# Patient Record
Sex: FEMALE | Race: WHITE | Employment: OTHER | URBAN - NONMETROPOLITAN AREA
[De-identification: names, ages, dates, MRNs, and addresses within clinical notes are randomized per-mention and may not be internally consistent; named-entity substitution may affect disease eponyms.]

---

## 2017-01-18 ENCOUNTER — OFFICE VISIT (OUTPATIENT)
Dept: FAMILY MEDICINE CLINIC | Age: 67
End: 2017-01-18

## 2017-01-18 VITALS
RESPIRATION RATE: 18 BRPM | WEIGHT: 196 LBS | TEMPERATURE: 97.4 F | HEIGHT: 65 IN | BODY MASS INDEX: 32.65 KG/M2 | SYSTOLIC BLOOD PRESSURE: 130 MMHG | OXYGEN SATURATION: 98 % | HEART RATE: 78 BPM | DIASTOLIC BLOOD PRESSURE: 66 MMHG

## 2017-01-18 DIAGNOSIS — F41.1 ANXIETY STATE: ICD-10-CM

## 2017-01-18 DIAGNOSIS — I10 ESSENTIAL HYPERTENSION: Primary | ICD-10-CM

## 2017-01-18 DIAGNOSIS — E78.49 OTHER HYPERLIPIDEMIA: ICD-10-CM

## 2017-01-18 PROCEDURE — 1090F PRES/ABSN URINE INCON ASSESS: CPT | Performed by: FAMILY MEDICINE

## 2017-01-18 PROCEDURE — 3014F SCREEN MAMMO DOC REV: CPT | Performed by: FAMILY MEDICINE

## 2017-01-18 PROCEDURE — G8419 CALC BMI OUT NRM PARAM NOF/U: HCPCS | Performed by: FAMILY MEDICINE

## 2017-01-18 PROCEDURE — G8484 FLU IMMUNIZE NO ADMIN: HCPCS | Performed by: FAMILY MEDICINE

## 2017-01-18 PROCEDURE — G8400 PT W/DXA NO RESULTS DOC: HCPCS | Performed by: FAMILY MEDICINE

## 2017-01-18 PROCEDURE — 3017F COLORECTAL CA SCREEN DOC REV: CPT | Performed by: FAMILY MEDICINE

## 2017-01-18 PROCEDURE — 99214 OFFICE O/P EST MOD 30 MIN: CPT | Performed by: FAMILY MEDICINE

## 2017-01-18 PROCEDURE — 4004F PT TOBACCO SCREEN RCVD TLK: CPT | Performed by: FAMILY MEDICINE

## 2017-01-18 PROCEDURE — 36415 COLL VENOUS BLD VENIPUNCTURE: CPT | Performed by: FAMILY MEDICINE

## 2017-01-18 PROCEDURE — 1123F ACP DISCUSS/DSCN MKR DOCD: CPT | Performed by: FAMILY MEDICINE

## 2017-01-18 PROCEDURE — G8427 DOCREV CUR MEDS BY ELIG CLIN: HCPCS | Performed by: FAMILY MEDICINE

## 2017-01-18 PROCEDURE — 4040F PNEUMOC VAC/ADMIN/RCVD: CPT | Performed by: FAMILY MEDICINE

## 2017-01-18 RX ORDER — LISINOPRIL AND HYDROCHLOROTHIAZIDE 20; 12.5 MG/1; MG/1
1 TABLET ORAL DAILY
Qty: 90 TABLET | Refills: 1 | Status: SHIPPED | OUTPATIENT
Start: 2017-01-18 | End: 2017-01-23 | Stop reason: ALTCHOICE

## 2017-01-18 RX ORDER — AMLODIPINE BESYLATE 5 MG/1
5 TABLET ORAL DAILY
Qty: 90 TABLET | Refills: 1 | Status: SHIPPED | OUTPATIENT
Start: 2017-01-18 | End: 2018-01-10

## 2017-01-18 RX ORDER — ATORVASTATIN CALCIUM 20 MG/1
20 TABLET, FILM COATED ORAL DAILY
Qty: 90 TABLET | Refills: 1 | Status: SHIPPED | OUTPATIENT
Start: 2017-01-18 | End: 2018-01-10

## 2017-01-18 RX ORDER — BUSPIRONE HYDROCHLORIDE 10 MG/1
10 TABLET ORAL 2 TIMES DAILY
Qty: 180 TABLET | Refills: 1 | Status: SHIPPED | OUTPATIENT
Start: 2017-01-18 | End: 2018-01-10 | Stop reason: SDUPTHER

## 2017-01-18 ASSESSMENT — ENCOUNTER SYMPTOMS: RESPIRATORY NEGATIVE: 1

## 2017-01-19 LAB
A/G RATIO: 1.5 (ref 1.1–2.2)
ALBUMIN SERPL-MCNC: 4.6 G/DL (ref 3.4–5)
ALP BLD-CCNC: 82 U/L (ref 40–129)
ALT SERPL-CCNC: 20 U/L (ref 10–40)
ANION GAP SERPL CALCULATED.3IONS-SCNC: 14 MMOL/L (ref 3–16)
AST SERPL-CCNC: 20 U/L (ref 15–37)
BILIRUB SERPL-MCNC: 0.3 MG/DL (ref 0–1)
BUN BLDV-MCNC: 8 MG/DL (ref 7–20)
CALCIUM SERPL-MCNC: 10 MG/DL (ref 8.3–10.6)
CHLORIDE BLD-SCNC: 90 MMOL/L (ref 99–110)
CHOLESTEROL, TOTAL: 155 MG/DL (ref 0–199)
CO2: 26 MMOL/L (ref 21–32)
CREAT SERPL-MCNC: 0.7 MG/DL (ref 0.6–1.2)
GFR AFRICAN AMERICAN: >60
GFR NON-AFRICAN AMERICAN: >60
GLOBULIN: 3.1 G/DL
GLUCOSE BLD-MCNC: 93 MG/DL (ref 70–99)
HDLC SERPL-MCNC: 66 MG/DL (ref 40–60)
LDL CHOLESTEROL CALCULATED: 63 MG/DL
POTASSIUM SERPL-SCNC: 5.3 MMOL/L (ref 3.5–5.1)
SODIUM BLD-SCNC: 130 MMOL/L (ref 136–145)
TOTAL PROTEIN: 7.7 G/DL (ref 6.4–8.2)
TRIGL SERPL-MCNC: 131 MG/DL (ref 0–150)
VLDLC SERPL CALC-MCNC: 26 MG/DL

## 2017-01-23 RX ORDER — LISINOPRIL 20 MG/1
20 TABLET ORAL DAILY
Qty: 30 TABLET | Refills: 3 | Status: SHIPPED | OUTPATIENT
Start: 2017-01-23 | End: 2018-01-10 | Stop reason: ALTCHOICE

## 2017-03-29 ENCOUNTER — OFFICE VISIT (OUTPATIENT)
Dept: FAMILY MEDICINE CLINIC | Age: 67
End: 2017-03-29

## 2017-03-29 VITALS
RESPIRATION RATE: 17 BRPM | BODY MASS INDEX: 30.82 KG/M2 | WEIGHT: 185 LBS | HEIGHT: 65 IN | TEMPERATURE: 97.6 F | OXYGEN SATURATION: 98 % | SYSTOLIC BLOOD PRESSURE: 136 MMHG | HEART RATE: 84 BPM | DIASTOLIC BLOOD PRESSURE: 70 MMHG

## 2017-03-29 DIAGNOSIS — E78.49 OTHER HYPERLIPIDEMIA: ICD-10-CM

## 2017-03-29 DIAGNOSIS — I10 ESSENTIAL HYPERTENSION: ICD-10-CM

## 2017-03-29 DIAGNOSIS — H26.9 CATARACT: Primary | ICD-10-CM

## 2017-03-29 DIAGNOSIS — F17.210 CIGARETTE NICOTINE DEPENDENCE WITHOUT COMPLICATION: ICD-10-CM

## 2017-03-29 LAB
ALBUMIN SERPL-MCNC: 4.7 G/DL (ref 3.4–5)
ANION GAP SERPL CALCULATED.3IONS-SCNC: 15 MMOL/L (ref 3–16)
BUN BLDV-MCNC: 8 MG/DL (ref 7–20)
CALCIUM SERPL-MCNC: 9.6 MG/DL (ref 8.3–10.6)
CHLORIDE BLD-SCNC: 96 MMOL/L (ref 99–110)
CO2: 24 MMOL/L (ref 21–32)
CREAT SERPL-MCNC: 0.7 MG/DL (ref 0.6–1.2)
GFR AFRICAN AMERICAN: >60
GFR NON-AFRICAN AMERICAN: >60
GLUCOSE BLD-MCNC: 101 MG/DL (ref 70–99)
PHOSPHORUS: 4.5 MG/DL (ref 2.5–4.9)
POTASSIUM SERPL-SCNC: 5 MMOL/L (ref 3.5–5.1)
SODIUM BLD-SCNC: 135 MMOL/L (ref 136–145)

## 2017-03-29 PROCEDURE — 1123F ACP DISCUSS/DSCN MKR DOCD: CPT | Performed by: FAMILY MEDICINE

## 2017-03-29 PROCEDURE — 1090F PRES/ABSN URINE INCON ASSESS: CPT | Performed by: FAMILY MEDICINE

## 2017-03-29 PROCEDURE — G8417 CALC BMI ABV UP PARAM F/U: HCPCS | Performed by: FAMILY MEDICINE

## 2017-03-29 PROCEDURE — 36415 COLL VENOUS BLD VENIPUNCTURE: CPT | Performed by: FAMILY MEDICINE

## 2017-03-29 PROCEDURE — 3017F COLORECTAL CA SCREEN DOC REV: CPT | Performed by: FAMILY MEDICINE

## 2017-03-29 PROCEDURE — 99213 OFFICE O/P EST LOW 20 MIN: CPT | Performed by: FAMILY MEDICINE

## 2017-03-29 PROCEDURE — G8484 FLU IMMUNIZE NO ADMIN: HCPCS | Performed by: FAMILY MEDICINE

## 2017-03-29 PROCEDURE — G8427 DOCREV CUR MEDS BY ELIG CLIN: HCPCS | Performed by: FAMILY MEDICINE

## 2017-03-29 PROCEDURE — 4040F PNEUMOC VAC/ADMIN/RCVD: CPT | Performed by: FAMILY MEDICINE

## 2017-03-29 PROCEDURE — 3014F SCREEN MAMMO DOC REV: CPT | Performed by: FAMILY MEDICINE

## 2017-03-29 PROCEDURE — G8400 PT W/DXA NO RESULTS DOC: HCPCS | Performed by: FAMILY MEDICINE

## 2017-03-29 PROCEDURE — 4004F PT TOBACCO SCREEN RCVD TLK: CPT | Performed by: FAMILY MEDICINE

## 2017-08-07 ENCOUNTER — OFFICE VISIT (OUTPATIENT)
Dept: FAMILY MEDICINE CLINIC | Age: 67
End: 2017-08-07

## 2017-08-07 VITALS
HEART RATE: 75 BPM | WEIGHT: 184 LBS | SYSTOLIC BLOOD PRESSURE: 150 MMHG | DIASTOLIC BLOOD PRESSURE: 80 MMHG | BODY MASS INDEX: 30.62 KG/M2 | OXYGEN SATURATION: 98 %

## 2017-08-07 DIAGNOSIS — F41.1 ANXIETY STATE: ICD-10-CM

## 2017-08-07 DIAGNOSIS — R07.9 CHEST PAIN, UNSPECIFIED TYPE: ICD-10-CM

## 2017-08-07 DIAGNOSIS — I10 ESSENTIAL HYPERTENSION: ICD-10-CM

## 2017-08-07 DIAGNOSIS — M79.602 LEFT ARM PAIN: Primary | ICD-10-CM

## 2017-08-07 PROCEDURE — 1123F ACP DISCUSS/DSCN MKR DOCD: CPT | Performed by: FAMILY MEDICINE

## 2017-08-07 PROCEDURE — G8400 PT W/DXA NO RESULTS DOC: HCPCS | Performed by: FAMILY MEDICINE

## 2017-08-07 PROCEDURE — 1090F PRES/ABSN URINE INCON ASSESS: CPT | Performed by: FAMILY MEDICINE

## 2017-08-07 PROCEDURE — G8417 CALC BMI ABV UP PARAM F/U: HCPCS | Performed by: FAMILY MEDICINE

## 2017-08-07 PROCEDURE — G8427 DOCREV CUR MEDS BY ELIG CLIN: HCPCS | Performed by: FAMILY MEDICINE

## 2017-08-07 PROCEDURE — 4040F PNEUMOC VAC/ADMIN/RCVD: CPT | Performed by: FAMILY MEDICINE

## 2017-08-07 PROCEDURE — 4004F PT TOBACCO SCREEN RCVD TLK: CPT | Performed by: FAMILY MEDICINE

## 2017-08-07 PROCEDURE — 93000 ELECTROCARDIOGRAM COMPLETE: CPT | Performed by: FAMILY MEDICINE

## 2017-08-07 PROCEDURE — 3017F COLORECTAL CA SCREEN DOC REV: CPT | Performed by: FAMILY MEDICINE

## 2017-08-07 PROCEDURE — 3014F SCREEN MAMMO DOC REV: CPT | Performed by: FAMILY MEDICINE

## 2017-08-07 PROCEDURE — 99214 OFFICE O/P EST MOD 30 MIN: CPT | Performed by: FAMILY MEDICINE

## 2017-08-07 RX ORDER — LISINOPRIL 20 MG/1
20 TABLET ORAL DAILY
Qty: 90 TABLET | Refills: 1 | Status: CANCELLED | OUTPATIENT
Start: 2017-08-07

## 2017-08-07 RX ORDER — ATORVASTATIN CALCIUM 20 MG/1
20 TABLET, FILM COATED ORAL DAILY
Qty: 90 TABLET | Refills: 1 | Status: CANCELLED | OUTPATIENT
Start: 2017-08-07

## 2017-08-07 RX ORDER — BUSPIRONE HYDROCHLORIDE 10 MG/1
10 TABLET ORAL 2 TIMES DAILY
Qty: 180 TABLET | Refills: 1 | Status: CANCELLED | OUTPATIENT
Start: 2017-08-07

## 2017-08-07 RX ORDER — AMLODIPINE BESYLATE 5 MG/1
5 TABLET ORAL DAILY
Qty: 90 TABLET | Refills: 1 | Status: CANCELLED | OUTPATIENT
Start: 2017-08-07

## 2017-08-07 ASSESSMENT — ENCOUNTER SYMPTOMS
WHEEZING: 0
SHORTNESS OF BREATH: 0
COUGH: 0
CHOKING: 0
CHEST TIGHTNESS: 1
STRIDOR: 0
GASTROINTESTINAL NEGATIVE: 1

## 2017-08-18 DIAGNOSIS — Z12.31 SCREENING MAMMOGRAM, ENCOUNTER FOR: Primary | ICD-10-CM

## 2018-01-10 ENCOUNTER — OFFICE VISIT (OUTPATIENT)
Dept: FAMILY MEDICINE CLINIC | Age: 68
End: 2018-01-10

## 2018-01-10 VITALS
BODY MASS INDEX: 31.12 KG/M2 | TEMPERATURE: 97.2 F | SYSTOLIC BLOOD PRESSURE: 190 MMHG | HEART RATE: 87 BPM | WEIGHT: 187 LBS | OXYGEN SATURATION: 98 % | DIASTOLIC BLOOD PRESSURE: 90 MMHG

## 2018-01-10 DIAGNOSIS — I10 ESSENTIAL HYPERTENSION: Primary | ICD-10-CM

## 2018-01-10 DIAGNOSIS — E78.49 OTHER HYPERLIPIDEMIA: ICD-10-CM

## 2018-01-10 DIAGNOSIS — F41.1 ANXIETY STATE: ICD-10-CM

## 2018-01-10 PROCEDURE — 3017F COLORECTAL CA SCREEN DOC REV: CPT | Performed by: FAMILY MEDICINE

## 2018-01-10 PROCEDURE — G8484 FLU IMMUNIZE NO ADMIN: HCPCS | Performed by: FAMILY MEDICINE

## 2018-01-10 PROCEDURE — 4004F PT TOBACCO SCREEN RCVD TLK: CPT | Performed by: FAMILY MEDICINE

## 2018-01-10 PROCEDURE — G8417 CALC BMI ABV UP PARAM F/U: HCPCS | Performed by: FAMILY MEDICINE

## 2018-01-10 PROCEDURE — 4040F PNEUMOC VAC/ADMIN/RCVD: CPT | Performed by: FAMILY MEDICINE

## 2018-01-10 PROCEDURE — G8427 DOCREV CUR MEDS BY ELIG CLIN: HCPCS | Performed by: FAMILY MEDICINE

## 2018-01-10 PROCEDURE — 1090F PRES/ABSN URINE INCON ASSESS: CPT | Performed by: FAMILY MEDICINE

## 2018-01-10 PROCEDURE — 3014F SCREEN MAMMO DOC REV: CPT | Performed by: FAMILY MEDICINE

## 2018-01-10 PROCEDURE — 99214 OFFICE O/P EST MOD 30 MIN: CPT | Performed by: FAMILY MEDICINE

## 2018-01-10 PROCEDURE — 1123F ACP DISCUSS/DSCN MKR DOCD: CPT | Performed by: FAMILY MEDICINE

## 2018-01-10 PROCEDURE — G8400 PT W/DXA NO RESULTS DOC: HCPCS | Performed by: FAMILY MEDICINE

## 2018-01-10 RX ORDER — BUSPIRONE HYDROCHLORIDE 10 MG/1
10 TABLET ORAL 2 TIMES DAILY
Qty: 60 TABLET | Refills: 1 | Status: SHIPPED | OUTPATIENT
Start: 2018-01-10 | End: 2018-02-09 | Stop reason: SDUPTHER

## 2018-01-10 RX ORDER — ATORVASTATIN CALCIUM 20 MG/1
20 TABLET, FILM COATED ORAL DAILY
Qty: 30 TABLET | Refills: 1 | Status: SHIPPED | OUTPATIENT
Start: 2018-01-10 | End: 2018-02-09 | Stop reason: SDUPTHER

## 2018-01-10 RX ORDER — LISINOPRIL AND HYDROCHLOROTHIAZIDE 20; 12.5 MG/1; MG/1
1 TABLET ORAL DAILY
COMMUNITY
End: 2018-01-10 | Stop reason: SDUPTHER

## 2018-01-10 RX ORDER — LISINOPRIL AND HYDROCHLOROTHIAZIDE 20; 12.5 MG/1; MG/1
1 TABLET ORAL DAILY
Qty: 30 TABLET | Refills: 1 | Status: SHIPPED | OUTPATIENT
Start: 2018-01-10 | End: 2018-02-09 | Stop reason: SDUPTHER

## 2018-01-10 ASSESSMENT — PATIENT HEALTH QUESTIONNAIRE - PHQ9
SUM OF ALL RESPONSES TO PHQ9 QUESTIONS 1 & 2: 0
2. FEELING DOWN, DEPRESSED OR HOPELESS: 0
SUM OF ALL RESPONSES TO PHQ QUESTIONS 1-9: 0
1. LITTLE INTEREST OR PLEASURE IN DOING THINGS: 0

## 2018-01-10 ASSESSMENT — ENCOUNTER SYMPTOMS
RESPIRATORY NEGATIVE: 1
RHINORRHEA: 0
TROUBLE SWALLOWING: 0

## 2018-01-10 NOTE — PATIENT INSTRUCTIONS
get mad at yourself if you smoke again. Make a list of things you learned and think about when you want to try again, such as next week, next month, or next year. Where can you learn more? Go to [email or web address] and sign in to your [employer] account. Enter T749 in the [employer] box to learn more about \"Stopping Smoking: Care Instructions. \"     If you do not have an account, please click on the \"Sign Up Now\" link. Current as of: March 20, 2017  Content Version: 11.5  © 7762-5935 Healthwise, Incorporated. Care instructions adapted under license by [facility] ([facility]). If you have questions about a medical condition or this instruction, always ask your healthcare professional. [address] any warranty or liability for your use of this information.

## 2018-02-09 ENCOUNTER — OFFICE VISIT (OUTPATIENT)
Dept: FAMILY MEDICINE CLINIC | Age: 68
End: 2018-02-09

## 2018-02-09 VITALS
DIASTOLIC BLOOD PRESSURE: 78 MMHG | WEIGHT: 185 LBS | HEIGHT: 65 IN | HEART RATE: 84 BPM | OXYGEN SATURATION: 98 % | BODY MASS INDEX: 30.82 KG/M2 | SYSTOLIC BLOOD PRESSURE: 134 MMHG

## 2018-02-09 DIAGNOSIS — I10 ESSENTIAL HYPERTENSION: Primary | ICD-10-CM

## 2018-02-09 DIAGNOSIS — E78.49 OTHER HYPERLIPIDEMIA: ICD-10-CM

## 2018-02-09 DIAGNOSIS — F41.1 ANXIETY STATE: ICD-10-CM

## 2018-02-09 PROCEDURE — 4004F PT TOBACCO SCREEN RCVD TLK: CPT | Performed by: FAMILY MEDICINE

## 2018-02-09 PROCEDURE — 3017F COLORECTAL CA SCREEN DOC REV: CPT | Performed by: FAMILY MEDICINE

## 2018-02-09 PROCEDURE — G8484 FLU IMMUNIZE NO ADMIN: HCPCS | Performed by: FAMILY MEDICINE

## 2018-02-09 PROCEDURE — 1123F ACP DISCUSS/DSCN MKR DOCD: CPT | Performed by: FAMILY MEDICINE

## 2018-02-09 PROCEDURE — G8400 PT W/DXA NO RESULTS DOC: HCPCS | Performed by: FAMILY MEDICINE

## 2018-02-09 PROCEDURE — 1090F PRES/ABSN URINE INCON ASSESS: CPT | Performed by: FAMILY MEDICINE

## 2018-02-09 PROCEDURE — 3014F SCREEN MAMMO DOC REV: CPT | Performed by: FAMILY MEDICINE

## 2018-02-09 PROCEDURE — 4040F PNEUMOC VAC/ADMIN/RCVD: CPT | Performed by: FAMILY MEDICINE

## 2018-02-09 PROCEDURE — G8417 CALC BMI ABV UP PARAM F/U: HCPCS | Performed by: FAMILY MEDICINE

## 2018-02-09 PROCEDURE — 99213 OFFICE O/P EST LOW 20 MIN: CPT | Performed by: FAMILY MEDICINE

## 2018-02-09 PROCEDURE — G8427 DOCREV CUR MEDS BY ELIG CLIN: HCPCS | Performed by: FAMILY MEDICINE

## 2018-02-09 RX ORDER — LISINOPRIL AND HYDROCHLOROTHIAZIDE 20; 12.5 MG/1; MG/1
1 TABLET ORAL DAILY
Qty: 30 TABLET | Refills: 3 | Status: SHIPPED | OUTPATIENT
Start: 2018-02-09 | End: 2018-06-25 | Stop reason: DRUGHIGH

## 2018-02-09 RX ORDER — ATORVASTATIN CALCIUM 20 MG/1
20 TABLET, FILM COATED ORAL DAILY
Qty: 30 TABLET | Refills: 3 | Status: SHIPPED | OUTPATIENT
Start: 2018-02-09 | End: 2018-06-25 | Stop reason: SDUPTHER

## 2018-02-09 RX ORDER — BUSPIRONE HYDROCHLORIDE 10 MG/1
10 TABLET ORAL 2 TIMES DAILY
Qty: 60 TABLET | Refills: 3 | Status: SHIPPED | OUTPATIENT
Start: 2018-02-09 | End: 2018-06-26 | Stop reason: SDUPTHER

## 2018-02-09 ASSESSMENT — ENCOUNTER SYMPTOMS
GASTROINTESTINAL NEGATIVE: 1
RESPIRATORY NEGATIVE: 1

## 2018-02-09 NOTE — PROGRESS NOTES
Subjective:      Patient ID: Rolando Melton is a 78 y.o. female. HPI  Chief Complaint   Patient presents with    Hypertension-Denies cv/cns/mike sx      Back on her antihypertensive; complaining of intermittent pain in calvesbrief short-lived, 2-3 minutes. Not nocturnal.  Patient wants to blame statin    Hyperlipidemia     fasting ; see above    Anxiety     Better with BuSpar routinely     Chief complaint present illness: 76-year-old white female presents unaccompanied for follow-up now that she's back on her medications. Review of Systems   Constitutional: Negative. Respiratory: Negative. Gastrointestinal: Negative. Objective:   Physical Exam   Constitutional: She appears well-developed and well-nourished. Cardiovascular: Normal rate, regular rhythm and normal heart sounds. Pulmonary/Chest: Effort normal and breath sounds normal.   Neurological: She is alert. Skin: Skin is warm. Psychiatric: She has a normal mood and affect. Nursing note and vitals reviewed. /78   Pulse 84   Ht 5' 5\" (1.651 m)   Wt 185 lb (83.9 kg)   SpO2 98%   BMI 30.79 kg/m²     Assessment:      Isa Echols was seen today for hypertension, hyperlipidemia and anxiety. Diagnoses and all orders for this visit:    Essential hypertension  -     lisinopril-hydrochlorothiazide (PRINZIDE;ZESTORETIC) 20-12.5 MG per tablet; Take 1 tablet by mouth daily  -     Renal Function Panel; Future    Other hyperlipidemia  -     atorvastatin (LIPITOR) 20 MG tablet; Take 1 tablet by mouth daily  -     Lipid Panel; Future    Anxiety state  -     busPIRone (BUSPAR) 10 MG tablet; Take 1 tablet by mouth 2 times daily           Plan:      Return in about 6 months (around 8/9/2018). Patient Instructions   In 3 months,come in for blood work.

## 2018-05-16 ENCOUNTER — NURSE ONLY (OUTPATIENT)
Dept: FAMILY MEDICINE CLINIC | Age: 68
End: 2018-05-16

## 2018-05-16 DIAGNOSIS — I10 ESSENTIAL HYPERTENSION: ICD-10-CM

## 2018-05-16 DIAGNOSIS — E78.49 OTHER HYPERLIPIDEMIA: ICD-10-CM

## 2018-05-16 LAB
ALBUMIN SERPL-MCNC: 4.9 G/DL (ref 3.4–5)
ANION GAP SERPL CALCULATED.3IONS-SCNC: 15 MMOL/L (ref 3–16)
BUN BLDV-MCNC: 10 MG/DL (ref 7–20)
CALCIUM SERPL-MCNC: 9.9 MG/DL (ref 8.3–10.6)
CHLORIDE BLD-SCNC: 87 MMOL/L (ref 99–110)
CHOLESTEROL, TOTAL: 149 MG/DL (ref 0–199)
CO2: 25 MMOL/L (ref 21–32)
CREAT SERPL-MCNC: 0.7 MG/DL (ref 0.6–1.2)
GFR AFRICAN AMERICAN: >60
GFR NON-AFRICAN AMERICAN: >60
GLUCOSE BLD-MCNC: 80 MG/DL (ref 70–99)
HDLC SERPL-MCNC: 64 MG/DL (ref 40–60)
LDL CHOLESTEROL CALCULATED: 54 MG/DL
PHOSPHORUS: 3.9 MG/DL (ref 2.5–4.9)
POTASSIUM SERPL-SCNC: 4.8 MMOL/L (ref 3.5–5.1)
SODIUM BLD-SCNC: 127 MMOL/L (ref 136–145)
TRIGL SERPL-MCNC: 153 MG/DL (ref 0–150)
VLDLC SERPL CALC-MCNC: 31 MG/DL

## 2018-05-16 PROCEDURE — 36415 COLL VENOUS BLD VENIPUNCTURE: CPT | Performed by: FAMILY MEDICINE

## 2018-05-17 RX ORDER — LISINOPRIL 40 MG/1
40 TABLET ORAL DAILY
Qty: 30 TABLET | Refills: 3 | Status: SHIPPED | OUTPATIENT
Start: 2018-05-17 | End: 2018-06-25 | Stop reason: SDUPTHER

## 2018-05-21 DIAGNOSIS — Z12.11 SCREENING FOR COLON CANCER: Primary | ICD-10-CM

## 2018-06-25 ENCOUNTER — OFFICE VISIT (OUTPATIENT)
Dept: FAMILY MEDICINE CLINIC | Age: 68
End: 2018-06-25

## 2018-06-25 VITALS
DIASTOLIC BLOOD PRESSURE: 80 MMHG | HEART RATE: 81 BPM | SYSTOLIC BLOOD PRESSURE: 180 MMHG | WEIGHT: 180 LBS | OXYGEN SATURATION: 97 % | BODY MASS INDEX: 29.95 KG/M2

## 2018-06-25 DIAGNOSIS — E87.1 HYPONATREMIA: ICD-10-CM

## 2018-06-25 DIAGNOSIS — I10 ESSENTIAL HYPERTENSION: Primary | ICD-10-CM

## 2018-06-25 DIAGNOSIS — F41.1 ANXIETY STATE: ICD-10-CM

## 2018-06-25 DIAGNOSIS — E78.49 OTHER HYPERLIPIDEMIA: ICD-10-CM

## 2018-06-25 LAB
ALBUMIN SERPL-MCNC: 4.6 G/DL (ref 3.4–5)
ANION GAP SERPL CALCULATED.3IONS-SCNC: 14 MMOL/L (ref 3–16)
BUN BLDV-MCNC: 7 MG/DL (ref 7–20)
CALCIUM SERPL-MCNC: 10 MG/DL (ref 8.3–10.6)
CHLORIDE BLD-SCNC: 97 MMOL/L (ref 99–110)
CO2: 24 MMOL/L (ref 21–32)
CREAT SERPL-MCNC: 0.8 MG/DL (ref 0.6–1.2)
GFR AFRICAN AMERICAN: >60
GFR NON-AFRICAN AMERICAN: >60
GLUCOSE BLD-MCNC: 79 MG/DL (ref 70–99)
PHOSPHORUS: 3.8 MG/DL (ref 2.5–4.9)
POTASSIUM SERPL-SCNC: 5.1 MMOL/L (ref 3.5–5.1)
SODIUM BLD-SCNC: 135 MMOL/L (ref 136–145)

## 2018-06-25 PROCEDURE — 4004F PT TOBACCO SCREEN RCVD TLK: CPT | Performed by: FAMILY MEDICINE

## 2018-06-25 PROCEDURE — 36415 COLL VENOUS BLD VENIPUNCTURE: CPT | Performed by: FAMILY MEDICINE

## 2018-06-25 PROCEDURE — G8400 PT W/DXA NO RESULTS DOC: HCPCS | Performed by: FAMILY MEDICINE

## 2018-06-25 PROCEDURE — 1123F ACP DISCUSS/DSCN MKR DOCD: CPT | Performed by: FAMILY MEDICINE

## 2018-06-25 PROCEDURE — 4040F PNEUMOC VAC/ADMIN/RCVD: CPT | Performed by: FAMILY MEDICINE

## 2018-06-25 PROCEDURE — G8427 DOCREV CUR MEDS BY ELIG CLIN: HCPCS | Performed by: FAMILY MEDICINE

## 2018-06-25 PROCEDURE — 3017F COLORECTAL CA SCREEN DOC REV: CPT | Performed by: FAMILY MEDICINE

## 2018-06-25 PROCEDURE — G8417 CALC BMI ABV UP PARAM F/U: HCPCS | Performed by: FAMILY MEDICINE

## 2018-06-25 PROCEDURE — 1090F PRES/ABSN URINE INCON ASSESS: CPT | Performed by: FAMILY MEDICINE

## 2018-06-25 PROCEDURE — 99213 OFFICE O/P EST LOW 20 MIN: CPT | Performed by: FAMILY MEDICINE

## 2018-06-25 RX ORDER — ATORVASTATIN CALCIUM 20 MG/1
20 TABLET, FILM COATED ORAL DAILY
Qty: 90 TABLET | Refills: 1 | Status: SHIPPED | OUTPATIENT
Start: 2018-06-25 | End: 2018-12-19

## 2018-06-25 RX ORDER — LISINOPRIL 40 MG/1
40 TABLET ORAL DAILY
Qty: 90 TABLET | Refills: 0 | Status: SHIPPED | OUTPATIENT
Start: 2018-06-25 | End: 2018-12-19 | Stop reason: SDUPTHER

## 2018-06-25 ASSESSMENT — ENCOUNTER SYMPTOMS
GASTROINTESTINAL NEGATIVE: 1
RESPIRATORY NEGATIVE: 1

## 2018-06-26 DIAGNOSIS — F41.1 ANXIETY STATE: ICD-10-CM

## 2018-06-27 RX ORDER — BUSPIRONE HYDROCHLORIDE 10 MG/1
TABLET ORAL
Qty: 180 TABLET | Refills: 1 | Status: SHIPPED | OUTPATIENT
Start: 2018-06-27 | End: 2018-12-19 | Stop reason: SDUPTHER

## 2018-06-29 ENCOUNTER — TELEPHONE (OUTPATIENT)
Dept: FAMILY MEDICINE CLINIC | Age: 68
End: 2018-06-29

## 2018-07-03 RX ORDER — HYDROCHLOROTHIAZIDE 12.5 MG/1
12.5 CAPSULE, GELATIN COATED ORAL DAILY
Qty: 30 CAPSULE | Refills: 1 | Status: SHIPPED | OUTPATIENT
Start: 2018-07-03 | End: 2018-12-19

## 2018-07-07 DIAGNOSIS — I10 ESSENTIAL HYPERTENSION: ICD-10-CM

## 2018-07-09 RX ORDER — LISINOPRIL AND HYDROCHLOROTHIAZIDE 20; 12.5 MG/1; MG/1
TABLET ORAL
Qty: 90 TABLET | Refills: 2 | OUTPATIENT
Start: 2018-07-09

## 2018-12-10 ENCOUNTER — TELEPHONE (OUTPATIENT)
Dept: FAMILY MEDICINE CLINIC | Age: 68
End: 2018-12-10

## 2018-12-17 DIAGNOSIS — E78.49 OTHER HYPERLIPIDEMIA: ICD-10-CM

## 2018-12-19 ENCOUNTER — OFFICE VISIT (OUTPATIENT)
Dept: FAMILY MEDICINE CLINIC | Age: 68
End: 2018-12-19
Payer: MEDICARE

## 2018-12-19 VITALS
DIASTOLIC BLOOD PRESSURE: 84 MMHG | OXYGEN SATURATION: 99 % | SYSTOLIC BLOOD PRESSURE: 134 MMHG | WEIGHT: 184.4 LBS | BODY MASS INDEX: 30.69 KG/M2 | HEART RATE: 64 BPM

## 2018-12-19 DIAGNOSIS — I10 ESSENTIAL HYPERTENSION: ICD-10-CM

## 2018-12-19 DIAGNOSIS — E78.49 OTHER HYPERLIPIDEMIA: Primary | ICD-10-CM

## 2018-12-19 DIAGNOSIS — K50.90 CROHN'S DISEASE WITHOUT COMPLICATION, UNSPECIFIED GASTROINTESTINAL TRACT LOCATION (HCC): ICD-10-CM

## 2018-12-19 DIAGNOSIS — F41.1 ANXIETY STATE: ICD-10-CM

## 2018-12-19 PROCEDURE — 3017F COLORECTAL CA SCREEN DOC REV: CPT | Performed by: FAMILY MEDICINE

## 2018-12-19 PROCEDURE — 1101F PT FALLS ASSESS-DOCD LE1/YR: CPT | Performed by: FAMILY MEDICINE

## 2018-12-19 PROCEDURE — 1123F ACP DISCUSS/DSCN MKR DOCD: CPT | Performed by: FAMILY MEDICINE

## 2018-12-19 PROCEDURE — 1090F PRES/ABSN URINE INCON ASSESS: CPT | Performed by: FAMILY MEDICINE

## 2018-12-19 PROCEDURE — 4004F PT TOBACCO SCREEN RCVD TLK: CPT | Performed by: FAMILY MEDICINE

## 2018-12-19 PROCEDURE — G8400 PT W/DXA NO RESULTS DOC: HCPCS | Performed by: FAMILY MEDICINE

## 2018-12-19 PROCEDURE — G8417 CALC BMI ABV UP PARAM F/U: HCPCS | Performed by: FAMILY MEDICINE

## 2018-12-19 PROCEDURE — G8484 FLU IMMUNIZE NO ADMIN: HCPCS | Performed by: FAMILY MEDICINE

## 2018-12-19 PROCEDURE — G8427 DOCREV CUR MEDS BY ELIG CLIN: HCPCS | Performed by: FAMILY MEDICINE

## 2018-12-19 PROCEDURE — 4040F PNEUMOC VAC/ADMIN/RCVD: CPT | Performed by: FAMILY MEDICINE

## 2018-12-19 PROCEDURE — 99214 OFFICE O/P EST MOD 30 MIN: CPT | Performed by: FAMILY MEDICINE

## 2018-12-19 RX ORDER — LISINOPRIL 40 MG/1
40 TABLET ORAL DAILY
Qty: 90 TABLET | Refills: 1 | Status: SHIPPED | OUTPATIENT
Start: 2018-12-19 | End: 2019-08-21 | Stop reason: DRUGHIGH

## 2018-12-19 RX ORDER — BUSPIRONE HYDROCHLORIDE 10 MG/1
TABLET ORAL
Qty: 180 TABLET | Refills: 1 | Status: SHIPPED | OUTPATIENT
Start: 2018-12-19 | End: 2019-10-07 | Stop reason: SDUPTHER

## 2018-12-19 RX ORDER — ATORVASTATIN CALCIUM 20 MG/1
TABLET, FILM COATED ORAL
Qty: 90 TABLET | Refills: 0 | OUTPATIENT
Start: 2018-12-19

## 2018-12-19 ASSESSMENT — ENCOUNTER SYMPTOMS
GASTROINTESTINAL NEGATIVE: 1
RESPIRATORY NEGATIVE: 1

## 2018-12-21 DIAGNOSIS — E78.49 OTHER HYPERLIPIDEMIA: ICD-10-CM

## 2018-12-21 DIAGNOSIS — I10 ESSENTIAL HYPERTENSION: ICD-10-CM

## 2018-12-21 RX ORDER — LISINOPRIL AND HYDROCHLOROTHIAZIDE 20; 12.5 MG/1; MG/1
TABLET ORAL
Qty: 90 TABLET | Refills: 2 | Status: SHIPPED | OUTPATIENT
Start: 2018-12-21 | End: 2019-04-09

## 2018-12-21 RX ORDER — ATORVASTATIN CALCIUM 20 MG/1
TABLET, FILM COATED ORAL
Qty: 90 TABLET | Refills: 0 | Status: SHIPPED | OUTPATIENT
Start: 2018-12-21 | End: 2019-03-20 | Stop reason: SDUPTHER

## 2019-03-20 DIAGNOSIS — E78.49 OTHER HYPERLIPIDEMIA: ICD-10-CM

## 2019-03-21 DIAGNOSIS — I10 ESSENTIAL HYPERTENSION: ICD-10-CM

## 2019-03-21 RX ORDER — LISINOPRIL AND HYDROCHLOROTHIAZIDE 20; 12.5 MG/1; MG/1
TABLET ORAL
Qty: 90 TABLET | Refills: 2 | OUTPATIENT
Start: 2019-03-21

## 2019-03-21 RX ORDER — LISINOPRIL 40 MG/1
TABLET ORAL
Qty: 90 TABLET | Refills: 0 | OUTPATIENT
Start: 2019-03-21

## 2019-03-21 RX ORDER — ATORVASTATIN CALCIUM 20 MG/1
TABLET, FILM COATED ORAL
Qty: 90 TABLET | Refills: 0 | Status: SHIPPED | OUTPATIENT
Start: 2019-03-21 | End: 2019-08-21 | Stop reason: SDUPTHER

## 2019-04-09 ENCOUNTER — APPOINTMENT (OUTPATIENT)
Dept: GENERAL RADIOLOGY | Age: 69
End: 2019-04-09
Payer: MEDICARE

## 2019-04-09 ENCOUNTER — HOSPITAL ENCOUNTER (EMERGENCY)
Age: 69
Discharge: HOME | End: 2019-04-09
Attending: EMERGENCY MEDICINE
Payer: MEDICARE

## 2019-04-09 VITALS
DIASTOLIC BLOOD PRESSURE: 81 MMHG | TEMPERATURE: 98 F | WEIGHT: 160 LBS | RESPIRATION RATE: 16 BRPM | SYSTOLIC BLOOD PRESSURE: 132 MMHG | HEART RATE: 88 BPM | BODY MASS INDEX: 26.66 KG/M2 | OXYGEN SATURATION: 98 % | HEIGHT: 65 IN

## 2019-04-09 DIAGNOSIS — S30.0XXA LUMBAR CONTUSION, INITIAL ENCOUNTER: Primary | ICD-10-CM

## 2019-04-09 LAB
BILIRUBIN URINE: NEGATIVE
BLOOD, URINE: NEGATIVE
CLARITY: CLEAR
COLOR: YELLOW
GLUCOSE URINE: NEGATIVE MG/DL
KETONES, URINE: NEGATIVE MG/DL
LEUKOCYTE ESTERASE, URINE: NEGATIVE
MICROSCOPIC EXAMINATION: NORMAL
NITRITE, URINE: NEGATIVE
PH UA: 6 (ref 5–8)
PROTEIN UA: NEGATIVE MG/DL
SPECIFIC GRAVITY UA: 1.01 (ref 1–1.03)
URINE TYPE: NORMAL
UROBILINOGEN, URINE: 0.2 E.U./DL

## 2019-04-09 PROCEDURE — 72100 X-RAY EXAM L-S SPINE 2/3 VWS: CPT

## 2019-04-09 PROCEDURE — 81003 URINALYSIS AUTO W/O SCOPE: CPT

## 2019-04-09 PROCEDURE — 99283 EMERGENCY DEPT VISIT LOW MDM: CPT

## 2019-04-09 RX ORDER — HYDROCODONE BITARTRATE AND ACETAMINOPHEN 5; 325 MG/1; MG/1
1 TABLET ORAL EVERY 6 HOURS PRN
Qty: 12 TABLET | Refills: 0 | Status: SHIPPED | OUTPATIENT
Start: 2019-04-09 | End: 2019-04-12

## 2019-04-09 ASSESSMENT — PAIN SCALES - GENERAL: PAINLEVEL_OUTOF10: 10

## 2019-04-09 ASSESSMENT — ENCOUNTER SYMPTOMS
COUGH: 0
VOMITING: 0
ABDOMINAL PAIN: 0
NAUSEA: 0
BACK PAIN: 1
CONSTIPATION: 0

## 2019-04-09 ASSESSMENT — PAIN DESCRIPTION - LOCATION: LOCATION: BACK

## 2019-04-09 ASSESSMENT — PAIN DESCRIPTION - ORIENTATION: ORIENTATION: LEFT;LOWER

## 2019-04-09 ASSESSMENT — PAIN DESCRIPTION - PAIN TYPE: TYPE: ACUTE PAIN

## 2019-04-09 ASSESSMENT — PAIN DESCRIPTION - DESCRIPTORS: DESCRIPTORS: ACHING

## 2019-04-09 NOTE — ED PROVIDER NOTES
17   Ht 5' 5\" (1.651 m)   Wt 160 lb (72.6 kg)   SpO2 98%   Breastfeeding? No   BMI 26.63 kg/m²     Physical Exam   Constitutional: She is oriented to person, place, and time. She appears well-developed. No distress. HENT:   Head: Normocephalic and atraumatic. Right Ear: External ear normal.   Left Ear: External ear normal.   Mouth/Throat: No oropharyngeal exudate, posterior oropharyngeal edema or posterior oropharyngeal erythema. Eyes: Pupils are equal, round, and reactive to light. Conjunctivae and EOM are normal. Right eye exhibits no discharge. Left eye exhibits no discharge. No scleral icterus. Neck: Normal range of motion. Neck supple. No JVD present. Cardiovascular: Normal rate, regular rhythm and intact distal pulses. Exam reveals no gallop and no friction rub. No murmur heard. Pulmonary/Chest: Effort normal and breath sounds normal. No stridor. No respiratory distress. She has no wheezes. She has no rales. Abdominal: Soft. Bowel sounds are normal. She exhibits no distension and no mass. There is no tenderness. There is no rigidity, no rebound and no guarding. Musculoskeletal: Normal range of motion. She exhibits no edema. Lumbar back: She exhibits tenderness and bony tenderness. She exhibits normal range of motion, no swelling, no edema, no deformity, no spasm and normal pulse. Back:    Neurological: She is alert and oriented to person, place, and time. She has normal strength. She displays normal reflexes. No cranial nerve deficit or sensory deficit. She exhibits normal muscle tone. Coordination normal. GCS eye subscore is 4. GCS verbal subscore is 5. GCS motor subscore is 6. Skin: Skin is warm and dry. Capillary refill takes less than 2 seconds. No rash noted. She is not diaphoretic. No erythema. No pallor. Psychiatric: She has a normal mood and affect.  Her behavior is normal.       Procedures    MDM      Labs    Results for orders placed or performed during the hospital encounter of 04/09/19   Urinalysis   Result Value Ref Range    Color, UA Yellow Straw/Yellow    Clarity, UA Clear Clear    Glucose, Ur Negative Negative mg/dL    Bilirubin Urine Negative Negative    Ketones, Urine Negative Negative mg/dL    Specific Gravity, UA 1.015 1.005 - 1.030    Blood, Urine Negative Negative    pH, UA 6.0 5.0 - 8.0    Protein, UA Negative Negative mg/dL    Urobilinogen, Urine 0.2 <2.0 E.U./dL    Nitrite, Urine Negative Negative    Leukocyte Esterase, Urine Negative Negative    Microscopic Examination Not Indicated     Urine Type Not Specified        Radiology    The following radiographic studies were viewed by myself. Radiologist's interpretation:    Xr Lumbar Spine (2-3 Views)    Result Date: 4/9/2019  EXAMINATION: 3 XRAY VIEWS OF THE LUMBAR SPINE 4/9/2019 12:35 pm COMPARISON: None. HISTORY: ORDERING SYSTEM PROVIDED HISTORY: fall TECHNOLOGIST PROVIDED HISTORY: Reason for exam:->fall Ordering Physician Provided Reason for Exam: FELL WHILE TRYING TO HANG CURTAINS ON THURSDAY, LBP Acuity: Acute Type of Exam: Initial FINDINGS: 5 lumbar vertebral bodies are normal in height and alignment. No evident fracture or bone lesion. Moderate facet arthropathy within the mid and lower lumbar spine. Mild multilevel degenerative disc changes throughout the lumbar spine especially L5-S1. Sacroiliac joints are unremarkable. Moderate calcification within the abdominal aorta without evident aneurysm. Surgical clips are seen within the right upper quadrant presumably from prior cholecystectomy. Fascial sutures are noted within the pelvis. No acute bony injury lumbar spine. Mild to moderate multilevel degenerative changes within facet joints and disc spaces. Emergency Department Course:  1:14 PM: We discussed back injuries and the need to return for numbness weakness incontinence of which the patient has at this time. She was given referral to orthopedics.   She has taken Vicodin in the past without any problem any problems and knows not to take with it. She voiced understanding of all structures is agreeable plan is discharged in good condition. Discussed results, diagnosis and plan with patient and/or family. Questions addressed. Disposition and follow-up agreed upon. Specific discharge instructions explained. The patient and/or family and I have discussed the diagnosis and risks, and we agree with discharging home to follow-up with their primary care, specialist or referral doctor. We also discussed returning to the Emergency Department immediately if new or worsening symptoms occur. We have discussed the symptoms which are most concerning that necessitate immediate return. This document serves as a record of the services and decisions personally performed by Martha Tyler MD. It was created on the provider's behalf by James Lee, a trained medical scribe. The creation of this document is based on the provider's statements to the medical scribe. The document has been reviewed and approved by the provider. Patrica Maya, 12:57 PM 4/9/19 scribing for and in the presence of Martha Tyler MD.       This dictation was generated by voice recognition computer software. Although all attempts are made to edit the dictation for accuracy, there may be errors in the transcription that are not intended.     The Clinical Impression is lumbar contusion     Martha Tyler MD  04/09/19 5209

## 2019-04-09 NOTE — ED NOTES
Pt denies numbness, radiation of pain or change in bowel / bladder habits. Gait steady and even. No contusions, erythema, edema or further s/s injuries noted. Pt denies LOC or further c/o's.       Joy Phillip RN  04/09/19 [id number]

## 2019-05-01 ENCOUNTER — OFFICE VISIT (OUTPATIENT)
Dept: FAMILY MEDICINE CLINIC | Age: 69
End: 2019-05-01
Payer: MEDICARE

## 2019-05-01 DIAGNOSIS — H10.33 ACUTE CONJUNCTIVITIS OF BOTH EYES, UNSPECIFIED ACUTE CONJUNCTIVITIS TYPE: Primary | ICD-10-CM

## 2019-05-01 LAB
ALBUMIN SERPL-MCNC: 4.2 G/DL (ref 3.4–5)
ANION GAP SERPL CALCULATED.3IONS-SCNC: 13 MMOL/L (ref 3–16)
BUN BLDV-MCNC: 6 MG/DL (ref 7–20)
CALCIUM SERPL-MCNC: 10 MG/DL (ref 8.3–10.6)
CHLORIDE BLD-SCNC: 97 MMOL/L (ref 99–110)
CO2: 25 MMOL/L (ref 21–32)
CREAT SERPL-MCNC: 0.8 MG/DL (ref 0.6–1.2)
GFR AFRICAN AMERICAN: >60
GFR NON-AFRICAN AMERICAN: >60
GLUCOSE BLD-MCNC: 102 MG/DL (ref 70–99)
PHOSPHORUS: 4.1 MG/DL (ref 2.5–4.9)
POTASSIUM SERPL-SCNC: 4.9 MMOL/L (ref 3.5–5.1)
SODIUM BLD-SCNC: 135 MMOL/L (ref 136–145)

## 2019-05-01 PROCEDURE — 36415 COLL VENOUS BLD VENIPUNCTURE: CPT | Performed by: FAMILY MEDICINE

## 2019-05-01 PROCEDURE — G8417 CALC BMI ABV UP PARAM F/U: HCPCS | Performed by: FAMILY MEDICINE

## 2019-05-01 PROCEDURE — 4040F PNEUMOC VAC/ADMIN/RCVD: CPT | Performed by: FAMILY MEDICINE

## 2019-05-01 PROCEDURE — G8400 PT W/DXA NO RESULTS DOC: HCPCS | Performed by: FAMILY MEDICINE

## 2019-05-01 PROCEDURE — 4004F PT TOBACCO SCREEN RCVD TLK: CPT | Performed by: FAMILY MEDICINE

## 2019-05-01 PROCEDURE — 1090F PRES/ABSN URINE INCON ASSESS: CPT | Performed by: FAMILY MEDICINE

## 2019-05-01 PROCEDURE — 1123F ACP DISCUSS/DSCN MKR DOCD: CPT | Performed by: FAMILY MEDICINE

## 2019-05-01 PROCEDURE — 3017F COLORECTAL CA SCREEN DOC REV: CPT | Performed by: FAMILY MEDICINE

## 2019-05-01 PROCEDURE — 99213 OFFICE O/P EST LOW 20 MIN: CPT | Performed by: FAMILY MEDICINE

## 2019-05-01 PROCEDURE — G8427 DOCREV CUR MEDS BY ELIG CLIN: HCPCS | Performed by: FAMILY MEDICINE

## 2019-05-01 RX ORDER — PREDNISOLONE ACETATE 10 MG/ML
SUSPENSION/ DROPS OPHTHALMIC
Refills: 1 | COMMUNITY
Start: 2019-04-27 | End: 2019-08-21

## 2019-05-01 RX ORDER — CETIRIZINE HYDROCHLORIDE 10 MG/1
10 TABLET ORAL DAILY
Qty: 15 TABLET | Refills: 0 | Status: SHIPPED | OUTPATIENT
Start: 2019-05-01 | End: 2019-08-21

## 2019-05-01 ASSESSMENT — PATIENT HEALTH QUESTIONNAIRE - PHQ9
SUM OF ALL RESPONSES TO PHQ QUESTIONS 1-9: 0
SUM OF ALL RESPONSES TO PHQ QUESTIONS 1-9: 0
SUM OF ALL RESPONSES TO PHQ9 QUESTIONS 1 & 2: 0
1. LITTLE INTEREST OR PLEASURE IN DOING THINGS: 0
2. FEELING DOWN, DEPRESSED OR HOPELESS: 0

## 2019-05-02 VITALS
OXYGEN SATURATION: 98 % | DIASTOLIC BLOOD PRESSURE: 60 MMHG | WEIGHT: 172 LBS | SYSTOLIC BLOOD PRESSURE: 136 MMHG | HEART RATE: 83 BPM | BODY MASS INDEX: 28.62 KG/M2 | TEMPERATURE: 97.6 F

## 2019-05-02 RX ORDER — FUROSEMIDE 20 MG/1
20 TABLET ORAL DAILY
Qty: 5 TABLET | Refills: 0 | Status: SHIPPED | OUTPATIENT
Start: 2019-05-02 | End: 2019-08-21

## 2019-05-02 ASSESSMENT — ENCOUNTER SYMPTOMS
EYE DISCHARGE: 1
RHINORRHEA: 0
SORE THROAT: 0
EYE REDNESS: 1
FACIAL SWELLING: 1
RESPIRATORY NEGATIVE: 1
PHOTOPHOBIA: 1
EYE ITCHING: 1

## 2019-08-21 ENCOUNTER — OFFICE VISIT (OUTPATIENT)
Dept: FAMILY MEDICINE CLINIC | Age: 69
End: 2019-08-21
Payer: MEDICARE

## 2019-08-21 VITALS
OXYGEN SATURATION: 98 % | HEART RATE: 68 BPM | SYSTOLIC BLOOD PRESSURE: 160 MMHG | DIASTOLIC BLOOD PRESSURE: 70 MMHG | WEIGHT: 155 LBS | BODY MASS INDEX: 25.79 KG/M2

## 2019-08-21 DIAGNOSIS — I10 ESSENTIAL HYPERTENSION: ICD-10-CM

## 2019-08-21 DIAGNOSIS — R09.89 BILATERAL CAROTID BRUITS: Primary | ICD-10-CM

## 2019-08-21 DIAGNOSIS — E78.49 OTHER HYPERLIPIDEMIA: ICD-10-CM

## 2019-08-21 DIAGNOSIS — F41.1 ANXIETY STATE: ICD-10-CM

## 2019-08-21 PROCEDURE — 36415 COLL VENOUS BLD VENIPUNCTURE: CPT | Performed by: FAMILY MEDICINE

## 2019-08-21 PROCEDURE — 99214 OFFICE O/P EST MOD 30 MIN: CPT | Performed by: FAMILY MEDICINE

## 2019-08-21 PROCEDURE — 1123F ACP DISCUSS/DSCN MKR DOCD: CPT | Performed by: FAMILY MEDICINE

## 2019-08-21 PROCEDURE — 1090F PRES/ABSN URINE INCON ASSESS: CPT | Performed by: FAMILY MEDICINE

## 2019-08-21 PROCEDURE — 4004F PT TOBACCO SCREEN RCVD TLK: CPT | Performed by: FAMILY MEDICINE

## 2019-08-21 PROCEDURE — G8417 CALC BMI ABV UP PARAM F/U: HCPCS | Performed by: FAMILY MEDICINE

## 2019-08-21 PROCEDURE — G8427 DOCREV CUR MEDS BY ELIG CLIN: HCPCS | Performed by: FAMILY MEDICINE

## 2019-08-21 PROCEDURE — 3017F COLORECTAL CA SCREEN DOC REV: CPT | Performed by: FAMILY MEDICINE

## 2019-08-21 PROCEDURE — G8400 PT W/DXA NO RESULTS DOC: HCPCS | Performed by: FAMILY MEDICINE

## 2019-08-21 PROCEDURE — 4040F PNEUMOC VAC/ADMIN/RCVD: CPT | Performed by: FAMILY MEDICINE

## 2019-08-21 RX ORDER — LISINOPRIL AND HYDROCHLOROTHIAZIDE 20; 12.5 MG/1; MG/1
2 TABLET ORAL DAILY
Qty: 90 TABLET | Refills: 1 | Status: SHIPPED | OUTPATIENT
Start: 2019-08-21 | End: 2019-10-07 | Stop reason: SDUPTHER

## 2019-08-21 RX ORDER — BUSPIRONE HYDROCHLORIDE 10 MG/1
TABLET ORAL
Qty: 180 TABLET | Refills: 1 | Status: CANCELLED | OUTPATIENT
Start: 2019-08-21

## 2019-08-21 RX ORDER — LISINOPRIL 40 MG/1
40 TABLET ORAL DAILY
Qty: 90 TABLET | Refills: 1 | Status: CANCELLED | OUTPATIENT
Start: 2019-08-21

## 2019-08-21 RX ORDER — ATORVASTATIN CALCIUM 20 MG/1
TABLET, FILM COATED ORAL
Qty: 90 TABLET | Refills: 1 | Status: SHIPPED | OUTPATIENT
Start: 2019-08-21 | End: 2020-03-04 | Stop reason: SDUPTHER

## 2019-08-21 ASSESSMENT — ENCOUNTER SYMPTOMS: RESPIRATORY NEGATIVE: 1

## 2019-08-21 NOTE — PROGRESS NOTES
Subjective:      Patient ID: Osmany Bello is a 77 y.o. female. HPI  Chief Complaint   Patient presents with    Hypertension-Denies cv/cns/mike sx      ran out of medication two days ago ; lisinopril 40 mg; states home blood pressures in the 1 43-1 50 range systolic//    Anxiety     Controlled with BuSpar-sometimes only takes 1 a day    Other     Crohn's disease    Hyperlipidemia-no problem with compliance;no myalgias;      not fasting; without myalgias       Review of Systems   Constitutional: Positive for unexpected weight change (Deliberate weight loss). Negative for activity change. Respiratory: Negative. Psychiatric/Behavioral:        Stress with      background/entire past medical,social and family history obtained and reviewed/updated today   Objective:   Physical Exam   Constitutional: She appears well-developed and well-nourished. Eyes: Conjunctivae are normal.   Neck: Neck supple. Carotid bruit is present (Bilateral carotid bruit-? Transmitted murmur). Cardiovascular: Normal rate and regular rhythm. Murmur (sm at aortic area) heard. Pulses:       Dorsalis pedis pulses are 2+ on the right side, and 2+ on the left side. Posterior tibial pulses are 2+ on the right side, and 2+ on the left side. Pulmonary/Chest: Effort normal and breath sounds normal. She has no wheezes. She has no rales. Musculoskeletal: She exhibits no edema. Neurological: She is alert. Skin: Skin is warm. Psychiatric: She has a normal mood and affect. Nursing note and vitals reviewed. BP (!) 160/70   Pulse 68   Wt 155 lb (70.3 kg)   SpO2 98%   BMI 25.79 kg/m²     Assessment:      Kishor Dykes was seen today for hypertension, anxiety, other and hyperlipidemia. Diagnoses and all orders for this visit:    Bilateral carotid bruits  -     VL DUP CAROTID BILATERAL; Future    Essential hypertension  -     lisinopril-hydrochlorothiazide (PRINZIDE;ZESTORETIC) 20-12.5 MG per tablet;  Take 2 tablets by mouth daily    Anxiety state    Other hyperlipidemia  -     atorvastatin (LIPITOR) 20 MG tablet; TAKE 1 TABLET BY MOUTH ONE TIME A DAY  -     Lipid Panel           Plan:      Return in about 4 weeks (around 9/18/2019). There are no Patient Instructions on file for this visit.          Palomo Borges MA

## 2019-08-22 LAB
CHOLESTEROL, TOTAL: 146 MG/DL (ref 0–199)
HDLC SERPL-MCNC: 70 MG/DL (ref 40–60)
LDL CHOLESTEROL CALCULATED: 61 MG/DL
TRIGL SERPL-MCNC: 75 MG/DL (ref 0–150)
VLDLC SERPL CALC-MCNC: 15 MG/DL

## 2019-08-28 ENCOUNTER — HOSPITAL ENCOUNTER (OUTPATIENT)
Dept: VASCULAR LAB | Age: 69
Discharge: HOME | End: 2019-08-28
Payer: MEDICARE

## 2019-08-28 DIAGNOSIS — R09.89 BILATERAL CAROTID BRUITS: ICD-10-CM

## 2019-08-28 PROCEDURE — 93880 EXTRACRANIAL BILAT STUDY: CPT

## 2019-10-07 ENCOUNTER — OFFICE VISIT (OUTPATIENT)
Dept: FAMILY MEDICINE CLINIC | Age: 69
End: 2019-10-07
Payer: MEDICARE

## 2019-10-07 DIAGNOSIS — K50.90 CROHN'S DISEASE WITHOUT COMPLICATION, UNSPECIFIED GASTROINTESTINAL TRACT LOCATION (HCC): ICD-10-CM

## 2019-10-07 DIAGNOSIS — F41.1 ANXIETY STATE: ICD-10-CM

## 2019-10-07 DIAGNOSIS — I10 ESSENTIAL HYPERTENSION: ICD-10-CM

## 2019-10-07 PROCEDURE — G8427 DOCREV CUR MEDS BY ELIG CLIN: HCPCS | Performed by: FAMILY MEDICINE

## 2019-10-07 PROCEDURE — 1090F PRES/ABSN URINE INCON ASSESS: CPT | Performed by: FAMILY MEDICINE

## 2019-10-07 PROCEDURE — 1123F ACP DISCUSS/DSCN MKR DOCD: CPT | Performed by: FAMILY MEDICINE

## 2019-10-07 PROCEDURE — 99213 OFFICE O/P EST LOW 20 MIN: CPT | Performed by: FAMILY MEDICINE

## 2019-10-07 PROCEDURE — G8417 CALC BMI ABV UP PARAM F/U: HCPCS | Performed by: FAMILY MEDICINE

## 2019-10-07 PROCEDURE — 4004F PT TOBACCO SCREEN RCVD TLK: CPT | Performed by: FAMILY MEDICINE

## 2019-10-07 PROCEDURE — G8400 PT W/DXA NO RESULTS DOC: HCPCS | Performed by: FAMILY MEDICINE

## 2019-10-07 PROCEDURE — 36415 COLL VENOUS BLD VENIPUNCTURE: CPT | Performed by: FAMILY MEDICINE

## 2019-10-07 PROCEDURE — 4040F PNEUMOC VAC/ADMIN/RCVD: CPT | Performed by: FAMILY MEDICINE

## 2019-10-07 PROCEDURE — 3017F COLORECTAL CA SCREEN DOC REV: CPT | Performed by: FAMILY MEDICINE

## 2019-10-07 PROCEDURE — G8484 FLU IMMUNIZE NO ADMIN: HCPCS | Performed by: FAMILY MEDICINE

## 2019-10-07 RX ORDER — BUSPIRONE HYDROCHLORIDE 10 MG/1
10 TABLET ORAL 3 TIMES DAILY
Qty: 90 TABLET | Refills: 1 | Status: SHIPPED | OUTPATIENT
Start: 2019-10-07 | End: 2019-11-11

## 2019-10-07 RX ORDER — LISINOPRIL AND HYDROCHLOROTHIAZIDE 20; 12.5 MG/1; MG/1
2 TABLET ORAL DAILY
Qty: 180 TABLET | Refills: 1 | Status: SHIPPED | OUTPATIENT
Start: 2019-10-07 | End: 2019-11-11 | Stop reason: CLARIF

## 2019-10-08 VITALS
WEIGHT: 154 LBS | BODY MASS INDEX: 25.63 KG/M2 | HEART RATE: 70 BPM | SYSTOLIC BLOOD PRESSURE: 126 MMHG | OXYGEN SATURATION: 98 % | DIASTOLIC BLOOD PRESSURE: 60 MMHG

## 2019-10-08 LAB
ALBUMIN SERPL-MCNC: 4.6 G/DL (ref 3.4–5)
ANION GAP SERPL CALCULATED.3IONS-SCNC: 14 MMOL/L (ref 3–16)
BUN BLDV-MCNC: 9 MG/DL (ref 7–20)
CALCIUM SERPL-MCNC: 10.1 MG/DL (ref 8.3–10.6)
CHLORIDE BLD-SCNC: 86 MMOL/L (ref 99–110)
CO2: 24 MMOL/L (ref 21–32)
CREAT SERPL-MCNC: 0.5 MG/DL (ref 0.6–1.2)
GFR AFRICAN AMERICAN: >60
GFR NON-AFRICAN AMERICAN: >60
GLUCOSE BLD-MCNC: 102 MG/DL (ref 70–99)
MAGNESIUM: 2 MG/DL (ref 1.8–2.4)
PHOSPHORUS: 4.5 MG/DL (ref 2.5–4.9)
POTASSIUM SERPL-SCNC: 5 MMOL/L (ref 3.5–5.1)
SODIUM BLD-SCNC: 124 MMOL/L (ref 136–145)

## 2019-10-08 RX ORDER — LISINOPRIL 40 MG/1
40 TABLET ORAL DAILY
Qty: 30 TABLET | Refills: 1 | Status: SHIPPED | OUTPATIENT
Start: 2019-10-08 | End: 2019-11-11 | Stop reason: SDUPTHER

## 2019-10-08 ASSESSMENT — ENCOUNTER SYMPTOMS
GASTROINTESTINAL NEGATIVE: 1
RESPIRATORY NEGATIVE: 1

## 2019-11-11 ENCOUNTER — OFFICE VISIT (OUTPATIENT)
Dept: FAMILY MEDICINE CLINIC | Age: 69
End: 2019-11-11
Payer: MEDICARE

## 2019-11-11 VITALS
WEIGHT: 154 LBS | OXYGEN SATURATION: 98 % | SYSTOLIC BLOOD PRESSURE: 140 MMHG | BODY MASS INDEX: 25.63 KG/M2 | DIASTOLIC BLOOD PRESSURE: 70 MMHG | HEART RATE: 69 BPM

## 2019-11-11 DIAGNOSIS — M79.601 RIGHT ARM PAIN: ICD-10-CM

## 2019-11-11 DIAGNOSIS — I10 ESSENTIAL HYPERTENSION: ICD-10-CM

## 2019-11-11 DIAGNOSIS — F41.9 CHRONIC ANXIETY: Primary | ICD-10-CM

## 2019-11-11 DIAGNOSIS — R59.0 ENLARGED LYMPH NODES IN ARMPIT: ICD-10-CM

## 2019-11-11 PROCEDURE — 4004F PT TOBACCO SCREEN RCVD TLK: CPT | Performed by: FAMILY MEDICINE

## 2019-11-11 PROCEDURE — G8427 DOCREV CUR MEDS BY ELIG CLIN: HCPCS | Performed by: FAMILY MEDICINE

## 2019-11-11 PROCEDURE — 1123F ACP DISCUSS/DSCN MKR DOCD: CPT | Performed by: FAMILY MEDICINE

## 2019-11-11 PROCEDURE — G8417 CALC BMI ABV UP PARAM F/U: HCPCS | Performed by: FAMILY MEDICINE

## 2019-11-11 PROCEDURE — 4040F PNEUMOC VAC/ADMIN/RCVD: CPT | Performed by: FAMILY MEDICINE

## 2019-11-11 PROCEDURE — G8484 FLU IMMUNIZE NO ADMIN: HCPCS | Performed by: FAMILY MEDICINE

## 2019-11-11 PROCEDURE — G8400 PT W/DXA NO RESULTS DOC: HCPCS | Performed by: FAMILY MEDICINE

## 2019-11-11 PROCEDURE — 3017F COLORECTAL CA SCREEN DOC REV: CPT | Performed by: FAMILY MEDICINE

## 2019-11-11 PROCEDURE — 99214 OFFICE O/P EST MOD 30 MIN: CPT | Performed by: FAMILY MEDICINE

## 2019-11-11 PROCEDURE — 1090F PRES/ABSN URINE INCON ASSESS: CPT | Performed by: FAMILY MEDICINE

## 2019-11-11 RX ORDER — LISINOPRIL 40 MG/1
40 TABLET ORAL DAILY
Qty: 90 TABLET | Refills: 1 | Status: SHIPPED | OUTPATIENT
Start: 2019-11-11 | End: 2020-03-04 | Stop reason: SDUPTHER

## 2019-11-11 RX ORDER — SERTRALINE HYDROCHLORIDE 25 MG/1
25 TABLET, FILM COATED ORAL DAILY
Qty: 90 TABLET | Refills: 1 | Status: SHIPPED | OUTPATIENT
Start: 2019-11-11 | End: 2020-03-04 | Stop reason: SINTOL

## 2019-11-11 ASSESSMENT — ENCOUNTER SYMPTOMS: RESPIRATORY NEGATIVE: 1

## 2020-03-04 ENCOUNTER — OFFICE VISIT (OUTPATIENT)
Dept: FAMILY MEDICINE CLINIC | Age: 70
End: 2020-03-04
Payer: MEDICARE

## 2020-03-04 PROCEDURE — G8427 DOCREV CUR MEDS BY ELIG CLIN: HCPCS | Performed by: FAMILY MEDICINE

## 2020-03-04 PROCEDURE — 1090F PRES/ABSN URINE INCON ASSESS: CPT | Performed by: FAMILY MEDICINE

## 2020-03-04 PROCEDURE — 3017F COLORECTAL CA SCREEN DOC REV: CPT | Performed by: FAMILY MEDICINE

## 2020-03-04 PROCEDURE — G8484 FLU IMMUNIZE NO ADMIN: HCPCS | Performed by: FAMILY MEDICINE

## 2020-03-04 PROCEDURE — 1123F ACP DISCUSS/DSCN MKR DOCD: CPT | Performed by: FAMILY MEDICINE

## 2020-03-04 PROCEDURE — 4004F PT TOBACCO SCREEN RCVD TLK: CPT | Performed by: FAMILY MEDICINE

## 2020-03-04 PROCEDURE — G8417 CALC BMI ABV UP PARAM F/U: HCPCS | Performed by: FAMILY MEDICINE

## 2020-03-04 PROCEDURE — 36415 COLL VENOUS BLD VENIPUNCTURE: CPT | Performed by: FAMILY MEDICINE

## 2020-03-04 PROCEDURE — 99214 OFFICE O/P EST MOD 30 MIN: CPT | Performed by: FAMILY MEDICINE

## 2020-03-04 PROCEDURE — 4040F PNEUMOC VAC/ADMIN/RCVD: CPT | Performed by: FAMILY MEDICINE

## 2020-03-04 PROCEDURE — G8400 PT W/DXA NO RESULTS DOC: HCPCS | Performed by: FAMILY MEDICINE

## 2020-03-04 RX ORDER — AMLODIPINE BESYLATE 2.5 MG/1
2.5 TABLET ORAL DAILY
Qty: 30 TABLET | Refills: 0 | Status: SHIPPED | OUTPATIENT
Start: 2020-03-04 | End: 2020-03-18 | Stop reason: SDUPTHER

## 2020-03-04 RX ORDER — BUSPIRONE HYDROCHLORIDE 10 MG/1
10 TABLET ORAL 3 TIMES DAILY
COMMUNITY
End: 2020-03-04 | Stop reason: SDUPTHER

## 2020-03-04 RX ORDER — BUSPIRONE HYDROCHLORIDE 10 MG/1
10 TABLET ORAL 3 TIMES DAILY
Qty: 270 TABLET | Refills: 1 | Status: SHIPPED | OUTPATIENT
Start: 2020-03-04 | End: 2020-08-10 | Stop reason: SDUPTHER

## 2020-03-04 RX ORDER — LISINOPRIL 40 MG/1
40 TABLET ORAL DAILY
Qty: 90 TABLET | Refills: 1 | Status: SHIPPED | OUTPATIENT
Start: 2020-03-04 | End: 2020-12-03

## 2020-03-04 RX ORDER — ATORVASTATIN CALCIUM 20 MG/1
TABLET, FILM COATED ORAL
Qty: 90 TABLET | Refills: 1 | Status: SHIPPED | OUTPATIENT
Start: 2020-03-04 | End: 2020-08-10 | Stop reason: SDUPTHER

## 2020-03-04 NOTE — PROGRESS NOTES
Subjective:      Patient ID: Leroy Bingham is a 77 y.o. female. HPI  Chief Complaint   Patient presents with    Headache    Dizziness     last saturday passed out     Hypertension-see above about lightheadedness, passing out and headache. Was supposed to be off lisinopril hydrochlorothiazide but ran out of lisinopril, and has been taking lisinopril hydrochlorothiazide instead. That was relatively recent and only after she had had a passing out spell    Depression and anxiety-in spite of which she said last time, she now feels the BuSpar works better for her than the sertraline. Wishes to be placed back on it.     does not want to take the zolfot anymore      Chief complaint present illness: 67-year-old white female presents unaccompanied in follow-up for hypertension but also relates a history of lightheadedness and one episode of passing out. Also please see the stress. With regard to the hypertension, she has been taking lisinopril hydrochlorothiazide and not just lisinopril. She denies any chest pain or palpitations. He denies any TIA symptoms but she does complain of orthostatic symptomatology. She is quite clear-lightheadedness and not vertigo when she first gets up from sitting and if she does not sit back down we will continue for about 15 minutes until it goes away. It started roughly 1 month ago and occurs daily. At least one time. Denies any associated cardiovascular symptoms or any neurological symptoms at that time. On February 29, while knocking down 3 beers at [employer], and eating dinner, she stood up and not only felt lightheaded but passed out. She remembers most of it. She does not have a room calling biting her tongue. There was no shaking. And she was alert quickly afterwards. The patient the loss of consciousness was very brief. She was with a family member I believe. No incontinence.     With regard to the headache, in light of the above, cannot really get to the extensive history of that. With regard to the stress and anxiety- has prostate cancer. I believe patient probably drinks more than she should. Review of Systems   Constitutional: Positive for activity change and unexpected weight change (Weight up 8 pounds. ). Eyes: Negative for visual disturbance. Respiratory: Negative for cough and shortness of breath. Cardiovascular: Negative for chest pain, palpitations and leg swelling. Skin: Negative. Neurological: Positive for syncope, light-headedness and headaches. Negative for dizziness, weakness and numbness. Hematological: Does not bruise/bleed easily. Psychiatric/Behavioral: Positive for dysphoric mood. Negative for confusion. The patient is nervous/anxious. background/entire past medical,social and family history obtained and reviewed/updated today   Objective:   Physical Exam  Vitals signs and nursing note reviewed. Constitutional:       Appearance: Normal appearance. She is not ill-appearing. HENT:      Head: Normocephalic. Nose: Nose normal.      Mouth/Throat:      Mouth: Mucous membranes are moist.      Pharynx: Oropharynx is clear. Neck:      Vascular: Carotid bruit (Right carotid bruit, no left carotid bruit) present. Cardiovascular:      Rate and Rhythm: Normal rate and regular rhythm. Heart sounds: Murmur (Positive systolic murmur best heard at the aortic region. Sounds more mitral.) present. Comments: Blood pressure right and left arms 160/80 right, 155/80 left. Blood pressure supine 150/80. Upon standing 150/80 with brief symptoms of lightheadedness lasting less than a few seconds. Pulmonary:      Effort: Pulmonary effort is normal.      Breath sounds: Normal breath sounds. Skin:     General: Skin is warm. Neurological:      Mental Status: She is alert and oriented to person, place, and time.    Psychiatric:         Mood and Affect: Mood normal.       BP (!) 146/76 Comment: After 5 minutes in a quiet room  Pulse 75   Wt 162 lb (73.5 kg)   SpO2 98%   BMI 26.96 kg/m²     Assessment:      Natalie Royal was seen today for headache, dizziness, hypertension and depression. Diagnoses and all orders for this visit:    Essential hypertension  -     lisinopril (PRINIVIL;ZESTRIL) 40 MG tablet; Take 1 tablet by mouth daily  -     amLODIPine (NORVASC) 2.5 MG tablet; Take 1 tablet by mouth daily    Chronic anxiety  -     busPIRone (BUSPAR) 10 MG tablet; Take 1 tablet by mouth 3 times daily    Other hyperlipidemia  -     atorvastatin (LIPITOR) 20 MG tablet; TAKE 1 TABLET BY MOUTH ONE TIME A DAY    Syncope, unspecified syncope type    Hyponatremia  -     Renal Function Panel  -     Magnesium           Plan:      No follow-ups on file.   Patient Instructions   Get up slowly     See me in one week    Stop the sertraline           Emiliana Monroe MA

## 2020-03-05 ENCOUNTER — TELEPHONE (OUTPATIENT)
Dept: FAMILY MEDICINE CLINIC | Age: 70
End: 2020-03-05

## 2020-03-05 VITALS
DIASTOLIC BLOOD PRESSURE: 76 MMHG | WEIGHT: 162 LBS | HEART RATE: 75 BPM | BODY MASS INDEX: 26.96 KG/M2 | OXYGEN SATURATION: 98 % | SYSTOLIC BLOOD PRESSURE: 146 MMHG

## 2020-03-05 LAB
ALBUMIN SERPL-MCNC: 4.5 G/DL (ref 3.4–5)
ANION GAP SERPL CALCULATED.3IONS-SCNC: 16 MMOL/L (ref 3–16)
BUN BLDV-MCNC: 8 MG/DL (ref 7–20)
CALCIUM SERPL-MCNC: 9.9 MG/DL (ref 8.3–10.6)
CHLORIDE BLD-SCNC: 84 MMOL/L (ref 99–110)
CO2: 23 MMOL/L (ref 21–32)
CREAT SERPL-MCNC: 0.6 MG/DL (ref 0.6–1.2)
GFR AFRICAN AMERICAN: >60
GFR NON-AFRICAN AMERICAN: >60
GLUCOSE BLD-MCNC: 96 MG/DL (ref 70–99)
MAGNESIUM: 2 MG/DL (ref 1.8–2.4)
PHOSPHORUS: 4.4 MG/DL (ref 2.5–4.9)
POTASSIUM SERPL-SCNC: 5 MMOL/L (ref 3.5–5.1)
SODIUM BLD-SCNC: 123 MMOL/L (ref 136–145)

## 2020-03-05 ASSESSMENT — ENCOUNTER SYMPTOMS
COUGH: 0
SHORTNESS OF BREATH: 0

## 2020-03-05 NOTE — TELEPHONE ENCOUNTER
I just sent a result note on Ms. Pepe. This is 3/5/2020. I forgot to tell her to quit drinking all alcohol.

## 2020-03-06 ENCOUNTER — OFFICE VISIT (OUTPATIENT)
Dept: FAMILY MEDICINE CLINIC | Age: 70
End: 2020-03-06
Payer: MEDICARE

## 2020-03-06 LAB
ALBUMIN SERPL-MCNC: 4.9 G/DL (ref 3.4–5)
ANION GAP SERPL CALCULATED.3IONS-SCNC: 16 MMOL/L (ref 3–16)
BUN BLDV-MCNC: 7 MG/DL (ref 7–20)
CALCIUM SERPL-MCNC: 10 MG/DL (ref 8.3–10.6)
CHLORIDE BLD-SCNC: 87 MMOL/L (ref 99–110)
CO2: 24 MMOL/L (ref 21–32)
CREAT SERPL-MCNC: 0.7 MG/DL (ref 0.6–1.2)
GFR AFRICAN AMERICAN: >60
GFR NON-AFRICAN AMERICAN: >60
GLUCOSE BLD-MCNC: 96 MG/DL (ref 70–99)
PHOSPHORUS: 3.8 MG/DL (ref 2.5–4.9)
POTASSIUM SERPL-SCNC: 5 MMOL/L (ref 3.5–5.1)
SODIUM BLD-SCNC: 127 MMOL/L (ref 136–145)

## 2020-03-06 PROCEDURE — 36415 COLL VENOUS BLD VENIPUNCTURE: CPT | Performed by: FAMILY MEDICINE

## 2020-03-06 PROCEDURE — G8400 PT W/DXA NO RESULTS DOC: HCPCS | Performed by: FAMILY MEDICINE

## 2020-03-06 PROCEDURE — 4004F PT TOBACCO SCREEN RCVD TLK: CPT | Performed by: FAMILY MEDICINE

## 2020-03-06 PROCEDURE — 1123F ACP DISCUSS/DSCN MKR DOCD: CPT | Performed by: FAMILY MEDICINE

## 2020-03-06 PROCEDURE — G8484 FLU IMMUNIZE NO ADMIN: HCPCS | Performed by: FAMILY MEDICINE

## 2020-03-06 PROCEDURE — 4040F PNEUMOC VAC/ADMIN/RCVD: CPT | Performed by: FAMILY MEDICINE

## 2020-03-06 PROCEDURE — G8417 CALC BMI ABV UP PARAM F/U: HCPCS | Performed by: FAMILY MEDICINE

## 2020-03-06 PROCEDURE — 99213 OFFICE O/P EST LOW 20 MIN: CPT | Performed by: FAMILY MEDICINE

## 2020-03-06 PROCEDURE — G8427 DOCREV CUR MEDS BY ELIG CLIN: HCPCS | Performed by: FAMILY MEDICINE

## 2020-03-06 PROCEDURE — 1090F PRES/ABSN URINE INCON ASSESS: CPT | Performed by: FAMILY MEDICINE

## 2020-03-06 PROCEDURE — 3017F COLORECTAL CA SCREEN DOC REV: CPT | Performed by: FAMILY MEDICINE

## 2020-03-06 RX ORDER — IBUPROFEN 200 MG
200 TABLET ORAL EVERY 6 HOURS PRN
Status: ON HOLD | COMMUNITY
End: 2021-07-16

## 2020-03-06 RX ORDER — DIPHENHYDRAMINE HCL 25 MG
25 TABLET ORAL EVERY 6 HOURS PRN
Status: ON HOLD | COMMUNITY
End: 2021-07-16

## 2020-03-08 VITALS
SYSTOLIC BLOOD PRESSURE: 140 MMHG | HEART RATE: 86 BPM | BODY MASS INDEX: 26.79 KG/M2 | DIASTOLIC BLOOD PRESSURE: 70 MMHG | OXYGEN SATURATION: 98 % | WEIGHT: 161 LBS

## 2020-03-08 ASSESSMENT — ENCOUNTER SYMPTOMS: RESPIRATORY NEGATIVE: 1

## 2020-03-16 ENCOUNTER — TELEPHONE (OUTPATIENT)
Dept: FAMILY MEDICINE CLINIC | Age: 70
End: 2020-03-16

## 2020-03-18 ENCOUNTER — OFFICE VISIT (OUTPATIENT)
Dept: FAMILY MEDICINE CLINIC | Age: 70
End: 2020-03-18
Payer: MEDICARE

## 2020-03-18 VITALS
DIASTOLIC BLOOD PRESSURE: 70 MMHG | WEIGHT: 162 LBS | HEART RATE: 79 BPM | OXYGEN SATURATION: 100 % | BODY MASS INDEX: 26.96 KG/M2 | SYSTOLIC BLOOD PRESSURE: 146 MMHG

## 2020-03-18 LAB
ALBUMIN SERPL-MCNC: 4.3 G/DL (ref 3.4–5)
ANION GAP SERPL CALCULATED.3IONS-SCNC: 14 MMOL/L (ref 3–16)
BILIRUBIN URINE: NEGATIVE
BLOOD, URINE: NEGATIVE
BUN BLDV-MCNC: 4 MG/DL (ref 7–20)
CALCIUM SERPL-MCNC: 9.4 MG/DL (ref 8.3–10.6)
CHLORIDE BLD-SCNC: 97 MMOL/L (ref 99–110)
CLARITY: CLEAR
CO2: 23 MMOL/L (ref 21–32)
COLOR: YELLOW
CREAT SERPL-MCNC: 0.7 MG/DL (ref 0.6–1.2)
EPITHELIAL CELLS, UA: 1 /HPF (ref 0–5)
GFR AFRICAN AMERICAN: >60
GFR NON-AFRICAN AMERICAN: >60
GLUCOSE BLD-MCNC: 84 MG/DL (ref 70–99)
GLUCOSE URINE: NEGATIVE MG/DL
HYALINE CASTS: 3 /LPF (ref 0–8)
KETONES, URINE: NEGATIVE MG/DL
LEUKOCYTE ESTERASE, URINE: NEGATIVE
MICROSCOPIC EXAMINATION: NORMAL
NITRITE, URINE: NEGATIVE
PH UA: 6.5 (ref 5–8)
PHOSPHORUS: 4.6 MG/DL (ref 2.5–4.9)
POTASSIUM SERPL-SCNC: 4.3 MMOL/L (ref 3.5–5.1)
PROTEIN UA: NEGATIVE MG/DL
RBC UA: 2 /HPF (ref 0–4)
SODIUM BLD-SCNC: 134 MMOL/L (ref 136–145)
SPECIFIC GRAVITY UA: 1.01 (ref 1–1.03)
URINE TYPE: NORMAL
UROBILINOGEN, URINE: 0.2 E.U./DL
WBC UA: 1 /HPF (ref 0–5)

## 2020-03-18 PROCEDURE — 1123F ACP DISCUSS/DSCN MKR DOCD: CPT | Performed by: FAMILY MEDICINE

## 2020-03-18 PROCEDURE — G8427 DOCREV CUR MEDS BY ELIG CLIN: HCPCS | Performed by: FAMILY MEDICINE

## 2020-03-18 PROCEDURE — G8417 CALC BMI ABV UP PARAM F/U: HCPCS | Performed by: FAMILY MEDICINE

## 2020-03-18 PROCEDURE — 99213 OFFICE O/P EST LOW 20 MIN: CPT | Performed by: FAMILY MEDICINE

## 2020-03-18 PROCEDURE — G8484 FLU IMMUNIZE NO ADMIN: HCPCS | Performed by: FAMILY MEDICINE

## 2020-03-18 PROCEDURE — 4040F PNEUMOC VAC/ADMIN/RCVD: CPT | Performed by: FAMILY MEDICINE

## 2020-03-18 PROCEDURE — 1090F PRES/ABSN URINE INCON ASSESS: CPT | Performed by: FAMILY MEDICINE

## 2020-03-18 PROCEDURE — 36415 COLL VENOUS BLD VENIPUNCTURE: CPT | Performed by: FAMILY MEDICINE

## 2020-03-18 PROCEDURE — 0509F URINE INCON PLAN DOCD: CPT | Performed by: FAMILY MEDICINE

## 2020-03-18 PROCEDURE — 4004F PT TOBACCO SCREEN RCVD TLK: CPT | Performed by: FAMILY MEDICINE

## 2020-03-18 PROCEDURE — G8400 PT W/DXA NO RESULTS DOC: HCPCS | Performed by: FAMILY MEDICINE

## 2020-03-18 PROCEDURE — 3017F COLORECTAL CA SCREEN DOC REV: CPT | Performed by: FAMILY MEDICINE

## 2020-03-18 RX ORDER — AMLODIPINE BESYLATE 5 MG/1
5 TABLET ORAL DAILY
Qty: 30 TABLET | Refills: 5 | Status: SHIPPED | OUTPATIENT
Start: 2020-03-18 | End: 2020-08-10 | Stop reason: DRUGHIGH

## 2020-03-19 LAB — URINE CULTURE, ROUTINE: NORMAL

## 2020-03-20 RX ORDER — OXYBUTYNIN CHLORIDE 5 MG/1
5 TABLET ORAL 2 TIMES DAILY
Qty: 30 TABLET | Refills: 0 | Status: SHIPPED | OUTPATIENT
Start: 2020-03-20 | End: 2020-05-28

## 2020-08-10 ENCOUNTER — OFFICE VISIT (OUTPATIENT)
Dept: FAMILY MEDICINE CLINIC | Age: 70
End: 2020-08-10
Payer: MEDICARE

## 2020-08-10 VITALS
HEART RATE: 78 BPM | BODY MASS INDEX: 27.96 KG/M2 | WEIGHT: 168 LBS | DIASTOLIC BLOOD PRESSURE: 74 MMHG | TEMPERATURE: 96.8 F | SYSTOLIC BLOOD PRESSURE: 125 MMHG | OXYGEN SATURATION: 97 %

## 2020-08-10 PROBLEM — R22.43 LOCALIZED SWELLING OF BOTH LOWER LEGS: Status: ACTIVE | Noted: 2020-08-10

## 2020-08-10 PROBLEM — N39.46 MIXED INCONTINENCE URGE AND STRESS: Status: ACTIVE | Noted: 2020-08-10

## 2020-08-10 PROBLEM — F17.200 SMOKING: Status: ACTIVE | Noted: 2020-08-10

## 2020-08-10 PROBLEM — I25.10 ASCVD (ARTERIOSCLEROTIC CARDIOVASCULAR DISEASE): Status: ACTIVE | Noted: 2020-08-10

## 2020-08-10 PROCEDURE — 99214 OFFICE O/P EST MOD 30 MIN: CPT | Performed by: NURSE PRACTITIONER

## 2020-08-10 PROCEDURE — 36415 COLL VENOUS BLD VENIPUNCTURE: CPT | Performed by: NURSE PRACTITIONER

## 2020-08-10 PROCEDURE — 3288F FALL RISK ASSESSMENT DOCD: CPT | Performed by: NURSE PRACTITIONER

## 2020-08-10 PROCEDURE — G8510 SCR DEP NEG, NO PLAN REQD: HCPCS | Performed by: NURSE PRACTITIONER

## 2020-08-10 RX ORDER — AMLODIPINE BESYLATE 2.5 MG/1
TABLET ORAL
Qty: 90 TABLET | Refills: 1 | Status: SHIPPED | OUTPATIENT
Start: 2020-08-10 | End: 2020-08-18

## 2020-08-10 RX ORDER — ATORVASTATIN CALCIUM 20 MG/1
TABLET, FILM COATED ORAL
Qty: 90 TABLET | Refills: 1 | Status: SHIPPED | OUTPATIENT
Start: 2020-08-10 | End: 2021-05-11 | Stop reason: SDUPTHER

## 2020-08-10 RX ORDER — BUSPIRONE HYDROCHLORIDE 10 MG/1
10 TABLET ORAL DAILY
Qty: 90 TABLET | Refills: 1 | Status: SHIPPED | OUTPATIENT
Start: 2020-08-10 | End: 2020-11-08

## 2020-08-10 ASSESSMENT — PATIENT HEALTH QUESTIONNAIRE - PHQ9
SUM OF ALL RESPONSES TO PHQ9 QUESTIONS 1 & 2: 0
SUM OF ALL RESPONSES TO PHQ QUESTIONS 1-9: 0
SUM OF ALL RESPONSES TO PHQ QUESTIONS 1-9: 0
2. FEELING DOWN, DEPRESSED OR HOPELESS: 0
1. LITTLE INTEREST OR PLEASURE IN DOING THINGS: 0

## 2020-08-10 ASSESSMENT — ENCOUNTER SYMPTOMS
BACK PAIN: 0
VOMITING: 0
CONSTIPATION: 0
DIARRHEA: 0
NAUSEA: 0
BLOOD IN STOOL: 0
ABDOMINAL PAIN: 0
SHORTNESS OF BREATH: 0

## 2020-08-10 NOTE — PROGRESS NOTES
Has been to 3 funerals this month of close friends. Relaxes by sitting in chair on her back porch or swims. Has good support system with  and children. Crohns disease  Well controlled today. No blood in urine. Last Colonoscopy in 2010- normal.  Denies new referral today for updated colonoscopy    Review of Systems   Constitutional: Negative for activity change, appetite change, chills, diaphoresis, fatigue and malaise/fatigue. Respiratory: Negative for shortness of breath. Cardiovascular: Negative for chest pain and palpitations. Gastrointestinal: Negative for abdominal pain, blood in stool, constipation, diarrhea, nausea and vomiting. Genitourinary: Positive for dysuria, frequency and urgency. Negative for decreased urine volume, vaginal bleeding, vaginal discharge and vaginal pain. Musculoskeletal: Negative for back pain, gait problem and joint swelling. Neurological: Negative for light-headedness and headaches. Psychiatric/Behavioral: Negative for confusion, decreased concentration, dysphoric mood, self-injury, sleep disturbance and suicidal ideas. The patient is nervous/anxious. Current Outpatient Medications on File Prior to Visit   Medication Sig Dispense Refill    diphenhydrAMINE (BENADRYL) 25 MG tablet Take 25 mg by mouth every 6 hours as needed for Itching      ibuprofen (ADVIL;MOTRIN) 200 MG tablet Take 200 mg by mouth every 6 hours as needed for Pain      lisinopril (PRINIVIL;ZESTRIL) 40 MG tablet Take 1 tablet by mouth daily 90 tablet 1    aspirin 81 MG tablet Take 81 mg by mouth daily       No current facility-administered medications on file prior to visit.        Allergies   Allergen Reactions    Penicillins Hives and Rash     5/2013 40 yrs ago had a nonpruritic rash on hands;no hives and no other sx's;     Past Medical History:   Diagnosis Date    Anxiety     Cro[facility] disease ([facility] [address]     CVA (cerebral infarction) 2006    hx unclear    Hypertension      Past Surgical History:   Procedure Laterality Date    APPENDECTOMY      CHOLECYSTECTOMY      PARTIAL HYSTERECTOMY  1973    infection after btl;one ovary in      Social History     Tobacco Use    Smoking status: Current Every Day Smoker     Packs/day: 1.50     Years: 30.00     Pack years: 45.00     Types: Cigarettes    Smokeless tobacco: Never Used    Tobacco comment: placed in AVS   Substance Use Topics    Alcohol use: Yes     Alcohol/week: 20.0 standard drinks     Types: 20 Cans of beer per week     Comment: daily      History reviewed. No pertinent family history. Vitals:    08/10/20 1546   BP: 125/74   Pulse: 78   Temp: 96.8 °F (36 °C)   TempSrc: Infrared   SpO2: 97%   Weight: 168 lb (76.2 kg)     Estimated body mass index is 27.96 kg/m² as calculated from the following:    Height as of 19: 5' 5\" (1.651 m). Weight as of this encounter: 168 lb (76.2 kg). Physical Exam  Constitutional:       Appearance: Normal appearance. Cardiovascular:      Rate and Rhythm: Normal rate and regular rhythm. Pulses: Normal pulses. Heart sounds: Murmur present. Crescendo  systolic murmur present with a grade of 2/6. Pulmonary:      Effort: Pulmonary effort is normal.      Breath sounds: Normal breath sounds. Abdominal:      General: Abdomen is flat. Bowel sounds are normal.      Palpations: Abdomen is soft. Musculoskeletal:      Right lower le+ Pitting Edema present. Left lower le+ Pitting Edema present. Skin:     General: Skin is warm and dry. Capillary Refill: Capillary refill takes less than 2 seconds. Neurological:      General: No focal deficit present. Mental Status: She is alert and oriented to person, place, and time. Psychiatric:         Mood and Affect: Mood normal.         Behavior: Behavior normal.         ASSESSMENT/PLAN:  1.  Essential hypertension  Well controlled today  Encouraged healthy diet, exercise 30 min/ day 3x/ week  Strongly encouraged smoking cessation  - amLODIPine (NORVASC) 2.5 MG tablet; TAKE 1 TABLET BY MOUTH ONE TIME A DAY  Dispense: 90 tablet; Refill: 1  - CBC WITH AUTO DIFFERENTIAL; Future  - Comprehensive Metabolic Panel, Fasting; Future  - Lipid, Fasting; Future    2. Other hyperlipidemia  Labs today. Tolerating meds well  - atorvastatin (LIPITOR) 20 MG tablet; TAKE 1 TABLET BY MOUTH ONE TIME A DAY  Dispense: 90 tablet; Refill: 1  - CBC WITH AUTO DIFFERENTIAL; Future  - Comprehensive Metabolic Panel, Fasting; Future  - Lipid, Fasting; Future    3. Chronic anxiety  Discussed stress reduction  Refuses smoking cessation  Discussed how to take Buspirone for full effect  - busPIRone (BUSPAR) 10 MG tablet; Take 1 tablet by mouth daily For anxiety  Dispense: 90 tablet; Refill: 1  - CBC WITH AUTO DIFFERENTIAL; Future    4. Localized swelling of both lower legs  Discussed causative factors (ASCVD, ETOH/ smoking, diet)  Encouraged elevation of legs while in chair/ in bed and use of compression stockings while walking  Consider doing ECG/ ECHO/ Cardio eval next visit due to Cardiac murmur, HTN hx, ASCVD and leg swelling  - CBC WITH AUTO DIFFERENTIAL; Future  - Comprehensive Metabolic Panel, Fasting; Future    5. Crohn's disease without complication, unspecified gastrointestinal tract location ([address]  Appears stable today  Will discuss colonoscopy next visit    6. Mixed incontinence urge and stress  Trial Myrbetriq. If no improvement will consider referral to [address] [place] [address]. Hx hysterectomy  No reduction in symptoms with Ditropan  - CBC WITH AUTO DIFFERENTIAL; Future    7. Smoking  Denies desire for cessation  Denies LD Lung CT screening for lung cancer prevention. Denies Pneumovax today  Has said will not take Influenza vaccine if offered. 8. ASCVD  See above  Discussed with patient consequences of not stopping current smoking and drinking habits in addition to change diet and exercise.   Pt has already had CVA in the past and has 50% bilateral carotid artery stenosis. Given her current risk factors she is at high risk for an MI or additional CVA. Pt verbalizes understanding of these issues. 9. Mammogram Screening and Bone Density Screening:  Patient refuses today. Will discuss next visit. Return in about 6 weeks (around 9/21/2020) for Urinary Incontinence, leg swelling. .       Patient's questions answered and concerns addressed. Patient agrees to plan of care.           Electronically signed by LEVAR Keller on 8/10/2020 at 4:52 PM

## 2020-08-10 NOTE — PROGRESS NOTES
Blood drawn per order. Needle size: 23 g butterfly   Site: R Antecubital.  First attempt successful Yes    Second attempt na  Pressure applied until bleeding stopped. Cotton ball and paper tape applied. Patient does not like band aid it bruises her     Patient informed to call office or return if bleeding reoccurs and unable to stop.     Tubes drawn: 1 purple     1 red

## 2020-08-11 LAB
A/G RATIO: 1.4 (ref 1.1–2.2)
ALBUMIN SERPL-MCNC: 4.6 G/DL (ref 3.4–5)
ALP BLD-CCNC: 84 U/L (ref 40–129)
ALT SERPL-CCNC: 16 U/L (ref 10–40)
ANION GAP SERPL CALCULATED.3IONS-SCNC: 16 MMOL/L (ref 3–16)
AST SERPL-CCNC: 22 U/L (ref 15–37)
BASOPHILS ABSOLUTE: 0.1 K/UL (ref 0–0.2)
BASOPHILS RELATIVE PERCENT: 0.7 %
BILIRUB SERPL-MCNC: 0.6 MG/DL (ref 0–1)
BUN BLDV-MCNC: 6 MG/DL (ref 7–20)
CALCIUM SERPL-MCNC: 9.8 MG/DL (ref 8.3–10.6)
CHLORIDE BLD-SCNC: 97 MMOL/L (ref 99–110)
CHOLESTEROL, FASTING: 139 MG/DL (ref 0–199)
CO2: 21 MMOL/L (ref 21–32)
CREAT SERPL-MCNC: 0.8 MG/DL (ref 0.6–1.2)
EOSINOPHILS ABSOLUTE: 0.3 K/UL (ref 0–0.6)
EOSINOPHILS RELATIVE PERCENT: 2.5 %
GFR AFRICAN AMERICAN: >60
GFR NON-AFRICAN AMERICAN: >60
GLOBULIN: 3.4 G/DL
GLUCOSE FASTING: 100 MG/DL (ref 70–99)
HCT VFR BLD CALC: 42.6 % (ref 36–48)
HDLC SERPL-MCNC: 67 MG/DL (ref 40–60)
HEMOGLOBIN: 14.4 G/DL (ref 12–16)
LDL CHOLESTEROL CALCULATED: 52 MG/DL
LYMPHOCYTES ABSOLUTE: 1.1 K/UL (ref 1–5.1)
LYMPHOCYTES RELATIVE PERCENT: 11.1 %
MCH RBC QN AUTO: 32.7 PG (ref 26–34)
MCHC RBC AUTO-ENTMCNC: 33.8 G/DL (ref 31–36)
MCV RBC AUTO: 96.9 FL (ref 80–100)
MONOCYTES ABSOLUTE: 1.1 K/UL (ref 0–1.3)
MONOCYTES RELATIVE PERCENT: 11 %
NEUTROPHILS ABSOLUTE: 7.7 K/UL (ref 1.7–7.7)
NEUTROPHILS RELATIVE PERCENT: 74.7 %
PDW BLD-RTO: 13.9 % (ref 12.4–15.4)
PLATELET # BLD: 253 K/UL (ref 135–450)
PMV BLD AUTO: 9.4 FL (ref 5–10.5)
POTASSIUM SERPL-SCNC: 4.6 MMOL/L (ref 3.5–5.1)
RBC # BLD: 4.4 M/UL (ref 4–5.2)
SODIUM BLD-SCNC: 134 MMOL/L (ref 136–145)
TOTAL PROTEIN: 8 G/DL (ref 6.4–8.2)
TRIGLYCERIDE, FASTING: 102 MG/DL (ref 0–150)
VLDLC SERPL CALC-MCNC: 20 MG/DL
WBC # BLD: 10.4 K/UL (ref 4–11)

## 2020-08-18 ENCOUNTER — TELEPHONE (OUTPATIENT)
Dept: FAMILY MEDICINE CLINIC | Age: 70
End: 2020-08-18

## 2020-08-18 RX ORDER — AMLODIPINE BESYLATE 5 MG/1
TABLET ORAL
Qty: 90 TABLET | Refills: 1 | Status: SHIPPED | OUTPATIENT
Start: 2020-08-18 | End: 2021-05-11 | Stop reason: SDUPTHER

## 2020-08-18 NOTE — TELEPHONE ENCOUNTER
She can take the 5 mg if that is what she has been doing. I changed the dose and sent in the new order for the Amlodipine to Edna Renteria at [facility]. Thanks.

## 2020-08-18 NOTE — TELEPHONE ENCOUNTER
That's fine. I knew the Myrbetriq may be too expensive for her. At the very least I need to see her back in 6 months for her regular follow up. If her leg swelling is worrisome we can discuss that sooner. I discussed ordering additional tests if she was interested. Thanks so much.

## 2020-08-18 NOTE — TELEPHONE ENCOUNTER
Patient states she was in last week and when you refilled her medications you gave her the amlodipine 2.5 mg but it was supposed to be a 5 mg pill because Dr Douglas Mckenna had her taking 2 of the 2.5 mg and he had told her the next time it will be a 5 mg pill.  We only gave her #30 of the 2.5mg so she cant even take 2 pills because it will only last 1 days,  So do you want her to only take 2.5 mg once a day or do you want to change it to 5 mg

## 2020-08-18 NOTE — TELEPHONE ENCOUNTER
Called patient back and she stated she is using OTC AZO bladder control and that is helping with the edema in her legs  She was told to let us know if the swelling comes back and her 6 month appt was set up

## 2020-12-03 RX ORDER — LISINOPRIL 40 MG/1
TABLET ORAL
Qty: 90 TABLET | Refills: 0 | Status: SHIPPED | OUTPATIENT
Start: 2020-12-03 | End: 2021-05-11 | Stop reason: SDUPTHER

## 2020-12-03 NOTE — TELEPHONE ENCOUNTER
Future appt scheduled 02/10/2021          Last appt 08/1/2020      Last Written 03/04/2020    lisinopril (PRINIVIL;ZESTRIL) 40 MG tablet  #90  1 RF

## 2020-12-14 ENCOUNTER — TELEPHONE (OUTPATIENT)
Dept: FAMILY MEDICINE CLINIC | Age: 70
End: 2020-12-14

## 2020-12-14 RX ORDER — CIPROFLOXACIN 500 MG/1
500 TABLET, FILM COATED ORAL 2 TIMES DAILY
Qty: 14 TABLET | Refills: 0 | Status: SHIPPED | OUTPATIENT
Start: 2020-12-14 | End: 2020-12-21

## 2020-12-14 NOTE — TELEPHONE ENCOUNTER
Antibiotic sent. Please advise her that while this may help, it may also increase her risk for a very serious bacterial infection in her gut. If her symptoms do not improve, she will need additional labs/ GI referral.  I understand her lack of insurance coverage, HOWEVER, we may not be able to fix it without looking inside the colon. Let me know how she does at the end of the week.

## 2020-12-14 NOTE — TELEPHONE ENCOUNTER
Patient says she has diarrhea for 2 weeks now. Anything she eats goes straight through. Says she has no other symptoms. Has tried pepto bismol but is not working.  Asking if Jose David Bishop DNP would prescribe something else and send to [facility] in [facility]

## 2020-12-14 NOTE — TELEPHONE ENCOUNTER
Patient says she has no abdominal cramping, nausea/vomiting or fever. Diarrhea 4-5 times a day, color is black due to the pepto bismol. Not solid stool, it is all liquid. Has lost 5 pounds. Imodium does not work and she has not tried anything else.  Does not have insurance to be able to see a gastroenterologist.

## 2020-12-14 NOTE — TELEPHONE ENCOUNTER
Additional questions: Abdominal cramping? Nausea/ vomiting? Fever? How often having diarrhea? Color of stool? Anyone else sick? Changes in appetite? Weight. Tried fiber? Probiotics? Imodium? Likely she needs to come in for eval.  She also needs another colonoscopy which she wanted to defer last visit. But if she wants to go see GI for the diarrhea they can also go ahead and do the colonoscopy. Any medications I can give will only be short term, so finding out the cause for the diarrhea is key. Likely this should have been a visit in the office for an evaluation instead of a note to me. Thanks.

## 2020-12-15 ENCOUNTER — TELEPHONE (OUTPATIENT)
Dept: FAMILY MEDICINE CLINIC | Age: 70
End: 2020-12-15

## 2020-12-15 NOTE — TELEPHONE ENCOUNTER
----- Message from Abimael Valadez sent at 12/14/2020  4:50 PM EST -----  Subject: Message to Provider    QUESTIONS  Information for Provider? Pt says she will have insurance as of the new   year and will be able to schedule a visit to be evaluated. She would like   a treatment for the diarrhea to be called in to hold her over till then.  ---------------------------------------------------------------------------  --------------  CALL BACK INFO  What is the best way for the office to contact you? OK to leave message on   voicemail  Preferred Call Back Phone Number? [phone]  ---------------------------------------------------------------------------  --------------  SCRIPT ANSWERS  Relationship to Patient?  Self

## 2021-03-08 ENCOUNTER — TELEPHONE (OUTPATIENT)
Dept: FAMILY MEDICINE CLINIC | Age: 71
End: 2021-03-08

## 2021-03-08 NOTE — TELEPHONE ENCOUNTER
Patient called to make an appointment. She has diarrhea for a month, has tried over the counter. Sore throat for 2 weeks. No fever, cough, shortness of breath, loss of taste or smell. Should we schedule an appointment.

## 2021-03-10 ENCOUNTER — OFFICE VISIT (OUTPATIENT)
Dept: FAMILY MEDICINE CLINIC | Age: 71
End: 2021-03-10
Payer: MEDICARE

## 2021-03-10 VITALS
OXYGEN SATURATION: 98 % | SYSTOLIC BLOOD PRESSURE: 112 MMHG | WEIGHT: 148 LBS | BODY MASS INDEX: 24.63 KG/M2 | HEART RATE: 80 BPM | TEMPERATURE: 94.1 F | DIASTOLIC BLOOD PRESSURE: 62 MMHG

## 2021-03-10 DIAGNOSIS — K50.10 CROHN'S DISEASE OF LARGE INTESTINE WITHOUT COMPLICATION (HCC): ICD-10-CM

## 2021-03-10 DIAGNOSIS — R19.7 DIARRHEA, UNSPECIFIED TYPE: Primary | ICD-10-CM

## 2021-03-10 DIAGNOSIS — K57.92 DIVERTICULITIS: ICD-10-CM

## 2021-03-10 DIAGNOSIS — F17.210 CIGARETTE NICOTINE DEPENDENCE WITHOUT COMPLICATION: ICD-10-CM

## 2021-03-10 DIAGNOSIS — R63.4 UNINTENTIONAL WEIGHT LOSS: ICD-10-CM

## 2021-03-10 DIAGNOSIS — J02.9 SORE THROAT: ICD-10-CM

## 2021-03-10 DIAGNOSIS — E78.49 OTHER HYPERLIPIDEMIA: ICD-10-CM

## 2021-03-10 LAB — S PYO AG THROAT QL: NORMAL

## 2021-03-10 PROCEDURE — 87880 STREP A ASSAY W/OPTIC: CPT | Performed by: NURSE PRACTITIONER

## 2021-03-10 PROCEDURE — 1123F ACP DISCUSS/DSCN MKR DOCD: CPT | Performed by: NURSE PRACTITIONER

## 2021-03-10 PROCEDURE — 1090F PRES/ABSN URINE INCON ASSESS: CPT | Performed by: NURSE PRACTITIONER

## 2021-03-10 PROCEDURE — G8484 FLU IMMUNIZE NO ADMIN: HCPCS | Performed by: NURSE PRACTITIONER

## 2021-03-10 PROCEDURE — G8400 PT W/DXA NO RESULTS DOC: HCPCS | Performed by: NURSE PRACTITIONER

## 2021-03-10 PROCEDURE — 3017F COLORECTAL CA SCREEN DOC REV: CPT | Performed by: NURSE PRACTITIONER

## 2021-03-10 PROCEDURE — G8427 DOCREV CUR MEDS BY ELIG CLIN: HCPCS | Performed by: NURSE PRACTITIONER

## 2021-03-10 PROCEDURE — G8420 CALC BMI NORM PARAMETERS: HCPCS | Performed by: NURSE PRACTITIONER

## 2021-03-10 PROCEDURE — 99213 OFFICE O/P EST LOW 20 MIN: CPT | Performed by: NURSE PRACTITIONER

## 2021-03-10 PROCEDURE — 4040F PNEUMOC VAC/ADMIN/RCVD: CPT | Performed by: NURSE PRACTITIONER

## 2021-03-10 PROCEDURE — 36415 COLL VENOUS BLD VENIPUNCTURE: CPT | Performed by: NURSE PRACTITIONER

## 2021-03-10 PROCEDURE — 4004F PT TOBACCO SCREEN RCVD TLK: CPT | Performed by: NURSE PRACTITIONER

## 2021-03-10 RX ORDER — CHOLESTYRAMINE 4 G/9G
1 POWDER, FOR SUSPENSION ORAL 2 TIMES DAILY
Qty: 60 PACKET | Refills: 0 | Status: SHIPPED | OUTPATIENT
Start: 2021-03-10 | End: 2021-04-07

## 2021-03-10 RX ORDER — BUSPIRONE HYDROCHLORIDE 10 MG/1
10 TABLET ORAL DAILY
COMMUNITY
End: 2021-05-11 | Stop reason: SDUPTHER

## 2021-03-10 ASSESSMENT — ENCOUNTER SYMPTOMS
FLATUS: 0
VOMITING: 0
COUGH: 0
BLOATING: 1
ABDOMINAL PAIN: 1
DIARRHEA: 1

## 2021-03-10 NOTE — PROGRESS NOTES
3/10/2021    Chief Complaint   Patient presents with    Diarrhea     ongoing for a month and is like water     Pharyngitis     ongoing for 2 wks     Fatigue       Angus Forte is a 72 y.o. female, presents today for:    Diarrhea   This is a new problem. The current episode started 1 to 4 weeks ago. Episode frequency: 2-10x/day. The problem has been waxing and waning. The stool consistency is described as watery. The patient states that diarrhea does not awaken her from sleep. Associated symptoms include abdominal pain, bloating (sometimes) and weight loss. Pertinent negatives include no arthralgias, chills, coughing, fever, headaches, increased  flatus, myalgias, sweats, URI or vomiting. The symptoms are aggravated by dairy products. She has tried anti-motility drug and bismuth subsalicylate for the symptoms. The treatment provided no relief. There is no history of bowel resection. Pharyngitis  Associated symptoms include abdominal pain and fatigue. Pertinent negatives include no arthralgias, chills, coughing, fever, headaches, myalgias or vomiting. Fatigue  Associated symptoms include abdominal pain and fatigue. Pertinent negatives include no arthralgias, chills, coughing, fever, headaches, myalgias or vomiting. No COVID exposure. No loss of taste/ smell. No nonproductive cough. No fever. No contacts with similar symptoms. Has lost 20 lbs since last visit uintentionally. Review of Systems   Constitutional: Positive for fatigue and weight loss. Negative for chills and fever. Respiratory: Negative for cough. Gastrointestinal: Positive for abdominal pain, bloating (sometimes) and diarrhea. Negative for flatus and vomiting. Musculoskeletal: Negative for arthralgias and myalgias. Neurological: Negative for headaches.        Current Outpatient Medications on File Prior to Visit   Medication Sig Dispense Refill    busPIRone (BUSPAR) 10 MG tablet Take 10 mg by mouth daily Indications: anxiety      lisinopril (PRINIVIL;ZESTRIL) 40 MG tablet TAKE 1 TABLET BY MOUTH ONE TIME A DAY  90 tablet 0    amLODIPine (NORVASC) 5 MG tablet TAKE 1 TABLET BY MOUTH ONE TIME A DAY for blood pressure 90 tablet 1    atorvastatin (LIPITOR) 20 MG tablet TAKE 1 TABLET BY MOUTH ONE TIME A DAY 90 tablet 1    diphenhydrAMINE (BENADRYL) 25 MG tablet Take 25 mg by mouth every 6 hours as needed for Itching      ibuprofen (ADVIL;MOTRIN) 200 MG tablet Take 200 mg by mouth every 6 hours as needed for Pain      aspirin 81 MG tablet Take 81 mg by mouth daily       No current facility-administered medications on file prior to visit. Allergies   Allergen Reactions    Penicillins Hives and Rash     5/2013 40 yrs ago had a nonpruritic rash on hands;no hives and no other sx's;     Past Medical History:   Diagnosis Date    Anxiety     Cro[facility] disease ([facility] [address]     CVA (cerebral infarction) 2006    hx unclear    Hypertension      Past Surgical History:   Procedure Laterality Date    APPENDECTOMY      CHOLECYSTECTOMY      PARTIAL HYSTERECTOMY  1973    infection after btl;one ovary in      Social History     Tobacco Use    Smoking status: Current Every Day Smoker     Packs/day: 1.50     Years: 30.00     Pack years: 45.00     Types: Cigarettes    Smokeless tobacco: Never Used    Tobacco comment: placed in AVS   Substance Use Topics    Alcohol use: Yes     Alcohol/week: 20.0 standard drinks     Types: 20 Cans of beer per week     Comment: daily      History reviewed. No pertinent family history. Vitals:    03/10/21 1616   BP: 112/62   Pulse: 80   Temp: 94.1 °F (34.5 °C)   TempSrc: Infrared   SpO2: 98%   Weight: 148 lb (67.1 kg)     Estimated body mass index is 24.63 kg/m² as calculated from the following:    Height as of 4/9/19: 5' 5\" (1.651 m). Weight as of this encounter: 148 lb (67.1 kg). Physical Exam  Constitutional:       Appearance: Normal appearance.    Cardiovascular:      Rate and Rhythm: Normal rate and regular rhythm. Pulses: Normal pulses. Heart sounds: Murmur present. Crescendo  systolic murmur present with a grade of 2/6. Pulmonary:      Effort: Pulmonary effort is normal.      Breath sounds: Normal breath sounds. Abdominal:      General: Abdomen is flat. Bowel sounds are normal.      Palpations: Abdomen is soft. Musculoskeletal:      Right lower le+ Pitting Edema present. Left lower le+ Pitting Edema present. Skin:     General: Skin is warm and dry. Capillary Refill: Capillary refill takes less than 2 seconds. Neurological:      General: No focal deficit present. Mental Status: She is alert and oriented to person, place, and time. Psychiatric:         Mood and Affect: Mood normal.         Behavior: Behavior normal.         ASSESSMENT/PLAN:  1. Diarrhea, unspecified type  Labs today  CT Chest/ Abd/ Pelvis due to weight loss  Trial Questran  Encouraged water/ gatorade intake, bland foods  - cholestyramine (QUESTRAN) 4 g packet; Take 1 packet by mouth 2 times daily diarrhea  Dispense: 60 packet; Refill: 0    2. Diverticulitis  See #1  - CBC Auto Differential  - Comprehensive Metabolic Panel  - CT CHEST ABDOMEN PELVIS WO CONTRAST; Future    3. Sore throat  Rapid Strep negative  Encouraged water intake, OTC meds. - POCT rapid strep A    4. Unintentional weight loss  Labs today  - TSH without Reflex  - CT CHEST ABDOMEN PELVIS WO CONTRAST; Future    5. Cigarette nicotine dependence without complication  Encouraged cessation  Labs/ imaging due to weight loss  - CT CHEST ABDOMEN PELVIS WO CONTRAST; Future    6. Crohn's disease of large intestine without complication ([facility] [address]  Labs/ imaging due to symptoms  See #1  - CT CHEST ABDOMEN PELVIS WO CONTRAST; Future    7. Other hyperlipidemia  Labs today  - Lipid Panel      Return in about 1 week (around 3/17/2021) for diarrhea. Patient's questions answered and concerns addressed.   Patient agrees to plan of

## 2021-03-11 LAB
A/G RATIO: 1.2 (ref 1.1–2.2)
ALBUMIN SERPL-MCNC: 4.4 G/DL (ref 3.4–5)
ALP BLD-CCNC: 87 U/L (ref 40–129)
ALT SERPL-CCNC: 20 U/L (ref 10–40)
ANION GAP SERPL CALCULATED.3IONS-SCNC: 15 MMOL/L (ref 3–16)
AST SERPL-CCNC: 30 U/L (ref 15–37)
BASOPHILS ABSOLUTE: 0 K/UL (ref 0–0.2)
BASOPHILS RELATIVE PERCENT: 0.1 %
BILIRUB SERPL-MCNC: 0.8 MG/DL (ref 0–1)
BUN BLDV-MCNC: 6 MG/DL (ref 7–20)
CALCIUM SERPL-MCNC: 9.4 MG/DL (ref 8.3–10.6)
CHLORIDE BLD-SCNC: 95 MMOL/L (ref 99–110)
CHOLESTEROL, TOTAL: 177 MG/DL (ref 0–199)
CO2: 22 MMOL/L (ref 21–32)
CREAT SERPL-MCNC: 0.6 MG/DL (ref 0.6–1.2)
EOSINOPHILS ABSOLUTE: 0.2 K/UL (ref 0–0.6)
EOSINOPHILS RELATIVE PERCENT: 1.8 %
GFR AFRICAN AMERICAN: >60
GFR NON-AFRICAN AMERICAN: >60
GLOBULIN: 3.7 G/DL
GLUCOSE BLD-MCNC: 96 MG/DL (ref 70–99)
HCT VFR BLD CALC: 47.6 % (ref 36–48)
HDLC SERPL-MCNC: 54 MG/DL (ref 40–60)
HEMOGLOBIN: 15.7 G/DL (ref 12–16)
LDL CHOLESTEROL CALCULATED: 101 MG/DL
LYMPHOCYTES ABSOLUTE: 0.9 K/UL (ref 1–5.1)
LYMPHOCYTES RELATIVE PERCENT: 8.6 %
MCH RBC QN AUTO: 32.2 PG (ref 26–34)
MCHC RBC AUTO-ENTMCNC: 33 G/DL (ref 31–36)
MCV RBC AUTO: 97.4 FL (ref 80–100)
MONOCYTES ABSOLUTE: 0.9 K/UL (ref 0–1.3)
MONOCYTES RELATIVE PERCENT: 8.2 %
NEUTROPHILS ABSOLUTE: 8.6 K/UL (ref 1.7–7.7)
NEUTROPHILS RELATIVE PERCENT: 81.3 %
PDW BLD-RTO: 15.6 % (ref 12.4–15.4)
PLATELET # BLD: 287 K/UL (ref 135–450)
PMV BLD AUTO: 9.1 FL (ref 5–10.5)
POTASSIUM SERPL-SCNC: 4.3 MMOL/L (ref 3.5–5.1)
RBC # BLD: 4.89 M/UL (ref 4–5.2)
SODIUM BLD-SCNC: 132 MMOL/L (ref 136–145)
TOTAL PROTEIN: 8.1 G/DL (ref 6.4–8.2)
TRIGL SERPL-MCNC: 109 MG/DL (ref 0–150)
TSH SERPL DL<=0.05 MIU/L-ACNC: 12.23 UIU/ML (ref 0.27–4.2)
VLDLC SERPL CALC-MCNC: 22 MG/DL
WBC # BLD: 10.6 K/UL (ref 4–11)

## 2021-03-12 ENCOUNTER — HOSPITAL ENCOUNTER (OUTPATIENT)
Dept: CT IMAGING | Age: 71
Discharge: HOME | End: 2021-03-12
Payer: MEDICARE

## 2021-03-12 DIAGNOSIS — K57.92 DIVERTICULITIS: ICD-10-CM

## 2021-03-12 DIAGNOSIS — K50.10 CROHN'S DISEASE OF LARGE INTESTINE WITHOUT COMPLICATION (HCC): ICD-10-CM

## 2021-03-12 DIAGNOSIS — R63.4 UNINTENTIONAL WEIGHT LOSS: ICD-10-CM

## 2021-03-12 DIAGNOSIS — F17.210 CIGARETTE NICOTINE DEPENDENCE WITHOUT COMPLICATION: ICD-10-CM

## 2021-03-12 PROCEDURE — 74176 CT ABD & PELVIS W/O CONTRAST: CPT

## 2021-03-12 PROCEDURE — 6360000004 HC RX CONTRAST MEDICATION: Performed by: NURSE PRACTITIONER

## 2021-03-12 RX ADMIN — IOHEXOL 50 ML: 240 INJECTION, SOLUTION INTRATHECAL; INTRAVASCULAR; INTRAVENOUS; ORAL at 13:34

## 2021-03-18 ENCOUNTER — OFFICE VISIT (OUTPATIENT)
Dept: FAMILY MEDICINE CLINIC | Age: 71
End: 2021-03-18
Payer: MEDICARE

## 2021-03-18 VITALS
TEMPERATURE: 93.7 F | WEIGHT: 150 LBS | SYSTOLIC BLOOD PRESSURE: 122 MMHG | DIASTOLIC BLOOD PRESSURE: 68 MMHG | OXYGEN SATURATION: 98 % | HEART RATE: 70 BPM | BODY MASS INDEX: 24.96 KG/M2

## 2021-03-18 DIAGNOSIS — J02.0 ACUTE STREPTOCOCCAL PHARYNGITIS: ICD-10-CM

## 2021-03-18 DIAGNOSIS — R19.7 DIARRHEA, UNSPECIFIED TYPE: Primary | ICD-10-CM

## 2021-03-18 DIAGNOSIS — K50.118 CROHN'S DISEASE OF LARGE INTESTINE WITH OTHER COMPLICATION (HCC): ICD-10-CM

## 2021-03-18 PROCEDURE — 4004F PT TOBACCO SCREEN RCVD TLK: CPT | Performed by: NURSE PRACTITIONER

## 2021-03-18 PROCEDURE — 99214 OFFICE O/P EST MOD 30 MIN: CPT | Performed by: NURSE PRACTITIONER

## 2021-03-18 PROCEDURE — 4040F PNEUMOC VAC/ADMIN/RCVD: CPT | Performed by: NURSE PRACTITIONER

## 2021-03-18 PROCEDURE — G8427 DOCREV CUR MEDS BY ELIG CLIN: HCPCS | Performed by: NURSE PRACTITIONER

## 2021-03-18 PROCEDURE — 3017F COLORECTAL CA SCREEN DOC REV: CPT | Performed by: NURSE PRACTITIONER

## 2021-03-18 PROCEDURE — 1123F ACP DISCUSS/DSCN MKR DOCD: CPT | Performed by: NURSE PRACTITIONER

## 2021-03-18 PROCEDURE — G8484 FLU IMMUNIZE NO ADMIN: HCPCS | Performed by: NURSE PRACTITIONER

## 2021-03-18 PROCEDURE — 1090F PRES/ABSN URINE INCON ASSESS: CPT | Performed by: NURSE PRACTITIONER

## 2021-03-18 PROCEDURE — G8420 CALC BMI NORM PARAMETERS: HCPCS | Performed by: NURSE PRACTITIONER

## 2021-03-18 PROCEDURE — G8400 PT W/DXA NO RESULTS DOC: HCPCS | Performed by: NURSE PRACTITIONER

## 2021-03-18 RX ORDER — BUDESONIDE 3 MG/1
9 CAPSULE, COATED PELLETS ORAL EVERY MORNING
Qty: 90 CAPSULE | Refills: 0 | Status: SHIPPED | OUTPATIENT
Start: 2021-03-18 | End: 2021-04-17

## 2021-03-18 RX ORDER — DIPHENOXYLATE HYDROCHLORIDE AND ATROPINE SULFATE 2.5; .025 MG/1; MG/1
2 TABLET ORAL EVERY 6 HOURS PRN
Qty: 40 TABLET | Refills: 0 | Status: SHIPPED | OUTPATIENT
Start: 2021-03-18 | End: 2021-03-28

## 2021-03-18 RX ORDER — AZITHROMYCIN 250 MG/1
250 TABLET, FILM COATED ORAL SEE ADMIN INSTRUCTIONS
Qty: 6 TABLET | Refills: 0 | Status: SHIPPED | OUTPATIENT
Start: 2021-03-18 | End: 2021-03-23

## 2021-03-18 ASSESSMENT — PATIENT HEALTH QUESTIONNAIRE - PHQ9
1. LITTLE INTEREST OR PLEASURE IN DOING THINGS: 1
SUM OF ALL RESPONSES TO PHQ QUESTIONS 1-9: 1
SUM OF ALL RESPONSES TO PHQ9 QUESTIONS 1 & 2: 1
SUM OF ALL RESPONSES TO PHQ QUESTIONS 1-9: 1
SUM OF ALL RESPONSES TO PHQ QUESTIONS 1-9: 1
2. FEELING DOWN, DEPRESSED OR HOPELESS: 0

## 2021-03-18 NOTE — PROGRESS NOTES
Diarrhea: over a month of diarrhea. Taking the Questran BID x 8 days and not working. Questran causing heartburn. Diarrhea 4 or so  Diarrhea a day. Heartburn: has been taking nexium, helps w heartburn. Throat/Ears: sore throat for over week. When swallowing, having throat pain. Left ear  Pain, she feels the ear is Blocked. No fever, coughing, vomiting. Sometimes right ear gets wax drainage and the left ear stays blocked. Motrin has not helped for pain. Tylenol not helping.

## 2021-03-26 ENCOUNTER — TELEPHONE (OUTPATIENT)
Dept: FAMILY MEDICINE CLINIC | Age: 71
End: 2021-03-26

## 2021-03-26 NOTE — PROGRESS NOTES
nonpruritic rash on hands;no hives and no other sx's;     Past Medical History:   Diagnosis Date    Anxiety     Crohn disease ([address]     CVA (cerebral infarction)     hx unclear    Hypertension      Past Surgical History:   Procedure Laterality Date    APPENDECTOMY      CHOLECYSTECTOMY      PARTIAL HYSTERECTOMY  1973    infection after btl;one ovary in      Social History     Tobacco Use    Smoking status: Current Every Day Smoker     Packs/day: 1.50     Years: 30.00     Pack years: 45.00     Types: Cigarettes    Smokeless tobacco: Never Used    Tobacco comment: placed in AVS   Substance Use Topics    Alcohol use: Yes     Alcohol/week: 20.0 standard drinks     Types: 20 Cans of beer per week     Comment: daily      No family history on file. Vitals:    21 1436   BP: 122/68   Pulse: 70   Temp: 93.7 °F (34.3 °C)   TempSrc: Infrared   SpO2: 98%   Weight: 150 lb (68 kg)     Estimated body mass index is 24.96 kg/m² as calculated from the following:    Height as of 19: 5' 5\" (1.651 m). Weight as of this encounter: 150 lb (68 kg). Physical Exam  Constitutional:       Appearance: Normal appearance. HENT:      Right Ear: Tympanic membrane normal.      Left Ear: Tympanic membrane normal.      Nose: Nose normal.      Mouth/Throat:      Dentition: No dental tenderness. Palate: Lesions present. Pharynx: Oropharyngeal exudate and posterior oropharyngeal erythema present. Cardiovascular:      Rate and Rhythm: Normal rate and regular rhythm. Pulses: Normal pulses. Heart sounds: Murmur present. Crescendo  systolic murmur present with a grade of 2/6. Pulmonary:      Effort: Pulmonary effort is normal.      Breath sounds: Normal breath sounds. Abdominal:      General: Abdomen is flat. Bowel sounds are normal.      Palpations: Abdomen is soft. Musculoskeletal:      Right lower le+ Pitting Edema present. Left lower le+ Pitting Edema present.    Skin: General: Skin is warm and dry. Capillary Refill: Capillary refill takes less than 2 seconds. Neurological:      General: No focal deficit present. Mental Status: She is alert and oriented to person, place, and time. Psychiatric:         Mood and Affect: Mood normal.         Behavior: Behavior normal.         ASSESSMENT/PLAN:  1. Diarrhea, unspecified type  Labs/ Ct scan reviewed. Will treat as Crohns flare for now  Will send to GI for opinion and updated colonoscopy, last done 2010  Start Lomotil for diarrhea, Budesonide for flare  - budesonide (ENTOCORT EC) 3 MG extended release capsule; Take 3 capsules by mouth every morning For Crohns flare  Dispense: 90 capsule; Refill: 0  - diphenoxylate-atropine (DIPHENATOL) 2.5-0.025 MG per tablet; Take 2 tablets by mouth every 6 hours as needed for Diarrhea for up to 10 days. Dispense: 40 tablet; Refill: 0    2. Crohn's disease of large intestine with other complication ([facility]  See above  - Martin Lange MD, Gastroenterology, [facility]    3. Acute streptococcal pharyngitis  Start ZPak for unresolving sore throat symptoms. - azithromycin (ZITHROMAX) 250 MG tablet; Take 1 tablet by mouth See Admin Instructions for 5 days 500mg on day 1 followed by 250mg on days 2 - 5  Dispense: 6 tablet; Refill: 0      Return in about 4 weeks (around 4/15/2021). Patient's questions answered and concerns addressed. Patient agrees to plan of care.           Electronically signed by LEVAR Castro CNP on 3/26/2021 at 9:56 AM

## 2021-04-06 ENCOUNTER — NURSE TRIAGE (OUTPATIENT)
Dept: OTHER | Facility: CLINIC | Age: 71
End: 2021-04-06

## 2021-04-06 NOTE — TELEPHONE ENCOUNTER
Reason for Disposition   Earache also present    Answer Assessment - Initial Assessment Questions  1. ONSET: \"When did the throat start hurting? \" (Hours or days ago)       About March 11th    2. SEVERITY: \"How bad is the sore throat? \" (Scale 1-10; mild, moderate or severe)    - MILD (1-3):  doesn't interfere with eating or normal activities    - MODERATE (4-7): interferes with eating some solids and normal activities    - SEVERE (8-10):  excruciating pain, interferes with most normal activities    - SEVERE DYSPHAGIA: can't swallow liquids, drooling      6/10    3. STREP EXPOSURE: \"Has there been any exposure to strep within the past week? \" If so, ask: \"What type of contact occurred? \"       No contact    4. VIRAL SYMPTOMS: Tank Night there any symptoms of a cold, such as a runny nose, cough, hoarse voice or red eyes? \"       Sometimes I have hoarseness    5. FEVER: \"Do you have a fever? \" If so, ask: \"What is your temperature, how was it measured, and when did it start? \"      No    6. PUS ON THE TONSILS: \"Is there pus on the tonsils in the back of your throat? \"      No    7. OTHER SYMPTOMS: \"Do you have any other symptoms? \" (e.g., difficulty breathing, headache, rash)      Left Ear pain,     8. PREGNANCY: \"Is there any chance you are pregnant? \" \"When was your last menstrual period? \"      no    Protocols used: SORE THROAT-ADULT-[place]    Received call from [facility] at pre-service center [address] with Red Flag Complaint. Brief description of triage: left side sore throat since mid March, Left earache    Triage indicates for patient to see PCP today    Care advice provided, patient verbalizes understanding; denies any other questions or concerns; instructed to call back for any new or worsening symptoms. Writer provided warm transfer to [place] at [facility] for appointment scheduling. Attention Provider: Thank you for allowing me to participate in the care of your patient.   The patient was connected to triage in response to information provided to the E[facility]. Please do not respond through this encounter as the response is not directed to a shared pool.

## 2021-04-07 ENCOUNTER — OFFICE VISIT (OUTPATIENT)
Dept: FAMILY MEDICINE CLINIC | Age: 71
End: 2021-04-07
Payer: MEDICARE

## 2021-04-07 VITALS
BODY MASS INDEX: 24.96 KG/M2 | TEMPERATURE: 97.2 F | OXYGEN SATURATION: 98 % | SYSTOLIC BLOOD PRESSURE: 136 MMHG | WEIGHT: 150 LBS | DIASTOLIC BLOOD PRESSURE: 68 MMHG | HEART RATE: 64 BPM

## 2021-04-07 DIAGNOSIS — J02.9 ACUTE PHARYNGITIS, UNSPECIFIED ETIOLOGY: ICD-10-CM

## 2021-04-07 DIAGNOSIS — B37.0 ORAL THRUSH: Primary | ICD-10-CM

## 2021-04-07 PROCEDURE — 3017F COLORECTAL CA SCREEN DOC REV: CPT | Performed by: NURSE PRACTITIONER

## 2021-04-07 PROCEDURE — G8420 CALC BMI NORM PARAMETERS: HCPCS | Performed by: NURSE PRACTITIONER

## 2021-04-07 PROCEDURE — 4004F PT TOBACCO SCREEN RCVD TLK: CPT | Performed by: NURSE PRACTITIONER

## 2021-04-07 PROCEDURE — G8427 DOCREV CUR MEDS BY ELIG CLIN: HCPCS | Performed by: NURSE PRACTITIONER

## 2021-04-07 PROCEDURE — 99213 OFFICE O/P EST LOW 20 MIN: CPT | Performed by: NURSE PRACTITIONER

## 2021-04-07 PROCEDURE — 1090F PRES/ABSN URINE INCON ASSESS: CPT | Performed by: NURSE PRACTITIONER

## 2021-04-07 PROCEDURE — 1123F ACP DISCUSS/DSCN MKR DOCD: CPT | Performed by: NURSE PRACTITIONER

## 2021-04-07 PROCEDURE — G8400 PT W/DXA NO RESULTS DOC: HCPCS | Performed by: NURSE PRACTITIONER

## 2021-04-07 PROCEDURE — 4040F PNEUMOC VAC/ADMIN/RCVD: CPT | Performed by: NURSE PRACTITIONER

## 2021-04-07 NOTE — PROGRESS NOTES
4/7/2021    Chief Complaint   Patient presents with    Pharyngitis     it is on the left side and hurts all the time , she pointed to the lymph node area under her left jaw        Marianna Ogden is a 72 y.o. female, presents today for:    Continues to have have moderate sore throat, mild difficulty swallowing, left sided neck tenderness. Mild cough. No fever, SOB, CP. No N/V. Ongoing issue for the past several weeks when pt presented with moderate diarrhea due to Crohns flare. Diarrhea has now resolved after Budesonide taper but sore throat is persisting. Rapid strep last visit was negative. Pt has attempted Azithromycin and now Budesonide without resolution in symptoms. Review of Systems   All other systems reviewed and are negative. Current Outpatient Medications on File Prior to Visit   Medication Sig Dispense Refill    budesonide (ENTOCORT EC) 3 MG extended release capsule Take 3 capsules by mouth every morning For Crohns flare 90 capsule 0    busPIRone (BUSPAR) 10 MG tablet Take 10 mg by mouth daily Indications: anxiety      lisinopril (PRINIVIL;ZESTRIL) 40 MG tablet TAKE 1 TABLET BY MOUTH ONE TIME A DAY  90 tablet 0    amLODIPine (NORVASC) 5 MG tablet TAKE 1 TABLET BY MOUTH ONE TIME A DAY for blood pressure 90 tablet 1    atorvastatin (LIPITOR) 20 MG tablet TAKE 1 TABLET BY MOUTH ONE TIME A DAY 90 tablet 1    diphenhydrAMINE (BENADRYL) 25 MG tablet Take 25 mg by mouth every 6 hours as needed for Itching      ibuprofen (ADVIL;MOTRIN) 200 MG tablet Take 200 mg by mouth every 6 hours as needed for Pain      aspirin 81 MG tablet Take 81 mg by mouth daily       No current facility-administered medications on file prior to visit.        Allergies   Allergen Reactions    Penicillins Hives and Rash     5/2013 40 yrs ago had a nonpruritic rash on hands;no hives and no other sx's;     Past Medical History:   Diagnosis Date    Anxiety     [facility] ([facility] [address]     CVA (cerebral infarction) 2006    hx unclear    Hypertension      Past Surgical History:   Procedure Laterality Date    APPENDECTOMY      CHOLECYSTECTOMY      PARTIAL HYSTERECTOMY  1973    infection after btl;one ovary in      Social History     Tobacco Use    Smoking status: Current Every Day Smoker     Packs/day: 1.50     Years: 30.00     Pack years: 45.00     Types: Cigarettes    Smokeless tobacco: Never Used    Tobacco comment: placed in AVS   Substance Use Topics    Alcohol use: Yes     Alcohol/week: 20.0 standard drinks     Types: 20 Cans of beer per week     Comment: daily      History reviewed. No pertinent family history. Vitals:    21 1135   BP: 136/68   Pulse: 64   Temp: 97.2 °F (36.2 °C)   TempSrc: Infrared   SpO2: 98%   Weight: 150 lb (68 kg)     Estimated body mass index is 24.96 kg/m² as calculated from the following:    Height as of 19: 5' 5\" (1.651 m). Weight as of this encounter: 150 lb (68 kg). Physical Exam  Constitutional:       Appearance: Normal appearance. HENT:      Right Ear: Tympanic membrane normal.      Left Ear: Tympanic membrane normal.      Nose: Nose normal.      Mouth/Throat:      Dentition: No dental tenderness. Palate: Lesions present. Pharynx: Oropharyngeal exudate and posterior oropharyngeal erythema present. Comments: Irregular white patch on oropharynx with mild yellow discharge on uvula  Cardiovascular:      Rate and Rhythm: Normal rate and regular rhythm. Pulses: Normal pulses. Heart sounds: Murmur present. Crescendo  systolic murmur present with a grade of 2/6. Pulmonary:      Effort: Pulmonary effort is normal.      Breath sounds: Normal breath sounds. Musculoskeletal:      Right lower le+ Pitting Edema present. Left lower le+ Pitting Edema present. Skin:     General: Skin is warm and dry. Capillary Refill: Capillary refill takes less than 2 seconds. Neurological:      General: No focal deficit present.

## 2021-04-07 NOTE — PATIENT INSTRUCTIONS
Budesonide Wean: take 2 capsules daily for 2 weeks, then decrease to 1 capsule daily for 2 weeks, then take every other day for a week, then stop

## 2021-04-10 LAB — THROAT CULTURE: NORMAL

## 2021-04-12 DIAGNOSIS — B37.0 ORAL THRUSH: ICD-10-CM

## 2021-04-12 DIAGNOSIS — J02.9 ACUTE PHARYNGITIS, UNSPECIFIED ETIOLOGY: Primary | ICD-10-CM

## 2021-04-13 ENCOUNTER — OFFICE VISIT (OUTPATIENT)
Dept: ENT CLINIC | Age: 71
End: 2021-04-13
Payer: MEDICARE

## 2021-04-13 VITALS
TEMPERATURE: 96.8 F | SYSTOLIC BLOOD PRESSURE: 159 MMHG | HEIGHT: 65 IN | BODY MASS INDEX: 25.06 KG/M2 | WEIGHT: 150.4 LBS | DIASTOLIC BLOOD PRESSURE: 64 MMHG | HEART RATE: 66 BPM

## 2021-04-13 DIAGNOSIS — J02.9 PHARYNGITIS, UNSPECIFIED ETIOLOGY: Primary | ICD-10-CM

## 2021-04-13 DIAGNOSIS — F17.200 SMOKING: ICD-10-CM

## 2021-04-13 DIAGNOSIS — R49.0 HOARSENESS: ICD-10-CM

## 2021-04-13 PROCEDURE — 3017F COLORECTAL CA SCREEN DOC REV: CPT | Performed by: STUDENT IN AN ORGANIZED HEALTH CARE EDUCATION/TRAINING PROGRAM

## 2021-04-13 PROCEDURE — 1090F PRES/ABSN URINE INCON ASSESS: CPT | Performed by: STUDENT IN AN ORGANIZED HEALTH CARE EDUCATION/TRAINING PROGRAM

## 2021-04-13 PROCEDURE — G8400 PT W/DXA NO RESULTS DOC: HCPCS | Performed by: STUDENT IN AN ORGANIZED HEALTH CARE EDUCATION/TRAINING PROGRAM

## 2021-04-13 PROCEDURE — 4040F PNEUMOC VAC/ADMIN/RCVD: CPT | Performed by: STUDENT IN AN ORGANIZED HEALTH CARE EDUCATION/TRAINING PROGRAM

## 2021-04-13 PROCEDURE — 31575 DIAGNOSTIC LARYNGOSCOPY: CPT | Performed by: STUDENT IN AN ORGANIZED HEALTH CARE EDUCATION/TRAINING PROGRAM

## 2021-04-13 PROCEDURE — 99204 OFFICE O/P NEW MOD 45 MIN: CPT | Performed by: STUDENT IN AN ORGANIZED HEALTH CARE EDUCATION/TRAINING PROGRAM

## 2021-04-13 PROCEDURE — G8417 CALC BMI ABV UP PARAM F/U: HCPCS | Performed by: STUDENT IN AN ORGANIZED HEALTH CARE EDUCATION/TRAINING PROGRAM

## 2021-04-13 PROCEDURE — G8427 DOCREV CUR MEDS BY ELIG CLIN: HCPCS | Performed by: STUDENT IN AN ORGANIZED HEALTH CARE EDUCATION/TRAINING PROGRAM

## 2021-04-13 PROCEDURE — 4004F PT TOBACCO SCREEN RCVD TLK: CPT | Performed by: STUDENT IN AN ORGANIZED HEALTH CARE EDUCATION/TRAINING PROGRAM

## 2021-04-13 PROCEDURE — 1123F ACP DISCUSS/DSCN MKR DOCD: CPT | Performed by: STUDENT IN AN ORGANIZED HEALTH CARE EDUCATION/TRAINING PROGRAM

## 2021-04-13 RX ORDER — FLUCONAZOLE 100 MG/1
100 TABLET ORAL DAILY
Qty: 10 TABLET | Refills: 0 | Status: SHIPPED | OUTPATIENT
Start: 2021-04-13 | End: 2021-04-23

## 2021-04-13 ASSESSMENT — ENCOUNTER SYMPTOMS
SHORTNESS OF BREATH: 0
NAUSEA: 0
TROUBLE SWALLOWING: 1
VOMITING: 0
EYE PAIN: 0
COUGH: 0
RHINORRHEA: 0
SORE THROAT: 1

## 2021-04-13 NOTE — PROGRESS NOTES
[facility]      Patient Name: Yessenia Up  Medical Record Number:  [id number]  Primary Care Physician:  LEVAR Petit CNP  Date of Consultation: 4/13/2021    Chief Complaint:   Chief Complaint   Patient presents with    Pharyngitis     States has had issues with tonsils since 03/18/2021, states it feels like there is pins on right side of throat, can't really eat or swallow it hurts, voice is hoarse (comes and goes)        [facility]  Ryanne Castro is a(n) 72 y.o. female who presents with left-sided throat pain and hoarseness. She states the throat pain has been going on since March. She had a flareup of her ulcerative colitis and was started on steroids and azithromycin. Her ulcerative colitis has improved however she continues to have the left-sided throat pain. She had a throat culture performed by her primary care physician which showed yeast and was started on nystatin oral suspension which has not resulted any improvement in her symptoms. She does have acid reflux that she takes over-the-counter medications for every 2 to 3 weeks. She has lost at least 30 to 40 pounds in the past several weeks due to her inability to eat and her ulcerative colitis flareup. She does smoke a pack and 1/2/day and has been smoking for many years. Patient Active Problem List   Diagnosis    Chronic anxiety    Crohn's disease without complication ([address]    Nicotine dependence    Aortic insufficiency    Essential hypertension    Hyperlipidemia    Cigarette nicotine dependence without complication    Smoking    Mixed incontinence urge and stress    Localized swelling of both lower legs    ASCVD (arteriosclerotic cardiovascular disease)     Past Surgical History:   Procedure Laterality Date    APPENDECTOMY      CHOLECYSTECTOMY      PARTIAL HYSTERECTOMY  1973    infection after btl;one ovary in     History reviewed.  No pertinent family history. Social History     Socioeconomic History    Marital status:      Spouse name: Not on file    Number of children: Not on file    Years of education: Not on file    Highest education level: Not on file   Occupational History    Not on file   Social Needs    Financial resource strain: Not on file    Food insecurity     Worry: Not on file     Inability: Not on file    Transportation needs     Medical: Not on file     Non-medical: Not on file   Tobacco Use    Smoking status: Current Every Day Smoker     Packs/day: 1.50     Years: 30.00     Pack years: 45.00     Types: Cigarettes    Smokeless tobacco: Never Used    Tobacco comment: placed in AVS   Substance and Sexual Activity    Alcohol use: Yes     Alcohol/week: 20.0 standard drinks     Types: 20 Cans of beer per week     Comment: daily    Drug use: No    Sexual activity: Not on file   Lifestyle    Physical activity     Days per week: Not on file     Minutes per session: Not on file    Stress: Not on file   Relationships    Social connections     Talks on phone: Not on file     Gets together: Not on file     Attends Zoroastrianism service: Not on file     Active member of club or organization: Not on file     Attends meetings of clubs or organizations: Not on file     Relationship status: Not on file    Intimate partner violence     Fear of current or ex partner: Not on file     Emotionally abused: Not on file     Physically abused: Not on file     Forced sexual activity: Not on file   Other Topics Concern    Not on file   Social History Narrative    10/2011 lives with spouse and dogs;not working re bowels       DRUG/FOOD ALLERGIES: Penicillins    CURRENT MEDICATIONS  Prior to Admission medications    Medication Sig Start Date End Date Taking?  Authorizing Provider   fluconazole (DIFLUCAN) 100 MG tablet Take 1 tablet by mouth daily for 10 days 4/13/21 4/23/21 Yes Rashid Jaimes DO   nystatin (MYCOSTATIN) 268748 UNIT/ML suspension Take 5 mLs by mouth 4 times daily for 10 days Retain in mouth as long as possible 4/7/21 4/17/21 Yes LEVAR Vincent CNP   budesonide (ENTOCORT EC) 3 MG extended release capsule Take 3 capsules by mouth every morning For Crohns flare 3/18/21 4/17/21 Yes LEVAR Vincent CNP   busPIRone (BUSPAR) 10 MG tablet Take 10 mg by mouth daily Indications: anxiety   Yes Historical Provider, MD   lisinopril (PRINIVIL;ZESTRIL) 40 MG tablet TAKE 1 TABLET BY MOUTH ONE TIME A DAY  12/3/20  Yes LEVAR Vincent CNP   amLODIPine (NORVASC) 5 MG tablet TAKE 1 TABLET BY MOUTH ONE TIME A DAY for blood pressure 8/18/20  Yes LEVAR Vincent CNP   atorvastatin (LIPITOR) 20 MG tablet TAKE 1 TABLET BY MOUTH ONE TIME A DAY 8/10/20  Yes LEVAR Vincent CNP   diphenhydrAMINE (BENADRYL) 25 MG tablet Take 25 mg by mouth every 6 hours as needed for Itching   Yes Historical Provider, MD   ibuprofen (ADVIL;MOTRIN) 200 MG tablet Take 200 mg by mouth every 6 hours as needed for Pain   Yes Historical Provider, MD   aspirin 81 MG tablet Take 81 mg by mouth daily   Yes Historical Provider, MD       REVIEW OF SYSTEMS  The following systems were reviewed and revealed the following in addition to any already discussed in the HPI:    Review of Systems   Constitutional: Negative for fatigue and fever. HENT: Positive for sore throat and trouble swallowing. Negative for congestion, ear pain, postnasal drip, rhinorrhea and sneezing. Eyes: Negative for pain and visual disturbance. Respiratory: Negative for cough and shortness of breath. Cardiovascular: Negative for chest pain. Gastrointestinal: Negative for nausea and vomiting. Endocrine: Negative. Genitourinary: Negative. Musculoskeletal: Negative for neck pain and neck stiffness. Skin: Negative for rash. Neurological: Negative for dizziness and headaches.           PHYSICAL EXAM  BP (!) 159/64 (Site: Right Upper Arm, Position: Sitting, Cuff Size: Medium Adult)   Pulse 66   Temp 96.8 °F (36 °C) (Infrared)   Ht 5' 5\" (1.651 m)   Wt 150 lb 6.4 oz (68.2 kg)   BMI 25.03 kg/m²     GENERAL: No Acute Distress, Alert and Oriented, no hoarseness  EYES: EOMI, Anti-icteric  NOSE: No epistaxis, nasal mucosa within normal limits, no purulent drainage  EARS: Normal external canal appearance, EAC patent bilaterally, right TM with pinpoint perforation, left TM intact with no evidence of effusions  FACE: 1/6 House-Brackmann Scale, symmetric, sensation equal bilaterally  ORAL CAVITY: No masses or lesions palpated, uvula is midline, moist mucous membranes, her uvula is covered in thick yellow mucus, left soft palate is very erythematous, tonsils 1+ without any abnormalities noted, edentulous  NECK: Normal range of motion, no thyromegaly, trachea is midline, no lymphadenopathy, no neck masses, no crepitus  CHEST: Normal respiratory effort, no retractions, breathing comfortably  SKIN: No rashes, normal appearing skin, no evidence of skin lesions/tumors    RADIOLOGY  Summary of findings:      PROCEDURE  Flexible Laryngoscopy    Pre op: Throat pain hoarseness. Post op: Same  Procedure : Flexible Laryngoscopy  Surgeon: Christian Nichols DO  Anesthesia: Afrin with 4% lidocaine  Indication: Laryngeal mirror examination was not tolerated due to gag reflex  Description:  The scope was passed along the floor of the left naris to the level of the larynx. There was no evidence of concerning masses or lesions of the base of tongue, vallecula, epiglottis, aryepiglottic folds, arytenoids, false vocal folds, true vocal folds, or pyriform sinuses. True vocal folds exhibited symmetric motion bilaterally without evidence of paralysis or paresis. The scope was removed. The patient tolerated the procedure without difficulty. There were no complications.   Pertinent findings: Mild post arytenoid edema present, no masses or lesions      ASSESSMENT/PLAN  Kevin Gonzalez is a very pleasant 72 y.o. female with 1. Pharyngitis, unspecified etiology  Her throat culture came back as positive for yeast.  She is on nystatin but does not feel this has improved her symptoms at all. I will start her on Diflucan and reevaluate her in 1 week. - fluconazole (DIFLUCAN) 100 MG tablet; Take 1 tablet by mouth daily for 10 days  Dispense: 10 tablet; Refill: 0    2. Hoarseness  There is no evidence of any masses or lesions on her vocal folds on flexible laryngoscopy. - ME LARYNGOSCOPY FLEXIBLE DIAGNOSTIC    3. Smoking  Encouraged her to stop smoking as she is at high risk for malignancy given her smoking history and continued smoking 1/2 packs/day. She will follow-up in 1 week for symptom check. If her symptoms are persistent physical exam is unchanged we will likely get a biopsy of her left soft palate and potentially pursue a CT scan of her neck. Medical Decision Making:   The following items were considered in medical decision making:  Independent review of images  Review / order clinical lab tests  Review / order radiology tests  Decision to obtain old records

## 2021-04-26 ENCOUNTER — OFFICE VISIT (OUTPATIENT)
Dept: ENT CLINIC | Age: 71
End: 2021-04-26
Payer: MEDICARE

## 2021-04-26 VITALS — TEMPERATURE: 96.8 F | BODY MASS INDEX: 24.99 KG/M2 | HEIGHT: 65 IN | WEIGHT: 150 LBS

## 2021-04-26 DIAGNOSIS — F17.200 SMOKING: ICD-10-CM

## 2021-04-26 DIAGNOSIS — J02.9 PHARYNGITIS, UNSPECIFIED ETIOLOGY: Primary | ICD-10-CM

## 2021-04-26 DIAGNOSIS — R49.0 HOARSENESS: ICD-10-CM

## 2021-04-26 PROCEDURE — G8427 DOCREV CUR MEDS BY ELIG CLIN: HCPCS | Performed by: STUDENT IN AN ORGANIZED HEALTH CARE EDUCATION/TRAINING PROGRAM

## 2021-04-26 PROCEDURE — 1123F ACP DISCUSS/DSCN MKR DOCD: CPT | Performed by: STUDENT IN AN ORGANIZED HEALTH CARE EDUCATION/TRAINING PROGRAM

## 2021-04-26 PROCEDURE — G8400 PT W/DXA NO RESULTS DOC: HCPCS | Performed by: STUDENT IN AN ORGANIZED HEALTH CARE EDUCATION/TRAINING PROGRAM

## 2021-04-26 PROCEDURE — 3017F COLORECTAL CA SCREEN DOC REV: CPT | Performed by: STUDENT IN AN ORGANIZED HEALTH CARE EDUCATION/TRAINING PROGRAM

## 2021-04-26 PROCEDURE — G8420 CALC BMI NORM PARAMETERS: HCPCS | Performed by: STUDENT IN AN ORGANIZED HEALTH CARE EDUCATION/TRAINING PROGRAM

## 2021-04-26 PROCEDURE — 99214 OFFICE O/P EST MOD 30 MIN: CPT | Performed by: STUDENT IN AN ORGANIZED HEALTH CARE EDUCATION/TRAINING PROGRAM

## 2021-04-26 PROCEDURE — 4004F PT TOBACCO SCREEN RCVD TLK: CPT | Performed by: STUDENT IN AN ORGANIZED HEALTH CARE EDUCATION/TRAINING PROGRAM

## 2021-04-26 PROCEDURE — 1090F PRES/ABSN URINE INCON ASSESS: CPT | Performed by: STUDENT IN AN ORGANIZED HEALTH CARE EDUCATION/TRAINING PROGRAM

## 2021-04-26 PROCEDURE — 4040F PNEUMOC VAC/ADMIN/RCVD: CPT | Performed by: STUDENT IN AN ORGANIZED HEALTH CARE EDUCATION/TRAINING PROGRAM

## 2021-04-26 RX ORDER — LIDOCAINE HYDROCHLORIDE 20 MG/ML
15 SOLUTION OROPHARYNGEAL
Qty: 100 ML | Refills: 2 | Status: ON HOLD | OUTPATIENT
Start: 2021-04-26 | End: 2021-07-16

## 2021-04-26 ASSESSMENT — ENCOUNTER SYMPTOMS
EYE PAIN: 0
SORE THROAT: 1
SHORTNESS OF BREATH: 0
VOMITING: 0
RHINORRHEA: 0
COUGH: 0
NAUSEA: 0

## 2021-04-26 NOTE — PROGRESS NOTES
Joo Fuelling    Age 72 y.o.    female    1950    MRN [id number]    5/5/2021  Arrival Time_____________  OR Time____________78 Min     Procedure(s):  DIRECT MICROLARYNGOSCOPY WITH BIOPSIES                      General     Surgeon(s):  [employer] [employer], DO      DAY ADMIT ___  SDS/OP ___  OUTPT IN BED ___         Phone [phone] (home)    PCP _____________________ Phone_________________ Epic ( ) Epic CE ( ) Appt ________    ADDITIONAL INFO __________________________________ Cardio/Consult _____________    NOTES _____________________________________________________________________    ____________________________________________________________________________    PAT APPT DATE:________ TIME: ________  FAXED QAD: _______  (__) H&P w/ hospitalist  ____________________________________________________________________________    COVID TEST: Date/Location______________        NURSING HISTORY COMPLETE: _______  (__) CBC       (__) W/ DIFF ___________  (__)  ECHO    __________  (__) Hgb A1C    ___________  (__) CHEST X RAY   __________  (__) LIPID PROFILE  ___________  (__) EKG   __________  (__) PT/PTT   ___________  (__) PFT's   __________  (__) BMP   ___________  (__) CAROTIDS  __________  (__) CMP   ___________  (__) VEIN MAPPING  __________  (__) U/A   ___________  (__) HISTORY & PHYSICAL __________  (__) URINE C & S  ___________  (__) CARDIAC CLEARANCE __________  (__) U/A W/ FLEX  ___________  (__) PULM.  CLEARANCE __________  (__) SERUM PREGNANCY ___________  (__) Check Epic DOS orders __________  (__) TYPE & SCREEN ________ repeat ( ) (__)  __________________ __________  (__) ALBUMIN   ___________  (__)  __________________ __________  (__) TRANSFERRIN  ___________  (__)  __________________ __________  (__) LIVER PROFILE  ___________  (__)  __________________ __________  (__) CARBOXY HGB  ___________  (__) URINE PREG DOS __________  (__) NICOTINE & MET.  ___________  (__) BLOOD SUGAR DOS __________  (__) PREALBUMIN  ___________    (__) MRSA NASAL SWAB ___________  (__) BLOOD THINNERS __________  (__) ACE/ ARBS: _____________________    (__) BETABLOCKERS ___________________

## 2021-04-26 NOTE — PROGRESS NOTES
[facility]      Patient Name: Joo Madsen  Medical Record Number:  [id number]  Primary Care Physician:  LEVAR Vincent CNP  Date of Consultation: 4/26/2021    Chief Complaint:   Chief Complaint   Patient presents with    Pharyngitis     Patient is here to follow up on tonsillitis. She states that her symptoms have not changed since her last [address] Chacon is a(n) 72 y.o. female who presents for follow-up of pharyngitis. She has been treated with nystatin and Diflucan as she had cultures positive for yeast in her throat. She has had no improvement. She continues to have discomfort with swallowing and pain at rest.  This has been going on for approximately 2 months now. She has lost 30 to 40 pounds in the past several weeks as her throat pain has correlated with an ulcerative colitis flare up. She has a pack year history of 39 and continues to smoke 1/2 pack/day. She also drinks about 20 beers per week. Patient Active Problem List   Diagnosis    Chronic anxiety    Crohn's disease without complication ([address]    Nicotine dependence    Aortic insufficiency    Essential hypertension    Hyperlipidemia    Cigarette nicotine dependence without complication    Smoking    Mixed incontinence urge and stress    Localized swelling of both lower legs    ASCVD (arteriosclerotic cardiovascular disease)     Past Surgical History:   Procedure Laterality Date    APPENDECTOMY      CHOLECYSTECTOMY      PARTIAL HYSTERECTOMY  1973    infection after btl;one ovary in     History reviewed. No pertinent family history.   Social History     Socioeconomic History    Marital status:      Spouse name: Not on file    Number of children: Not on file    Years of education: Not on file    Highest education level: Not on file   Occupational History    Not on file   Social Needs    Financial resource strain: Not on file    Food insecurity     Worry: Not on file     Inability: Not on file    Transportation needs     Medical: Not on file     Non-medical: Not on file   Tobacco Use    Smoking status: Current Every Day Smoker     Packs/day: 1.50     Years: 30.00     Pack years: 45.00     Types: Cigarettes    Smokeless tobacco: Never Used    Tobacco comment: placed in AVS   Substance and Sexual Activity    Alcohol use: Yes     Alcohol/week: 20.0 standard drinks     Types: 20 Cans of beer per week     Comment: daily    Drug use: No    Sexual activity: Not on file   Lifestyle    Physical activity     Days per week: Not on file     Minutes per session: Not on file    Stress: Not on file   Relationships    Social connections     Talks on phone: Not on file     Gets together: Not on file     Attends Episcopal service: Not on file     Active member of club or organization: Not on file     Attends meetings of clubs or organizations: Not on file     Relationship status: Not on file    Intimate partner violence     Fear of current or ex partner: Not on file     Emotionally abused: Not on file     Physically abused: Not on file     Forced sexual activity: Not on file   Other Topics Concern    Not on file   Social History Narrative    10/2011 lives with spouse and dogs;not working re bowels       DRUG/FOOD ALLERGIES: Penicillins    CURRENT MEDICATIONS  Prior to Admission medications    Medication Sig Start Date End Date Taking?  Authorizing Provider   lidocaine viscous hcl (XYLOCAINE) 2 % SOLN solution Take 15 mLs by mouth every 3 hours as needed for Irritation or Pain 4/26/21  Yes Rashid Jaimes DO   busPIRone (BUSPAR) 10 MG tablet Take 10 mg by mouth daily Indications: anxiety   Yes Historical Provider, MD   lisinopril (PRINIVIL;ZESTRIL) 40 MG tablet TAKE 1 TABLET BY MOUTH ONE TIME A DAY  12/3/20  Yes LEVAR Luna - CNP   amLODIPine (NORVASC) 5 MG tablet TAKE 1 TABLET BY MOUTH ONE TIME A DAY for blood pressure 8/18/20 Yes LEVAR Butcher CNP   atorvastatin (LIPITOR) 20 MG tablet TAKE 1 TABLET BY MOUTH ONE TIME A DAY 8/10/20  Yes LEVAR Butcher CNP   diphenhydrAMINE (BENADRYL) 25 MG tablet Take 25 mg by mouth every 6 hours as needed for Itching   Yes Historical Provider, MD   ibuprofen (ADVIL;MOTRIN) 200 MG tablet Take 200 mg by mouth every 6 hours as needed for Pain   Yes Historical Provider, MD   aspirin 81 MG tablet Take 81 mg by mouth daily   Yes Historical Provider, MD       REVIEW OF SYSTEMS  The following systems were reviewed and revealed the following in addition to any already discussed in the HPI:    Review of Systems   Constitutional: Negative for fatigue and fever. HENT: Positive for sore throat. Negative for congestion, ear pain, postnasal drip, rhinorrhea and sneezing. Eyes: Negative for pain and visual disturbance. Respiratory: Negative for cough and shortness of breath. Cardiovascular: Negative for chest pain. Gastrointestinal: Negative for nausea and vomiting. Endocrine: Negative. Genitourinary: Negative. Musculoskeletal: Negative for neck pain and neck stiffness. Skin: Negative for rash. Neurological: Negative for dizziness and headaches.           PHYSICAL EXAM  Temp 96.8 °F (36 °C)   Ht 5' 5\" (1.651 m)   Wt 150 lb (68 kg)   BMI 24.96 kg/m²     GENERAL: No Acute Distress, Alert and Oriented, no hoarseness  EYES: EOMI, Anti-icteric  NOSE: No epistaxis, nasal mucosa within normal limits, no purulent drainage  EARS: Normal external canal appearance, EAC patent bilaterally, TMs intact bilaterally with no evidence of effusions  FACE: 1/6 House-Brackmann Scale, symmetric, sensation equal bilaterally  ORAL CAVITY: No masses or lesions palpated, uvula is midline, moist mucous membranes, her uvula is covered in thick yellow mucus which is unchanged from previous exam, left soft palate is very erythematous, tonsils 1+ without any abnormalities noted, edentulous   NECK: Normal

## 2021-04-26 NOTE — PATIENT INSTRUCTIONS
LARYNGOSCOPY    Post Operative Instructions    [facility] ENT Number (24 hours): [phone]    The Surgery Itself  Laryngoscopy with biopsy or injection involves a brief general anesthesia, typically for less than one hour. Patients may be sedated for several hours after surgery and may remain sleepy for the better part of the day. Nausea and vomiting are occasionally seen, and usually resolve by the evening of surgery - even without additional medications. Almost all patients can go home the day of surgery. After Surgery   You will have a sore throat from the metal instruments used to allow a good view of your voice box for 3-5 days after surgery. Some patients may have sores on the tongue or in the mouth. This is normal and you will be given pain medications for this. If you have sores in the mouth, avoid citrus or acidic foods/drinks since they will burn.  Avoid coughing or frequent throat clearing. Drinking water can help alleviate the urge to clear the throat.  Avoid any heavy lifting (more than 20 lbs), straining, exercise, or sports activities for 2 weeks after surgery.  Avoid alcohol, tobacco products, spicy foods, or eating late at night as these may cause heartburn or stomach reflux and may delay the healing process.  Your voice may be hoarse after surgery from swelling of the vocal cords caused by manipulation.  You do not have to avoid talking unless your surgeon gives you specific instructions. Use your normal voice since whispering is harder on your vocal cords then talking at a regular volume. Medications   Pain medication can be used for pain as prescribed. Pain in the throat and the tongue is normal.   Some patients will be given steroids or acid reducing medications after surgery, take these as directed.  Take all of your routine medications as prescribed, unless told otherwise by your surgeon.  Any medications that thin the blood should be avoided unless approved by your surgeon. These include aspirin and aspirin-like products (Advil, Motrin, Excedrin, Naprosyn)    Final Result   Following vocal cord injection, the voice may seem strangled due to swelling for a few days or weeks. This is normal.   If you have a biopsy in surgery, you will usually find out the pathology results when you are seen in the office for follow up.  Many patients benefit from voice therapy after surgery to improve the long term result. Your surgeon will recommend this if you could benefit from it.

## 2021-04-28 ENCOUNTER — OFFICE VISIT (OUTPATIENT)
Dept: FAMILY MEDICINE CLINIC | Age: 71
End: 2021-04-28
Payer: MEDICARE

## 2021-04-28 VITALS
BODY MASS INDEX: 24.3 KG/M2 | TEMPERATURE: 96.8 F | SYSTOLIC BLOOD PRESSURE: 136 MMHG | WEIGHT: 146 LBS | OXYGEN SATURATION: 98 % | HEART RATE: 77 BPM | DIASTOLIC BLOOD PRESSURE: 82 MMHG

## 2021-04-28 DIAGNOSIS — R79.89 ELEVATED TSH: ICD-10-CM

## 2021-04-28 DIAGNOSIS — Z01.818 PREOP TESTING: ICD-10-CM

## 2021-04-28 DIAGNOSIS — J02.9 ACUTE PHARYNGITIS, UNSPECIFIED ETIOLOGY: Primary | ICD-10-CM

## 2021-04-28 DIAGNOSIS — R53.82 CHRONIC FATIGUE: ICD-10-CM

## 2021-04-28 PROCEDURE — 36415 COLL VENOUS BLD VENIPUNCTURE: CPT | Performed by: NURSE PRACTITIONER

## 2021-04-28 PROCEDURE — 4004F PT TOBACCO SCREEN RCVD TLK: CPT | Performed by: NURSE PRACTITIONER

## 2021-04-28 PROCEDURE — 4040F PNEUMOC VAC/ADMIN/RCVD: CPT | Performed by: NURSE PRACTITIONER

## 2021-04-28 PROCEDURE — G8427 DOCREV CUR MEDS BY ELIG CLIN: HCPCS | Performed by: NURSE PRACTITIONER

## 2021-04-28 PROCEDURE — 93000 ELECTROCARDIOGRAM COMPLETE: CPT | Performed by: NURSE PRACTITIONER

## 2021-04-28 PROCEDURE — 1123F ACP DISCUSS/DSCN MKR DOCD: CPT | Performed by: NURSE PRACTITIONER

## 2021-04-28 PROCEDURE — 99213 OFFICE O/P EST LOW 20 MIN: CPT | Performed by: NURSE PRACTITIONER

## 2021-04-28 PROCEDURE — 3017F COLORECTAL CA SCREEN DOC REV: CPT | Performed by: NURSE PRACTITIONER

## 2021-04-28 PROCEDURE — G8420 CALC BMI NORM PARAMETERS: HCPCS | Performed by: NURSE PRACTITIONER

## 2021-04-28 PROCEDURE — 1090F PRES/ABSN URINE INCON ASSESS: CPT | Performed by: NURSE PRACTITIONER

## 2021-04-28 PROCEDURE — G8400 PT W/DXA NO RESULTS DOC: HCPCS | Performed by: NURSE PRACTITIONER

## 2021-04-28 ASSESSMENT — ENCOUNTER SYMPTOMS
CONSTIPATION: 0
BLOOD IN STOOL: 0
CHEST TIGHTNESS: 0
TROUBLE SWALLOWING: 1
SHORTNESS OF BREATH: 0
DIARRHEA: 0
SORE THROAT: 1
COUGH: 0

## 2021-04-28 NOTE — PROGRESS NOTES
Blood drawn per order. Needle size: 23 g butterfly  Site: R Antecubital.  First attempt successful Yes    Second attempt na    Pressure applied until bleeding stopped. Cotton ball and band aid  applied. Patient informed to call office or return if bleeding reoccurs and unable to stop.     Tubes drawn: 1 purple     1 red

## 2021-04-28 NOTE — PROGRESS NOTES
history of abnormal bleeding  Personal or FH of DVT/PE: No    Patient objection to receiving blood products: No    Past Medical History:   Diagnosis Date    Anxiety     Crohn disease ([address]     CVA (cerebral infarction) 2006    hx unclear    Hypertension        Past Surgical History:   Procedure Laterality Date    APPENDECTOMY      CHOLECYSTECTOMY      PARTIAL HYSTERECTOMY  1973    infection after btl;one ovary in       No family history on file. Social History     Socioeconomic History    Marital status:      Spouse name: Not on file    Number of children: Not on file    Years of education: Not on file    Highest education level: Not on file   Occupational History    Not on file   Social Needs    Financial resource strain: Not on file    Food insecurity     Worry: Not on file     Inability: Not on file    Transportation needs     Medical: Not on file     Non-medical: Not on file   Tobacco Use    Smoking status: Current Every Day Smoker     Packs/day: 1.50     Years: 30.00     Pack years: 45.00     Types: Cigarettes    Smokeless tobacco: Never Used    Tobacco comment: placed in AVS   Substance and Sexual Activity    Alcohol use:  Yes     Alcohol/week: 20.0 standard drinks     Types: 20 Cans of beer per week     Comment: daily    Drug use: No    Sexual activity: Not on file   Lifestyle    Physical activity     Days per week: Not on file     Minutes per session: Not on file    Stress: Not on file   Relationships    Social connections     Talks on phone: Not on file     Gets together: Not on file     Attends [employer]ism service: Not on file     Active member of club or organization: Not on file     Attends meetings of clubs or organizations: Not on file     Relationship status: Not on file    Intimate partner violence     Fear of current or ex partner: Not on file     Emotionally abused: Not on file     Physically abused: Not on file     Forced sexual activity: Not on file   Other Topics Concern    Not on file   Social History Narrative    10/2011 lives with spouse and dogs;not working [employer]       Review of Systems  Review of Systems   Constitutional: Negative. HENT: Positive for sore throat and trouble swallowing. Respiratory: Negative for cough, chest tightness and shortness of breath. Cardiovascular: Negative. Gastrointestinal: Negative for blood in stool, constipation and diarrhea. Skin: Negative. Neurological: Negative for dizziness, tremors, light-headedness and headaches. Psychiatric/Behavioral: Negative for decreased concentration, dysphoric mood, self-injury, sleep disturbance and suicidal ideas. The patient is not nervous/anxious. Vitals:    21 1557   BP: 136/82   Pulse: 77   Temp: 96.8 °F (36 °C)   TempSrc: Infrared   SpO2: 98%   Weight: 146 lb (66.2 kg)        Physical Exam   Physical Exam  Constitutional:       Appearance: Normal appearance. HENT:      Right Ear: Tympanic membrane normal.      Left Ear: Tympanic membrane normal.      Nose: Nose normal.      Mouth/Throat:      Dentition: No dental tenderness. Palate: Lesions present. Pharynx: Oropharyngeal exudate and posterior oropharyngeal erythema present. Comments: Irregular white patch on oropharynx with mild yellow discharge on uvula  Cardiovascular:      Rate and Rhythm: Normal rate and regular rhythm. Pulses: Normal pulses. Heart sounds: Murmur present. Crescendo  systolic murmur present with a grade of 2/6. Pulmonary:      Effort: Pulmonary effort is normal.      Breath sounds: Normal breath sounds. Abdominal:      General: Abdomen is flat. Palpations: Abdomen is soft. Musculoskeletal:      Right lower le+ Pitting Edema present. Left lower le+ Pitting Edema present. Skin:     General: Skin is warm and dry. Capillary Refill: Capillary refill takes less than 2 seconds. Neurological:      General: No focal deficit present.       Mental Status: She is alert and oriented to person, place, and time. Psychiatric:         Mood and Affect: Mood normal.         Behavior: Behavior normal.         EKG Interpretation:  normal EKG, normal sinus rhythm, unchanged from previous tracings.     Lab Review   Office Visit on 04/07/2021   Component Date Value    Throat Culture 04/07/2021 Normal oral jeison, No Beta Strep isolated     Fungus Stain 04/07/2021 No Fungal elements seen     Organism 04/07/2021 Candida glabrata*    Fungus (Mycology) Culture 04/07/2021                      Value:Heavy growth  No further workup     Office Visit on 03/10/2021   Component Date Value    Strep A Ag 03/10/2021 None Detected     WBC 03/10/2021 10.6     RBC 03/10/2021 4.89     Hemoglobin 03/10/2021 15.7     Hematocrit 03/10/2021 47.6     MCV 03/10/2021 97.4     MCH 03/10/2021 32.2     MCHC 03/10/2021 33.0     RDW 03/10/2021 15.6*    Platelets 05/81/9052 287     MPV 03/10/2021 9.1     Neutrophils % 03/10/2021 81.3     Lymphocytes % 03/10/2021 8.6     Monocytes % 03/10/2021 8.2     Eosinophils % 03/10/2021 1.8     Basophils % 03/10/2021 0.1     Neutrophils Absolute 03/10/2021 8.6*    Lymphocytes Absolute 03/10/2021 0.9*    Monocytes Absolute 03/10/2021 0.9     Eosinophils Absolute 03/10/2021 0.2     Basophils Absolute 03/10/2021 0.0     Sodium 03/10/2021 132*    Potassium 03/10/2021 4.3     Chloride 03/10/2021 95*    CO2 03/10/2021 22     Anion Gap 03/10/2021 15     Glucose 03/10/2021 96     BUN 03/10/2021 6*    CREATININE 03/10/2021 0.6     GFR Non- 03/10/2021 >60     GFR  03/10/2021 >60     Calcium 03/10/2021 9.4     Total Protein 03/10/2021 8.1     Albumin 03/10/2021 4.4     Albumin/Globulin Ratio 03/10/2021 1.2     Total Bilirubin 03/10/2021 0.8     Alkaline Phosphatase 03/10/2021 87     ALT 03/10/2021 20     AST 03/10/2021 30     Globulin 03/10/2021 3.7     TSH 03/10/2021 12.23*    Cholesterol, Total 03/10/2021 177     Triglycerides 03/10/2021 109     HDL 03/10/2021 54     LDL Calculated 03/10/2021 101*    VLDL Cholesterol Calcula* 03/10/2021 22            Assessment:       Charly 89 y.o. patient with planned surgery as above. Known risk factors for perioperative complications: Coronary artery disease, Hypertension, Tobacco abuse  Current medications which may produce withdrawal symptoms if withheld perioperatively: none   1. Acute pharyngitis, unspecified etiology  No improvement with abx, steroids, antifungals  Continue care with ENT, appreciate assistance  Labs today  - CBC Auto Differential  - Basic Metabolic Panel    2. Preop testing  Cleared for surgery   - EKG 12 lead; Future  - EKG 12 lead  - CBC Auto Differential  - Basic Metabolic Panel    3. Elevated TSH  TSH 12.23 3/19/21  Neck CT ordered by ENT- encouraged pt to obtain  Will draw new labs today    4. Chronic fatigue  See #3  - TSH with Reflex         Plan:     1. Preoperative workup as follows:ECG, hemoglobin, hematocrit, electrolytes, creatinine, glucose  2. Change in medication regimen before surgery: Hold all medicine morning of surgery  3. Prophylaxis forcardiac events with perioperative beta-blockers: Not indicated  ACC/AHA indications for pre-operative beta-blocker use:    · Vascular surgery with history of postitive stress test  · Intermediate or high risk surgery with history of CAD   · Intermediate or high risk surgery with multiple clinical predictors of CAD- 2 of the following: history of compensated or prior heart failure, history of cerebrovascular disease, DM, or renal insufficiency    Routine administration of higher-dose, long-acting metoprolol in beta-blocker-naïve patients on the day of surgery, and in the absence of dose titration is associated with an overall increase in mortality.   Beta-blockers should be started days to weeks prior to surgery and titrated to pulse < 70.  4. Deep vein thrombosis prophylaxis: regimen to be chosen by surgical team  5.  No contraindications to planned surgery           Jann Robertsons, APRN - CNP

## 2021-04-29 ENCOUNTER — OFFICE VISIT (OUTPATIENT)
Dept: PRIMARY CARE CLINIC | Age: 71
End: 2021-04-29
Payer: MEDICARE

## 2021-04-29 DIAGNOSIS — Z01.818 PREOP TESTING: Primary | ICD-10-CM

## 2021-04-29 LAB
ANION GAP SERPL CALCULATED.3IONS-SCNC: 13 MMOL/L (ref 3–16)
BASOPHILS ABSOLUTE: 0.1 K/UL (ref 0–0.2)
BASOPHILS RELATIVE PERCENT: 1.4 %
BUN BLDV-MCNC: 5 MG/DL (ref 7–20)
CALCIUM SERPL-MCNC: 9.1 MG/DL (ref 8.3–10.6)
CHLORIDE BLD-SCNC: 94 MMOL/L (ref 99–110)
CO2: 24 MMOL/L (ref 21–32)
CREAT SERPL-MCNC: 0.6 MG/DL (ref 0.6–1.2)
EOSINOPHILS ABSOLUTE: 0.2 K/UL (ref 0–0.6)
EOSINOPHILS RELATIVE PERCENT: 3.2 %
GFR AFRICAN AMERICAN: >60
GFR NON-AFRICAN AMERICAN: >60
GLUCOSE BLD-MCNC: 87 MG/DL (ref 70–99)
HCT VFR BLD CALC: 44.6 % (ref 36–48)
HEMOGLOBIN: 15.2 G/DL (ref 12–16)
LYMPHOCYTES ABSOLUTE: 1.2 K/UL (ref 1–5.1)
LYMPHOCYTES RELATIVE PERCENT: 19 %
MCH RBC QN AUTO: 32.5 PG (ref 26–34)
MCHC RBC AUTO-ENTMCNC: 34.1 G/DL (ref 31–36)
MCV RBC AUTO: 95.4 FL (ref 80–100)
MONOCYTES ABSOLUTE: 0.7 K/UL (ref 0–1.3)
MONOCYTES RELATIVE PERCENT: 11 %
NEUTROPHILS ABSOLUTE: 4.3 K/UL (ref 1.7–7.7)
NEUTROPHILS RELATIVE PERCENT: 65.4 %
PDW BLD-RTO: 14.8 % (ref 12.4–15.4)
PLATELET # BLD: 229 K/UL (ref 135–450)
PMV BLD AUTO: 8.4 FL (ref 5–10.5)
POTASSIUM SERPL-SCNC: 4.7 MMOL/L (ref 3.5–5.1)
RBC # BLD: 4.68 M/UL (ref 4–5.2)
SODIUM BLD-SCNC: 131 MMOL/L (ref 136–145)
T4 FREE: 1 NG/DL (ref 0.9–1.8)
TSH REFLEX: 6.84 UIU/ML (ref 0.27–4.2)
WBC # BLD: 6.6 K/UL (ref 4–11)

## 2021-04-29 PROCEDURE — G8420 CALC BMI NORM PARAMETERS: HCPCS | Performed by: NURSE PRACTITIONER

## 2021-04-29 PROCEDURE — G8428 CUR MEDS NOT DOCUMENT: HCPCS | Performed by: NURSE PRACTITIONER

## 2021-04-29 PROCEDURE — 99211 OFF/OP EST MAY X REQ PHY/QHP: CPT | Performed by: NURSE PRACTITIONER

## 2021-04-29 NOTE — RESULT ENCOUNTER NOTE
Cleared for surgery. Thyroid continues to be elevated- but is lower than prior. Will watch this number for now. Please call if you have additional questions and/ or concerns. Please keep your next appt. Thank you.

## 2021-04-29 NOTE — PATIENT INSTRUCTIONS

## 2021-04-29 NOTE — PROGRESS NOTES
Yessenia pU received a viral test for COVID-19. They were educated on isolation and quarantine as appropriate. For any symptoms, they were directed to seek care from their PCP, given contact information to establish with a doctor, directed to an urgent care or the emergency room.

## 2021-04-29 NOTE — PROGRESS NOTES
Obstructive Sleep Apnea (WESTLEY) Screening     Patient:  Tiffanie Polo    YOB: 1950      Medical Record #:  [id number]                     Date:  4/29/2021     1. Are you a loud and/or regular snorer? []  Yes       [x] No    2. Have you been observed to gasp or stop breathing during sleep? []  Yes       [x] No    3. Do you feel tired or groggy upon awakening or do you awaken with a headache?           []  Yes       [x] No    4. Are you often tired or fatigued during the wake time hours? []  Yes       [x] No    5. Do you fall asleep sitting, reading, watching TV or driving? []  Yes       [x] No    6. Do you often have problems with memory or concentration? []  Yes       [x] No    If patient's WESTLEY score if greater than or equal to 3, they are considered high risk for WESTLEY. An anesthesia provider will evaluate the patient and develop a plan of care the day of surgery. Note:  If the patient's BMI is more than 35 kg m¯² , has neck circumference > 40 cm, and/or high blood pressure the risk is greater (© American Sleep Apnea Association, 2006).

## 2021-04-30 ENCOUNTER — HOSPITAL ENCOUNTER (OUTPATIENT)
Dept: CT IMAGING | Age: 71
Discharge: HOME | End: 2021-04-30
Payer: MEDICARE

## 2021-04-30 ENCOUNTER — TELEPHONE (OUTPATIENT)
Dept: ENT CLINIC | Age: 71
End: 2021-04-30

## 2021-04-30 DIAGNOSIS — F17.200 SMOKING: ICD-10-CM

## 2021-04-30 DIAGNOSIS — J02.9 PHARYNGITIS, UNSPECIFIED ETIOLOGY: ICD-10-CM

## 2021-04-30 LAB — SARS-COV-2: NOT DETECTED

## 2021-04-30 PROCEDURE — 6360000004 HC RX CONTRAST MEDICATION: Performed by: STUDENT IN AN ORGANIZED HEALTH CARE EDUCATION/TRAINING PROGRAM

## 2021-04-30 PROCEDURE — 70491 CT SOFT TISSUE NECK W/DYE: CPT

## 2021-04-30 RX ADMIN — IOPAMIDOL 75 ML: 755 INJECTION, SOLUTION INTRAVENOUS at 09:07

## 2021-04-30 NOTE — TELEPHONE ENCOUNTER
Benny Bland called in regards to the patients CT scan done today (04/30/2021) wanting to know if we have received the results yet. I informed her that we have not received the results yet and as soon as we do someone in the office will reach out to her. She verbalized understanding. [phone] is a good call back number for David Recio

## 2021-04-30 NOTE — TELEPHONE ENCOUNTER
Received the results of the CT scan. Results were scanned into the patient's chart. [facility] was informed.

## 2021-05-03 ENCOUNTER — TELEPHONE (OUTPATIENT)
Dept: ENT CLINIC | Age: 71
End: 2021-05-03

## 2021-05-03 NOTE — TELEPHONE ENCOUNTER
CT results called to patient. I will see her on Wednesday for surgery and biopsy of the areas in question on the CT scan.

## 2021-05-04 ENCOUNTER — ANESTHESIA EVENT (OUTPATIENT)
Dept: OPERATING ROOM | Age: 71
End: 2021-05-04
Payer: MEDICARE

## 2021-05-05 ENCOUNTER — ANESTHESIA (OUTPATIENT)
Dept: OPERATING ROOM | Age: 71
End: 2021-05-05
Payer: MEDICARE

## 2021-05-05 ENCOUNTER — HOSPITAL ENCOUNTER (OUTPATIENT)
Age: 71
Setting detail: OUTPATIENT SURGERY
Discharge: HOME | End: 2021-05-05
Attending: STUDENT IN AN ORGANIZED HEALTH CARE EDUCATION/TRAINING PROGRAM | Admitting: STUDENT IN AN ORGANIZED HEALTH CARE EDUCATION/TRAINING PROGRAM
Payer: MEDICARE

## 2021-05-05 VITALS
DIASTOLIC BLOOD PRESSURE: 84 MMHG | SYSTOLIC BLOOD PRESSURE: 202 MMHG | RESPIRATION RATE: 2 BRPM | OXYGEN SATURATION: 100 %

## 2021-05-05 VITALS
TEMPERATURE: 97.3 F | BODY MASS INDEX: 24.66 KG/M2 | OXYGEN SATURATION: 92 % | WEIGHT: 148 LBS | DIASTOLIC BLOOD PRESSURE: 55 MMHG | HEART RATE: 89 BPM | HEIGHT: 65 IN | SYSTOLIC BLOOD PRESSURE: 117 MMHG | RESPIRATION RATE: 16 BRPM

## 2021-05-05 DIAGNOSIS — J39.2 OROPHARYNGEAL MASS: ICD-10-CM

## 2021-05-05 DIAGNOSIS — Z98.890 STATUS POST SURGERY: Primary | ICD-10-CM

## 2021-05-05 PROCEDURE — 7100000011 HC PHASE II RECOVERY - ADDTL 15 MIN: Performed by: STUDENT IN AN ORGANIZED HEALTH CARE EDUCATION/TRAINING PROGRAM

## 2021-05-05 PROCEDURE — 2709999900 HC NON-CHARGEABLE SUPPLY: Performed by: STUDENT IN AN ORGANIZED HEALTH CARE EDUCATION/TRAINING PROGRAM

## 2021-05-05 PROCEDURE — 2500000003 HC RX 250 WO HCPCS: Performed by: NURSE ANESTHETIST, CERTIFIED REGISTERED

## 2021-05-05 PROCEDURE — 3700000000 HC ANESTHESIA ATTENDED CARE: Performed by: STUDENT IN AN ORGANIZED HEALTH CARE EDUCATION/TRAINING PROGRAM

## 2021-05-05 PROCEDURE — 7100000001 HC PACU RECOVERY - ADDTL 15 MIN: Performed by: STUDENT IN AN ORGANIZED HEALTH CARE EDUCATION/TRAINING PROGRAM

## 2021-05-05 PROCEDURE — 6360000002 HC RX W HCPCS: Performed by: ANESTHESIOLOGY

## 2021-05-05 PROCEDURE — 6370000000 HC RX 637 (ALT 250 FOR IP): Performed by: STUDENT IN AN ORGANIZED HEALTH CARE EDUCATION/TRAINING PROGRAM

## 2021-05-05 PROCEDURE — 6360000002 HC RX W HCPCS: Performed by: NURSE ANESTHETIST, CERTIFIED REGISTERED

## 2021-05-05 PROCEDURE — 31535 LARYNGOSCOPY W/BIOPSY: CPT | Performed by: STUDENT IN AN ORGANIZED HEALTH CARE EDUCATION/TRAINING PROGRAM

## 2021-05-05 PROCEDURE — 88331 PATH CONSLTJ SURG 1 BLK 1SPC: CPT

## 2021-05-05 PROCEDURE — 7100000010 HC PHASE II RECOVERY - FIRST 15 MIN: Performed by: STUDENT IN AN ORGANIZED HEALTH CARE EDUCATION/TRAINING PROGRAM

## 2021-05-05 PROCEDURE — 3700000001 HC ADD 15 MINUTES (ANESTHESIA): Performed by: STUDENT IN AN ORGANIZED HEALTH CARE EDUCATION/TRAINING PROGRAM

## 2021-05-05 PROCEDURE — 88305 TISSUE EXAM BY PATHOLOGIST: CPT

## 2021-05-05 PROCEDURE — 7100000000 HC PACU RECOVERY - FIRST 15 MIN: Performed by: STUDENT IN AN ORGANIZED HEALTH CARE EDUCATION/TRAINING PROGRAM

## 2021-05-05 PROCEDURE — 2580000003 HC RX 258: Performed by: ANESTHESIOLOGY

## 2021-05-05 PROCEDURE — 3600000004 HC SURGERY LEVEL 4 BASE: Performed by: STUDENT IN AN ORGANIZED HEALTH CARE EDUCATION/TRAINING PROGRAM

## 2021-05-05 PROCEDURE — 88342 IMHCHEM/IMCYTCHM 1ST ANTB: CPT

## 2021-05-05 PROCEDURE — 3600000014 HC SURGERY LEVEL 4 ADDTL 15MIN: Performed by: STUDENT IN AN ORGANIZED HEALTH CARE EDUCATION/TRAINING PROGRAM

## 2021-05-05 RX ORDER — OXYCODONE HYDROCHLORIDE AND ACETAMINOPHEN 5; 325 MG/1; MG/1
1 TABLET ORAL EVERY 6 HOURS PRN
Qty: 20 TABLET | Refills: 0 | Status: SHIPPED | OUTPATIENT
Start: 2021-05-05 | End: 2021-05-10

## 2021-05-05 RX ORDER — MIDAZOLAM HYDROCHLORIDE 1 MG/ML
INJECTION INTRAMUSCULAR; INTRAVENOUS PRN
Status: DISCONTINUED | OUTPATIENT
Start: 2021-05-05 | End: 2021-05-05 | Stop reason: SDUPTHER

## 2021-05-05 RX ORDER — DEXAMETHASONE SODIUM PHOSPHATE 4 MG/ML
INJECTION, SOLUTION INTRA-ARTICULAR; INTRALESIONAL; INTRAMUSCULAR; INTRAVENOUS; SOFT TISSUE PRN
Status: DISCONTINUED | OUTPATIENT
Start: 2021-05-05 | End: 2021-05-05 | Stop reason: SDUPTHER

## 2021-05-05 RX ORDER — LIDOCAINE HYDROCHLORIDE 20 MG/ML
INJECTION, SOLUTION INFILTRATION; PERINEURAL PRN
Status: DISCONTINUED | OUTPATIENT
Start: 2021-05-05 | End: 2021-05-05 | Stop reason: SDUPTHER

## 2021-05-05 RX ORDER — LIDOCAINE HYDROCHLORIDE 10 MG/ML
1 INJECTION, SOLUTION EPIDURAL; INFILTRATION; INTRACAUDAL; PERINEURAL
Status: DISCONTINUED | OUTPATIENT
Start: 2021-05-05 | End: 2021-05-05 | Stop reason: HOSPADM

## 2021-05-05 RX ORDER — SODIUM CHLORIDE 0.9 % (FLUSH) 0.9 %
10 SYRINGE (ML) INJECTION EVERY 12 HOURS SCHEDULED
Status: DISCONTINUED | OUTPATIENT
Start: 2021-05-05 | End: 2021-05-05 | Stop reason: HOSPADM

## 2021-05-05 RX ORDER — SODIUM CHLORIDE 0.9 % (FLUSH) 0.9 %
10 SYRINGE (ML) INJECTION PRN
Status: DISCONTINUED | OUTPATIENT
Start: 2021-05-05 | End: 2021-05-05 | Stop reason: HOSPADM

## 2021-05-05 RX ORDER — DIPHENHYDRAMINE HYDROCHLORIDE 50 MG/ML
12.5 INJECTION INTRAMUSCULAR; INTRAVENOUS
Status: DISCONTINUED | OUTPATIENT
Start: 2021-05-05 | End: 2021-05-05 | Stop reason: HOSPADM

## 2021-05-05 RX ORDER — LABETALOL HYDROCHLORIDE 5 MG/ML
5 INJECTION, SOLUTION INTRAVENOUS EVERY 10 MIN PRN
Status: DISCONTINUED | OUTPATIENT
Start: 2021-05-05 | End: 2021-05-05 | Stop reason: HOSPADM

## 2021-05-05 RX ORDER — SODIUM CHLORIDE 9 MG/ML
25 INJECTION, SOLUTION INTRAVENOUS PRN
Status: DISCONTINUED | OUTPATIENT
Start: 2021-05-05 | End: 2021-05-05 | Stop reason: HOSPADM

## 2021-05-05 RX ORDER — OXYCODONE HYDROCHLORIDE AND ACETAMINOPHEN 5; 325 MG/1; MG/1
2 TABLET ORAL PRN
Status: DISCONTINUED | OUTPATIENT
Start: 2021-05-05 | End: 2021-05-05 | Stop reason: HOSPADM

## 2021-05-05 RX ORDER — ONDANSETRON 2 MG/ML
4 INJECTION INTRAMUSCULAR; INTRAVENOUS
Status: DISCONTINUED | OUTPATIENT
Start: 2021-05-05 | End: 2021-05-05 | Stop reason: HOSPADM

## 2021-05-05 RX ORDER — PROMETHAZINE HYDROCHLORIDE 25 MG/ML
6.25 INJECTION, SOLUTION INTRAMUSCULAR; INTRAVENOUS
Status: DISCONTINUED | OUTPATIENT
Start: 2021-05-05 | End: 2021-05-05 | Stop reason: HOSPADM

## 2021-05-05 RX ORDER — ONDANSETRON 2 MG/ML
INJECTION INTRAMUSCULAR; INTRAVENOUS PRN
Status: DISCONTINUED | OUTPATIENT
Start: 2021-05-05 | End: 2021-05-05 | Stop reason: SDUPTHER

## 2021-05-05 RX ORDER — FENTANYL CITRATE 50 UG/ML
INJECTION, SOLUTION INTRAMUSCULAR; INTRAVENOUS PRN
Status: DISCONTINUED | OUTPATIENT
Start: 2021-05-05 | End: 2021-05-05 | Stop reason: SDUPTHER

## 2021-05-05 RX ORDER — PROPOFOL 10 MG/ML
INJECTION, EMULSION INTRAVENOUS PRN
Status: DISCONTINUED | OUTPATIENT
Start: 2021-05-05 | End: 2021-05-05 | Stop reason: SDUPTHER

## 2021-05-05 RX ORDER — HYDRALAZINE HYDROCHLORIDE 20 MG/ML
INJECTION INTRAMUSCULAR; INTRAVENOUS PRN
Status: DISCONTINUED | OUTPATIENT
Start: 2021-05-05 | End: 2021-05-05 | Stop reason: SDUPTHER

## 2021-05-05 RX ORDER — OXYCODONE HYDROCHLORIDE AND ACETAMINOPHEN 5; 325 MG/1; MG/1
1 TABLET ORAL PRN
Status: DISCONTINUED | OUTPATIENT
Start: 2021-05-05 | End: 2021-05-05 | Stop reason: HOSPADM

## 2021-05-05 RX ORDER — OXYCODONE HYDROCHLORIDE AND ACETAMINOPHEN 5; 325 MG/1; MG/1
1 TABLET ORAL EVERY 6 HOURS PRN
Qty: 20 TABLET | Refills: 0 | Status: SHIPPED | OUTPATIENT
Start: 2021-05-05 | End: 2021-05-05 | Stop reason: SDUPTHER

## 2021-05-05 RX ORDER — ROCURONIUM BROMIDE 10 MG/ML
INJECTION, SOLUTION INTRAVENOUS PRN
Status: DISCONTINUED | OUTPATIENT
Start: 2021-05-05 | End: 2021-05-05 | Stop reason: SDUPTHER

## 2021-05-05 RX ORDER — OXYMETAZOLINE HYDROCHLORIDE 0.05 G/100ML
SPRAY NASAL PRN
Status: DISCONTINUED | OUTPATIENT
Start: 2021-05-05 | End: 2021-05-05 | Stop reason: ALTCHOICE

## 2021-05-05 RX ORDER — SODIUM CHLORIDE, SODIUM LACTATE, POTASSIUM CHLORIDE, CALCIUM CHLORIDE 600; 310; 30; 20 MG/100ML; MG/100ML; MG/100ML; MG/100ML
INJECTION, SOLUTION INTRAVENOUS CONTINUOUS
Status: DISCONTINUED | OUTPATIENT
Start: 2021-05-05 | End: 2021-05-05 | Stop reason: HOSPADM

## 2021-05-05 RX ORDER — MORPHINE SULFATE 2 MG/ML
1 INJECTION, SOLUTION INTRAMUSCULAR; INTRAVENOUS EVERY 5 MIN PRN
Status: DISCONTINUED | OUTPATIENT
Start: 2021-05-05 | End: 2021-05-05 | Stop reason: HOSPADM

## 2021-05-05 RX ORDER — HYDRALAZINE HYDROCHLORIDE 20 MG/ML
5 INJECTION INTRAMUSCULAR; INTRAVENOUS EVERY 10 MIN PRN
Status: DISCONTINUED | OUTPATIENT
Start: 2021-05-05 | End: 2021-05-05 | Stop reason: HOSPADM

## 2021-05-05 RX ORDER — MORPHINE SULFATE 2 MG/ML
2 INJECTION, SOLUTION INTRAMUSCULAR; INTRAVENOUS EVERY 5 MIN PRN
Status: DISCONTINUED | OUTPATIENT
Start: 2021-05-05 | End: 2021-05-05 | Stop reason: HOSPADM

## 2021-05-05 RX ORDER — MEPERIDINE HYDROCHLORIDE 50 MG/ML
12.5 INJECTION INTRAMUSCULAR; INTRAVENOUS; SUBCUTANEOUS EVERY 5 MIN PRN
Status: DISCONTINUED | OUTPATIENT
Start: 2021-05-05 | End: 2021-05-05 | Stop reason: HOSPADM

## 2021-05-05 RX ADMIN — HYDRALAZINE HYDROCHLORIDE 5 MG: 20 INJECTION INTRAMUSCULAR; INTRAVENOUS at 12:21

## 2021-05-05 RX ADMIN — HYDROMORPHONE HYDROCHLORIDE 0.5 MG: 1 INJECTION, SOLUTION INTRAMUSCULAR; INTRAVENOUS; SUBCUTANEOUS at 12:44

## 2021-05-05 RX ADMIN — HYDROMORPHONE HYDROCHLORIDE 0.5 MG: 1 INJECTION, SOLUTION INTRAMUSCULAR; INTRAVENOUS; SUBCUTANEOUS at 13:05

## 2021-05-05 RX ADMIN — FENTANYL CITRATE 50 MCG: 50 INJECTION INTRAMUSCULAR; INTRAVENOUS at 11:35

## 2021-05-05 RX ADMIN — SODIUM CHLORIDE, SODIUM LACTATE, POTASSIUM CHLORIDE, AND CALCIUM CHLORIDE: .6; .31; .03; .02 INJECTION, SOLUTION INTRAVENOUS at 12:23

## 2021-05-05 RX ADMIN — LIDOCAINE HYDROCHLORIDE 60 MG: 20 INJECTION, SOLUTION INFILTRATION; PERINEURAL at 11:35

## 2021-05-05 RX ADMIN — SODIUM CHLORIDE, SODIUM LACTATE, POTASSIUM CHLORIDE, AND CALCIUM CHLORIDE: .6; .31; .03; .02 INJECTION, SOLUTION INTRAVENOUS at 11:11

## 2021-05-05 RX ADMIN — PROPOFOL 160 MG: 10 INJECTION, EMULSION INTRAVENOUS at 11:35

## 2021-05-05 RX ADMIN — ROCURONIUM BROMIDE 40 MG: 10 SOLUTION INTRAVENOUS at 11:35

## 2021-05-05 RX ADMIN — ONDANSETRON 4 MG: 2 INJECTION INTRAMUSCULAR; INTRAVENOUS at 11:50

## 2021-05-05 RX ADMIN — FENTANYL CITRATE 50 MCG: 50 INJECTION INTRAMUSCULAR; INTRAVENOUS at 11:45

## 2021-05-05 RX ADMIN — DEXAMETHASONE SODIUM PHOSPHATE 10 MG: 4 INJECTION, SOLUTION INTRAMUSCULAR; INTRAVENOUS at 11:50

## 2021-05-05 RX ADMIN — HYDRALAZINE HYDROCHLORIDE 5 MG: 20 INJECTION INTRAMUSCULAR; INTRAVENOUS at 12:16

## 2021-05-05 RX ADMIN — SUGAMMADEX 200 MG: 100 INJECTION, SOLUTION INTRAVENOUS at 12:12

## 2021-05-05 RX ADMIN — MIDAZOLAM HYDROCHLORIDE 2 MG: 2 INJECTION, SOLUTION INTRAMUSCULAR; INTRAVENOUS at 11:30

## 2021-05-05 ASSESSMENT — PULMONARY FUNCTION TESTS
PIF_VALUE: 0
PIF_VALUE: 1
PIF_VALUE: 21
PIF_VALUE: 18
PIF_VALUE: 21
PIF_VALUE: 22
PIF_VALUE: 18
PIF_VALUE: 23
PIF_VALUE: 6
PIF_VALUE: 18
PIF_VALUE: 20
PIF_VALUE: 22
PIF_VALUE: 3
PIF_VALUE: 23
PIF_VALUE: 25
PIF_VALUE: 19
PIF_VALUE: 19
PIF_VALUE: 1
PIF_VALUE: 21
PIF_VALUE: 22
PIF_VALUE: 20
PIF_VALUE: 18
PIF_VALUE: 0
PIF_VALUE: 24
PIF_VALUE: 18
PIF_VALUE: 23

## 2021-05-05 ASSESSMENT — PAIN DESCRIPTION - FREQUENCY: FREQUENCY: CONTINUOUS

## 2021-05-05 ASSESSMENT — PAIN DESCRIPTION - PAIN TYPE: TYPE: SURGICAL PAIN

## 2021-05-05 ASSESSMENT — PAIN DESCRIPTION - ORIENTATION
ORIENTATION: INNER
ORIENTATION: INNER

## 2021-05-05 ASSESSMENT — PAIN SCALES - GENERAL: PAINLEVEL_OUTOF10: 7

## 2021-05-05 ASSESSMENT — LIFESTYLE VARIABLES: SMOKING_STATUS: 1

## 2021-05-05 ASSESSMENT — PAIN - FUNCTIONAL ASSESSMENT: PAIN_FUNCTIONAL_ASSESSMENT: 0-10

## 2021-05-05 NOTE — PROGRESS NOTES
Reviewed AVS with patient and patient daughter, all questioned and concerns addressed at this time.    April Merino

## 2021-05-05 NOTE — PROGRESS NOTES
Received bedside report from [facility] PACU RN. Patient A/O x 4, patient reports, \"some burning when swallowing\".  Patient tolerating small sips of clears  Russell Blake

## 2021-05-05 NOTE — PROGRESS NOTES
D: Pt brought to PACU. Report obtained from [address] and CRNA. Pt placed on monitor, VSS, alert, c/o burning in throat, see MAR for details.

## 2021-05-05 NOTE — PROGRESS NOTES
Patient resting in stretcher with safety features in place and call light in reach.  Family brought to bedside

## 2021-05-05 NOTE — ANESTHESIA PRE PROCEDURE
Department of Anesthesiology  Preprocedure Note       Name:  Loree Yan   Age:  72 y.o.  :  1950                                          MRN:  [id number]         Date:  2021      Surgeon: Jens Romero):  Laura Jaimes DO    Procedure: Procedure(s):  DIRECT MICROLARYNGOSCOPY WITH BIOPSIES    Medications prior to admission:   Prior to Admission medications    Medication Sig Start Date End Date Taking?  Authorizing Provider   lidocaine viscous hcl (XYLOCAINE) 2 % SOLN solution Take 15 mLs by mouth every 3 hours as needed for Irritation or Pain 21  Yes Rashid Jaimes DO   busPIRone (BUSPAR) 10 MG tablet Take 10 mg by mouth daily Indications: anxiety   Yes Historical Provider, MD   lisinopril (PRINIVIL;ZESTRIL) 40 MG tablet TAKE 1 TABLET BY MOUTH ONE TIME A DAY  12/3/20  Yes LEVAR Sifuentes CNP   amLODIPine (NORVASC) 5 MG tablet TAKE 1 TABLET BY MOUTH ONE TIME A DAY for blood pressure 20  Yes LEVAR Sifuentes CNP   atorvastatin (LIPITOR) 20 MG tablet TAKE 1 TABLET BY MOUTH ONE TIME A DAY 8/10/20  Yes LEVAR Sifuentes CNP   diphenhydrAMINE (BENADRYL) 25 MG tablet Take 25 mg by mouth every 6 hours as needed for Itching   Yes Historical Provider, MD   ibuprofen (ADVIL;MOTRIN) 200 MG tablet Take 200 mg by mouth every 6 hours as needed for Pain   Yes Historical Provider, MD   aspirin 81 MG tablet Take 81 mg by mouth daily    Historical Provider, MD       Current medications:    Current Facility-Administered Medications   Medication Dose Route Frequency Provider Last Rate Last Admin    lactated ringers infusion   Intravenous Continuous Elyse Jack MD        sodium chloride flush 0.9 % injection 10 mL  10 mL Intravenous 2 times per day Elyse Jack MD        sodium chloride flush 0.9 % injection 10 mL  10 mL Intravenous PRN Elyse Jack MD        0.9 % sodium chloride infusion  25 mL Intravenous PRN Elyse Jack MD        lidocaine PF 1 % injection 1 mL 1 mL Intradermal Once PRN Ghassan Thomas MD           Allergies: Allergies   Allergen Reactions    Penicillins Hives and Rash     5/2013 40 yrs ago had a nonpruritic rash on hands;no hives and no other sx's;       Problem List:    Patient Active Problem List   Diagnosis Code    Chronic anxiety F41.9    [facility] J49.36    Nicotine dependence F17.200    Aortic insufficiency I35.1    Essential hypertension I10    Hyperlipidemia E78.5    Cigarette nicotine dependence without complication A07.862    Smoking F17.200    Mixed incontinence urge and stress N39.46    Localized swelling of both lower legs R22.43    ASCVD (arteriosclerotic cardiovascular disease) I25.10       Past Medical History:        Diagnosis Date    Anxiety     [facility]erebral artery occlusion with cerebral infarction ([facility]     30 years ago had mini stroke with no residual    [facility]     CVA (cerebral infarction) 2006    hx unclear    Hyperlipidemia     Hypertension        Past Surgical History:        Procedure Laterality Date    APPENDECTOMY      CHOLECYSTECTOMY      PARTIAL HYSTERECTOMY  1973    infection after btl;one ovary in       Social History:    Social History     Tobacco Use    Smoking status: Current Every Day Smoker     Packs/day: 1.50     Years: 30.00     Pack years: 45.00     Types: Cigarettes    Smokeless tobacco: Never Used    Tobacco comment: placed in AVS   Substance Use Topics    Alcohol use:  Yes     Alcohol/week: 20.0 standard drinks     Types: 20 Cans of beer per week     Comment: daily; 4 beers per day                                Ready to quit: Not Answered  Counseling given: Not Answered  Comment: placed in AVS      Vital Signs (Current):   Vitals:    04/29/21 0924 05/05/21 0914   BP:  (!) 154/70   Pulse:  87   Resp:  16   Temp:  97.1 °F (36.2 °C)   TempSrc:  Temporal   SpO2:  97%   Weight: 146 lb (66.2 kg) 148 lb (67.1 kg)   Height: 5' 5\" (1.651 m) 5' 5\" (1.651 m)                                              BP Readings from Last 3 Encounters:   05/05/21 (!) 154/70   04/28/21 136/82   04/13/21 (!) 159/64       NPO Status: Time of last liquid consumption: 1900                        Time of last solid consumption: 1600                        Date of last liquid consumption: 05/04/21                        Date of last solid food consumption: 05/04/21    BMI:   Wt Readings from Last 3 Encounters:   05/05/21 148 lb (67.1 kg)   04/28/21 146 lb (66.2 kg)   04/26/21 150 lb (68 kg)     Body mass index is 24.63 kg/m². CBC:   Lab Results   Component Value Date    WBC 6.6 04/28/2021    RBC 4.68 04/28/2021    HGB 15.2 04/28/2021    HCT 44.6 04/28/2021    MCV 95.4 04/28/2021    RDW 14.8 04/28/2021     04/28/2021       CMP:   Lab Results   Component Value Date     04/28/2021    K 4.7 04/28/2021    CL 94 04/28/2021    CO2 24 04/28/2021    BUN 5 04/28/2021    CREATININE 0.6 04/28/2021    GFRAA >60 04/28/2021    GFRAA >60 02/27/2010    AGRATIO 1.2 03/10/2021    LABGLOM >60 04/28/2021    GLUCOSE 87 04/28/2021    PROT 8.1 03/10/2021    PROT 7.1 02/27/2010    CALCIUM 9.1 04/28/2021    BILITOT 0.8 03/10/2021    ALKPHOS 87 03/10/2021    AST 30 03/10/2021    ALT 20 03/10/2021       POC Tests: No results for input(s): POCGLU, POCNA, POCK, POCCL, POCBUN, POCHEMO, POCHCT in the last 72 hours.     Coags:   Lab Results   Component Value Date    PROTIME 10.9 08/08/2017    INR 0.94 08/08/2017    APTT 27.7 08/08/2017       HCG (If Applicable): No results found for: PREGTESTUR, PREGSERUM, HCG, HCGQUANT     ABGs: No results found for: PHART, PO2ART, MYN3LEP, BIM2IEN, BEART, Z4IWQVWK     Type & Screen (If Applicable):  No results found for: LABABO, LABRH    Drug/Infectious Status (If Applicable):  No results found for: HIV, HEPCAB    COVID-19 Screening (If Applicable):   Lab Results   Component Value Date    COVID19 Not Detected 04/29/2021           Anesthesia Evaluation   no history of anesthetic complications:   Airway: Mallampati: II  TM distance: >3 FB   Neck ROM: full  Mouth opening: > = 3 FB Dental:    (+) lower dentures and upper dentures      Pulmonary:   (+) current smoker                           Cardiovascular:    (+) hypertension:, CAD:, hyperlipidemia                  Neuro/Psych:   (+) CVA:, psychiatric history:depression/anxiety             GI/Hepatic/Renal:            ROS comment: Crohn's. Endo/Other: Negative Endo/Other ROS                    Abdominal:           Vascular: negative vascular ROS. Anesthesia Plan      general     ASA 3     (Pt agrees to risks, benefits and alternatives of GETA. Questions answered. Willing to proceed with plan.)  Induction: intravenous. Anesthetic plan and risks discussed with patient.                       Christoph Morgan MD   5/5/2021

## 2021-05-05 NOTE — OP NOTE
Operative Note      Patient: Trey Lynn  YOB: 1950  MRN: [id number]    Date of Procedure: 5/5/2021    Pre-Op Diagnosis: OROPHARYNGEAL MASS    Post-Op Diagnosis: Same       Procedure(s):  DIRECT MICROLARYNGOSCOPY WITH BIOPSIES    Surgeon(s):  Kalyan Desai DO    Assistant:   Surgical Assistant: Esperanza Flower    Anesthesia: General    Estimated Blood Loss (mL): Minimal    Complications: None    Specimens:   ID Type Source Tests Collected by Time Destination   A : LEFT TONGUE BASE LESION FOR FROZEN Tissue Tissue SURGICAL PATHOLOGY Rashidjayant Jaimes, DO 5/5/2021 1140    B : UVEULA Tissue [facility] 5/5/2021 1147    C : LEFT BASE OF TONGUE FOE PERMANENT Tissue Tissue SURGICAL PATHOLOGY Kalyan Desai,  5/5/2021 1152    D : LEFT SOFT PILLAR Tissue Tissue SURGICAL PATHOLOGY Kalyan Desai, DO 5/5/2021 1206        Implants:  * No implants in log *      Drains: * No LDAs found *    Findings: 1) left base of tongue lesion 2) abnormal appearing uvula 3) abnormal soft palate     Detailed Description of Procedure:   After verification of informed consent, the patient was brought to the operating room and placed in the supine position. General anesthesia was induced. The  Dedo laryngoscope with dental guard was used to expose the larynx the supraglottis and glottis as described. A thorough evaluation of the oral cavity, vallecula, base of tongue, bilateral tonsils, true and false vocal cords and bilateral pyriform sinuses was performed. The uvula, left soft palate, left tonsil and left base base of tongue appeared abnormal and the tongue base felt abnormal. At the site of the lesion, multiple biopsies were taken with cup forceps and sent for frozen pathology. 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 Multiple deeper biopsies of the base of tongue where taken and sent for permanent section. The uvula and soft palate where also sent for permanent section.  The base of tongue was suction bovied to obtain hemostasis. An OG tube was used to suction out the stomach. The patient was then turned back to the care of Anesthesia to recover. The patient tolerated the procedure well and was transferred to the recovery room in stable condition.       Electronically signed by Ghazal Donahue DO on 5/5/2021 at 12:10 PM

## 2021-05-05 NOTE — H&P
The patient's most recent H&P, office notes, imaging, and pathology were reviewed. Patient examined. There has been no changes. We will plan to proceed with a direct laryngoscopy with biopsy.     Sanket Musa, DO

## 2021-05-06 NOTE — ANESTHESIA POSTPROCEDURE EVALUATION
Department of Anesthesiology  Postprocedure Note    Patient: Benny Fu  MRN: [id number]  YOB: 1950  Date of evaluation: 5/6/2021  Time:  11:30 AM     Procedure Summary     Date: 05/05/21 Room / Location: [place] [address] / [facility]    Anesthesia Start: 7522 Anesthesia Stop: 1226    Procedure: DIRECT MICROLARYNGOSCOPY WITH BIOPSIES (N/A Throat) Diagnosis:       Oropharyngeal mass      (OROPHARYNGEAL MASS)    Surgeons: Von Freeman DO Responsible Provider: Oseas Neal MD    Anesthesia Type: general ASA Status: 3          Anesthesia Type: general    Ricky Phase I: Ricky Score: 10    Ricky Phase II: Ricky Score: 10    Last vitals: Reviewed and per EMR flowsheets. Anesthesia Post Evaluation    Patient location during evaluation: PACU  Patient participation: complete - patient participated  Level of consciousness: awake and alert  Airway patency: patent  Nausea & Vomiting: no nausea and no vomiting  Complications: no  Cardiovascular status: blood pressure returned to baseline  Respiratory status: acceptable  Hydration status: euvolemic  Comments: VSS on transfer to phase 2 recovery. No anesthetic complications.
04/28/2021 04:27 PM        HCT                      44.6                04/28/2021 04:27 PM        PLT                      229                 04/28/2021 04:27 PM   RENAL  Lab Results       Component                Value               Date/Time                  NA                       131 (L)             04/28/2021 04:27 PM        K                        4.7                 04/28/2021 04:27 PM        CL                       94 (L)              04/28/2021 04:27 PM        CO2                      24                  04/28/2021 04:27 PM        BUN                      5 (L)               04/28/2021 04:27 PM        CREATININE               0.6                 04/28/2021 04:27 PM        GLUCOSE                  87                  04/28/2021 04:27 PM   COAGS  Lab Results       Component                Value               Date/Time                  PROTIME                  10.9                08/08/2017 10:40 AM        INR                      0.94                08/08/2017 10:40 AM        APTT                     27.7                08/08/2017 10:40 AM     Intake & Output:  @20LXNM@    Nausea & Vomiting:  No    Level of Consciousness:  Awake    Pain Assessment:  Adequate analgesia    Anesthesia Complications:  No apparent anesthetic complications    SUMMARY      Vital signs stable  OK to discharge from Stage I post anesthesia care.   Care transferred from Anesthesiology department on discharge from perioperative area

## 2021-05-10 LAB
FUNGUS (MYCOLOGY) CULTURE: ABNORMAL
FUNGUS STAIN: ABNORMAL
ORGANISM: ABNORMAL

## 2021-05-11 ENCOUNTER — OFFICE VISIT (OUTPATIENT)
Dept: FAMILY MEDICINE CLINIC | Age: 71
End: 2021-05-11
Payer: MEDICARE

## 2021-05-11 VITALS
HEART RATE: 85 BPM | OXYGEN SATURATION: 98 % | DIASTOLIC BLOOD PRESSURE: 62 MMHG | SYSTOLIC BLOOD PRESSURE: 150 MMHG | BODY MASS INDEX: 24.63 KG/M2 | WEIGHT: 148 LBS

## 2021-05-11 DIAGNOSIS — F17.210 CIGARETTE NICOTINE DEPENDENCE WITHOUT COMPLICATION: ICD-10-CM

## 2021-05-11 DIAGNOSIS — F41.9 CHRONIC ANXIETY: ICD-10-CM

## 2021-05-11 DIAGNOSIS — E78.49 OTHER HYPERLIPIDEMIA: ICD-10-CM

## 2021-05-11 DIAGNOSIS — C10.9 SQUAMOUS CELL CARCINOMA OF OROPHARYNX (HCC): Primary | ICD-10-CM

## 2021-05-11 DIAGNOSIS — I10 ESSENTIAL HYPERTENSION: ICD-10-CM

## 2021-05-11 PROCEDURE — G8420 CALC BMI NORM PARAMETERS: HCPCS | Performed by: NURSE PRACTITIONER

## 2021-05-11 PROCEDURE — 99214 OFFICE O/P EST MOD 30 MIN: CPT | Performed by: NURSE PRACTITIONER

## 2021-05-11 PROCEDURE — 1090F PRES/ABSN URINE INCON ASSESS: CPT | Performed by: NURSE PRACTITIONER

## 2021-05-11 PROCEDURE — 4040F PNEUMOC VAC/ADMIN/RCVD: CPT | Performed by: NURSE PRACTITIONER

## 2021-05-11 PROCEDURE — G8400 PT W/DXA NO RESULTS DOC: HCPCS | Performed by: NURSE PRACTITIONER

## 2021-05-11 PROCEDURE — 3017F COLORECTAL CA SCREEN DOC REV: CPT | Performed by: NURSE PRACTITIONER

## 2021-05-11 PROCEDURE — G8427 DOCREV CUR MEDS BY ELIG CLIN: HCPCS | Performed by: NURSE PRACTITIONER

## 2021-05-11 PROCEDURE — 4004F PT TOBACCO SCREEN RCVD TLK: CPT | Performed by: NURSE PRACTITIONER

## 2021-05-11 PROCEDURE — 1123F ACP DISCUSS/DSCN MKR DOCD: CPT | Performed by: NURSE PRACTITIONER

## 2021-05-11 RX ORDER — BUSPIRONE HYDROCHLORIDE 10 MG/1
10 TABLET ORAL DAILY
Qty: 90 TABLET | Refills: 1 | Status: ON HOLD | OUTPATIENT
Start: 2021-05-11 | End: 2021-07-16

## 2021-05-11 RX ORDER — NICOTINE 21 MG/24HR
1 PATCH, TRANSDERMAL 24 HOURS TRANSDERMAL DAILY
Qty: 42 PATCH | Refills: 0 | Status: ON HOLD | OUTPATIENT
Start: 2021-05-11 | End: 2021-05-26

## 2021-05-11 RX ORDER — ATORVASTATIN CALCIUM 20 MG/1
TABLET, FILM COATED ORAL
Qty: 90 TABLET | Refills: 1 | Status: ON HOLD | OUTPATIENT
Start: 2021-05-11 | End: 2021-07-16

## 2021-05-11 RX ORDER — BUPROPION HYDROCHLORIDE 150 MG/1
150 TABLET, EXTENDED RELEASE ORAL 2 TIMES DAILY
Qty: 60 TABLET | Refills: 0 | Status: ON HOLD | OUTPATIENT
Start: 2021-05-11 | End: 2021-07-16

## 2021-05-11 RX ORDER — AMLODIPINE BESYLATE 5 MG/1
TABLET ORAL
Qty: 90 TABLET | Refills: 1 | Status: ON HOLD | OUTPATIENT
Start: 2021-05-11 | End: 2021-07-16

## 2021-05-11 RX ORDER — LISINOPRIL 40 MG/1
TABLET ORAL
Qty: 90 TABLET | Refills: 1 | Status: ON HOLD | OUTPATIENT
Start: 2021-05-11 | End: 2021-07-16

## 2021-05-11 ASSESSMENT — ENCOUNTER SYMPTOMS
CHEST TIGHTNESS: 0
CONSTIPATION: 0
TROUBLE SWALLOWING: 1
BLOOD IN STOOL: 0
COUGH: 0
SORE THROAT: 1
SHORTNESS OF BREATH: 0
DIARRHEA: 0

## 2021-05-11 NOTE — PATIENT INSTRUCTIONS
1-800 QUIT NOW for nicotine patch coupons  Patient Education        bupropion  Pronunciation:  byoo PRO pee on  Brand:  Aplenzin, Forfivo XL, Wellbutrin SR, Wellbutrin XL, Zyban Advantage Pack  What is the most important information I should know about bupropion? You should not take bupropion if you have seizures or an eating disorder, or if you have suddenly stopped using alcohol, seizure medication, or sedatives. If you take Wellbutrin for depression, do not also take Zyban to quit smoking. Do not use bupropion within 14 days before or 14 days after you have used an MAO inhibitor, such as isocarboxazid, linezolid, methylene blue injection, phenelzine, rasagiline, selegiline, or tranylcypromine. Some young people have thoughts about suicide when first taking an antidepressant. Stay alert to changes in your mood or symptoms. Report any new or worsening symptoms to your doctor. What is bupropion? Bupropion is an antidepressant used to treat major depressive disorder and seasonal affective disorder. The Zyban brand of bupropion is used to help people stop smoking by reducing cravings and other withdrawal effects. Bupropion may also be used for purposes not listed in this medication guide. What should I discuss with my healthcare provider before taking bupropion? You should not take bupropion if you are allergic to it, or if you have:  · a seizure disorder;  · an eating disorder such as anorexia or bulimia; or  · if you have suddenly stopped using alcohol, seizure medication, or a sedative (such as Xanax, Valium, Fiorinal, Klonopin, and others). Do not use an MAO inhibitor within 14 days before or 14 days after you take bupropion. A dangerous drug interaction could occur. MAO inhibitors include isocarboxazid, linezolid, phenelzine, rasagiline, selegiline, and tranylcypromine. Do not take bupropion to treat more than one condition at a time.  If you take bupropion for depression, do not also take this medicine to quit smoking. Bupropion may cause seizures, especially if you have certain medical conditions or use certain drugs. Tell your doctor about all of your medical conditions and the drugs you use. Tell your doctor if you have ever had:  · a head injury, seizures, or brain or spinal cord tumor;  · narrow-angle glaucoma;  · heart disease, high blood pressure, or a heart attack;  · diabetes;  · kidney or liver disease (especially cirrhosis);  · depression, bipolar disorder, or other mental illness; or  · if you drink alcohol. Some young people have thoughts about suicide when first taking an antidepressant. Your doctor will need to check your progress at regular visits. Your family or other caregivers should also be alert to changes in your mood or symptoms. Ask your doctor about taking this medicine if you are pregnant. It is not known whether bupropion will harm an unborn baby. However, you may have a relapse of depression if you stop taking your antidepressant. Tell your doctor right away if you become pregnant. Do not start or stop taking bupropion without your doctor's advice. If you are pregnant, your name may be listed on a pregnancy registry to track the effects of bupropion on the baby. It may not be safe to breastfeed while using this medicine. Ask your doctor about any risk. Bupropion is not approved for use by anyone younger than 25years old. How should I take bupropion? Follow all directions on your prescription label and read all medication guides or instruction sheets. Your doctor may occasionally change your dose. Use the medicine exactly as directed. Too much of this medicine can increase your risk of a seizure. You may take bupropion with or without food. Swallow the extended-release tablet whole and do not crush, chew, or break it. You should not change your dose or stop using bupropion suddenly, unless you have a seizure while taking this medicine.  Stopping suddenly can cause unpleasant stiffness, hallucinations, fast or uneven heartbeat, shallow breathing, or fainting. What should I avoid while taking bupropion? Drinking alcohol with bupropion may increase your risk of seizures. If you drink alcohol regularly, talk with your doctor before changing the amount you drink. Bupropion can also cause seizures in a regular drinker who suddenly stops drinking at the start of treatment with bupropion. Avoid driving or hazardous activity until you know how this medicine will affect you. Your reactions could be impaired. What are the possible side effects of bupropion? Get emergency medical help if you have signs of an allergic reaction (hives, itching, fever, swollen glands, difficult breathing, swelling in your face or throat) or a severe skin reaction (fever, sore throat, burning eyes, skin pain, red or purple skin rash with blistering and peeling). Report any new or worsening symptoms to your doctor, such as: mood or behavior changes, anxiety, depression, panic attacks, trouble sleeping, or if you feel impulsive, irritable, agitated, hostile, aggressive, restless, hyperactive (mentally or physically), more depressed, or have thoughts about suicide or hurting yourself. Call your doctor at once if you have:  · a seizure (convulsions);  · confusion, unusual changes in mood or behavior;  · blurred vision, tunnel vision, eye pain or swelling, or seeing halos around lights;  · fast or irregular heartbeats; or  · a manic episode --racing thoughts, increased energy, reckless behavior, feeling extremely happy or irritable, talking more than usual, severe problems with sleep.   Common side effects may include:  · dry mouth, sore throat, stuffy nose;  · ringing in the ears;  · blurred vision;  · nausea, vomiting, stomach pain, loss of appetite, constipation;  · sleep problems (insomnia);  · tremors, sweating, feeling anxious or nervous;  · fast heartbeats;  · confusion, agitation, hostility;  · rash;  · weight loss;  · increased urination;  · headache, dizziness; or  · muscle or joint pain. This is not a complete list of side effects and others may occur. Call your doctor for medical advice about side effects. You may report side effects to FDA at 5-408-WJM-4556. What other drugs will affect bupropion? You may have a higher risk of seizures if you use certain other medicines while taking bupropion. Many drugs can affect bupropion. This includes prescription and over-the-counter medicines, vitamins, and herbal products. Not all possible interactions are listed here. Tell your doctor about all your current medicines and any medicine you start or stop using. Where can I get more information? Your pharmacist can provide more information about bupropion. Remember, keep this and all other medicines out of the reach of children, never share your medicines with others, and use this medication only for the indication prescribed. Every effort has been made to ensure that the information provided by [address] is accurate, up-to-date, and complete, but no guarantee is made to that effect. Drug information contained herein may be time sensitive. [facility] information has been compiled for use by healthcare practitioners and consumers in the [place] and therefore [facility] does not warrant that uses outside of the [place] are appropriate, unless specifically indicated otherwise. [facility] drug information does not endorse drugs, diagnose patients or recommend therapy. [facility] drug information is an informational resource designed to assist licensed healthcare practitioners in caring for their patients and/or to serve consumers viewing this service as a supplement to, and not a substitute for, the expertise, skill, knowledge and judgment of healthcare practitioners.  The absence of a warning for a given drug or drug combination in no way should be construed to indicate that the drug or drug combination is safe, effective or appropriate for any given patient. [facility] does not assume any responsibility for any aspect of healthcare administered with the aid of information [facility] provides. The information contained herein is not intended to cover all possible uses, directions, precautions, warnings, drug interactions, allergic reactions, or adverse effects. If you have questions about the drugs you are taking, check with your doctor, nurse or pharmacist.  Copyright 0076-7505 [address]: 24.01. Revision date: 1/27/2020. Care instructions adapted under license by [employer]. If you have questions about a medical condition or this instruction, always ask your healthcare professional. [place] [address] any warranty or liability for your use of this information. Patient Education        Stopping Smoking: Care Instructions  Your Care Instructions     Cigarette smokers crave the nicotine in cigarettes. Giving it up is much harder than simply changing a habit. Your body has to stop craving the nicotine. It is hard to quit, but you can do it. There are many tools that people use to quit smoking. You may find that combining tools works best for you. There are several steps to quitting. First you get ready to quit. Then you get support to help you. After that, you learn new skills and behaviors to become a nonsmoker. For many people, a necessary step is getting and using medicine. Your doctor will help you set up the plan that best meets your needs. You may want to attend a smoking cessation program to help you quit smoking. When you choose a program, look for one that has proven success. Ask your doctor for ideas. You will greatly increase your chances of success if you take medicine as well as get counseling or join a cessation program.  Some of the changes you feel when you first quit tobacco are uncomfortable.  Your body will miss the nicotine at first, and you may feel prescribe varenicline (Chantix) or bupropion (Zyban). These medicines can help you cope with cravings for tobacco. They are pills that don't contain nicotine. You also can use nicotine replacement products. These do contain nicotine. There are many types. · Gum and lozenges slowly release nicotine into your mouth. · Patches stick to your skin. They slowly release nicotine into your bloodstream.  · An inhaler has a schwab that contains nicotine. You breathe in a puff of nicotine vapor through your mouth and throat. · Nasal spray releases a mist that contains nicotine. What are key points about this decision? · Using medicines can double your chances of quitting smoking. They can ease cravings and withdrawal symptoms. · Getting counseling along with using medicine can raise your chances of quitting even more. · If you smoke fewer than 5 cigarettes a day, you may not need medicines to help you quit smoking. · These medicines have less nicotine than cigarettes. And by itself, nicotine is not nearly as harmful as smoking. The tars, carbon monoxide, and other toxic chemicals in tobacco cause the harmful effects. · The side effects of nicotine replacement products depend on the type of product. For example, a patch can make your skin red and itchy. Medicines in pill form can make you sick to your stomach. They can also cause dry mouth and trouble sleeping. For most people, the side effects are not bad enough to make them stop using the products. Why might you choose to use medicines to quit smoking? · You have tried on your own to stop smoking, but you were not able to stop. · You smoke more than 5 cigarettes a day. · You want to increase your chances of quitting smoking. · You want to reduce your cravings and withdrawal symptoms. · You feel the benefits of medicine outweigh the side effects. Why might you choose not to use medicine?   · You want to try quitting on your own by stopping all at once (\"cold turkey\"). · You want to cut back slowly on the number of cigarettes you smoke. · You smoke fewer than 5 cigarettes a day. · You do not like using medicine. · You feel the side effects of medicines outweigh the benefits. · You are worried about the cost of medicines. Your decision  Thinking about the facts and your feelings can help you make a decision that is right for you. Be sure you understand the benefits and risks of your options, and think about what else you need to do before you make the decision. Where can you learn more? Go to https://[employer]. org and sign in to your [employer] account. Enter P505 in the [employer] box to learn more about \"Deciding About Using Medicines To Quit Smoking. \"     If you do not have an account, please click on the \"Sign Up Now\" link. Current as of: March 12, 2020               Content Version: 12.8  © 6792-8313 [employer]. Care instructions adapted under license by [facility] ([facility]). If you have questions about a medical condition or this instruction, always ask your healthcare professional. [place] [address] any warranty or liability for your use of this information. Patient Education        Learning About Benefits From Quitting Smoking  How does quitting smoking make you healthier? If you're thinking about quitting smoking, you may have a few reasons to be smoke-free. Your health may be one of them. · When you quit smoking, you lower your risks for cancer, lung disease, heart attack, stroke, blood vessel disease, and blindness from macular degeneration. · When you're smoke-free, you get sick less often, and you heal faster. You are less likely to get colds, flu, bronchitis, and pneumonia. · As a nonsmoker, you may find that your mood is better and you are less stressed. When and how will you feel healthier? Quitting has real health benefits that start from day 1 of being smoke-free.  And the

## 2021-05-11 NOTE — PROGRESS NOTES
5/11/2021    Chief Complaint   Patient presents with    Hypertension    Hyperlipidemia    Anxiety    Cancer     SCC Mouth, meeting with Radiologist tomorrow       Virgil Diaz is a 72 y.o. female, presents today for:    Doing well today. Brought granddaughter Ana Cristina Benítez) with her to the appt today. Recent pharyngeal/ tongue biopsies with SCC. Meeting with SHASHI MCCLENDON and Dr. Marylou Bynum tomorrow to discuss treatment options. SCC not in her lymph nodes, possible discussing radiation therapy. Throat continues to be sore and decreased ability to eat. Concerned about effects of treatment. Absolutely does not want feeing tube. Tobacco use: Since last visit pt is no longer smoking. Daughter bought her 21 mg Nicotine patches which she is tolerating well. No Nicotine withdrawals, previously smoking 1.5-2 ppd. Requesting for PCP to order for insurance to cover today. Depression and anxiety worse due to above issues. Out of Buspirone for the past week which has helped her sleep in the past.  Granddaughter wondering if we can start Wellbutrin to elevate her mood and to help her start smoking. Recent Crohn's Flare: Now tapered off of Budesonide. No longer having abdominal cramping/ frequent diarrhea. Review of Systems   Constitutional: Negative. HENT: Positive for sore throat and trouble swallowing. Respiratory: Negative for cough, chest tightness and shortness of breath. Cardiovascular: Negative. Gastrointestinal: Negative for blood in stool, constipation and diarrhea. Skin: Negative. Neurological: Negative for dizziness, tremors, light-headedness and headaches. Psychiatric/Behavioral: Negative for decreased concentration, dysphoric mood, self-injury, sleep disturbance and suicidal ideas. The patient is not nervous/anxious.         Current Outpatient Medications on File Prior to Visit   Medication Sig Dispense Refill    lidocaine viscous hcl (XYLOCAINE) 2 % SOLN solution Take 15 mLs by mouth every 3 hours as needed for Irritation or Pain 100 mL 2    diphenhydrAMINE (BENADRYL) 25 MG tablet Take 25 mg by mouth every 6 hours as needed for Itching      ibuprofen (ADVIL;MOTRIN) 200 MG tablet Take 200 mg by mouth every 6 hours as needed for Pain      aspirin 81 MG tablet Take 81 mg by mouth daily       No current facility-administered medications on file prior to visit. Allergies   Allergen Reactions    Penicillins Hives and Rash     5/2013 40 yrs ago had a nonpruritic rash on hands;no hives and no other sx's;     Past Medical History:   Diagnosis Date    Anxiety     [facility]bral artery occlusion [facility] [address]     30 years ago had mini stroke with no residual    Crohn disease ([facility] [address]     CVA (cerebral infarction) 2006    hx unclear    Hyperlipidemia     Hypertension      Past Surgical History:   Procedure Laterality Date    APPENDECTOMY      CHOLECYSTECTOMY      LARYNGOSCOPY N/A 5/5/2021    DIRECT MICROLARYNGOSCOPY WITH BIOPSIES performed by Darrin Leventhal, DO at [address]    infection after btl;one ovary in      Social History     Tobacco Use    Smoking status: Current Every Day Smoker     Packs/day: 1.50     Years: 30.00     Pack years: 45.00     Types: Cigarettes    Smokeless tobacco: Never Used    Tobacco comment: placed in AVS   Substance Use Topics    Alcohol use: Yes     Alcohol/week: 20.0 standard drinks     Types: 20 Cans of beer per week     Comment: daily; 4 beers per day      Family History   Problem Relation Age of Onset    Heart Disease Mother         Vitals:    05/11/21 1200 05/11/21 1216   BP: (!) 150/70 (!) 150/62   Pulse: 85    SpO2: 98%    Weight: 148 lb (67.1 kg)      Estimated body mass index is 24.63 kg/m² as calculated from the following:    Height as of 5/5/21: 5' 5\" (1.651 m). Weight as of this encounter: 148 lb (67.1 kg). Physical Exam  Constitutional:       Appearance: Normal appearance.    HENT: Right Ear: Tympanic membrane normal.      Left Ear: Tympanic membrane normal.      Nose: Nose normal.      Mouth/Throat:      Dentition: No dental tenderness. Palate: Lesions present. Pharynx: Oropharyngeal exudate and posterior oropharyngeal erythema present. Comments: Irregular white patch on oropharynx with mild yellow discharge on uvula  Cardiovascular:      Rate and Rhythm: Normal rate and regular rhythm. Pulses: Normal pulses. Heart sounds: Murmur present. Crescendo  systolic murmur present with a grade of 2/6. Pulmonary:      Effort: Pulmonary effort is normal.      Breath sounds: Normal breath sounds. Musculoskeletal:      Right lower le+ Pitting Edema present. Left lower le+ Pitting Edema present. Skin:     General: Skin is warm and dry. Capillary Refill: Capillary refill takes less than 2 seconds. Neurological:      General: No focal deficit present. Mental Status: She is alert and oriented to person, place, and time. Psychiatric:         Mood and Affect: Mood normal.         Behavior: Behavior normal.         ASSESSMENT/PLAN:  1. Squamous cell carcinoma of oropharynx (HCC)  Continue to follow with ENT  Appt with [facility] tomrorow  Continue smoking cessation    2. Cigarette nicotine dependence without complication  Start Wellbutrin today  Continue Nicotine patches  Encouraged to call 1-800 QUIT NOW to assist with payment  - buPROPion (WELLBUTRIN SR) 150 MG extended release tablet; Take 1 tablet by mouth 2 times daily Smoking cessation  Dispense: 60 tablet; Refill: 0  - nicotine (NICODERM CQ) 21 MG/24HR; Place 1 patch onto the skin daily  Dispense: 42 patch; Refill: 0    3.  Essential hypertension  Suboptimal  Continue Amlodipine/ lisinopril  If continues to be elevated will increase Amlodipine to 10 mg  Encouraged heart healthy diet  Encouraged 30 min activity daily  Encouraged weight loss  Encouraged continued smoking cessation.     - lisinopril (PRINIVIL;ZESTRIL) 40 MG tablet; TAKE 1 TABLET BY MOUTH ONE TIME A DAY  Dispense: 90 tablet; Refill: 1  - amLODIPine (NORVASC) 5 MG tablet; TAKE 1 TABLET BY MOUTH ONE TIME A DAY for blood pressure  Dispense: 90 tablet; Refill: 1    4. Other hyperlipidemia  Stable today  Continue Lipitor  - atorvastatin (LIPITOR) 20 MG tablet; TAKE 1 TABLET BY MOUTH ONE TIME A DAY  Dispense: 90 tablet; Refill: 1    5. Chronic anxiety  Worse today, out of Buspirone x 1 week  Refill sent, start Wellbutrin. May want to consider initiation of SSRI instead of Buspirone pending SCC treatment  Encouraged continued support from family/ friends. - busPIRone (BUSPAR) 10 MG tablet; Take 1 tablet by mouth daily Indications: anxiety  Dispense: 90 tablet; Refill: 1      Return in about 2 weeks (around 5/25/2021) for depression. Patient's questions answered and concerns addressed. Patient agrees to plan of care.           Electronically signed by LEVAR Staley CNP on 5/11/2021 at 3:45 PM

## 2021-05-21 ENCOUNTER — HOSPITAL ENCOUNTER (OUTPATIENT)
Age: 71
Discharge: HOME | End: 2021-05-21
Payer: MEDICARE

## 2021-05-21 LAB — SARS-COV-2: NOT DETECTED

## 2021-05-21 PROCEDURE — U0003 INFECTIOUS AGENT DETECTION BY NUCLEIC ACID (DNA OR RNA); SEVERE ACUTE RESPIRATORY SYNDROME CORONAVIRUS 2 (SARS-COV-2) (CORONAVIRUS DISEASE [COVID-19]), AMPLIFIED PROBE TECHNIQUE, MAKING USE OF HIGH THROUGHPUT TECHNOLOGIES AS DESCRIBED BY CMS-2020-01-R: HCPCS

## 2021-05-21 PROCEDURE — U0005 INFEC AGEN DETEC AMPLI PROBE: HCPCS

## 2021-05-25 ENCOUNTER — TELEPHONE (OUTPATIENT)
Dept: FAMILY MEDICINE CLINIC | Age: 71
End: 2021-05-25

## 2021-05-25 ENCOUNTER — ANESTHESIA EVENT (OUTPATIENT)
Dept: ENDOSCOPY | Age: 71
End: 2021-05-25
Payer: MEDICARE

## 2021-05-26 ENCOUNTER — HOSPITAL ENCOUNTER (OUTPATIENT)
Age: 71
Setting detail: OUTPATIENT SURGERY
Discharge: HOME | End: 2021-05-26
Attending: INTERNAL MEDICINE | Admitting: INTERNAL MEDICINE
Payer: MEDICARE

## 2021-05-26 ENCOUNTER — ANESTHESIA (OUTPATIENT)
Dept: ENDOSCOPY | Age: 71
End: 2021-05-26
Payer: MEDICARE

## 2021-05-26 VITALS
HEIGHT: 64 IN | BODY MASS INDEX: 24.92 KG/M2 | OXYGEN SATURATION: 98 % | WEIGHT: 146 LBS | HEART RATE: 67 BPM | SYSTOLIC BLOOD PRESSURE: 158 MMHG | DIASTOLIC BLOOD PRESSURE: 62 MMHG | TEMPERATURE: 97.2 F | RESPIRATION RATE: 16 BRPM

## 2021-05-26 VITALS
DIASTOLIC BLOOD PRESSURE: 46 MMHG | RESPIRATION RATE: 13 BRPM | OXYGEN SATURATION: 98 % | SYSTOLIC BLOOD PRESSURE: 150 MMHG

## 2021-05-26 PROCEDURE — 7100000011 HC PHASE II RECOVERY - ADDTL 15 MIN: Performed by: INTERNAL MEDICINE

## 2021-05-26 PROCEDURE — 2580000003 HC RX 258: Performed by: INTERNAL MEDICINE

## 2021-05-26 PROCEDURE — 3700000001 HC ADD 15 MINUTES (ANESTHESIA): Performed by: INTERNAL MEDICINE

## 2021-05-26 PROCEDURE — 7100000010 HC PHASE II RECOVERY - FIRST 15 MIN: Performed by: INTERNAL MEDICINE

## 2021-05-26 PROCEDURE — 6360000002 HC RX W HCPCS: Performed by: NURSE ANESTHETIST, CERTIFIED REGISTERED

## 2021-05-26 PROCEDURE — 3700000000 HC ANESTHESIA ATTENDED CARE: Performed by: INTERNAL MEDICINE

## 2021-05-26 PROCEDURE — 2709999900 HC NON-CHARGEABLE SUPPLY: Performed by: INTERNAL MEDICINE

## 2021-05-26 PROCEDURE — 3609013300 HC EGD TUBE PLACEMENT: Performed by: INTERNAL MEDICINE

## 2021-05-26 PROCEDURE — 6370000000 HC RX 637 (ALT 250 FOR IP): Performed by: INTERNAL MEDICINE

## 2021-05-26 RX ORDER — ESOMEPRAZOLE MAGNESIUM 20 MG/1
20 FOR SUSPENSION ORAL DAILY
Status: ON HOLD | COMMUNITY
End: 2021-07-16

## 2021-05-26 RX ORDER — PROPOFOL 10 MG/ML
INJECTION, EMULSION INTRAVENOUS PRN
Status: DISCONTINUED | OUTPATIENT
Start: 2021-05-26 | End: 2021-05-26 | Stop reason: SDUPTHER

## 2021-05-26 RX ORDER — LORAZEPAM 0.5 MG/1
0.5 TABLET ORAL NIGHTLY PRN
COMMUNITY
End: 2021-08-27 | Stop reason: CLARIF

## 2021-05-26 RX ORDER — VANCOMYCIN HYDROCHLORIDE 1 G/20ML
1000 INJECTION, POWDER, LYOPHILIZED, FOR SOLUTION INTRAVENOUS ONCE
Status: DISCONTINUED | OUTPATIENT
Start: 2021-05-26 | End: 2021-05-26 | Stop reason: CLARIF

## 2021-05-26 RX ORDER — SODIUM CHLORIDE, SODIUM LACTATE, POTASSIUM CHLORIDE, CALCIUM CHLORIDE 600; 310; 30; 20 MG/100ML; MG/100ML; MG/100ML; MG/100ML
INJECTION, SOLUTION INTRAVENOUS CONTINUOUS
Status: DISCONTINUED | OUTPATIENT
Start: 2021-05-26 | End: 2021-05-26 | Stop reason: HOSPADM

## 2021-05-26 RX ORDER — OXYCODONE HYDROCHLORIDE AND ACETAMINOPHEN 5; 325 MG/1; MG/1
1 TABLET ORAL ONCE
Status: COMPLETED | OUTPATIENT
Start: 2021-05-26 | End: 2021-05-26

## 2021-05-26 RX ADMIN — OXYCODONE HYDROCHLORIDE AND ACETAMINOPHEN 1 TABLET: 5; 325 TABLET ORAL at 09:34

## 2021-05-26 RX ADMIN — PROPOFOL 50 MG: 10 INJECTION, EMULSION INTRAVENOUS at 08:45

## 2021-05-26 RX ADMIN — PROPOFOL 30 MG: 10 INJECTION, EMULSION INTRAVENOUS at 08:37

## 2021-05-26 RX ADMIN — PROPOFOL 50 MG: 10 INJECTION, EMULSION INTRAVENOUS at 08:41

## 2021-05-26 RX ADMIN — SODIUM CHLORIDE, POTASSIUM CHLORIDE, SODIUM LACTATE AND CALCIUM CHLORIDE: 600; 310; 30; 20 INJECTION, SOLUTION INTRAVENOUS at 08:07

## 2021-05-26 RX ADMIN — PROPOFOL 70 MG: 10 INJECTION, EMULSION INTRAVENOUS at 08:34

## 2021-05-26 ASSESSMENT — PAIN DESCRIPTION - PAIN TYPE: TYPE: SURGICAL PAIN

## 2021-05-26 ASSESSMENT — PAIN DESCRIPTION - LOCATION: LOCATION: ABDOMEN

## 2021-05-26 ASSESSMENT — PAIN SCALES - GENERAL: PAINLEVEL_OUTOF10: 6

## 2021-05-26 NOTE — OP NOTE
[address]                 [address] [facility]                                OPERATIVE REPORT    PATIENT NAME: Little Lucas                :        1950  MED REC NO:   [id number]                          ROOM:  ACCOUNT NO:   [de-identified]                           ADMIT DATE: 2021  PROVIDER:     Leeanna Le MD    DATE OF PROCEDURE:  2021    PREPROCEDURE DIAGNOSES:  Oral cancer, squamous cell carcinoma of the  tongue. POSTPROCEDURE DIAGNOSES:  1. Normal EGD. 2.  Successful placement of 20-Turkish EnTake ConMed PEG tube. 3.  External bolster at 3.5 cm esteban. PROCEDURE PERFORMED:  EGD with PEG tube placement. SURGEON:  Leeanna Le MD    MEDICATIONS:  MAC per Anesthesia. INDICATION FOR PROCEDURE:  This is a 71-year-old female with history of  hypertension, anxiety, Crohn's disease, hyperlipidemia, CVA, squamous  cell carcinoma of the tongue, requested to have a PEG tube placed in  preparation for chemoradiation therapy. DETAILS OF PROCEDURE:  Informed consent obtained after discussing risks,  benefits, and alternatives. Full history and physical was performed. The patient was classified as ASA class III. Medications were given by  Anesthesia. Cardiopulmonary status was continuously monitored  throughout the procedure. The patient was placed in left lateral  decubitus position. Once adequately sedated, a standard upper  gastroscope was inserted in the mouth and advanced under direct  visualization to second portion of the duodenum. The entire mucosa of  the esophagus, stomach (retroflexed and forward views), duodenum (bulb,  sweep, and second portion) was examined carefully during withdrawal.   The patient tolerated the procedure well without any difficulties. FINDINGS:  ESOPHAGUS:  The entire esophagus appeared normal.  No evidence of mass  lesions, ulcers, strictures, or Mcclain's. STOMACH:  The entire stomach appeared normal both on forward and  retroflexed views. A suitable spot for PEG placement was identified  using transillumination palpation method. Once identified, the skin was  cleaned and prepped in sterile manner. Then, a 5 mL lidocaine was  injected in subcutaneous tissue for local anesthesia. A 1 cm incision  was made over the skin. A Finder trocar loaded with a needle was  inserted in the incision site and observed entering into the gastric  lumen. This was grasped using a snare in the gastric lumen. The needle  was then exchanged in favor of a guidewire. The guidewire was then  grasped using the snare in the gastric lumen and pulled out of the  patient's mouth. This tip of the wire was attached to the tip of the  PEG tube. Traction was applied at the abdominal wall, and the wire was  pulled as it pulled back the PEG tube back into the mouth and out  through the incision site. External bolster was placed at 3.5 cm esteban. DUODENUM:  Examined portion of the duodenum appeared normal.    SUMMARY:  1. Normal EGD. 2.  Successful placement of 20-French pull-method ConMed EnTake PEG  tube. 3.  External bolster at 3.5 cm esteban. RECOMMENDATIONS:  1. Discharge the patient to home when standard parameters are met. 2.  The patient can use the PEG tube for meds and water flushes in 6  hours. 3.  Start tube feeds in the morning as needed. 4.  Can resume oral diet as well. 5.  Keep head of the bed elevated during PEG use. 6.  Keep the PEG site clean and dry. 7.  Flush the PEG tube with 60 mL of free water every 6 hours and after  use.     EBL: <5mL    Render Babinski, MD    D: 05/26/2021 10:16:52       T: 05/26/2021 10:23:45     [email or web address]  Job#: [id number]     Doc#: [id number]    CC:  Noelle Slater MD

## 2021-05-26 NOTE — ANESTHESIA PRE PROCEDURE
kg)   Height: 5' 4\" (1.626 m)                                              BP Readings from Last 3 Encounters:   05/26/21 (!) 166/59   05/11/21 (!) 150/62   05/05/21 (!) 202/84       NPO Status: Time of last liquid consumption: 0430 (4 oz of coffee with powder creamer)                        Time of last solid consumption: 1800                        Date of last liquid consumption: 05/26/21                        Date of last solid food consumption: 05/25/21    BMI:   Wt Readings from Last 3 Encounters:   05/26/21 146 lb (66.2 kg)   05/11/21 148 lb (67.1 kg)   05/05/21 148 lb (67.1 kg)     Body mass index is 25.06 kg/m². CBC:   Lab Results   Component Value Date    WBC 6.6 04/28/2021    RBC 4.68 04/28/2021    HGB 15.2 04/28/2021    HCT 44.6 04/28/2021    MCV 95.4 04/28/2021    RDW 14.8 04/28/2021     04/28/2021       CMP:   Lab Results   Component Value Date     04/28/2021    K 4.7 04/28/2021    CL 94 04/28/2021    CO2 24 04/28/2021    BUN 5 04/28/2021    CREATININE 0.6 04/28/2021    GFRAA >60 04/28/2021    GFRAA >60 02/27/2010    AGRATIO 1.2 03/10/2021    LABGLOM >60 04/28/2021    GLUCOSE 87 04/28/2021    PROT 8.1 03/10/2021    PROT 7.1 02/27/2010    CALCIUM 9.1 04/28/2021    BILITOT 0.8 03/10/2021    ALKPHOS 87 03/10/2021    AST 30 03/10/2021    ALT 20 03/10/2021       POC Tests: No results for input(s): POCGLU, POCNA, POCK, POCCL, POCBUN, POCHEMO, POCHCT in the last 72 hours.     Coags:   Lab Results   Component Value Date    PROTIME 10.9 08/08/2017    INR 0.94 08/08/2017    APTT 27.7 08/08/2017       HCG (If Applicable): No results found for: PREGTESTUR, PREGSERUM, HCG, HCGQUANT     ABGs: No results found for: PHART, PO2ART, WGH1IHX, SRE2YDA, BEART, O7XTSABC     Type & Screen (If Applicable):  No results found for: LABABO, LABRH    Drug/Infectious Status (If Applicable):  No results found for: HIV, HEPCAB    COVID-19 Screening (If Applicable):   Lab Results   Component Value Date    COVID19 Not Detected 05/21/2021           Anesthesia Evaluation  Patient summary reviewed and Nursing notes reviewed no history of anesthetic complications:   Airway: Mallampati: II     Neck ROM: full   Dental:          Pulmonary:                              Cardiovascular:    (+) hypertension:,                   Neuro/Psych:   (+) CVA:,             GI/Hepatic/Renal:             Endo/Other:                     Abdominal:           Vascular:                                        Anesthesia Plan      MAC     ASA 3     (Medications & allergies reviewed  All available lab & EKG data reviewed)  Induction: intravenous. Anesthetic plan and risks discussed with patient. Plan discussed with CRNA.                   Kiya Fox MD   5/26/2021

## 2021-05-26 NOTE — ANESTHESIA POSTPROCEDURE EVALUATION
Department of Anesthesiology  Postprocedure Note    Patient: Marcos Moreno  MRN: [id number]  YOB: 1950  Date of evaluation: 5/26/2021  Time:  12:47 PM     Procedure Summary     Date: 05/26/21 Room / Location: [facility] / [facility]    Anesthesia Start: 0831 Anesthesia Stop: 3367    Procedure: EGD ESOPHAGOGASTRODUODENOSCOPY PEG TUBE INSERTION Diagnosis: (G-TUBE REPLACEMENT)    Surgeons: Danica Wisnton MD Responsible Provider: Luke Castro MD    Anesthesia Type: MAC ASA Status: 3          Anesthesia Type: MAC    Ricky Phase I: Ricky Score: 10    Ricky Phase II: Ricky Score: 10    Last vitals: Reviewed and per EMR flowsheets.        Anesthesia Post Evaluation    Comments: Postoperative Anesthesia Note    Name:    Marcos Moreno  MRN:      [id number]    Patient Vitals in the past 12 hrs:  05/26/21 0945, BP:(!) 158/62, Pulse:67, Resp:16, SpO2:98 %  05/26/21 0855, BP:(!) 162/69, Temp:97.2 °F (36.2 °C), Temp src:Infrared, Pulse:66, Resp:16, SpO2:98 %  05/26/21 0736, BP:(!) 166/59, Temp:96.5 °F (35.8 °C), Pulse:66, Resp:16, SpO2:98 %, Height:5' 4\" (1.626 m), Weight:146 lb (66.2 kg)     LABS:    CBC  Lab Results       Component                Value               Date/Time                  WBC                      6.6                 04/28/2021 04:27 PM        HGB                      15.2                04/28/2021 04:27 PM        HCT                      44.6                04/28/2021 04:27 PM        PLT                      229                 04/28/2021 04:27 PM   RENAL  Lab Results       Component                Value               Date/Time                  NA                       131 (L)             04/28/2021 04:27 PM        K                        4.7                 04/28/2021 04:27 PM        CL                       94 (L)              04/28/2021 04:27 PM        CO2                      24                  04/28/2021 04:27 PM        BUN

## 2021-05-26 NOTE — BRIEF OP NOTE
Brief Postoperative Note      Patient: Ricky England  YOB: 1950  MRN: [id number]    Date of Procedure: 5/26/2021    Pre-Op Diagnosis: G-TUBE REPLACEMENT    Post-Op Diagnosis: PEG tube placed, external bumper at 3.5cm esteban       Procedure(s):  EGD ESOPHAGOGASTRODUODENOSCOPY PEG TUBE INSERTION    Surgeon(s):  Colt Simmons MD    Assistant:  * No surgical staff found *    Anesthesia: Monitor Anesthesia Care    Estimated Blood Loss (mL): Minimal    Complications: None    Specimens:   * No specimens in log *    Implants:  * No implants in log *      Drains: * No LDAs found *    Findings: PEG tube placed, external bumper at 3.5cm esteban    Electronically signed by Colt Simmons MD on 5/26/2021 at 9:02 AM

## 2021-05-26 NOTE — PROGRESS NOTES
Assessment unchanged from previous. Verbalizes understanding of discharge instructions and written instructions also given to patient. Questions and concerns addressed at this time. Pt's daughter at bedside. Denies any needs at this time. IV d/c'd. Pt discharged via wheelchair to car in good condition. All personal belongings returned to pt.

## 2021-06-04 NOTE — TELEPHONE ENCOUNTER
Needs to continue Budesonide taper. Hopefully the cramping will resolve. Needs follow up in 3 weeks please. Her Rapid Strep was negative. If her sore throat is not resolving we should do a throat culture.
Patient states the diarrhea is all cleared up. But she still has awful abdominal cramps. The medication has helped the diarrhea. States her throat is still sore as well.
Pt informed, verbalizing understanding, stating she will call back and schedule her follow up appt.
DISPLAY PLAN FREE TEXT

## 2021-06-15 ENCOUNTER — CLINICAL DOCUMENTATION (OUTPATIENT)
Dept: OTHER | Age: 71
End: 2021-06-15

## 2021-07-16 ENCOUNTER — APPOINTMENT (OUTPATIENT)
Dept: ULTRASOUND IMAGING | Age: 71
DRG: 683 | End: 2021-07-16
Attending: INTERNAL MEDICINE
Payer: MEDICARE

## 2021-07-16 ENCOUNTER — HOSPITAL ENCOUNTER (INPATIENT)
Age: 71
LOS: 7 days | Discharge: HOME | DRG: 683 | End: 2021-07-23
Attending: INTERNAL MEDICINE | Admitting: INTERNAL MEDICINE
Payer: MEDICARE

## 2021-07-16 PROBLEM — N17.9 ACUTE KIDNEY INJURY (HCC): Status: ACTIVE | Noted: 2021-07-16

## 2021-07-16 LAB
ANION GAP SERPL CALCULATED.3IONS-SCNC: 11 MMOL/L (ref 3–16)
BACTERIA: ABNORMAL /HPF
BASOPHILS ABSOLUTE: 0 K/UL (ref 0–0.2)
BASOPHILS RELATIVE PERCENT: 0 %
BILIRUBIN URINE: NEGATIVE
BLOOD, URINE: ABNORMAL
BUN BLDV-MCNC: 33 MG/DL (ref 7–20)
CALCIUM SERPL-MCNC: 7.9 MG/DL (ref 8.3–10.6)
CHLORIDE BLD-SCNC: 96 MMOL/L (ref 99–110)
CLARITY: ABNORMAL
CO2: 18 MMOL/L (ref 21–32)
COLOR: ABNORMAL
CREAT SERPL-MCNC: 2.9 MG/DL (ref 0.6–1.2)
CREATININE URINE: 30.2 MG/DL (ref 28–259)
EOSINOPHILS ABSOLUTE: 0 K/UL (ref 0–0.6)
EOSINOPHILS RELATIVE PERCENT: 0 %
EPITHELIAL CELLS, UA: ABNORMAL /HPF (ref 0–5)
GFR AFRICAN AMERICAN: 19
GFR NON-AFRICAN AMERICAN: 16
GLUCOSE BLD-MCNC: 99 MG/DL (ref 70–99)
GLUCOSE URINE: NEGATIVE MG/DL
HCT VFR BLD CALC: 22.5 % (ref 36–48)
HEMOGLOBIN: 8 G/DL (ref 12–16)
KETONES, URINE: NEGATIVE MG/DL
LEUKOCYTE ESTERASE, URINE: ABNORMAL
LYMPHOCYTES ABSOLUTE: 0.6 K/UL (ref 1–5.1)
LYMPHOCYTES RELATIVE PERCENT: 9 %
MCH RBC QN AUTO: 33.8 PG (ref 26–34)
MCHC RBC AUTO-ENTMCNC: 35.4 G/DL (ref 31–36)
MCV RBC AUTO: 95.4 FL (ref 80–100)
MICROSCOPIC EXAMINATION: YES
MONOCYTES ABSOLUTE: 0.5 K/UL (ref 0–1.3)
MONOCYTES RELATIVE PERCENT: 7 %
NEUTROPHILS ABSOLUTE: 5.7 K/UL (ref 1.7–7.7)
NEUTROPHILS RELATIVE PERCENT: 84 %
NITRITE, URINE: NEGATIVE
PDW BLD-RTO: 13.1 % (ref 12.4–15.4)
PH UA: 6.5 (ref 5–8)
PLATELET # BLD: 291 K/UL (ref 135–450)
PLATELET SLIDE REVIEW: ADEQUATE
PMV BLD AUTO: 7.5 FL (ref 5–10.5)
POTASSIUM REFLEX MAGNESIUM: 3.9 MMOL/L (ref 3.5–5.1)
PROTEIN UA: 30 MG/DL
RBC # BLD: 2.36 M/UL (ref 4–5.2)
RBC UA: ABNORMAL /HPF (ref 0–4)
SARS-COV-2, NAAT: NOT DETECTED
SLIDE REVIEW: ABNORMAL
SODIUM BLD-SCNC: 125 MMOL/L (ref 136–145)
SODIUM URINE: 109 MMOL/L
SPECIFIC GRAVITY UA: 1.01 (ref 1–1.03)
URINE TYPE: ABNORMAL
UROBILINOGEN, URINE: 0.2 E.U./DL
WBC # BLD: 6.8 K/UL (ref 4–11)
WBC UA: ABNORMAL /HPF (ref 0–5)

## 2021-07-16 PROCEDURE — 76770 US EXAM ABDO BACK WALL COMP: CPT

## 2021-07-16 PROCEDURE — 80048 BASIC METABOLIC PNL TOTAL CA: CPT

## 2021-07-16 PROCEDURE — 2500000003 HC RX 250 WO HCPCS: Performed by: INTERNAL MEDICINE

## 2021-07-16 PROCEDURE — 2580000003 HC RX 258: Performed by: INTERNAL MEDICINE

## 2021-07-16 PROCEDURE — 85025 COMPLETE CBC W/AUTO DIFF WBC: CPT

## 2021-07-16 PROCEDURE — 6360000002 HC RX W HCPCS: Performed by: PHYSICIAN ASSISTANT

## 2021-07-16 PROCEDURE — 84300 ASSAY OF URINE SODIUM: CPT

## 2021-07-16 PROCEDURE — 1200000000 HC SEMI PRIVATE

## 2021-07-16 PROCEDURE — 81001 URINALYSIS AUTO W/SCOPE: CPT

## 2021-07-16 PROCEDURE — 87635 SARS-COV-2 COVID-19 AMP PRB: CPT

## 2021-07-16 PROCEDURE — 36415 COLL VENOUS BLD VENIPUNCTURE: CPT

## 2021-07-16 PROCEDURE — 99222 1ST HOSP IP/OBS MODERATE 55: CPT | Performed by: PHYSICIAN ASSISTANT

## 2021-07-16 PROCEDURE — 82570 ASSAY OF URINE CREATININE: CPT

## 2021-07-16 PROCEDURE — 51798 US URINE CAPACITY MEASURE: CPT

## 2021-07-16 RX ORDER — ACETAMINOPHEN 325 MG/1
650 TABLET ORAL EVERY 6 HOURS PRN
Status: DISCONTINUED | OUTPATIENT
Start: 2021-07-16 | End: 2021-07-23 | Stop reason: HOSPADM

## 2021-07-16 RX ORDER — PANTOPRAZOLE SODIUM 40 MG/1
40 TABLET, DELAYED RELEASE ORAL DAILY
Status: DISCONTINUED | OUTPATIENT
Start: 2021-07-16 | End: 2021-07-16

## 2021-07-16 RX ORDER — LIDOCAINE HYDROCHLORIDE 20 MG/ML
15 SOLUTION OROPHARYNGEAL
Status: DISCONTINUED | OUTPATIENT
Start: 2021-07-16 | End: 2021-07-22 | Stop reason: ALTCHOICE

## 2021-07-16 RX ORDER — OXYCODONE HYDROCHLORIDE 5 MG/1
5 TABLET ORAL EVERY 4 HOURS PRN
COMMUNITY
End: 2021-08-27 | Stop reason: CLARIF

## 2021-07-16 RX ORDER — AMLODIPINE BESYLATE 5 MG/1
5 TABLET ORAL DAILY
Status: DISCONTINUED | OUTPATIENT
Start: 2021-07-16 | End: 2021-07-16

## 2021-07-16 RX ORDER — SODIUM CHLORIDE 0.9 % (FLUSH) 0.9 %
5-40 SYRINGE (ML) INJECTION EVERY 12 HOURS SCHEDULED
Status: DISCONTINUED | OUTPATIENT
Start: 2021-07-16 | End: 2021-07-23 | Stop reason: HOSPADM

## 2021-07-16 RX ORDER — SODIUM CHLORIDE 9 MG/ML
INJECTION, SOLUTION INTRAVENOUS CONTINUOUS
Status: DISCONTINUED | OUTPATIENT
Start: 2021-07-16 | End: 2021-07-16

## 2021-07-16 RX ORDER — ACETAMINOPHEN 650 MG/1
650 SUPPOSITORY RECTAL EVERY 6 HOURS PRN
Status: DISCONTINUED | OUTPATIENT
Start: 2021-07-16 | End: 2021-07-23 | Stop reason: HOSPADM

## 2021-07-16 RX ORDER — SODIUM CHLORIDE 0.9 % (FLUSH) 0.9 %
5-40 SYRINGE (ML) INJECTION PRN
Status: DISCONTINUED | OUTPATIENT
Start: 2021-07-16 | End: 2021-07-23 | Stop reason: HOSPADM

## 2021-07-16 RX ORDER — 0.9 % SODIUM CHLORIDE 0.9 %
1000 INTRAVENOUS SOLUTION INTRAVENOUS ONCE
Status: COMPLETED | OUTPATIENT
Start: 2021-07-16 | End: 2021-07-16

## 2021-07-16 RX ORDER — PANTOPRAZOLE SODIUM 20 MG/1
20 TABLET, DELAYED RELEASE ORAL DAILY
Status: DISCONTINUED | OUTPATIENT
Start: 2021-07-16 | End: 2021-07-16

## 2021-07-16 RX ORDER — SODIUM CHLORIDE 9 MG/ML
25 INJECTION, SOLUTION INTRAVENOUS PRN
Status: DISCONTINUED | OUTPATIENT
Start: 2021-07-16 | End: 2021-07-23 | Stop reason: HOSPADM

## 2021-07-16 RX ORDER — BUSPIRONE HYDROCHLORIDE 10 MG/1
10 TABLET ORAL DAILY
Status: DISCONTINUED | OUTPATIENT
Start: 2021-07-16 | End: 2021-07-16

## 2021-07-16 RX ORDER — ONDANSETRON 2 MG/ML
4 INJECTION INTRAMUSCULAR; INTRAVENOUS EVERY 6 HOURS PRN
Status: DISCONTINUED | OUTPATIENT
Start: 2021-07-16 | End: 2021-07-23 | Stop reason: HOSPADM

## 2021-07-16 RX ORDER — LORAZEPAM 0.5 MG/1
0.5 TABLET ORAL NIGHTLY PRN
Status: DISCONTINUED | OUTPATIENT
Start: 2021-07-16 | End: 2021-07-23 | Stop reason: HOSPADM

## 2021-07-16 RX ORDER — ATORVASTATIN CALCIUM 10 MG/1
20 TABLET, FILM COATED ORAL DAILY
Status: DISCONTINUED | OUTPATIENT
Start: 2021-07-16 | End: 2021-07-16

## 2021-07-16 RX ORDER — ONDANSETRON 4 MG/1
4 TABLET, ORALLY DISINTEGRATING ORAL EVERY 8 HOURS PRN
Status: DISCONTINUED | OUTPATIENT
Start: 2021-07-16 | End: 2021-07-23 | Stop reason: HOSPADM

## 2021-07-16 RX ORDER — HEPARIN SODIUM 5000 [USP'U]/ML
5000 INJECTION, SOLUTION INTRAVENOUS; SUBCUTANEOUS EVERY 8 HOURS SCHEDULED
Status: DISCONTINUED | OUTPATIENT
Start: 2021-07-16 | End: 2021-07-23 | Stop reason: HOSPADM

## 2021-07-16 RX ORDER — ASPIRIN 81 MG/1
81 TABLET, CHEWABLE ORAL DAILY
Status: DISCONTINUED | OUTPATIENT
Start: 2021-07-16 | End: 2021-07-16

## 2021-07-16 RX ADMIN — HYDROMORPHONE HYDROCHLORIDE 0.25 MG: 1 INJECTION, SOLUTION INTRAMUSCULAR; INTRAVENOUS; SUBCUTANEOUS at 16:54

## 2021-07-16 RX ADMIN — HEPARIN SODIUM 5000 UNITS: 5000 INJECTION INTRAVENOUS; SUBCUTANEOUS at 20:44

## 2021-07-16 RX ADMIN — HEPARIN SODIUM 5000 UNITS: 5000 INJECTION INTRAVENOUS; SUBCUTANEOUS at 17:46

## 2021-07-16 RX ADMIN — SODIUM BICARBONATE: 84 INJECTION, SOLUTION INTRAVENOUS at 19:25

## 2021-07-16 RX ADMIN — SODIUM CHLORIDE 1000 ML: 9 INJECTION, SOLUTION INTRAVENOUS at 16:13

## 2021-07-16 ASSESSMENT — PAIN DESCRIPTION - PAIN TYPE: TYPE: CHRONIC PAIN;OTHER (COMMENT)

## 2021-07-16 ASSESSMENT — PAIN DESCRIPTION - FREQUENCY: FREQUENCY: CONTINUOUS

## 2021-07-16 ASSESSMENT — PAIN SCALES - GENERAL
PAINLEVEL_OUTOF10: 8
PAINLEVEL_OUTOF10: 8
PAINLEVEL_OUTOF10: 6

## 2021-07-16 ASSESSMENT — PAIN DESCRIPTION - LOCATION: LOCATION: THROAT

## 2021-07-16 ASSESSMENT — PAIN DESCRIPTION - PROGRESSION: CLINICAL_PROGRESSION: NOT CHANGED

## 2021-07-16 ASSESSMENT — PAIN - FUNCTIONAL ASSESSMENT: PAIN_FUNCTIONAL_ASSESSMENT: PREVENTS OR INTERFERES SOME ACTIVE ACTIVITIES AND ADLS

## 2021-07-16 ASSESSMENT — PAIN DESCRIPTION - ONSET: ONSET: ON-GOING

## 2021-07-16 ASSESSMENT — PAIN DESCRIPTION - DESCRIPTORS: DESCRIPTORS: BURNING

## 2021-07-16 ASSESSMENT — PAIN DESCRIPTION - ORIENTATION: ORIENTATION: MID

## 2021-07-16 NOTE — CONSULTS
[facility]    Consult Note      Requesting Physician:  Dr. Willie Bundy:  Renal insufficiency        HISTORY OF PRESENT ILLNESS:      Ms. Lorenzo Lloyd  is a 80 y.o. female we are seeing in consultation for Squamous cell carcinoma the oropharynx. She is being treated with high-dose cisplatin and radiation. On 7/14/2021 she was given her third dose of cisplatin which she was given concurrently with radiation. Previous serum creatinine was 0.7. Her creatinine on the day of administration was 2.2. She was brought back the next 2 days for IV fluids, but her creatinine went up to 2.7. She is admitted for acute kidney injury that was present prior to administration of platinum, but probably made worse by cisplatin. The patient does not have any complaints other than occasional nausea and vomiting. She denies fever, chills, shortness of breath. She has a PEG tube. She cannot swallow due to the carcinoma.       Past Medical History:    Past Medical History:   Diagnosis Date    Anxiety     Cancer ([address] 05/05/2021    throat    Cerebral artery occlusion with cerebral infarction ([address]     30 years ago had mini stroke with no residual    Crohn disease ([address]     CVA (cerebral infarction) 2006    hx unclear    Hyperlipidemia     Hypertension      Past Surgical History:    Past Surgical History:   Procedure Laterality Date    APPENDECTOMY      CHOLECYSTECTOMY      COLONOSCOPY  2011    HYSTERECTOMY      LARYNGOSCOPY N/A 5/5/2021    DIRECT MICROLARYNGOSCOPY WITH BIOPSIES performed by Paty Meier DO at [address]    infection after btl;one ovary in   [address] [place] [address] Drive  05/26/2021    Procedure: EGD ESOPHAGOGASTRODUODENOSCOPY PEG TUBE INSERTION    UPPER GASTROINTESTINAL ENDOSCOPY  5/26/2021    EGD ESOPHAGOGASTRODUODENOSCOPY PEG TUBE INSERTION performed by Mindy Mcdonald MD at [id number]  [address]       Current Medications:    Current Facility-Administered Medications   Medication Dose Route Frequency Provider Last Rate Last Admin    sodium chloride flush 0.9 % injection 5-40 mL  5-40 mL Intravenous 2 times per day FLOR Lara        sodium chloride flush 0.9 % injection 5-40 mL  5-40 mL Intravenous PRN FLOR Lara        0.9 % sodium chloride infusion  25 mL Intravenous PRN FLOR Lara        ondansetron (ZOFRAN-ODT) disintegrating tablet 4 mg  4 mg Oral Q8H PRN FLOR Lara        Or    [facility]etron [facility] injection 4 mg  4 mg Intravenous Q6H PRN FLOR Lara        acetaminophen (TYLENOL) tablet 650 mg  650 mg Oral Q6H PRN FLOR Lara        Or    acetaminophen (TYLENOL) suppository 650 mg  650 mg Rectal Q6H PRN FLOR Lara        0.9 % sodium chloride infusion   Intravenous Continuous Hope Lara        heparin (porcine) injection 5,000 Units  5,000 Units Subcutaneous 3 times per day FLOR Lara        amLODIPine (NORVASC) tablet 5 mg  5 mg Oral Daily Myah Guzman, [place]        busPIRone (BUSPAR) tablet 10 mg  10 mg Oral Daily Myah Guzman, [place]        atorvastatin (LIPITOR) tablet 20 mg  20 mg Oral Daily Myah Guzman, [place]        pantoprazole (PROTONIX) tablet 40 mg  40 mg Oral Daily Jeannie Ring MD        lidocaine viscous hcl (XYLOCAINE) 2 % solution 15 mL  15 mL Mouth/Throat Q3H PRN FLOR Palomares        LORazepam (ATIVAN) tablet 0.5 mg  0.5 mg Oral Nightly PRN Myah Guzman PA        aspirin chewable tablet 81 mg  81 mg Oral Daily Myah Guzman, [place]         Allergies:     Allergies   Allergen Reactions    Penicillins Hives and Rash     5/2013 40 yrs ago had a nonpruritic rash on hands;no hives and no other sx's;       Social History:   Social History     Socioeconomic History    Marital status:      Spouse name: Katie Cordova Number of children: Not on file    Years of education: Not on file    Highest education level: Not on file   Occupational History    Not on file   Tobacco Use    Smoking status: Former Smoker     Packs/day: 1.50     Years: 30.00     Pack years: 45.00     Types: Cigarettes     Quit date: 2021     Years since quittin.1    Smokeless tobacco: Never Used    Tobacco comment: placed in AVS   Vaping Use    Vaping Use: Never used   Substance and Sexual Activity    Alcohol use: Not Currently     Alcohol/week: 20.0 standard drinks     Types: 20 Cans of beer per week     Comment: daily; 4 beers per day    Drug use: No    Sexual activity: Not Currently   Other Topics Concern    Not on file   Social History Narrative    10/2011 lives with spouse and dogs;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[employer]     Social Determinants of Health     Financial Resource Strain:     Difficulty of Paying Living Expenses:    Food Insecurity:     Worried About Running Out of Food in the Last Year:     [address] in the Last Year:    Transportation Needs:     Lack of Transportation (Medical):      Lack of Transportation (Non-Medical):    Physical Activity:     Days of Exercise per Week:     Minutes of Exercise per Session:    Stress:     Feeling of Stress :    Social Connections:     Frequency of Communication with Friends and Family:     Frequency of Social Gatherings with Friends and Family:     Attends Yarsanism Services:     Active Member of Clubs or Organizations:     Attends Club or Organization Meetings:     Marital Status:    Intimate Partner Violence:     Fear of Current or Ex-Partner:     Emotionally Abused:     Physically Abused:     Sexually Abused:           Family History:     Family History   Problem Relation Age of Onset    Heart Disease Mother      REVIEW OF SYSTEMS:      Constitutional:  She looks chronically ill  Eyes:  No impairment or change in vision  ENT / Mouth:  No pain, abnormal ulceration, bleeding, nasal drip or change in voice or hearing  Cardiovascular:  No chest pain, palpitations, new edema, or calf discomfort  Respiratory:  No pain, hemoptysis, change to breathing  Breast:  No pain, discharge, change in appearance or texture  Gastrointestinal:  Chronic nausea and vomiting  Urinary:  No pain, bleeding or change in continence  Genitalia: No pain, bleeding or discharge  Musculoskeletal:  No redness, pain, edema or weakness  Skin:  No pruritus, rash, change to nodules or lesions  Neurologic:  No discomfort, change in mental status, speech, sensory or motor activity  Psychiatric:  No change in concentration or change to affect or mood  Endocrine:  No hot flashes, increased thirst, or change to urine production  Hematologic: No petechiae, ecchymosis or bleeding  Lymphatic:  No lymphadenopathy or lymphedema  Allergy / Immunologic:  No eczema, hives, frequent or recurrent infections    PHYSICAL EXAM:      Vitals:  BP (!) 140/59   Pulse 72   Temp 97.7 °F (36.5 °C) (Oral)   Resp 16   SpO2 100%   CONSTITUTIONAL: awake, alert, cooperative, no apparent distress   EYES: pupils equal, round and reactive to light, sclera clear and conjunctiva normal  ENT: Normocephalic, without obvious abnormality, atraumatic  NECK: supple, symmetrical, no jugular venous distension and no carotid bruits   HEMATOLOGIC/LYMPHATIC: no cervical, supraclavicular or axillary lymphadenopathy   LUNGS: no increased work of breathing and clear to auscultation   CARDIOVASCULAR: regular rate and rhythm, normal S1 and S2, no murmur noted  ABDOMEN: normal bowel sounds x 4, soft, non-distended, non-tender, no masses palpated, no hepatosplenomgaly   MUSCULOSKELETAL: full range of motion noted, tone is normal  NEUROLOGIC: awake, alert, oriented to name, place and time. Motor skills grossly intact. SKIN: Normal skin color, texture, turgor and no jaundice.  appears intact   EXTREMITIES: no LE edema DATA:    PT/INR:  No results for input(s): PROT, INR in the last 72 hours. PTT:  No results for input(s): APTT in the last 72 hours.   CMP:    Lab Results   Component Value Date     07/16/2021    K 3.9 07/16/2021    CL 96 07/16/2021    CO2 18 07/16/2021    BUN 33 07/16/2021    PROT 8.1 03/10/2021    PROT 7.1 02/27/2010     :    Lab Results   Component Value Date    MG 2.00 03/04/2020     Phosphorus:  No components found for: PO4  Calcium:  No components found for: CA  CBC:    Lab Results   Component Value Date    WBC 6.8 07/16/2021    RBC 2.36 07/16/2021    HGB 8.0 07/16/2021    HCT 22.5 07/16/2021    MCV 95.4 07/16/2021    RDW 13.1 07/16/2021     07/16/2021     DIFF:  Lab Results   Component Value Date    MCV 95.4 07/16/2021    RDW 13.1 07/16/2021      [email or web address]  Uric Acid:  @labcrnt(URIC)@    Radiology Review:          Problem List  Patient Active Problem List   Diagnosis    Chronic anxiety    [facility]    Nicotine dependence    Aortic insufficiency    Essential hypertension    Hyperlipidemia    Cigarette nicotine dependence without complication    Smoking    Mixed incontinence urge and stress    Localized swelling of both lower legs    ASCVD (arteriosclerotic cardiovascular disease)    Acute kidney injury ([address]    Squamous cell carcinoma of pharynx (HCC)    Hyponatremia    Dysphagia       IMPRESSION/RECOMMENDATIONS:    Acute kidney injury:  -Likely secondary to volume depletion, made worse by cis-platinum  -Nephrology has been consulted  -IV fluids were started yesterday in the office, but her serum creatinine failed to improve    Hyponatremia:  -Again I suspect this is more from volume rather than SIADH    Squamous cell carcinoma oropharynx  -Status post 3 cycles of cisplatin along with current radiation  -Cisplatin is given 100 mg/m² every 3 weeks    Nutrition:  -The patient has a PEG    Crohn's disease:  -Appears to be stable    Hypertension:  -Managed per the hospitalist        Thank you for asking me to see the patient.        James Cohn MD  Please Contact Through [employer]

## 2021-07-16 NOTE — PROGRESS NOTES
Patient admitted to room __224__ from Dr. Shauna Guzman admit. Patient oriented to room, call light, bed rails, phone, lights and bathroom. Patient instructed about the schedule of the day including: vital sign frequency, lab draws, possible tests, frequency of MD and staff rounds, daily weights, I &O's and prescribed diet. Bed alarm on. Bed locked, in lowest position, side rails up 2/4, call light within reach. Recliner Assessment:     Patient is able to demonstrate the ability to move from a reclining position to an upright position within the recliner. 4 Eyes Skin Assessment     The patient is being assess for   Admission    I agree that 2 RN's have performed a thorough Head to Toe Skin Assessment on the patient. ALL assessment sites listed below have been assessed. Areas assessed for pressure by both nurses:   [x]   Head, Face, and Ears   [x]   Shoulders, Back, and Chest, Abdomen  [x]   Arms, Elbows, and Hands   [x]   Coccyx, Sacrum, and Ischium  [x]   Legs, Feet, and Heels        Skin Assessed Under all Medical Devices by both nurses:  na              All Mepilex Borders were peeled back and area peeked at by both nurses:  No: na  Please list where Mepilex Borders are located:  na             **SHARE this note so that the co-signing nurse is able to place an eSignature**    Co-signer eSignature: Electronically signed by Altagracia Garcia RN on 7/16/21 at 6:37 PM EDT    Does the Patient have Skin Breakdown related to pressure?   No     (Insert Photo here na)         Cristobal Prevention initiated:  NA   Wound Care Orders initiated:  NA      [facility] nurse consulted for Pressure Injury (Stage 3,4, Unstageable, DTI, NWPT, Complex wounds)and New or Established Ostomies:  NA      Primary Nurse eSignature: Electronically signed by Noelle Marie RN on 7/16/21 at 7:05 PM EDT

## 2021-07-16 NOTE — PROGRESS NOTES
This is a copy of the last [facility] progress note:     Patient Name: Nguyen Morales  Patient : 1950  Patient MRN: [id number]    Primary Oncologist: Mickie Alejandro  Referring Physician: Fang Weller DO    Date of Service: 2021      Chief Complaint  Primary malignant neoplasm of oropharynx (disorder) ( Stage Date: Unknown, Stage III (T2, N1, cM0, Negative)  Disease State: Initial diagnosis; Lymph Node Involvement: Cervical; Histopathologic Type: Squamous cell carcinoma; )      Problem List  Primary malignant neoplasm of oropharynx (disorder) ( Stage Date: Unknown, Stage III (T2, N1, cM0, Negative)  Disease State: Initial diagnosis; Lymph Node Involvement: Cervical; Histopathologic Type: Squamous cell carcinoma; )  Aortic valve regurgitation (disorder)  Chronic anxiety (finding)  Crohn's disease (disorder)  Hyperlipidemia  Hypertension  Pain due to neoplastic disease (finding)  Secondary malignancy of lymph nodes  Tobacco use    HPI  72 WF with pmh smoking, alcohol abuse, HTN, HLD and anxiety. The patient has been referred with recently diagnosed oropharyngeal cancer. She presented with painful swallowing and a 40 lb weight loss. She had CT scans revealing an oropharyngeal mass. She was seen by ENT and biopsy showed squamous cell carcinoma, P16 negative. She saw Rad-Onc and was planning definitive RT but still needed PET staging. PET scan showed (+) cervical LN involvement and she was referred for consideration of concurrent chemotherapy with her RT. She is struggling with a lot of anxiety around her diagnosis and treatment. She has quit smoking but still drinks 4 beers daily. She is scheduled for a feeding tube next week.        Previous Therapies    None    Current Therapy  Cisplatin D1,22,43 + XRT Q49D (Head and Neck) Cycle Length: 49 Number Cycles: 1 Start: Alisa Williamson on 2021 Assoc Dx: Primary malignant neoplasm of oropharynx (disorder) LOT: Definitive Stage: III 2021 C1 D43  Cisplatin IV, 174 mg (100 mg/m2)  Sodium Chloride IV 0.9 %, 1000 mL  Potassium Chloride IV, 10 mEq  Magnesium Sulfate IV, 1 gram  Sodium Chloride IV 0.9 %, 1000 mL      Interval History    Yann Haskins returns to the office today for follow up and consideration of cycle #3. She tells me that she is only using her pain medication once a day --usually in the evening because it helps her sleep. Pain is in her throat. She is having difficulty swallowing and using her tube for most things. Finishes radiation on the 22nd. Review of Systems    Constitutional:  No weight loss, No fever, No chills, No night sweats.   Energy level poor  ENT / Mouth:  + pain, abnormal ulceration, bleeding, nasal drip or change in voice or hearing  Cardiovascular:  No chest pain, palpitations, new edema, or calf discomfort  Respiratory:  No pain, hemoptysis, change to breathing  Breast:  No pain, discharge, change in appearance or texture  Gastrointestinal:  No pain, cramping, jaundice, change to eating and bowel habits  Urinary:  No pain, bleeding or change in continence  Genitalia: No pain, bleeding or discharge  Musculoskeletal:  No redness, pain, edema or weakness  Skin:  No pruritus, rash, change to nodules or lesions  Neurologic:  No discomfort, change in mental status, speech, sensory or motor activity  Psychiatric:  No change in concentration or change to affect or mood  Endocrine:  No hot flashes, increased thirst, or change to urine production  Hematologic: No petechiae, ecchymosis or bleeding  Lymphatic:  No lymphadenopathy or lymphedema  Allergy / Immunologic:  No eczema, hives, frequent or recurrent infections      Vital Signs  Blood pressure: 122/76, L arm, Regular, Pulse: 72, Temperature: 97.9 F, Respirations: 16, O2 sat: , Pain Scale: 0, Height: 64 in, Weight: 137.6 lb, BSA: 1.68, BMI: 23.62 kg/m2    Physical Exam    CONSTITUTIONAL: awake, alert, cooperative, no apparent distress , thin   EYES: pupils equal, round and reactive to light, sclera clear and conjunctiva normal  ENT: Normocephalic, without obvious abnormality, atraumatic  NECK: supple, symmetrical, no jugular venous distension and no carotid bruits   HEMATOLOGIC/LYMPHATIC: no cervical, supraclavicular or axillary lymphadenopathy   LUNGS: no increased work of breathing and clear to auscultation   CARDIOVASCULAR: regular rate and rhythm, normal S1 and S2, no murmur noted  ABDOMEN: normal bowel sounds x 4, soft, non-distended, non-tender, no masses palpated, no hepatosplenomegaly  G tube in place   MUSCULOSKELETAL: full range of motion noted, tone is normal  NEUROLOGIC: awake, alert, oriented to name, place and time. Motor skills grossly intact. SKIN: Normal skin color, texture, turgor and no jaundice.  appears intact   EXTREMITIES: Without cyanosis, clubbing, edema or asymmetry      Labs  CBC  Lab Results 07/14/2021 06/23/2021 06/09/2021 06/02/2021 05/21/2021 04/29/2021    CBC                 WBC x 10^3/uL 4.7 5.3 5.6 5.8         RBC x 10^6/uL 2.74 (L) 3.37 (L) 3.99 4.30         HGB g/dL 8.9 (L) 11.0 (L) 12.8 13.8         HCT % 26.3 (L) 32.1 (L) 36.1 40.4         MCV fL 96.0 (H) 95.3 (H) 90.5 94.0         MCH pg 32.5 (H) 32.6 (H) 32.1 32.1         MCHC g/dL 33.8 34.3 35.5 34.2         RDW-CV, % 12.6 13.6 12.5 13.9         PLT x 10^3/uL 344.0 390.0 (H) 189.0 342.0         Kofi % 62.2 64.4 66.9 61.9         LY % 7.7 (L) 5.3 (L) 14.2 (L) 14.4 (L)         MO % 26.0 (H) 28.8 (H) 17.2 (H) 18.0 (H)         EO % 3.2 1.1 1.3 5.0         Kofi # (ANC) x 10^3/uL 2.89 3.40 3.74 3.58         BA % 0.9 0.4 0.4 0.7         MO # x 10^3/uL 1.21 (H) 1.52 (H) 0.96 (H) 1.04 (H)         EO # x 10^3/uL 0.15 0.06 0.07 0.29         BA # x 10^3/uL 0.04 0.02 0.02 0.04         LY # x 10^3/uL 0.36 (L) 0.28 (L) 0.79 (L) 0.83 (L)     CMP  Lab Results 07/14/2021 06/23/2021 06/09/2021 06/02/2021 05/21/2021 04/29/2021    Chemistries                 Glucose mg/dL 104 92 98 100         BUN mg/dL 27 (H) 13 16 7         Creatinine mg/dL 2.2 (H) 0.7 1.09 (H) 0.9         BUN/Creatinine ratio     14.7           Sodium mmol/L 135 134 133 (L) 135         Potassium mmol/L 5.5 (H) 5.0 3.6 4.3         Chloride mmol/L 100 100 95 (L) 98         CO2 mmol/L 29 30 30.7 32         Anion gap, mmol/L     7.4           Calcium mg/dL 9.7 10.1 9.8 10.3         Albumin g/dL 3.5 3.8 3.7 3.9         Total protein g/dL 7.8 7.0 6.8 8.0         A/G ratio     1.2           Bilirubin, total mg/dL 0.5 0.4 0.9 1.0         Alkaline phosphatase U/L 64 68 66 64         AST/SGOT U/L 33 29 23 30         ALT/SGPT U/L 35 25 42 46         GFR non-African American, estimated mL/min/1.73m2 22.1 (L) >60.0 49.8 (L) >60.0         GFR African American, estimated mL/min/1.73m2 26.7 (L) >60.0 >60.0 >60.0                           Imaging    CT Neck 4/30/21:  IMPRESSION: 1. Enhancing mass-like area in the left glossal tonsillar sulcus, extending along the left tongue base. While this could be related to infection given the patient's clinical history, an underlying mass cannot be excluded. Direct visualization is recommended for further evaluation. 2. No cervical adenopathy. 3. Extensive atherosclerotic disease with marked plaque noted in bilateral proximal internal carotid arteries. PET 5/17/21:  IMPRESSION: 1. Hypermetabolic left oropharyngeal mass compatible with malignancy. 2. Hypermetabolic uptake in the left nasopharynx/fossa of [place] also concerning for malignancy. 3. Hypermetabolic left level 2A lymph node suspicious for metastatic disease. OCM - Patient Care Management    Pain, if applicable:        Performance Status: ECOG 2 In bed <50% of the time. Ambulatory and capable of all self-care, but unable to carry out any work activities. Up and about more than 50% of waking hours. (Date: 07/14/2021)     Depression Status: Was screened;  Outcome positive: No; Screening Date: 06/09/2021; Screening Tool: Patient Health Questionnaire (PHQ9)    Psycho-social PHQ-9 Follow-up Plan (if applicable):     Research    Would you like this patient screened for clinical trial eligibility? If yes, please enter the order for \"Research: Screen for Eligibility\"    Assessment & Plan    1. 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  - presented with odynophagia x 2 months  - CT scan with L oropharyngeal mass and no cervical LAD  - biopsy showed squamous cell carcinoma, p16 ( - )  - seen by Rad-Onc and was planning definitive RT but had not had PET  - PET scan shows cervical LAD, upstaging disease to stage III  - would agree with concurrent chemotherapy and radiation  - will plan concurrent chemotherapy with cisplatin on day 1, 22, 43  - discussed potential side effects and toxicity including N/V, myelosuppression, neuropathy, nephropathy, fatigue, ototoxicity  - OCM completed  - We will proceed with her last Cisplatin treatment today   - she completes radiation 7/22/21     2. Anxiety    - continue ativan 0.5 mg    3. Neoplasm related pain    - pain contract signed 5/21/21  - Continue Norco--she is dissolving this and putting it through her G tube     4. FEN    -continue Ensure     5. Tobacco Abuse    - quit smoking since her diagnosis  - cessation efforts encouraged    6. Crohn's disease  Not currently having any issues     She will return to the office in 1 month for a Survivorship visit  Will repeat PET/CT prior to that visit     Time Based Itemization    A total of  minutes was spent on today's patient encounter. If applicable, non-patient-facing activities:     (   )   Preparing to see the patient and reviewing records     (   )   Individual interpretation of results      (   )   Discussion or coordination of care with other health care professionals     (   )   Ordering of unique tests, medications, or procedures     (   )   Documentation within the EHR   . Recent imaging and labs were reviewed and discussed with the patient.   I have recommended that the patient follow CDC guidelines for prevention of COVID-19 infection. Jelena Montejo RN, MSN, [facility]  Phone: [phone]  Fax: [phone]  Online: [email or web address]     Note Recipients:   MD Kristen Ascencio MD Loran Light, DO (Referring)

## 2021-07-16 NOTE — PLAN OF CARE
Admit 2W    Direct Admit from Hem/onc: received renal toxic tx for head/neck cancer and now with significant JESSICA without electrolyte abnormalities     Nephro consult, stat labs, IVF, urine studies, will need home medications reconciled-->not ordered on admission

## 2021-07-16 NOTE — H&P
Hospital Medicine History & Physical      PCP: Leida Bustos, APRN - CNP    Date of Admission: 7/16/2021    Date of Service: Pt seen/examined on 7/16/2021    Chief Complaint:  No chief complaint on file. History Of Present Illness: The patient is a 80 y.o. female with a PMH of Squamous Cell Carcinoma of the oropharynx, hx of tobacco abuse, Crohn's Disease, Anxiety, Hx of CVA, HLD, HTN who presented as a direct admission from Dr. Lobito Jain office d/t elevated creatinine. Pt was diagnosed with squamous call cancer of the oropharynx in May of 2021. She has completed 32/37 rounds of radiation therapy and 3 courses of Cisplatin with her last dose being this past Wednesday. Her labs were being monitored and it was noted that her creatinine was rising. It was noted to be 2.7 and thus she was sent by her oncologist for admission. She follows with Dr. Otilio Waldron. Initially reports decreased UOP but was given fluids OP which did seem to help. She has nausea and vomiting daily. Also has poor PO intake. She cannot swallow foods and gets all of her nutrition via PEG tube. Reports her fluid intake is poor. No prior issues with her kidneys. She stopped taking her BP meds about a week ago because her BPs were running low. Pt was admitted to Med Surg 2/2 JESSICA. Nephrology was consulted.     Past Medical History:        Diagnosis Date    Anxiety     Cancer ([address] 05/05/2021    throat    Cerebral artery occlusion with cerebral infarction ([address]     30 years ago had mini stroke with no residual    Crohn disease ([address]     CVA (cerebral infarction) 2006    hx unclear    Hyperlipidemia     Hypertension        Past Surgical History:        Procedure Laterality Date    APPENDECTOMY      CHOLECYSTECTOMY      COLONOSCOPY  2011    HYSTERECTOMY      LARYNGOSCOPY N/A 5/5/2021    DIRECT MICROLARYNGOSCOPY WITH BIOPSIES performed by Nestor Block DO at [address]    infection after btl;one ovary in    treated with Cisplatin, now with elevated Cr 2.9 and BUN 33, previously 0.6 in 4/2021  - etiology: ATN, pre-renal with cisplatin use, nausea, vomiting and poor PO intake  - Admitted from Dr. Noelle Lorenzo office to Med Surg  - avoid nephrotoxic meds, check renal U/S  - IVF, hold Lisinopril, strict I&Os  - nephrology consult    Hyponatremia  - 125 on arrival  - has been low previously in 120s, most recently 131 in 4/2021  - IVF, monitor BMP  - nephro consulted    Squamous Cell Carcinoma of the Oropharynx  - f/w Dr. Guillermo Gudino with [facility] and Dr. Scahi Govea with ENT  - s/p Cisplatin & 32/27 rounds of radiation therapy  - cont PRN Xylocaine    S/p PEG tube  - NPO  - dietitian consult for TF    Crohn's Disease  - no acute flare    HTN  - stable  - cont Norvasc, hold Lisinopril    GERD  - cont Nexium    HLD  - cont Lipitor    Hx of CVA  - cont ASA and Lipitor    Chronic Anxiety  - cont Buspar, PRN ativan    Former Smoker  - quit in May    DVT Prophylaxis: SQ heparin  Diet: ADULT DIET;  Regular  Code Status: Full Code    Hope Brito PA-C 2:37 PM 7/16/2021

## 2021-07-17 LAB
A/G RATIO: 1.2 (ref 1.1–2.2)
ALBUMIN SERPL-MCNC: 3.2 G/DL (ref 3.4–5)
ALP BLD-CCNC: 62 U/L (ref 40–129)
ALT SERPL-CCNC: 30 U/L (ref 10–40)
ANION GAP SERPL CALCULATED.3IONS-SCNC: 12 MMOL/L (ref 3–16)
ANION GAP SERPL CALCULATED.3IONS-SCNC: 15 MMOL/L (ref 3–16)
AST SERPL-CCNC: 35 U/L (ref 15–37)
BASOPHILS ABSOLUTE: 0 K/UL (ref 0–0.2)
BASOPHILS RELATIVE PERCENT: 0.8 %
BILIRUB SERPL-MCNC: <0.2 MG/DL (ref 0–1)
BUN BLDV-MCNC: 33 MG/DL (ref 7–20)
BUN BLDV-MCNC: 33 MG/DL (ref 7–20)
CALCIUM SERPL-MCNC: 7.5 MG/DL (ref 8.3–10.6)
CALCIUM SERPL-MCNC: 8.7 MG/DL (ref 8.3–10.6)
CHLORIDE BLD-SCNC: 94 MMOL/L (ref 99–110)
CHLORIDE BLD-SCNC: 94 MMOL/L (ref 99–110)
CO2: 20 MMOL/L (ref 21–32)
CO2: 22 MMOL/L (ref 21–32)
CREAT SERPL-MCNC: 2.9 MG/DL (ref 0.6–1.2)
CREAT SERPL-MCNC: 3 MG/DL (ref 0.6–1.2)
EOSINOPHILS ABSOLUTE: 0.1 K/UL (ref 0–0.6)
EOSINOPHILS RELATIVE PERCENT: 1.6 %
GFR AFRICAN AMERICAN: 19
GFR AFRICAN AMERICAN: 19
GFR NON-AFRICAN AMERICAN: 15
GFR NON-AFRICAN AMERICAN: 16
GLOBULIN: 2.7 G/DL
GLUCOSE BLD-MCNC: 85 MG/DL (ref 70–99)
GLUCOSE BLD-MCNC: 91 MG/DL (ref 70–99)
HCT VFR BLD CALC: 21.3 % (ref 36–48)
HEMOGLOBIN: 7.6 G/DL (ref 12–16)
LYMPHOCYTES ABSOLUTE: 0.3 K/UL (ref 1–5.1)
LYMPHOCYTES RELATIVE PERCENT: 8.6 %
MAGNESIUM: 2 MG/DL (ref 1.8–2.4)
MCH RBC QN AUTO: 33.8 PG (ref 26–34)
MCHC RBC AUTO-ENTMCNC: 35.5 G/DL (ref 31–36)
MCV RBC AUTO: 95.2 FL (ref 80–100)
MONOCYTES ABSOLUTE: 0.7 K/UL (ref 0–1.3)
MONOCYTES RELATIVE PERCENT: 18.4 %
NEUTROPHILS ABSOLUTE: 2.8 K/UL (ref 1.7–7.7)
NEUTROPHILS RELATIVE PERCENT: 70.6 %
PDW BLD-RTO: 13.2 % (ref 12.4–15.4)
PHOSPHORUS: 4.9 MG/DL (ref 2.5–4.9)
PLATELET # BLD: 236 K/UL (ref 135–450)
PMV BLD AUTO: 7.8 FL (ref 5–10.5)
POTASSIUM REFLEX MAGNESIUM: 3 MMOL/L (ref 3.5–5.1)
POTASSIUM SERPL-SCNC: 3 MMOL/L (ref 3.5–5.1)
POTASSIUM SERPL-SCNC: 3.6 MMOL/L (ref 3.5–5.1)
RBC # BLD: 2.24 M/UL (ref 4–5.2)
SODIUM BLD-SCNC: 126 MMOL/L (ref 136–145)
SODIUM BLD-SCNC: 131 MMOL/L (ref 136–145)
TOTAL PROTEIN: 5.9 G/DL (ref 6.4–8.2)
WBC # BLD: 4 K/UL (ref 4–11)

## 2021-07-17 PROCEDURE — 99232 SBSQ HOSP IP/OBS MODERATE 35: CPT | Performed by: INTERNAL MEDICINE

## 2021-07-17 PROCEDURE — 36415 COLL VENOUS BLD VENIPUNCTURE: CPT

## 2021-07-17 PROCEDURE — 6360000002 HC RX W HCPCS: Performed by: INTERNAL MEDICINE

## 2021-07-17 PROCEDURE — 83970 ASSAY OF PARATHORMONE: CPT

## 2021-07-17 PROCEDURE — 6360000002 HC RX W HCPCS: Performed by: PHYSICIAN ASSISTANT

## 2021-07-17 PROCEDURE — 2500000003 HC RX 250 WO HCPCS: Performed by: INTERNAL MEDICINE

## 2021-07-17 PROCEDURE — 6370000000 HC RX 637 (ALT 250 FOR IP): Performed by: INTERNAL MEDICINE

## 2021-07-17 PROCEDURE — 80053 COMPREHEN METABOLIC PANEL: CPT

## 2021-07-17 PROCEDURE — 83735 ASSAY OF MAGNESIUM: CPT

## 2021-07-17 PROCEDURE — 6370000000 HC RX 637 (ALT 250 FOR IP): Performed by: PHYSICIAN ASSISTANT

## 2021-07-17 PROCEDURE — 2580000003 HC RX 258: Performed by: INTERNAL MEDICINE

## 2021-07-17 PROCEDURE — 85025 COMPLETE CBC W/AUTO DIFF WBC: CPT

## 2021-07-17 PROCEDURE — 2580000003 HC RX 258: Performed by: PHYSICIAN ASSISTANT

## 2021-07-17 PROCEDURE — 82306 VITAMIN D 25 HYDROXY: CPT

## 2021-07-17 PROCEDURE — 1200000000 HC SEMI PRIVATE

## 2021-07-17 RX ORDER — AMLODIPINE BESYLATE 5 MG/1
5 TABLET ORAL DAILY
Status: DISCONTINUED | OUTPATIENT
Start: 2021-07-17 | End: 2021-07-23 | Stop reason: HOSPADM

## 2021-07-17 RX ORDER — POTASSIUM CHLORIDE 7.45 MG/ML
10 INJECTION INTRAVENOUS
Status: COMPLETED | OUTPATIENT
Start: 2021-07-17 | End: 2021-07-17

## 2021-07-17 RX ORDER — DEXAMETHASONE SODIUM PHOSPHATE 4 MG/ML
4 INJECTION, SOLUTION INTRA-ARTICULAR; INTRALESIONAL; INTRAMUSCULAR; INTRAVENOUS; SOFT TISSUE EVERY 12 HOURS
Status: DISCONTINUED | OUTPATIENT
Start: 2021-07-17 | End: 2021-07-23 | Stop reason: HOSPADM

## 2021-07-17 RX ORDER — SODIUM CHLORIDE, SODIUM LACTATE, POTASSIUM CHLORIDE, CALCIUM CHLORIDE 600; 310; 30; 20 MG/100ML; MG/100ML; MG/100ML; MG/100ML
INJECTION, SOLUTION INTRAVENOUS CONTINUOUS
Status: DISCONTINUED | OUTPATIENT
Start: 2021-07-17 | End: 2021-07-18

## 2021-07-17 RX ADMIN — SODIUM CHLORIDE, POTASSIUM CHLORIDE, SODIUM LACTATE AND CALCIUM CHLORIDE: 600; 310; 30; 20 INJECTION, SOLUTION INTRAVENOUS at 18:31

## 2021-07-17 RX ADMIN — HEPARIN SODIUM 5000 UNITS: 5000 INJECTION INTRAVENOUS; SUBCUTANEOUS at 14:04

## 2021-07-17 RX ADMIN — SODIUM BICARBONATE: 84 INJECTION, SOLUTION INTRAVENOUS at 04:37

## 2021-07-17 RX ADMIN — ONDANSETRON HYDROCHLORIDE 4 MG: 2 INJECTION, SOLUTION INTRAMUSCULAR; INTRAVENOUS at 12:01

## 2021-07-17 RX ADMIN — AMLODIPINE BESYLATE 5 MG: 5 TABLET ORAL at 11:02

## 2021-07-17 RX ADMIN — POTASSIUM BICARBONATE 40 MEQ: 782 TABLET, EFFERVESCENT ORAL at 10:09

## 2021-07-17 RX ADMIN — HEPARIN SODIUM 5000 UNITS: 5000 INJECTION INTRAVENOUS; SUBCUTANEOUS at 06:38

## 2021-07-17 RX ADMIN — Medication 10 ML: at 21:04

## 2021-07-17 RX ADMIN — LORAZEPAM 0.5 MG: 0.5 TABLET ORAL at 00:54

## 2021-07-17 RX ADMIN — HEPARIN SODIUM 5000 UNITS: 5000 INJECTION INTRAVENOUS; SUBCUTANEOUS at 21:05

## 2021-07-17 RX ADMIN — LORAZEPAM 0.5 MG: 0.5 TABLET ORAL at 23:19

## 2021-07-17 RX ADMIN — Medication 10 MEQ: at 10:07

## 2021-07-17 RX ADMIN — CALCIUM GLUCONATE 2000 MG: 98 INJECTION, SOLUTION INTRAVENOUS at 11:06

## 2021-07-17 RX ADMIN — DEXAMETHASONE SODIUM PHOSPHATE 4 MG: 4 INJECTION, SOLUTION INTRAMUSCULAR; INTRAVENOUS at 14:04

## 2021-07-17 RX ADMIN — SODIUM CHLORIDE, POTASSIUM CHLORIDE, SODIUM LACTATE AND CALCIUM CHLORIDE: 600; 310; 30; 20 INJECTION, SOLUTION INTRAVENOUS at 10:57

## 2021-07-17 RX ADMIN — Medication 10 MEQ: at 12:09

## 2021-07-17 RX ADMIN — HYDROMORPHONE HYDROCHLORIDE 0.25 MG: 1 INJECTION, SOLUTION INTRAMUSCULAR; INTRAVENOUS; SUBCUTANEOUS at 04:14

## 2021-07-17 RX ADMIN — ONDANSETRON HYDROCHLORIDE 4 MG: 2 INJECTION, SOLUTION INTRAMUSCULAR; INTRAVENOUS at 18:30

## 2021-07-17 ASSESSMENT — PAIN DESCRIPTION - PAIN TYPE: TYPE: ACUTE PAIN

## 2021-07-17 ASSESSMENT — PAIN DESCRIPTION - FREQUENCY: FREQUENCY: INTERMITTENT

## 2021-07-17 ASSESSMENT — PAIN DESCRIPTION - PROGRESSION: CLINICAL_PROGRESSION: GRADUALLY WORSENING

## 2021-07-17 ASSESSMENT — PAIN SCALES - GENERAL
PAINLEVEL_OUTOF10: 6
PAINLEVEL_OUTOF10: 0
PAINLEVEL_OUTOF10: 0

## 2021-07-17 ASSESSMENT — PAIN - FUNCTIONAL ASSESSMENT: PAIN_FUNCTIONAL_ASSESSMENT: ACTIVITIES ARE NOT PREVENTED

## 2021-07-17 ASSESSMENT — PAIN DESCRIPTION - DESCRIPTORS: DESCRIPTORS: BURNING;SORE

## 2021-07-17 ASSESSMENT — PAIN DESCRIPTION - LOCATION: LOCATION: HEAD;THROAT

## 2021-07-17 ASSESSMENT — PAIN DESCRIPTION - ONSET: ONSET: GRADUAL

## 2021-07-17 NOTE — PROGRESS NOTES
Resting in bed awake. No complaints or needs at present time. AM assessment complete. Alert and oriented. Helped up to bathroom and back to bed. Bed alarm in place and turned on. Call light in reach. Patient is able to demonstrate the ability to move from a reclining position to an upright position within the recliner. Bedside Mobility Assessment Tool (BMAT):     Assessment Level 1- Sit and Shake    1. From a semi-reclined position, ask patient to sit up and rotate to a seated position at the side of the bed. Can use the bedrail. 2. Ask patient to reach out and grab your hand and shake making sure patient reaches across his/her midline. Pass- Patient is able to come to a seated position, maintain core strength. Maintains seated balance while reaching across midline. Move on to Assessment Level 2. Assessment Level 2- Stretch and Point   1. With patient in seated position at the side of the bed, have patient place both feet on the floor (or stool) with knees no higher than hips. 2. Ask patient to stretch one leg and straighten the knee, then bend the ankle/flex and point the toes. If appropriate, repeat with the other leg. Pass- Patient is able to demonstrate appropriate quad strength on intended weight bearing limb(s). Move onto Assessment Level 3. Assessment Level 3- Stand   1. Ask patient to elevate off the bed or chair (seated to standing) using an assistive device (cane, bedrail). 2. Patient should be able to raise buttocks off be and hold for a count of five. May repeat once. Pass- Patient maintains standing stability for at least 5 seconds, proceed to assessment level 4. Assessment Level 4- Walk   1. Ask patient to march in place at bedside. 2. Then ask patient to advance step and return each foot. Some medical conditions may render a patient from stepping backwards, use your best clinical judgement.    Pass- Patient demonstrates balance while shifting weight and ability to step, takes independent steps, does not use assistive device patient is MOBILITY LEVEL 4.       Mobility Level- 4

## 2021-07-17 NOTE — PROGRESS NOTES
Comprehensive Nutrition Assessment    Type and Reason for Visit:  Initial, Positive Nutrition Screen, Consult (+screen difficulty c/s, weight loss, +MST= 5; Consult TF order and management)    Nutrition Recommendations/Plan:   1. Continue ADULT DIET; Regular  2. Continue Current TF regimen- ADULT TUBE FEEDING; PEG; Jevity 1.5 with a goal rate of 65 ml/hr x 20 hours with 3 ml water flushes q4 hrs or per MD guidance. 3. Monitor TF rate, advancement, intake, tolerance, +water flushes  4. Monitor appetite and po intake. 5. Monitor weight trends, nutrition related lab values, bowel function, GI status and POC. Nutrition Assessment:  patient is nutritionally compromised AEB poor appetite/intake r/t pain + difficulty swallowing d/t squamous cell carcinoma of pharynx and N/V r/t chemo + radiation PTA, and she is at risk for further compromise d/t need for EN as sole source of nutrition, hx of squamous cell carcinoma of pharynx +currently going through chemo + radiation, and altered nutrition related lab values; Will continue ADULT DIET; Regular pleasure feeds and and continue current TF regimen- Jevity 1.5 with a goal rate of 65 ml/hr x 20 hours with 30 ml water flushes every 4 hours or per MD guidance    Malnutrition Assessment:  Malnutrition Status:   At risk for malnutrition (Comment)    Context:  Chronic Illness     Findings of the 6 clinical characteristics of malnutrition:  Energy Intake:  Unable to assess (poor po intake PTA, however unsure if patient had home TF regimen)  Weight Loss:  1 - Mild weight loss (specify amount and time period) (5.1% or - 7.7# x 3 months PTA; since 4/13/21)     Body Fat Loss:  Unable to assess (pt asleep at time of assessment)     Muscle Mass Loss:  Unable to assess (pt asleep at time of assessment)    Fluid Accumulation:  No significant fluid accumulation     Strength:  Not Performed    Estimated Daily Nutrient Needs:  Energy (kcal):  4119-3059 kcals based on 28-32 kcal/kg/CBW; Weight Used for Energy Requirements:  Current     Protein (g):  79-93 g protein based on 1.2-1.4 g/kg/CBW; Weight Used for Protein Requirements:  Ideal        Fluid (ml/day):  7382-7818 ml; Method Used for Fluid Requirements:  1 ml/kcal      Nutrition Related Findings:  attempted to visit with pt today, pt would not arouse when name was called x3; noted pt direct admit from Hem/Onc d/t pt received renal tox treatment for head/neck cancer and now with an JESSICA and electrolyte abnormalitites; Na, K,Cl, Ca are low; BUN and Creat are elevated; GFR = 15; patient with squamous cell carcinoma of pharynx, per chart review dx on 5/5/21-noted currently on 32/37 rounds of chemo and radiation, last tx on 7/14; noted pt with N/V daily, +dry heaving x 2 weeks PTA d/t chemo and radiation; per MD note, pt \"absolutely did not want a feeding tube\", however noted pt had a PEG tube placed on 5/26/21; noted pt had a decreased ability to eat, poor po intake, and a sore throat; noted per past encounters on 5/11/21, pt depression and anxiety had worsened d/t these issues; Abdomen soft, rounded, guarding. BS active. Last BM 7/11/21; TF started today; hx of crohn's disease      Wounds:  None       Current Nutrition Therapies:    ADULT DIET; Regular  ADULT TUBE FEEDING; PEG; Other Tube Feeding (specify); jevity 1.5; Continuous; 20; Yes; 15; Q 4 hours; 65; 30; Q 4 hours    Current Tube Feeding (TF) Orders:  · Feeding Route: PEG  · Formula: Standard with Fiber (Jevity 1.5)  · Schedule: Continuous  · Additives/Modulars:    · Water Flushes: 30 ml q 4 hours or per MD guidance  · Current TF & Flush Orders Provides: TF started today- start rate not yet documented  · Goal TF & Flush Orders Provides: Jevity 1.5 with a goal rate of 65 ml/hr x 20 hours- 1300 ml TV, 1950 kcals, 83 g protein, 988 ml free water + 30 ml water flushes every 4 hours or per MD guidance.       Anthropometric Measures:  · Height: 5' 4\" (162.6 cm)  · Current Body

## 2021-07-17 NOTE — PLAN OF CARE
Problem: Pain:  Goal: Pain level will decrease  Description: Pain level will decrease  Outcome: Met This Shift     Problem: Nausea/Vomiting:  Goal: Absence of nausea/vomiting  Description: Absence of nausea/vomiting  Outcome: Met This Shift     Problem: Falls - Risk of:  Goal: Will remain free from falls  Description: Will remain free from falls  Outcome: Met This Shift

## 2021-07-17 NOTE — PROGRESS NOTES
Peg tube without residual. Increased tube feeding, Jevity, to 35 cc. Pt tolerating feedings at present time. HOB elevated.

## 2021-07-17 NOTE — PLAN OF CARE
Nutrition Problem #1: Inadequate oral intake  Intervention: Food and/or Nutrient Delivery: Continue Current Diet, Continue Current Tube Feeding  Nutritional Goals: patient will tolerate Jevity 1.5 with a goal rate of 65 ml.hr x 20 hours with 30 ml water flushes every 4 hrs or per MD guidance without s/s of GI distress; pt will tolerate ADULT DIET;  Regular- pleasure feeds as able without s/s of GI distress

## 2021-07-17 NOTE — PROGRESS NOTES
C/o nausea. Requesting nausea medication. Zofran 4 mg IV given. Holding off on tube feeding at present time due to nausea.

## 2021-07-17 NOTE — ONCOLOGY
ONCOLOGY HEMATOLOGY CARE PROGRESS NOTE      SUBJECTIVE:     She's nauseated. ROS:   The remaining 10 point review of symptoms is unremarkable. OBJECTIVE        Physical    VITALS:  BP (!) 153/58   Pulse 73   Temp 97.3 °F (36.3 °C) (Oral)   Resp 14   Ht 5' 4\" (1.626 m)   SpO2 100%   BMI 25.06 kg/m²   TEMPERATURE:  Current - Temp: 97.3 °F (36.3 °C); Max - Temp  Av.4 °F (36.3 °C)  Min: 97 °F (36.1 °C)  Max: 97.7 °F (36.5 °C)  PULSE OXIMETRY RANGE: SpO2  Av %  Min: 100 %  Max: 100 %  24HR INTAKE/OUTPUT:      Intake/Output Summary (Last 24 hours) at 2021 1229  Last data filed at 2021 1206  Gross per 24 hour   Intake 1163.18 ml   Output 400 ml   Net 763.18 ml       CONSTITUTIONAL:  awake, alert, cooperative, no apparent distress, HEENT oral pharynx , no scleral icterus  HEMATOLOGIC/LYMPHATICS:  no cervical lymphadenopathy, no supraclavicular lymphadenopathy, no axillary lymphadenopathy and no inguinal lymphadenopathy  LUNGS:  No increased work of breathing, good air exchange, clear to auscultation bilaterally, no crackles or wheezing  CARDIOVASCULAR:  , regular rate and rhythm, normal S1 and S2, no S3 or S4, and no murmur noted  ABDOMEN:  No scars, normal bowel sounds, soft, non-distended, non-tender, no masses palpated, no hepatosplenomegally  MUSCULOSKELETAL:  There is no redness, warmth, or swelling of the joints. EXTREMETIES: No clubbing cynosis or edema  NEUROLOGIC:  Awake, alert, oriented to name, place and time. Cranial nerves II-XII are grossly intact. Motor is 5 out of 5 bilaterally.    SKIN:  no bruising or bleeding      Data      Recent Labs     21  1347 21  0516   WBC 6.8 4.0   HGB 8.0* 7.6*   HCT 22.5* 21.3*    236   MCV 95.4 95.2        Recent Labs     21  1347 21  0516   * 126*   K 3.9 3.0*  3.0*   CL 96* 94*   CO2 18* 20*   PHOS  --  4.9   BUN 33* 33*   CREATININE 2.9* 3.0*     Recent Labs 07/17/21  0516   AST 35   ALT 30   BILITOT <0.2   ALKPHOS 62       Magnesium:    Lab Results   Component Value Date    MG 2.00 07/17/2021    MG 2.00 03/04/2020    MG 2.00 10/07/2019         Problem List  Patient Active Problem List   Diagnosis    Chronic anxiety    [facility] complication [facility]    Nicotine dependence    Aortic insufficiency    Essential hypertension    Hyperlipidemia    Cigarette nicotine dependence without complication    Smoking    Mixed incontinence urge and stress    Localized swelling of both lower legs    ASCVD (arteriosclerotic cardiovascular disease)    Acute kidney injury ([address]    Squamous cell carcinoma of pharynx (HCC)    Hyponatremia    Dysphagia       ASSESSMENT AND PLAN    Acute kidney injury:  -Likely secondary to volume depletion, made worse by cis-platinum  -Nephrology has been consulted  -IVF and avoid nephrotoxins.     Hyponatremia:  -Again I suspect this is more from volume rather than SIADH     Squamous cell carcinoma oropharynx  -Status post 3 cycles of cisplatin along with current radiation  -Cisplatin is given 100 mg/m² every 3 weeks     Nutrition:  -The patient has a PEG  - Jevity 1.5 at has been ordered but not yet started.     Crohn's disease:  -Appears to be stable     Hypertension:  -Managed per the hospitalist     Nausea  -Has Ondansetron ordered  -Will add Dex    ONCOLOGIC DISPOSITION:    Ruth Ann Blackwell MD  Please contact through [address]

## 2021-07-17 NOTE — PROGRESS NOTES
Admit: 2021    Name:  Cynthia Frances  Room:  [facility]0224-01  MRN:    [id number]     Daily Progress Note for 2021   A 60-year-old female with a history of squamous cell carcinoma of the oropharynx admitted directly from oncologist office for acute kidney injury. Patient has been receiving cisplatin for chemotherapy    Interval History:   Feels weak. Has some mild nausea. Scheduled Meds:   potassium chloride  10 mEq Intravenous Q1H    potassium bicarb-citric acid  40 mEq Per G Tube Once    sodium chloride flush  5-40 mL Intravenous 2 times per day    heparin (porcine)  5,000 Units Subcutaneous 3 times per day       Continuous Infusions:   sodium chloride      IV infusion builder 125 mL/hr at 21 0437       PRN Meds:  sodium chloride flush, sodium chloride, ondansetron **OR** ondansetron, acetaminophen **OR** acetaminophen, lidocaine viscous hcl, LORazepam, HYDROmorphone                  Objective:     Temp  Av.4 °F (36.3 °C)  Min: 97 °F (36.1 °C)  Max: 97.7 °F (36.5 °C)  Pulse  Av.8  Min: 70  Max: 73  BP  Min: 140/59  Max: 158/60  SpO2  Av %  Min: 100 %  Max: 100 %  Patient Vitals for the past 4 hrs:   BP Temp Temp src Pulse Resp SpO2   21 0745 (!) 153/58 97.3 °F (36.3 °C) Oral 73 14 100 %         Intake/Output Summary (Last 24 hours) at 2021 1004  Last data filed at 2021 2353  Gross per 24 hour   Intake 1002.55 ml   Output 400 ml   Net 602.55 ml       Physical Exam:  Gen: No distress. Alert. Elderly  female, moderately [place], hoarse voice  Eyes: No sclera icterus. No conjunctival injection. glasses  Neck: Trachea midline. Resp: No accessory muscle use. No crackles. No wheezes. No rhonchi. On RA  CV: Regular rate. Regular rhythm. + murmur. No rub. No edema. Peripheral Pulses: +2 palpable, equal bilaterally   GI: Soft, Non-tender. Non-distended. Normal bowel sounds. No hernia. PEG tube in place  Skin: Warm and dry. Decreased skin turgor.  No rash on exposed extremities. Neuro: Awake. Grossly non-focal, moves all extremities, follows commands, no dysuria    Psych: Oriented x 4. No anxiety or agitation. Lab Data:  CBC:   Recent Labs     07/16/21  1347 07/17/21  0516   WBC 6.8 4.0   RBC 2.36* 2.24*   HGB 8.0* 7.6*   HCT 22.5* 21.3*   MCV 95.4 95.2   RDW 13.1 13.2    236     BMP:   Recent Labs     07/16/21  1347 07/17/21  0516   * 126*   K 3.9 3.0*  3.0*   CL 96* 94*   CO2 18* 20*   PHOS  --  4.9   BUN 33* 33*   CREATININE 2.9* 3.0*     BNP: No results for input(s): BNP in the last 72 hours. PT/INR: No results for input(s): PROTIME, INR in the last 72 hours. APTT:No results for input(s): APTT in the last 72 hours. CARDIAC ENZYMES: No results for input(s): CKMB, CKMBINDEX, TROPONINI in the last 72 hours.     Invalid input(s): CKTOTAL;3  FASTING LIPID PANEL:  Lab Results   Component Value Date    CHOL 177 03/10/2021    HDL 54 03/10/2021    TRIG 109 03/10/2021     LIVER PROFILE:   Recent Labs     07/17/21  0516   AST 35   ALT 30   BILITOT <0.2   ALKPHOS 62           Assessment & Plan:     Patient Active Problem List    Diagnosis Date Noted    Acute kidney injury ([facility] 07/16/2021    Squamous cell carcinoma of pharynx (HCC)     Hyponatremia     Dysphagia     Smoking 08/10/2020    Mixed incontinence urge and stress 08/10/2020    Localized swelling of both lower legs 08/10/2020    ASCVD (arteriosclerotic cardiovascular disease) 08/10/2020    Cigarette nicotine dependence without complication 02/77/0696    Essential hypertension 10/12/2016    Hyperlipidemia 10/12/2016    Nicotine dependence 10/15/2011    Aortic insufficiency 10/15/2011    Crohn's disease without complication ([facility] 66/29/1041    Chronic anxiety 01/28/2010       JESSICA  - recenlty treated with Cisplatin, now with elevated Cr 2.9 and BUN 33, previously 0.6 in 4/2021  - etiology: ATN, pre-renal with cisplatin use, nausea, vomiting and poor PO intake  - Admitted from  [facility] office to Med Surg  - avoid nephrotoxic meds, check renal U/S  - IVF, hold Lisinopril, strict I&Os  - nephrology consult     Hyponatremia  - 125 on arrival  - has been low previously in 120s, most recently 131 in 4/2021  - IVF, monitor BMP  - nephro consulted    Hypokalemia  Replace potassium.     Squamous Cell Carcinoma of the Oropharynx  - f/w Dr. Galo Hamilton with [facility] and Dr. Malcolm Childers with ENT  - s/p Cisplatin & 32/27 rounds of radiation therapy  - cont PRN Xylocaine     S/p PEG tube  - dietitian consult for TF  Start TF      Crohn's Disease  - no acute flare     HTN  - stable  - cont Norvasc, hold Lisinopril     GERD  - cont Nexium     HLD  - cont Lipitor     Hx of CVA  - cont ASA and Lipitor     Chronic Anxiety  - cont Buspar, PRN ativan     Former Smoker  - quit in May     DVT Prophylaxis: SQ heparin  Diet: ADULT DIET;  Regular  Code Status: Full Code    Pawan Wallis MD

## 2021-07-17 NOTE — ACP (ADVANCE CARE PLANNING)
Advance Care Planning   Healthcare Decision Maker:    Primary Decision Maker: Joe Campa Spouse - [phone]      Today we documented Decision Maker(s) consistent with Legal Next of Kin hierarchy.

## 2021-07-17 NOTE — CONSULTS
[facility]  Nephrology Consult Note           Reason for Consult: JESSICA  Requesting Physician:  Dr. Mookie Prince    Chief Complaint:  No chief complaint on file. History of Present Illness on 7/17/21:    80 y.o. 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, last on 7/14/21 who is admitted for jessica  Cr was normal in the past but was 2.2 on 7/14 when she received her 3rd dose of cisplatin  She has been dry heaving for the last 2 weeks as well  Has PEG t and getting TFs    Past Medical History:        Diagnosis Date    Anxiety     Cancer ([facility] 05/05/2021    throat    Cerebral artery occlusion with cerebral infarction ([facility]     30 years ago had mini stroke with no residual    Crohn disease ([facility]     CVA (cerebral infarction) 2006    hx unclear    Hyperlipidemia     Hypertension        Past Surgical History:        Procedure Laterality Date    APPENDECTOMY      CHOLECYSTECTOMY      COLONOSCOPY  2011    HYSTERECTOMY      LARYNGOSCOPY N/A 5/5/2021    DIRECT MICROLARYNGOSCOPY WITH BIOPSIES performed by Lesia Alvares DO at [address]    infection after btl;one ovary in   [address]  05/26/2021    Procedure: EGD ESOPHAGOGASTRODUODENOSCOPY PEG TUBE INSERTION    UPPER GASTROINTESTINAL ENDOSCOPY  5/26/2021    EGD ESOPHAGOGASTRODUODENOSCOPY PEG TUBE INSERTION performed by Driss Mcocrmack MD at [address] Medications:    No current facility-administered medications on file prior to encounter. Current Outpatient Medications on File Prior to Encounter   Medication Sig Dispense Refill    oxyCODONE (ROXICODONE) 5 MG immediate release tablet Take 5 mg by mouth every 4 hours as needed for Pain. Crushes and takes through gtube      LORazepam (ATIVAN) 0.5 MG tablet Take 0.5 mg by mouth nightly as needed for Anxiety.          Allergies:  Penicillins    Social History:    Social History     Socioeconomic History    Marital status:      Spouse name: Leona Winn Number of children: Not on file    Years of education: Not on file    Highest education level: Not on file   Occupational History    Not on file   Tobacco Use    Smoking status: Former Smoker     Packs/day: 1.50     Years: 30.00     Pack years: 45.00     Types: Cigarettes     Quit date: 2021     Years since quittin.2    Smokeless tobacco: Never Used    Tobacco comment: placed in AVS   Vaping Use    Vaping Use: Never used   Substance and Sexual Activity    Alcohol use: Not Currently     Alcohol/week: 20.0 standard drinks     Types: 20 Cans of beer per week     Comment: daily; 4 beers per day    Drug use: No    Sexual activity: Not Currently   Other Topics Concern    Not on file   Social History Narrative    10/2011 lives with spouse and dogs;not working re [employer]     Social Determinants of Health     Financial Resource Strain:     Difficulty of Paying Living Expenses:    Food Insecurity:     Worried About Running Out of Food in the Last Year:     [address] in the Last Year:    Transportation Needs:     Lack of Transportation (Medical):  Lack of Transportation (Non-Medical):    Physical Activity:     Days of Exercise per Week:     Minutes of Exercise per Session:    Stress:     Feeling of Stress :    Social Connections:     Frequency of Communication with Friends and Family:     Frequency of Social Gatherings with Friends and Family:     Attends Jain Services:     Active Member of Clubs or Organizations:     Attends Club or Organization Meetings:     Marital Status:    Intimate Partner Violence:     Fear of Current or Ex-Partner:     Emotionally Abused:     Physically Abused:     Sexually Abused:        Family History:   Family History   Problem Relation Age of Onset    Heart Disease Mother        Review of Systems:   Pertinent positives stated above in HPI.  All other 10 systems were reviewed and were negative. Physical exam:   Constitutional:  VITALS:  BP (!) 153/58   Pulse 73   Temp 97.3 °F (36.3 °C) (Oral)   Resp 14   Ht 5' 4\" (1.626 m)   SpO2 100%   BMI 25.06 kg/m²   Gen: alert, awake, nad  HEENT: pupils reactive  Neck: no bruits or jvd noted  Cardiovascular:  S1, S2 without m/r/g; no lower extremity edema  Respiratory: CTA B without w/r/r; respiratory effort normal  Abdomen:  +bs, soft, nt, nd, no hepatosplenomegaly  Neuro/Psy: AAoriented times 3 ; moves all 4 ext    Data/  Recent Labs     07/16/21  1347 07/17/21  0516   WBC 6.8 4.0   HGB 8.0* 7.6*   HCT 22.5* 21.3*   MCV 95.4 95.2    236     Recent Labs     07/16/21  1347 07/17/21  0516   * 126*   K 3.9 3.0*  3.0*   CL 96* 94*   CO2 18* 20*   GLUCOSE 99 85   PHOS  --  4.9   MG  --  2.00   BUN 33* 33*   CREATININE 2.9* 3.0*   LABGLOM 16* 15*   GFRAA 19* 19*     Renal USG  Kidneys:       The right kidney measures 10.4 cm in length with cortical thickness of 16 mm   and the left kidney 10.3 cm in length with cortical thickness of 14 mm. Normal to mild increase in renal parenchymal echogenicity with no   hydronephrosis.  Left renal cyst is noted measuring 2.7 x 2.3 x 2.4           Bladder:       Mild bladder wall thickening, likely accentuated by incomplete distention. Ureteral jets are not evaluated.  No postvoid imaging was performed.           Impression   Normal to mild increase in renal parenchymal echogenicity with no   hydronephrosis.       Limited evaluation of the urinary bladder due to incomplete distention. UA w micro: trace blood, protein 30, small LE, 10-20 wbcs, 11-20 RE cells  U na 109 cr 30    Assessment  -JESSICA in the setting of cisplatin and n/v. Renal USG w echogenicity but no hydro. UA was contaminated  Sample.  Platelets nml    -Hyponatremia w hypovolemia and decreased solute intake    -Hypokalemia    -hypocalcemia    -Metabolic acidosis from JESSICA    -Anemia per hem/onc    -Oropharyngeal SCC s/p XRT and cisplatin, last on 7/14/21      Plan  -change IVF to LR at 125 ml/h  -iv kcl 20 meq and 40 per T  -iv ramin gluconate 2g   -on Jevity, maximize as much as possible  -amlodipine 5 mg daily   -25 OH vit D, PTH  -Serial renal panel  -daily wts and strict i/o  -renal dose medications   -avoid nephrotoxins      Thank you for the consultation. Please do not hesitate to call with questions.     Molina Ballesteros MD  [facility]  Office: [phone]  Fax:    [phone]

## 2021-07-17 NOTE — CARE COORDINATION
Case Management Assessment  Initial Evaluation      Patient Name: Torri Diaz  YOB: 1950  Diagnosis: Acute kidney injury [facility]) [N17.9]  Date / Time: 7/16/2021  1:33 PM    Admission status/Date:  07/16/2021  Chart Reviewed: Yes      Patient Interviewed: Yes   Family Interviewed:  No      Hospitalization in the last 30 days:  No      Health Care Decision Maker :   Primary Decision Maker: Brigitte Rogers Spouse - [phone]    (CM - must 1st enter selection under Navigator - emergency contact- [place] Relationship and pick relationship)   Who do you trust or have selected to make healthcare decisions for you      Met with: patient and spouse  Interview conducted  (bedside/phone): bedside    Current PCP: Kel Esqu[address]  Precert required for SNF : YES         3 night stay required - NA    ADLS  Support Systems/Care Needs: Spouse/Significant Other, Family Members  Transportation: family    Meal Preparation: self/family    Housing  Living Arrangements: Lives w/sp, IPTA Steps: NA  Intent for return to present living arrangements: Yes  Identified Issues:     [address] with [address] : No Agency:(Services)  Type of Home Care Services: None  Passport/Waiver : No  :                      Phone Number:    Passport/Waiver Services: NA          Durable Medical Equiptment   DME Provider: NATHANIEL  Equipment: NATHANIEL  Walker___Cane___RTS___ BSC___Shower Chair___Hospital Bed___W/C____Other________  02 at ____Liter(s)---wears(frequency)_______ HHN ___ CPAP___ BiPap___   N/A____    Home O2 Use :  No      Community Service Affiliation  Dialysis:  No    · Agency:  · Location:  · Dialysis Schedule:  · Phone:   · Fax: Other Community Services: (ex:PT/OT,Mental Health,Wound Clinic, Cardio/Pul [address])    DISCHARGE PLAN: Explained Case Management role/services. Chart reviewed.  Met with pt and spouse at bedside and explained the role of the CM. Plans to return home. +Active w/[facility]. Denies any HHC or DMe needs. +CM following.

## 2021-07-18 LAB
ALBUMIN SERPL-MCNC: 3.1 G/DL (ref 3.4–5)
ANION GAP SERPL CALCULATED.3IONS-SCNC: 13 MMOL/L (ref 3–16)
BUN BLDV-MCNC: 36 MG/DL (ref 7–20)
CALCIUM SERPL-MCNC: 7.6 MG/DL (ref 8.3–10.6)
CHLORIDE BLD-SCNC: 92 MMOL/L (ref 99–110)
CO2: 21 MMOL/L (ref 21–32)
CREAT SERPL-MCNC: 3.2 MG/DL (ref 0.6–1.2)
GFR AFRICAN AMERICAN: 17
GFR NON-AFRICAN AMERICAN: 14
GLUCOSE BLD-MCNC: 149 MG/DL (ref 70–99)
GLUCOSE BLD-MCNC: 196 MG/DL (ref 70–99)
HCT VFR BLD CALC: 21.1 % (ref 36–48)
HEMOGLOBIN: 7.6 G/DL (ref 12–16)
MCH RBC QN AUTO: 35 PG (ref 26–34)
MCHC RBC AUTO-ENTMCNC: 36.3 G/DL (ref 31–36)
MCV RBC AUTO: 96.6 FL (ref 80–100)
PARATHYROID HORMONE INTACT: 68.8 PG/ML (ref 14–72)
PDW BLD-RTO: 13.2 % (ref 12.4–15.4)
PERFORMED ON: ABNORMAL
PHOSPHORUS: 4.2 MG/DL (ref 2.5–4.9)
PLATELET # BLD: 206 K/UL (ref 135–450)
PMV BLD AUTO: 8 FL (ref 5–10.5)
POTASSIUM SERPL-SCNC: 3.1 MMOL/L (ref 3.5–5.1)
RBC # BLD: 2.18 M/UL (ref 4–5.2)
SODIUM BLD-SCNC: 126 MMOL/L (ref 136–145)
VITAMIN D 25-HYDROXY: 29.7 NG/ML
WBC # BLD: 2.6 K/UL (ref 4–11)

## 2021-07-18 PROCEDURE — 85027 COMPLETE CBC AUTOMATED: CPT

## 2021-07-18 PROCEDURE — 2580000003 HC RX 258: Performed by: INTERNAL MEDICINE

## 2021-07-18 PROCEDURE — 6360000002 HC RX W HCPCS: Performed by: PHYSICIAN ASSISTANT

## 2021-07-18 PROCEDURE — 6370000000 HC RX 637 (ALT 250 FOR IP): Performed by: PHYSICIAN ASSISTANT

## 2021-07-18 PROCEDURE — 80069 RENAL FUNCTION PANEL: CPT

## 2021-07-18 PROCEDURE — 99232 SBSQ HOSP IP/OBS MODERATE 35: CPT | Performed by: INTERNAL MEDICINE

## 2021-07-18 PROCEDURE — 36415 COLL VENOUS BLD VENIPUNCTURE: CPT

## 2021-07-18 PROCEDURE — 6370000000 HC RX 637 (ALT 250 FOR IP): Performed by: INTERNAL MEDICINE

## 2021-07-18 PROCEDURE — 6360000002 HC RX W HCPCS: Performed by: INTERNAL MEDICINE

## 2021-07-18 PROCEDURE — 2580000003 HC RX 258: Performed by: PHYSICIAN ASSISTANT

## 2021-07-18 PROCEDURE — 1200000000 HC SEMI PRIVATE

## 2021-07-18 RX ORDER — SODIUM CHLORIDE AND POTASSIUM CHLORIDE .9; .15 G/100ML; G/100ML
SOLUTION INTRAVENOUS CONTINUOUS
Status: DISCONTINUED | OUTPATIENT
Start: 2021-07-18 | End: 2021-07-22

## 2021-07-18 RX ADMIN — SODIUM CHLORIDE, POTASSIUM CHLORIDE, SODIUM LACTATE AND CALCIUM CHLORIDE: 600; 310; 30; 20 INJECTION, SOLUTION INTRAVENOUS at 00:47

## 2021-07-18 RX ADMIN — CALCIUM GLUCONATE 2000 MG: 98 INJECTION, SOLUTION INTRAVENOUS at 09:59

## 2021-07-18 RX ADMIN — ONDANSETRON HYDROCHLORIDE 4 MG: 2 INJECTION, SOLUTION INTRAMUSCULAR; INTRAVENOUS at 19:22

## 2021-07-18 RX ADMIN — HYDROMORPHONE HYDROCHLORIDE 0.25 MG: 1 INJECTION, SOLUTION INTRAMUSCULAR; INTRAVENOUS; SUBCUTANEOUS at 00:43

## 2021-07-18 RX ADMIN — DEXAMETHASONE SODIUM PHOSPHATE 4 MG: 4 INJECTION, SOLUTION INTRAMUSCULAR; INTRAVENOUS at 00:43

## 2021-07-18 RX ADMIN — HEPARIN SODIUM 5000 UNITS: 5000 INJECTION INTRAVENOUS; SUBCUTANEOUS at 21:17

## 2021-07-18 RX ADMIN — POTASSIUM CHLORIDE AND SODIUM CHLORIDE: 900; 150 INJECTION, SOLUTION INTRAVENOUS at 11:47

## 2021-07-18 RX ADMIN — AMLODIPINE BESYLATE 5 MG: 5 TABLET ORAL at 08:20

## 2021-07-18 RX ADMIN — POTASSIUM BICARBONATE 40 MEQ: 782 TABLET, EFFERVESCENT ORAL at 09:57

## 2021-07-18 RX ADMIN — ONDANSETRON HYDROCHLORIDE 4 MG: 2 INJECTION, SOLUTION INTRAMUSCULAR; INTRAVENOUS at 10:26

## 2021-07-18 RX ADMIN — Medication 10 ML: at 08:22

## 2021-07-18 RX ADMIN — HEPARIN SODIUM 5000 UNITS: 5000 INJECTION INTRAVENOUS; SUBCUTANEOUS at 06:32

## 2021-07-18 RX ADMIN — LORAZEPAM 0.5 MG: 0.5 TABLET ORAL at 23:41

## 2021-07-18 RX ADMIN — HEPARIN SODIUM 5000 UNITS: 5000 INJECTION INTRAVENOUS; SUBCUTANEOUS at 14:52

## 2021-07-18 RX ADMIN — Medication 10 ML: at 23:48

## 2021-07-18 RX ADMIN — DEXAMETHASONE SODIUM PHOSPHATE 4 MG: 4 INJECTION, SOLUTION INTRAMUSCULAR; INTRAVENOUS at 12:42

## 2021-07-18 RX ADMIN — SODIUM CHLORIDE, POTASSIUM CHLORIDE, SODIUM LACTATE AND CALCIUM CHLORIDE: 600; 310; 30; 20 INJECTION, SOLUTION INTRAVENOUS at 06:32

## 2021-07-18 RX ADMIN — POTASSIUM CHLORIDE AND SODIUM CHLORIDE: 900; 150 INJECTION, SOLUTION INTRAVENOUS at 23:42

## 2021-07-18 ASSESSMENT — PAIN DESCRIPTION - ORIENTATION: ORIENTATION: MID;LOWER

## 2021-07-18 ASSESSMENT — PAIN DESCRIPTION - LOCATION: LOCATION: BACK

## 2021-07-18 ASSESSMENT — PAIN DESCRIPTION - PROGRESSION: CLINICAL_PROGRESSION: GRADUALLY WORSENING

## 2021-07-18 ASSESSMENT — PAIN SCALES - GENERAL
PAINLEVEL_OUTOF10: 8
PAINLEVEL_OUTOF10: 0

## 2021-07-18 ASSESSMENT — PAIN DESCRIPTION - PAIN TYPE: TYPE: ACUTE PAIN

## 2021-07-18 ASSESSMENT — PAIN DESCRIPTION - FREQUENCY: FREQUENCY: INTERMITTENT

## 2021-07-18 ASSESSMENT — PAIN DESCRIPTION - ONSET: ONSET: GRADUAL

## 2021-07-18 ASSESSMENT — PAIN DESCRIPTION - DESCRIPTORS: DESCRIPTORS: ACHING

## 2021-07-18 ASSESSMENT — PAIN - FUNCTIONAL ASSESSMENT: PAIN_FUNCTIONAL_ASSESSMENT: ACTIVITIES ARE NOT PREVENTED

## 2021-07-18 NOTE — PROGRESS NOTES
Resting in bed awake. No complaints or needs at present time. Am assessment complete. Peg tube in place and Jevity infusing at 65 cc/hr. Pt tolerating well with only 20 cc residual. HOB elevated and locked into position. Bed alarm in place and turned on. Call light in reach. Patient is able to demonstrate the ability to move from a reclining position to an upright position within the recliner. Bedside Mobility Assessment Tool (BMAT):     Assessment Level 1- Sit and Shake    1. From a semi-reclined position, ask patient to sit up and rotate to a seated position at the side of the bed. Can use the bedrail. 2. Ask patient to reach out and grab your hand and shake making sure patient reaches across his/her midline. Pass- Patient is able to come to a seated position, maintain core strength. Maintains seated balance while reaching across midline. Move on to Assessment Level 2. Assessment Level 2- Stretch and Point   1. With patient in seated position at the side of the bed, have patient place both feet on the floor (or stool) with knees no higher than hips. 2. Ask patient to stretch one leg and straighten the knee, then bend the ankle/flex and point the toes. If appropriate, repeat with the other leg. Pass- Patient is able to demonstrate appropriate quad strength on intended weight bearing limb(s). Move onto Assessment Level 3. Assessment Level 3- Stand   1. Ask patient to elevate off the bed or chair (seated to standing) using an assistive device (cane, bedrail). 2. Patient should be able to raise buttocks off be and hold for a count of five. May repeat once. Pass- Patient maintains standing stability for at least 5 seconds, proceed to assessment level 4. Assessment Level 4- Walk   1. Ask patient to march in place at bedside. 2. Then ask patient to advance step and return each foot. Some medical conditions may render a patient from stepping backwards, use your best clinical judgement. Pass- Patient demonstrates balance while shifting weight and ability to step, takes independent steps, does not use assistive device patient is MOBILITY LEVEL 4.       Mobility Level- 4

## 2021-07-18 NOTE — PROGRESS NOTES
C/o feeling nauseated. Helped up to bathroom and pt vomited 100 cc of tube feeding color emesis. Tube feeding stopped. Helped back to bed. PEG tube residual of 60 cc. Will reevaluate in an hour. Scheduled decadron given. See mar. Call light in reach. Bed alarm in place and turned on.

## 2021-07-18 NOTE — PROGRESS NOTES
C/o nausea with 50 cc of clear with tan liquid. Zofran 4 mg IV given. Offered cold washcloth but pt declined. See MAR. Call light in reach.

## 2021-07-18 NOTE — PROGRESS NOTES
[employer]  Nephrology follow-up note           Reason for Consult:   JESSICA  Requesting Physician:  Dr. Breanna Fritz history  Patient vomited once  Reports that the potassium is making her nauseated  Creatinine 3.2    Last 24 h uop 2.9 L    ROS: No chest pain/shortness of breath/fever  PSFH: No visitor    Scheduled Meds:   calcium gluconate IVPB  2,000 mg Intravenous Once    amLODIPine  5 mg Oral Daily    dexamethasone  4 mg Intravenous Q12H    sodium chloride flush  5-40 mL Intravenous 2 times per day    heparin (porcine)  5,000 Units Subcutaneous 3 times per day     Continuous Infusions:   lactated ringers 150 mL/hr at 07/18/21 0957    sodium chloride       PRN Meds:.sodium chloride flush, sodium chloride, ondansetron **OR** ondansetron, acetaminophen **OR** acetaminophen, lidocaine viscous hcl, LORazepam, HYDROmorphone    History of Present Illness on 7/17/21:    80 y.o. 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, last on 7/14/21 who is admitted for jessica  Cr was normal in the past but was 2.2 on 7/14 when she received her 3rd dose of cisplatin  She has been dry heaving for the last 2 weeks as well  Has PEG t and getting TFs    Physical exam:   Constitutional:  VITALS:  BP (!) 145/63   Pulse 71   Temp 97 °F (36.1 °C) (Oral)   Resp 14   Ht 5' 4\" (1.626 m)   Wt 142 lb 11.2 oz (64.7 kg)   SpO2 99%   BMI 24.49 kg/m²   Gen: alert, awake, nad  HEENT: pupils reactive  Neck: no bruits or jvd noted  Cardiovascular:  S1, S2 without m/r/g; no lower extremity edema  Respiratory: CTA B without w/r/r; respiratory effort normal  Abdomen:  +bs, soft, nt, nd, no hepatosplenomegaly  Neuro/Psy: AAoriented times 3 ; moves all 4 ext    Data/  Recent Labs     07/16/21  1347 07/17/21  0516 07/18/21  0530   WBC 6.8 4.0 2.6*   HGB 8.0* 7.6* 7.6*   HCT 22.5* 21.3* 21.1*   MCV 95.4 95.2 96.6    236 206     Recent Labs     07/17/21  0516 07/17/21  1443 07/18/21  0530 * 131* 126*   K 3.0*  3.0* 3.6 3.1*   CL 94* 94* 92*   CO2 20* 22 21   GLUCOSE 85 91 196*   PHOS 4.9  --  4.2   MG 2.00  --   --    BUN 33* 33* 36*   CREATININE 3.0* 2.9* 3.2*   LABGLOM 15* 16* 14*   GFRAA 19* 19* 17*     Renal USG  Kidneys:       The right kidney measures 10.4 cm in length with cortical thickness of 16 mm   and the left kidney 10.3 cm in length with cortical thickness of 14 mm. Normal to mild increase in renal parenchymal echogenicity with no   hydronephrosis.  Left renal cyst is noted measuring 2.7 x 2.3 x 2.4           Bladder:       Mild bladder wall thickening, likely accentuated by incomplete distention. Ureteral jets are not evaluated.  No postvoid imaging was performed.           Impression   Normal to mild increase in renal parenchymal echogenicity with no   hydronephrosis.       Limited evaluation of the urinary bladder due to incomplete distention. UA w micro: trace blood, protein 30, small LE, 10-20 wbcs, 11-20 RE cells  U na 109 cr 30    Assessment  -JESSICA in the setting of cisplatin and n/v. Renal USG w echogenicity but no hydro. UA was contaminated  Sample. Platelets nml    -Hyponatremia w hypovolemia and decreased solute intake    -Hypokalemia    -hypocalcemia    -Metabolic acidosis from JESSICA    -Anemia per hem/onc    -Oropharyngeal SCC s/p XRT and cisplatin, last on 7/14/21      Plan  -change IVF to normal saline with addition of 20 EQ of potassium chloride at 100 mL/h  - kcl 40 meq per T  -iv ramin gluconate 2g   -on Jevity, maximize as much as possible  -amlodipine 5 mg daily   - follow-up 25 OH vit D, PTH  -Serial renal panel  -daily wts and strict i/o  -renal dose medications   -avoid nephrotoxins      Thank you for the consultation. Please do not hesitate to call with questions.     Erick Noriega MD  [facility]  Office: [phone]  Fax:    [phone]

## 2021-07-18 NOTE — PROGRESS NOTES
Pt states that she feels a litter better. Lying in bed watching tv. Peg tube aspiration of 0. Increased tube feeding to 35 cc/hr. Will monitor. Call light in reach.

## 2021-07-18 NOTE — PROGRESS NOTES
Admit: 2021    Name:  Lanette Benson  Room:  0224/0224-01  MRN:    [id number]     Daily Progress Note for 2021   A 28-year-old female with a history of squamous cell carcinoma of the oropharynx admitted directly from oncologist office for acute kidney injury. Patient has been receiving cisplatin for chemotherapy    Interval History:     TF is at goal,but patient feels nauseous         Scheduled Meds:   amLODIPine  5 mg Oral Daily    dexamethasone  4 mg Intravenous Q12H    sodium chloride flush  5-40 mL Intravenous 2 times per day    heparin (porcine)  5,000 Units Subcutaneous 3 times per day       Continuous Infusions:   lactated ringers 150 mL/hr at 21 0634    sodium chloride         PRN Meds:  sodium chloride flush, sodium chloride, ondansetron **OR** ondansetron, acetaminophen **OR** acetaminophen, lidocaine viscous hcl, LORazepam, HYDROmorphone                  Objective:     Temp  Av.7 °F (36.5 °C)  Min: 97.3 °F (36.3 °C)  Max: 98.1 °F (36.7 °C)  Pulse  Av.5  Min: 71  Max: 77  BP  Min: 138/72  Max: 167/74  SpO2  Av %  Min: 98 %  Max: 100 %  Patient Vitals for the past 4 hrs:   BP Temp Temp src Pulse Resp SpO2   21 0353 (!) 167/74 98 °F (36.7 °C) Oral 73 16 98 %         Intake/Output Summary (Last 24 hours) at 2021 0727  Last data filed at 2021 0634  Gross per 24 hour   Intake 3417.56 ml   Output 2850 ml   Net 567.56 ml       Physical Exam:  Gen: No distress. Alert. Elderly  female, moderately [place], hoarse voice  Eyes: No sclera icterus. No conjunctival injection. glasses  Neck: Trachea midline. Resp: No accessory muscle use. No crackles. No wheezes. No rhonchi. On RA  CV: Regular rate. Regular rhythm. + murmur. No rub. No edema. Peripheral Pulses: +2 palpable, equal bilaterally   GI: Soft, Non-tender. Non-distended. Normal bowel sounds. No hernia. PEG tube in place  Skin: Warm and dry. Decreased skin turgor. No rash on exposed extremities. Neuro: Awake. Grossly non-focal, moves all extremities, follows commands, no dysuria    Psych: Oriented x 4. No anxiety or agitation. Lab Data:  CBC:   Recent Labs     07/16/21  1347 07/17/21  0516 07/18/21  0530   WBC 6.8 4.0 2.6*   RBC 2.36* 2.24* 2.18*   HGB 8.0* 7.6* 7.6*   HCT 22.5* 21.3* 21.1*   MCV 95.4 95.2 96.6   RDW 13.1 13.2 13.2    236 206     BMP:   Recent Labs     07/17/21  0516 07/17/21  1443 07/18/21  0530   * 131* 126*   K 3.0*  3.0* 3.6 3.1*   CL 94* 94* 92*   CO2 20* 22 21   PHOS 4.9  --  4.2   BUN 33* 33* 36*   CREATININE 3.0* 2.9* 3.2*     BNP: No results for input(s): BNP in the last 72 hours. PT/INR: No results for input(s): PROTIME, INR in the last 72 hours. APTT:No results for input(s): APTT in the last 72 hours. CARDIAC ENZYMES: No results for input(s): CKMB, CKMBINDEX, TROPONINI in the last 72 hours. Invalid input(s): CKTOTAL;3  FASTING LIPID PANEL:  Lab Results   Component Value Date    CHOL 177 03/10/2021    HDL 54 03/10/2021    TRIG 109 03/10/2021     LIVER PROFILE:   Recent Labs     07/17/21  0516   AST 35   ALT 30   BILITOT <0.2   ALKPHOS 62       renal US -  Normal to mild increase in renal parenchymal echogenicity with no   hydronephrosis. Limited evaluation of the urinary bladder due to incomplete distention.      Assessment & Plan:     Patient Active Problem List    Diagnosis Date Noted    Acute kidney injury ([facility] [address] 07/16/2021    Squamous cell carcinoma of pharynx (HCC)     Hyponatremia     Dysphagia     Smoking 08/10/2020    Mixed incontinence urge and stress 08/10/2020    Localized swelling of both lower legs 08/10/2020    ASCVD (arteriosclerotic cardiovascular disease) 08/10/2020    Cigarette nicotine dependence without complication 66/17/6100    Essential hypertension 10/12/2016    Hyperlipidemia 10/12/2016    Nicotine dependence 10/15/2011    Aortic insufficiency 10/15/2011    Crohn's disease without complication ([facility] 85/53/2961    Chronic anxiety 01/28/2010       JESSICA  - recenlty treated with Cisplatin, now with elevated Cr 2.9 and BUN 33, previously 0.6 in 4/2021  - etiology: ATN, pre-renal with cisplatin use, nausea, vomiting and poor PO intake  - Admitted from Dr. Camacho Earthly office to Med Surg  - avoid nephrotoxic meds, check renal U/S  - IVF, hold Lisinopril, strict I&Os  - nephrology consult  Slight worsening of CRTN today  Renal US negative     Hyponatremia  - 125 on arrival  - has been low previously in 120s, most recently 131 in 4/2021  - IVF, monitor BMP  - nephro consulted    Hypokalemia  Replace potassium. Hypocalcemia  Being replaced      Squamous Cell Carcinoma of the Oropharynx  - f/w Dr. Thao Ramirez with [facility] and Dr. Mile Abarca with ENT  - s/p Cisplatin & 32/27 rounds of radiation therapy  - cont PRN Xylocaine    Dexamethasone started by oncology for nausea      S/p PEG tube  - dietitian consult for TF  Start TF -now at goal. No residuals     Crohn's Disease  - no acute flare     HTN  - stable  - cont Norvasc, hold Lisinopril     GERD  - cont Nexium     HLD  - cont Lipitor     Hx of CVA  - cont ASA and Lipitor     Chronic Anxiety  - cont Buspar, PRN ativan     Former Smoker  - quit in May     DVT Prophylaxis: SQ heparin  Diet: ADULT DIET;  Regular  Code Status: Full Code    Ritu Milligan MD

## 2021-07-18 NOTE — PROGRESS NOTES
Pt tube feeding checked per order. No residual noted, pt tolerating well. Tube feed intake increased 15cc per order. Pt now at 50ml/hr. Will continue to monitor. Pt requesting PRN anxiety med at this time, see RAMÍREZ Gresham, RN, RN

## 2021-07-18 NOTE — PROGRESS NOTES
Resting in bed with eyes closed. Resp e/e. No distress noted. Call light in reach. Bed alarm in place and turned on.

## 2021-07-18 NOTE — ONCOLOGY
07/17/21  0516 07/17/21  1443 07/18/21  0530   * 131* 126*   K 3.0*  3.0* 3.6 3.1*   CL 94* 94* 92*   CO2 20* 22 21   PHOS 4.9  --  4.2   BUN 33* 33* 36*   CREATININE 3.0* 2.9* 3.2*     Recent Labs     07/17/21  0516   AST 35   ALT 30   BILITOT <0.2   ALKPHOS 62       Magnesium:    Lab Results   Component Value Date    MG 2.00 07/17/2021    MG 2.00 03/04/2020    MG 2.00 10/07/2019         Problem List  Patient Active Problem List   Diagnosis    Chronic anxiety    Crohn's disease without complication ([facility] [address]    Nicotine dependence    Aortic insufficiency    Essential hypertension    Hyperlipidemia    Cigarette nicotine dependence without complication    Smoking    Mixed incontinence urge and stress    Localized swelling of both lower legs    ASCVD (arteriosclerotic cardiovascular disease)    Acute kidney injury ([address]    Squamous cell carcinoma of pharynx (HCC)    Hyponatremia    Dysphagia       ASSESSMENT AND PLAN    Acute kidney injury:  -Likely secondary to volume depletion, made worse by cis-platinum  -Nephrology has been consulted  -IVF and avoid nephrotoxins.  -Creat 2.9 --> 3.0 --> 3.2     Hyponatremia:  -Again I suspect this is more from volume rather than SIADH     Squamous cell carcinoma oropharynx  -Status post 3 cycles of cisplatin along with current radiation  -Cisplatin is given 100 mg/m² every 3 weeks     Nutrition:  -The patient has a PEG  - Jevity 1.5 is running at 65 ml/hr.     Crohn's disease:  -Appears to be stable     Hypertension:  -Managed per the hospitalist     Nausea  -Has Ondansetron ordered  -Added Dex 7/17/2021  - Seems a bit improved.     ONCOLOGIC DISPOSITION:    Esperanza José MD  Please contact through [employer]

## 2021-07-18 NOTE — PROGRESS NOTES
Tube feeding replaced with all new tubing and new bottle. Pt tolerating tube feeding well. Call light in reach.

## 2021-07-18 NOTE — PLAN OF CARE
Problem: Pain:  Goal: Pain level will decrease  Description: Pain level will decrease  Outcome: Met This Shift     Problem: Nausea/Vomiting:  Goal: Absence of nausea/vomiting  Description: Absence of nausea/vomiting  Outcome: Met This Shift     Problem: Falls - Risk of:  Goal: Will remain free from falls  Description: Will remain free from falls  Outcome: Met This Shift     Problem: Nutrition  Goal: Optimal nutrition therapy  7/17/2021 1635 by Victorina Malone RD, LD  Outcome: Ongoing  Goal: Understanding of nutritional guidelines  7/17/2021 1635 by Victorina Malone RD, LD  Outcome: Ongoing

## 2021-07-18 NOTE — PROGRESS NOTES
Shift assessment complete - See flow sheet. Nightly medications given - See [facility]     Patient with no complaints of pain at this time. Patient denies any further needs. Bed alarm is set for patient safety.  Call light explained and in reach

## 2021-07-19 LAB
ALBUMIN SERPL-MCNC: 3 G/DL (ref 3.4–5)
ANION GAP SERPL CALCULATED.3IONS-SCNC: 12 MMOL/L (ref 3–16)
BASOPHILS ABSOLUTE: 0 K/UL (ref 0–0.2)
BASOPHILS RELATIVE PERCENT: 0.4 %
BUN BLDV-MCNC: 40 MG/DL (ref 7–20)
CALCIUM SERPL-MCNC: 8.2 MG/DL (ref 8.3–10.6)
CHLORIDE BLD-SCNC: 95 MMOL/L (ref 99–110)
CO2: 22 MMOL/L (ref 21–32)
CREAT SERPL-MCNC: 3 MG/DL (ref 0.6–1.2)
EOSINOPHILS ABSOLUTE: 0 K/UL (ref 0–0.6)
EOSINOPHILS RELATIVE PERCENT: 0.1 %
GFR AFRICAN AMERICAN: 19
GFR NON-AFRICAN AMERICAN: 15
GLUCOSE BLD-MCNC: 104 MG/DL (ref 70–99)
GLUCOSE BLD-MCNC: 107 MG/DL (ref 70–99)
GLUCOSE BLD-MCNC: 108 MG/DL (ref 70–99)
GLUCOSE BLD-MCNC: 124 MG/DL (ref 70–99)
GLUCOSE BLD-MCNC: 145 MG/DL (ref 70–99)
GLUCOSE BLD-MCNC: 157 MG/DL (ref 70–99)
HCT VFR BLD CALC: 22.1 % (ref 36–48)
HEMOGLOBIN: 7.9 G/DL (ref 12–16)
LYMPHOCYTES ABSOLUTE: 0.4 K/UL (ref 1–5.1)
LYMPHOCYTES RELATIVE PERCENT: 9.2 %
MCH RBC QN AUTO: 33.9 PG (ref 26–34)
MCHC RBC AUTO-ENTMCNC: 35.7 G/DL (ref 31–36)
MCV RBC AUTO: 95 FL (ref 80–100)
MONOCYTES ABSOLUTE: 0.5 K/UL (ref 0–1.3)
MONOCYTES RELATIVE PERCENT: 10.7 %
NEUTROPHILS ABSOLUTE: 3.5 K/UL (ref 1.7–7.7)
NEUTROPHILS RELATIVE PERCENT: 79.6 %
PDW BLD-RTO: 12.9 % (ref 12.4–15.4)
PERFORMED ON: ABNORMAL
PHOSPHORUS: 3.4 MG/DL (ref 2.5–4.9)
PLATELET # BLD: 223 K/UL (ref 135–450)
PMV BLD AUTO: 8 FL (ref 5–10.5)
POTASSIUM SERPL-SCNC: 3.6 MMOL/L (ref 3.5–5.1)
RBC # BLD: 2.33 M/UL (ref 4–5.2)
SODIUM BLD-SCNC: 129 MMOL/L (ref 136–145)
WBC # BLD: 4.3 K/UL (ref 4–11)

## 2021-07-19 PROCEDURE — 85025 COMPLETE CBC W/AUTO DIFF WBC: CPT

## 2021-07-19 PROCEDURE — 36415 COLL VENOUS BLD VENIPUNCTURE: CPT

## 2021-07-19 PROCEDURE — 2580000003 HC RX 258: Performed by: PHYSICIAN ASSISTANT

## 2021-07-19 PROCEDURE — 99232 SBSQ HOSP IP/OBS MODERATE 35: CPT | Performed by: INTERNAL MEDICINE

## 2021-07-19 PROCEDURE — 6360000002 HC RX W HCPCS: Performed by: INTERNAL MEDICINE

## 2021-07-19 PROCEDURE — 6370000000 HC RX 637 (ALT 250 FOR IP): Performed by: PHYSICIAN ASSISTANT

## 2021-07-19 PROCEDURE — 6370000000 HC RX 637 (ALT 250 FOR IP): Performed by: INTERNAL MEDICINE

## 2021-07-19 PROCEDURE — 1200000000 HC SEMI PRIVATE

## 2021-07-19 PROCEDURE — 6360000002 HC RX W HCPCS: Performed by: PHYSICIAN ASSISTANT

## 2021-07-19 PROCEDURE — 80069 RENAL FUNCTION PANEL: CPT

## 2021-07-19 PROCEDURE — 6370000000 HC RX 637 (ALT 250 FOR IP)

## 2021-07-19 RX ORDER — VITAMIN B COMPLEX
1000 TABLET ORAL DAILY
Status: DISCONTINUED | OUTPATIENT
Start: 2021-07-19 | End: 2021-07-23 | Stop reason: HOSPADM

## 2021-07-19 RX ORDER — DIMETHICONE, OXYBENZONE, AND PADIMATE O 2; 2.5; 6.6 G/100G; G/100G; G/100G
STICK TOPICAL
Status: COMPLETED
Start: 2021-07-19 | End: 2021-07-19

## 2021-07-19 RX ADMIN — DEXAMETHASONE SODIUM PHOSPHATE 4 MG: 4 INJECTION, SOLUTION INTRAMUSCULAR; INTRAVENOUS at 12:33

## 2021-07-19 RX ADMIN — AMLODIPINE BESYLATE 5 MG: 5 TABLET ORAL at 08:53

## 2021-07-19 RX ADMIN — Medication: at 08:53

## 2021-07-19 RX ADMIN — Medication 1000 UNITS: at 10:41

## 2021-07-19 RX ADMIN — ONDANSETRON HYDROCHLORIDE 4 MG: 2 INJECTION, SOLUTION INTRAMUSCULAR; INTRAVENOUS at 18:35

## 2021-07-19 RX ADMIN — HEPARIN SODIUM 5000 UNITS: 5000 INJECTION INTRAVENOUS; SUBCUTANEOUS at 13:35

## 2021-07-19 RX ADMIN — ONDANSETRON HYDROCHLORIDE 4 MG: 2 INJECTION, SOLUTION INTRAMUSCULAR; INTRAVENOUS at 12:33

## 2021-07-19 RX ADMIN — LORAZEPAM 0.5 MG: 0.5 TABLET ORAL at 20:47

## 2021-07-19 RX ADMIN — ONDANSETRON HYDROCHLORIDE 4 MG: 2 INJECTION, SOLUTION INTRAMUSCULAR; INTRAVENOUS at 04:37

## 2021-07-19 RX ADMIN — HEPARIN SODIUM 5000 UNITS: 5000 INJECTION INTRAVENOUS; SUBCUTANEOUS at 20:47

## 2021-07-19 RX ADMIN — Medication 10 ML: at 20:47

## 2021-07-19 RX ADMIN — POTASSIUM CHLORIDE AND SODIUM CHLORIDE: 900; 150 INJECTION, SOLUTION INTRAVENOUS at 08:55

## 2021-07-19 RX ADMIN — HEPARIN SODIUM 5000 UNITS: 5000 INJECTION INTRAVENOUS; SUBCUTANEOUS at 06:52

## 2021-07-19 RX ADMIN — DEXAMETHASONE SODIUM PHOSPHATE 4 MG: 4 INJECTION, SOLUTION INTRAMUSCULAR; INTRAVENOUS at 01:41

## 2021-07-19 ASSESSMENT — PAIN SCALES - GENERAL
PAINLEVEL_OUTOF10: 0
PAINLEVEL_OUTOF10: 0

## 2021-07-19 NOTE — ONCOLOGY
ONCOLOGY HEMATOLOGY CARE PROGRESS NOTE      SUBJECTIVE:     The patient is refusing her tube feedings. She states it is too much and it makes her nauseated. ROS:   The remaining 10 point review of symptoms is unremarkable. OBJECTIVE        Physical    VITALS:  BP (!) 162/82   Pulse 85   Temp 98.2 °F (36.8 °C) (Oral)   Resp 16   Ht 5' 4\" (1.626 m)   Wt 142 lb 1.6 oz (64.5 kg)   SpO2 98%   BMI 24.39 kg/m²   TEMPERATURE:  Current - Temp: 98.2 °F (36.8 °C); Max - Temp  Av.4 °F (36.9 °C)  Min: 98.2 °F (36.8 °C)  Max: 98.6 °F (37 °C)  PULSE OXIMETRY RANGE: SpO2  Av %  Min: 98 %  Max: 100 %  24HR INTAKE/OUTPUT:      Intake/Output Summary (Last 24 hours) at 2021 0752  Last data filed at 2021 3126  Gross per 24 hour   Intake 2732.45 ml   Output 2700 ml   Net 32.45 ml       CONSTITUTIONAL:  awake, alert, cooperative, no apparent distress, HEENT oral pharynx , no scleral icterus  HEMATOLOGIC/LYMPHATICS:  no cervical lymphadenopathy, no supraclavicular lymphadenopathy, no axillary lymphadenopathy and no inguinal lymphadenopathy  LUNGS:  No increased work of breathing, good air exchange, clear to auscultation bilaterally, no crackles or wheezing  CARDIOVASCULAR:  , regular rate and rhythm, normal S1 and S2, no S3 or S4, and no murmur noted  ABDOMEN:  No scars, normal bowel sounds, soft, non-distended, non-tender, no masses palpated, no hepatosplenomegally  MUSCULOSKELETAL:  There is no redness, warmth, or swelling of the joints. EXTREMETIES: No clubbing cynosis or edema  NEUROLOGIC:  Awake, alert, oriented to name, place and time. Cranial nerves II-XII are grossly intact. Motor is 5 out of 5 bilaterally.    SKIN:  no bruising or bleeding      Data      Recent Labs     21  0516 21  0530 21  0525   WBC 4.0 2.6* 4.3   HGB 7.6* 7.6* 7.9*   HCT 21.3* 21.1* 22.1*    206 223   MCV 95.2 96.6 95.0        Recent Labs     21  0516 07/17/21  0516 07/17/21  1443 07/18/21  0530 07/19/21  0525   *   < > 131* 126* 129*   K 3.0*  3.0*   < > 3.6 3.1* 3.6   CL 94*   < > 94* 92* 95*   CO2 20*   < > 22 21 22   PHOS 4.9  --   --  4.2 3.4   BUN 33*   < > 33* 36* 40*   CREATININE 3.0*   < > 2.9* 3.2* 3.0*    < > = values in this interval not displayed.      Recent Labs     07/17/21  0516   AST 35   ALT 30   BILITOT <0.2   ALKPHOS 62       Magnesium:    Lab Results   Component Value Date    MG 2.00 07/17/2021    MG 2.00 03/04/2020    MG 2.00 10/07/2019         Problem List  Patient Active Problem List   Diagnosis    Chronic anxiety    Crohn's disease without complication ([address]    Nicotine dependence    Aortic insufficiency    Essential hypertension    Hyperlipidemia    Cigarette nicotine dependence without complication    Smoking    Mixed incontinence urge and stress    Localized swelling of both lower legs    ASCVD (arteriosclerotic cardiovascular disease)    Acute kidney injury ([address]    Squamous cell carcinoma of pharynx (HCC)    Hyponatremia    Dysphagia       ASSESSMENT AND PLAN    Acute kidney injury:  -Likely secondary to volume depletion, made worse by cis-platinum  -Nephrology has been consulted  -IVF and avoid nephrotoxins.  -Serum creatinine peaked at 3.2 but is now down to 3     Hyponatremia:  -Again I suspect this is more from volume rather than SIADH  -This is fluctuating, but she is still slightly low today     Squamous cell carcinoma oropharynx  -Status post 3 cycles of cisplatin along with current radiation  -Cisplatin is given 100 mg/m² every 3 weeks     Nutrition:  -The patient has a PEG  -She is refusing her tube feedings, but is going to talk to the nutritionist today     Crohn's disease:  -Appears to be stable     Hypertension:  -Managed per the hospitalist     Nausea  -Has Ondansetron ordered  -Will add Dex  -This appears better with the tube feeding off    ONCOLOGIC DISPOSITION:    -Per nephrology    Conrado Reyna Siddharth Leija MD  May be reached through [facility]

## 2021-07-19 NOTE — PROGRESS NOTES
Pt resting with eyes closed, respirs witnessed as e/e, no signs of distress. SR up x2, bed in lowest  position, wheels locked,bed alarm on, Call light and bedside table in easy reach.    Paige Moses, RN, RN

## 2021-07-19 NOTE — PLAN OF CARE
Problem: Pain:  Goal: Pain level will decrease  Description: Pain level will decrease  Outcome: Ongoing     Problem: Nausea/Vomiting:  Goal: Absence of nausea/vomiting  Description: Absence of nausea/vomiting  Outcome: Ongoing     Problem: Falls - Risk of:  Goal: Will remain free from falls  Description: Will remain free from falls  Outcome: Ongoing

## 2021-07-19 NOTE — PROGRESS NOTES
Pt resting with eyes closed, respirs witnessed as e/e, no signs of distress. SR up x2, bed in lowest  position, wheels locked,bed alarm on, Call light and bedside table in easy reach.    Genoveva Jacob, RN, RN

## 2021-07-19 NOTE — PROGRESS NOTES
[email or web address]  Nephrology follow-up [facility]           Reason for Consult:   JESSICA  Requesting Physician:  Dr. Schwartz Sees history  Patient nauseated, refusing TF d/t that  Creatinine 3.2-->3  BP up    Last 24 h uop 2.6 L    ROS: No chest pain/shortness of breath/fever  PSFH: No visitor    Scheduled Meds:   amLODIPine  5 mg Oral Daily    dexamethasone  4 mg Intravenous Q12H    sodium chloride flush  5-40 mL Intravenous 2 times per day    heparin (porcine)  5,000 Units Subcutaneous 3 times per day     Continuous Infusions:   0.9% NaCl with KCl 20 mEq 100 mL/hr at 07/19/21 0855    sodium chloride       PRN Meds:.sodium chloride flush, sodium chloride, ondansetron **OR** ondansetron, acetaminophen **OR** acetaminophen, lidocaine viscous hcl, LORazepam, HYDROmorphone    History of Present Illness on 7/17/21:    80 y.o. 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, last on 7/14/21 who is admitted for jessica  Cr was normal in the past but was 2.2 on 7/14 when she received her 3rd dose of cisplatin  She has been dry heaving for the last 2 weeks as well  Has PEG t and getting TFs    Physical exam:   Constitutional:  VITALS:  BP (!) 156/66   Pulse 67   Temp 97.7 °F (36.5 °C) (Oral)   Resp 16   Ht 5' 4\" (1.626 m)   Wt 141 lb 9 oz (64.2 kg)   SpO2 100%   BMI 24.30 kg/m²   Gen: alert, awake, nad  HEENT: pupils reactive  Neck: no bruits or jvd noted  Cardiovascular:  S1, S2 without m/r/g; no lower extremity edema  Respiratory: CTA B without w/r/r; respiratory effort normal  Abdomen:  +bs, soft, nt, nd, no hepatosplenomegaly  Neuro/Psy: AAoriented times 3 ; moves all 4 ext    Data/  Recent Labs     07/17/21  0516 07/18/21  0530 07/19/21  0525   WBC 4.0 2.6* 4.3   HGB 7.6* 7.6* 7.9*   HCT 21.3* 21.1* 22.1*   MCV 95.2 96.6 95.0    206 223     Recent Labs     07/17/21  0516 07/17/21  0516 07/17/21  1443 07/18/21  0530 07/19/21  0525   *   < > 131* 126* 129*   K 3. 0*  3.0*   < > 3.6 3.1* 3.6   CL 94*   < > 94* 92* 95*   CO2 20*   < > 22 21 22   GLUCOSE 85   < > 91 196* 124*   PHOS 4.9  --   --  4.2 3.4   MG 2.00  --   --   --   --    BUN 33*   < > 33* 36* 40*   CREATININE 3.0*   < > 2.9* 3.2* 3.0*   LABGLOM 15*   < > 16* 14* 15*   GFRAA 19*   < > 19* 17* 19*    < > = values in this interval not displayed. Renal USG  Kidneys:       The right kidney measures 10.4 cm in length with cortical thickness of 16 mm   and the left kidney 10.3 cm in length with cortical thickness of 14 mm. Normal to mild increase in renal parenchymal echogenicity with no   hydronephrosis.  Left renal cyst is noted measuring 2.7 x 2.3 x 2.4           Bladder:       Mild bladder wall thickening, likely accentuated by incomplete distention. Ureteral jets are not evaluated.  No postvoid imaging was performed.           Impression   Normal to mild increase in renal parenchymal echogenicity with no   hydronephrosis.       Limited evaluation of the urinary bladder due to incomplete distention. UA w micro: trace blood, protein 30, small LE, 10-20 wbcs, 11-20 RE cells  U na 109 cr 30  25 OH vit D 30 , PTH 69    Assessment  -JESSICA in the setting of cisplatin and n/v. Renal USG w echogenicity but no hydro. UA was contaminated  Sample. Platelets nml    -Hyponatremia w hypovolemia and decreased solute intake    -Hypokalemia    -hypocalcemia    -Metabolic acidosis from JESSICA    -Anemia per hem/onc    -Oropharyngeal SCC s/p XRT and cisplatin, last on 7/14/21      Plan  -cont IVF to normal saline with addition of 20 EQ of potassium chloride at 100 mL/h  -on Jevity, maximize as much as possible, d/w pt about need to be TF, will start at a low dose of 10-15 ml/h and see how she does  -laxatives per IM  -amlodipine 5 mg daily   - vit d 1000 units daily   -Serial renal panel  -daily wts and strict i/o  -renal dose medications   -avoid nephrotoxins      Thank you for the consultation.   Please do not hesitate to call with questions.     Sheri León MD  [facility]  Office: [phone]  Fax:    [phone]

## 2021-07-19 NOTE — PROGRESS NOTES
Pt nauseated and complaining of the tube feed. Pt feels like it is too much food, as she eats differently at home. Pt did vomit a small amount and is refusing the tube feed to be on at this time, as she would like a break. 85 residual pulled.

## 2021-07-19 NOTE — FLOWSHEET NOTE
07/19/21 0830   Vital Signs   Temp 97.7 °F (36.5 °C)   Temp Source Oral   Pulse 67   Heart Rate Source Monitor   Resp 16   BP (!) 156/66   BP Location Left upper arm   Patient Position Semi fowlers   Level of Consciousness Alert (0)   MEWS Score 1   Patient Currently in Pain No   Pain Assessment   Pain Assessment 0-10   Pain Level 0   Patient's Stated Pain Goal No pain   Oxygen Therapy   SpO2 100 %   O2 Device None (Room air)   AM assessment completed, see flow sheet. Pt is alert and oriented. Vital signs are WNL. Respirations are even & easy. No complaints voiced. Pt. Refusing tube feeding as it makes her vomit. Pt denies needs at this time. SR up x 2, and bed in low position. Call light is within reach. Bedside Mobility Assessment Tool (BMAT):     Assessment Level 1- Sit and Shake    1. From a semi-reclined position, ask patient to sit up and rotate to a seated position at the side of the bed. Can use the bedrail. 2. Ask patient to reach out and grab your hand and shake making sure patient reaches across his/her midline. Pass- Patient is able to come to a seated position, maintain core strength. Maintains seated balance while reaching across midline. Move on to Assessment Level 2. Assessment Level 2- Stretch and Point   1. With patient in seated position at the side of the bed, have patient place both feet on the floor (or stool) with knees no higher than hips. 2. Ask patient to stretch one leg and straighten the knee, then bend the ankle/flex and point the toes. If appropriate, repeat with the other leg. Pass- Patient is able to demonstrate appropriate quad strength on intended weight bearing limb(s). Move onto Assessment Level 3. Assessment Level 3- Stand   1. Ask patient to elevate off the bed or chair (seated to standing) using an assistive device (cane, bedrail). 2. Patient should be able to raise buttocks off be and hold for a count of five. May repeat once.    Pass- Patient maintains standing stability for at least 5 seconds, proceed to assessment level 4. Assessment Level 4- Walk   1. Ask patient to march in place at bedside. 2. Then ask patient to advance step and return each foot. Some medical conditions may render a patient from stepping backwards, use your best clinical judgement. Fail- Patient not able to complete tasks OR requires use of assistive device. Patient is MOBILITY LEVEL 3. Mobility Level- 3    Patient is able to demonstrate the ability to move from a reclining position to an upright position within the recliner.

## 2021-07-19 NOTE — PROGRESS NOTES
Admit: 2021    Name:  Demetrius Friedman  Room:  0224/0224-01  MRN:    [id number]     Daily Progress Note for 2021       A 67-year-old female with a history of squamous cell carcinoma of the oropharynx , admitted directly from oncologist office for acute kidney injury. Patient has been receiving cisplatin for chemotherapy    Interval History:       Patient is having trouble tolerating tube feeds she complains of persistent nausea with this. Continued on IV fluids  Creatinine better from 3.2-> 3.0 today      Scheduled Meds:   Vitamin D  1,000 Units Oral Daily    amLODIPine  5 mg Oral Daily    dexamethasone  4 mg Intravenous Q12H    sodium chloride flush  5-40 mL Intravenous 2 times per day    heparin (porcine)  5,000 Units Subcutaneous 3 times per day       Continuous Infusions:   0.9% NaCl with KCl 20 mEq 100 mL/hr at 21 0855    sodium chloride         PRN Meds:  sodium chloride flush, sodium chloride, ondansetron **OR** ondansetron, acetaminophen **OR** acetaminophen, lidocaine viscous hcl, LORazepam, HYDROmorphone            Objective:     Temp  Av °F (36.7 °C)  Min: 97.7 °F (36.5 °C)  Max: 98.2 °F (36.8 °C)  Pulse  Av  Min: 67  Max: 85  BP  Min: 156/66  Max: 162/82  SpO2  Av %  Min: 98 %  Max: 100 %  No data found. Intake/Output Summary (Last 24 hours) at 2021 1312  Last data filed at 2021 1017  Gross per 24 hour   Intake 1777 ml   Output 2050 ml   Net -273 ml       Physical Exam:  Gen: No distress. Alert. Elderly  female, moderately [place], hoarse voice  Eyes: No sclera icterus. No conjunctival injection. glasses  Neck: Trachea midline. Resp: No accessory muscle use. No crackles. No wheezes. No rhonchi. On RA  CV: Regular rate. Regular rhythm. + murmur. No rub. No edema. Peripheral Pulses: +2 palpable, equal bilaterally   GI: Soft, Non-tender. Non-distended. Normal bowel sounds. No hernia. PEG tube in place  Skin: Warm and dry.  Decreased skin turgor. No rash on exposed extremities. Neuro: Awake. Grossly non-focal, moves all extremities, follows commands, no dysuria    Psych: Oriented x 4. No anxiety or agitation. Lab Data:  CBC:   Recent Labs     07/17/21  0516 07/18/21  0530 07/19/21  0525   WBC 4.0 2.6* 4.3   RBC 2.24* 2.18* 2.33*   HGB 7.6* 7.6* 7.9*   HCT 21.3* 21.1* 22.1*   MCV 95.2 96.6 95.0   RDW 13.2 13.2 12.9    206 223     BMP:   Recent Labs     07/17/21  0516 07/17/21  0516 07/17/21  1443 07/18/21  0530 07/19/21  0525   *   < > 131* 126* 129*   K 3.0*  3.0*   < > 3.6 3.1* 3.6   CL 94*   < > 94* 92* 95*   CO2 20*   < > 22 21 22   PHOS 4.9  --   --  4.2 3.4   BUN 33*   < > 33* 36* 40*   CREATININE 3.0*   < > 2.9* 3.2* 3.0*    < > = values in this interval not displayed. BNP: No results for input(s): BNP in the last 72 hours. PT/INR: No results for input(s): PROTIME, INR in the last 72 hours. APTT:No results for input(s): APTT in the last 72 hours. CARDIAC ENZYMES: No results for input(s): CKMB, CKMBINDEX, TROPONINI in the last 72 hours. Invalid input(s): CKTOTAL;3  FASTING LIPID PANEL:  Lab Results   Component Value Date    CHOL 177 03/10/2021    HDL 54 03/10/2021    TRIG 109 03/10/2021     LIVER PROFILE:   Recent Labs     07/17/21  0516   AST 35   ALT 30   BILITOT <0.2   ALKPHOS 62      Renal US -  Normal to mild increase in renal parenchymal echogenicity with no   hydronephrosis. Limited evaluation of the urinary bladder due to incomplete distention.      Assessment & Plan:     Patient Active Problem List    Diagnosis Date Noted    Acute kidney injury ([address] 07/16/2021    Squamous cell carcinoma of pharynx (HCC)     Hyponatremia     Dysphagia     Smoking 08/10/2020    Mixed incontinence urge and stress 08/10/2020    Localized swelling of both lower legs 08/10/2020    ASCVD (arteriosclerotic cardiovascular disease) 08/10/2020    Cigarette nicotine dependence without complication 44/61/6576    Essential hypertension 10/12/2016    Hyperlipidemia 10/12/2016    Nicotine dependence 10/15/2011    Aortic insufficiency 10/15/2011    Crohn's disease without complication ([facility] [address] 18/60/8293    Chronic anxiety 01/28/2010       JESSICA  - recently treated with Cisplatin, now with elevated Cr 2.9 and BUN 33, previously 0.6 in 4/2021  - etiology: ATN, pre-renal with cisplatin use, nausea, vomiting and poor PO intake  - Admitted from Dr. Lin Gallardo office to Med Surg  - avoid nephrotoxic meds, check renal U/S  - IVF, hold Lisinopril, strict I&Os  - nephrology consult  -Renal US negative  - monitor BMP ; crtn 2.9-> 3.2-> 3.0 now. Cont IVF     Hyponatremia  - 125 on arrival  - has been low previously in 120s, most recently 131 in 4/2021  - IVF, monitor BMP. Na 125-> 131-> 126-> 129   - nephro consulted    Hypokalemia  - Replace potassium. Hypocalcemia  - Being replaced      Squamous Cell Carcinoma of the Oropharynx  - f/w Dr. Salima Noonan with [facility] and Dr. Madelin Grigsby with ENT  - s/p Cisplatin & 32/27 rounds of radiation therapy  - cont PRN Xylocaine  -Dexamethasone started by oncology for nausea      S/p PEG tube  - dietitian consult for TF  - cont TF .   was at goal rate, but c/O nausea      Crohn's Disease  - no acute flare     HTN  - stable  - cont Norvasc, hold Lisinopril     GERD  - cont Nexium     HLD  - cont Lipitor     Hx of CVA  - cont ASA and Lipitor     Chronic Anxiety  - cont Buspar, PRN ativan     Former Smoker  - quit in May     DVT Prophylaxis: SQ heparin  Diet: ADULT DIET; Regular.    cont TF  Code Status: Full Code    Fabian Sexton MD  7/19/2021

## 2021-07-19 NOTE — CARE COORDINATION
INTERDISCIPLINARY PLAN OF CARE CONFERENCE    Date/Time: 7/19/2021 3:02 PM  Completed by: Jade Dejesus RN, Case Management      Patient Name:  Kendal Nassar  YOB: 1950  Admitting Diagnosis: Acute kidney injury [facility]) [N17.9]     Admit Date/Time:  7/16/2021  1:33 PM    Chart reviewed. Interdisciplinary team contacted or reviewed plan related to patient progress and discharge plans. Disciplines included Case Management, Nursing, and Dietitian. Current Status:inpt  PT/OT recommendation for discharge plan of care: tbd    Expected D/C Disposition:  Home  Confirmed plan with patient and/or family Yes confirmed with: (name) pt    Discharge Plan Comments: Reviewed chart. Pt from home with spouse and plan return. Pt states she does not have a [employer] company that provides TF as she buys ensure from [employer] and granddaughter helps manage her care. Pt declines hhc at this time. Follow.     Home O2 in place on admit: No

## 2021-07-20 LAB
ALBUMIN SERPL-MCNC: 3.3 G/DL (ref 3.4–5)
ANION GAP SERPL CALCULATED.3IONS-SCNC: 13 MMOL/L (ref 3–16)
BUN BLDV-MCNC: 41 MG/DL (ref 7–20)
CALCIUM SERPL-MCNC: 7.4 MG/DL (ref 8.3–10.6)
CHLORIDE BLD-SCNC: 95 MMOL/L (ref 99–110)
CO2: 20 MMOL/L (ref 21–32)
CREAT SERPL-MCNC: 3.3 MG/DL (ref 0.6–1.2)
GFR AFRICAN AMERICAN: 17
GFR NON-AFRICAN AMERICAN: 14
GLUCOSE BLD-MCNC: 106 MG/DL (ref 70–99)
GLUCOSE BLD-MCNC: 113 MG/DL (ref 70–99)
GLUCOSE BLD-MCNC: 114 MG/DL (ref 70–99)
GLUCOSE BLD-MCNC: 120 MG/DL (ref 70–99)
GLUCOSE BLD-MCNC: 95 MG/DL (ref 70–99)
GLUCOSE BLD-MCNC: 99 MG/DL (ref 70–99)
GLUCOSE BLD-MCNC: 99 MG/DL (ref 70–99)
PERFORMED ON: ABNORMAL
PERFORMED ON: NORMAL
PHOSPHORUS: 3.4 MG/DL (ref 2.5–4.9)
POTASSIUM SERPL-SCNC: 3.9 MMOL/L (ref 3.5–5.1)
SODIUM BLD-SCNC: 128 MMOL/L (ref 136–145)

## 2021-07-20 PROCEDURE — 6360000002 HC RX W HCPCS: Performed by: INTERNAL MEDICINE

## 2021-07-20 PROCEDURE — 80069 RENAL FUNCTION PANEL: CPT

## 2021-07-20 PROCEDURE — 1200000000 HC SEMI PRIVATE

## 2021-07-20 PROCEDURE — 6360000002 HC RX W HCPCS: Performed by: PHYSICIAN ASSISTANT

## 2021-07-20 PROCEDURE — 6370000000 HC RX 637 (ALT 250 FOR IP): Performed by: PHYSICIAN ASSISTANT

## 2021-07-20 PROCEDURE — 36415 COLL VENOUS BLD VENIPUNCTURE: CPT

## 2021-07-20 PROCEDURE — 6370000000 HC RX 637 (ALT 250 FOR IP): Performed by: INTERNAL MEDICINE

## 2021-07-20 PROCEDURE — 99232 SBSQ HOSP IP/OBS MODERATE 35: CPT | Performed by: INTERNAL MEDICINE

## 2021-07-20 RX ADMIN — LORAZEPAM 0.5 MG: 0.5 TABLET ORAL at 21:50

## 2021-07-20 RX ADMIN — POTASSIUM CHLORIDE AND SODIUM CHLORIDE: 900; 150 INJECTION, SOLUTION INTRAVENOUS at 21:56

## 2021-07-20 RX ADMIN — ONDANSETRON HYDROCHLORIDE 4 MG: 2 INJECTION, SOLUTION INTRAMUSCULAR; INTRAVENOUS at 00:36

## 2021-07-20 RX ADMIN — DEXAMETHASONE SODIUM PHOSPHATE 4 MG: 4 INJECTION, SOLUTION INTRAMUSCULAR; INTRAVENOUS at 13:46

## 2021-07-20 RX ADMIN — Medication 1000 UNITS: at 09:00

## 2021-07-20 RX ADMIN — DEXAMETHASONE SODIUM PHOSPHATE 4 MG: 4 INJECTION, SOLUTION INTRAMUSCULAR; INTRAVENOUS at 00:36

## 2021-07-20 RX ADMIN — HEPARIN SODIUM 5000 UNITS: 5000 INJECTION INTRAVENOUS; SUBCUTANEOUS at 05:23

## 2021-07-20 RX ADMIN — ONDANSETRON HYDROCHLORIDE 4 MG: 2 INJECTION, SOLUTION INTRAMUSCULAR; INTRAVENOUS at 11:35

## 2021-07-20 RX ADMIN — ONDANSETRON HYDROCHLORIDE 4 MG: 2 INJECTION, SOLUTION INTRAMUSCULAR; INTRAVENOUS at 18:25

## 2021-07-20 RX ADMIN — HEPARIN SODIUM 5000 UNITS: 5000 INJECTION INTRAVENOUS; SUBCUTANEOUS at 13:46

## 2021-07-20 RX ADMIN — AMLODIPINE BESYLATE 5 MG: 5 TABLET ORAL at 09:00

## 2021-07-20 RX ADMIN — HEPARIN SODIUM 5000 UNITS: 5000 INJECTION INTRAVENOUS; SUBCUTANEOUS at 22:02

## 2021-07-20 ASSESSMENT — PAIN SCALES - GENERAL
PAINLEVEL_OUTOF10: 0

## 2021-07-20 NOTE — PROGRESS NOTES
[facility] [employer]  Nephrology follow-up note           Reason for Consult:   JESSICA  Requesting Physician:  Dr. Sammi Peterson history  She has been off of tube feeds from last night due to increased nausea   creatinine 3.2-->3-->3.3  BP up    Last 24 h uop 2.7 L    ROS: No chest pain/shortness of breath/fever  PSFH: No visitor    Scheduled Meds:   Vitamin D  1,000 Units Oral Daily    amLODIPine  5 mg Oral Daily    dexamethasone  4 mg Intravenous Q12H    sodium chloride flush  5-40 mL Intravenous 2 times per day    heparin (porcine)  5,000 Units Subcutaneous 3 times per day     Continuous Infusions:   0.9% NaCl with KCl 20 mEq 125 mL/hr at 07/20/21 1345    sodium chloride       PRN Meds:.sodium chloride flush, sodium chloride, ondansetron **OR** ondansetron, acetaminophen **OR** acetaminophen, lidocaine viscous hcl, LORazepam, HYDROmorphone    History of Present Illness on 7/17/21:    80 y.o. 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, last on 7/14/21 who is admitted for jessica  Cr was normal in the past but was 2.2 on 7/14 when she received her 3rd dose of cisplatin  She has been dry heaving for the last 2 weeks as well  Has PEG t and getting TFs    Physical exam:   Constitutional:  VITALS:  BP (!) 165/72   Pulse 87   Temp 98 °F (36.7 °C) (Oral)   Resp 16   Ht 5' 4\" (1.626 m)   Wt 142 lb 8 oz (64.6 kg)   SpO2 100%   BMI 24.46 kg/m²   Gen: alert, awake, nad  HEENT: pupils reactive  Neck: no bruits or jvd noted  Cardiovascular:  S1, S2 without m/r/g; no lower extremity edema  Respiratory: CTA B without w/r/r; respiratory effort normal  Abdomen:  +bs, soft, nt, nd, no hepatosplenomegaly  Neuro/Psy: AAoriented times 3 ; moves all 4 ext    Data/  Recent Labs     07/18/21  0530 07/19/21  0525   WBC 2.6* 4.3   HGB 7.6* 7.9*   HCT 21.1* 22.1*   MCV 96.6 95.0    223     Recent Labs     07/18/21  0530 07/19/21  0525 07/20/21  0531   * 129* 128*   K 3.1* 3.6 3.9   CL 92* 95* 95*   CO2 21 22 20*   GLUCOSE 196* 124* 106*   PHOS 4.2 3.4 3.4   BUN 36* 40* 41*   CREATININE 3.2* 3.0* 3.3*   LABGLOM 14* 15* 14*   GFRAA 17* 19* 17*     Renal USG  Kidneys:       The right kidney measures 10.4 cm in length with cortical thickness of 16 mm   and the left kidney 10.3 cm in length with cortical thickness of 14 mm. Normal to mild increase in renal parenchymal echogenicity with no   hydronephrosis.  Left renal cyst is noted measuring 2.7 x 2.3 x 2.4           Bladder:       Mild bladder wall thickening, likely accentuated by incomplete distention. Ureteral jets are not evaluated.  No postvoid imaging was performed.           Impression   Normal to mild increase in renal parenchymal echogenicity with no   hydronephrosis.       Limited evaluation of the urinary bladder due to incomplete distention. UA w micro: trace blood, protein 30, small LE, 10-20 wbcs, 11-20 RE cells  U na 109 cr 30  25 OH vit D 30 , PTH 69    Assessment  -JESSICA in the setting of cisplatin and n/v. Renal USG w echogenicity but no hydro. UA was contaminated  Sample. Platelets nml    -Hyponatremia w hypovolemia and decreased solute intake    -Hypokalemia    -hypocalcemia    -Metabolic acidosis from JESSICA    -Anemia per hem/onc    -Oropharyngeal SCC s/p XRT and cisplatin, last on 7/14/21      Plan  -Increase IVF to normal saline with addition of 20 EQ of potassium chloride at 125 mL/h  -try ensure for TF  -need to control nausea w antiemetics   -laxatives per IM  -amlodipine 5 mg daily   - vit d 1000 units daily   -Serial renal panel  -daily wts and strict i/o  -renal dose medications   -avoid nephrotoxins      Thank you for the consultation. Please do not hesitate to call with questions.     Hunter uG MD  [facility]  Office: [phone]  Fax:    [phone]

## 2021-07-20 NOTE — PROGRESS NOTES
PRN IV Zofran given along with NS flush, pt also received scheduled Decadron IV. BG 95. Will continue to monitor.

## 2021-07-20 NOTE — FLOWSHEET NOTE
07/20/21 0845   Vital Signs   Temp 97.5 °F (36.4 °C)   Temp Source Oral   Pulse 72   Heart Rate Source Monitor   Resp 16   /82   BP Location Left upper arm   Patient Position Semi fowlers   Level of Consciousness Alert (0)   MEWS Score 1   Patient Currently in Pain No   Pain Assessment   Pain Assessment 0-10   Pain Level 0   Oxygen Therapy   SpO2 100 %   O2 Device None (Room air)   AM assessment completed, see flow sheet. Pt is alert and oriented. Vital signs are WNL. Respirations are even & easy. Pt. says nausea is better although she turned her tube feed off last night as she could not tolerate it. Pt denies needs at this time. SR up x 2, and bed in low position. Call light is within reach. Bedside Mobility Assessment Tool (BMAT):     Assessment Level 1- Sit and Shake    1. From a semi-reclined position, ask patient to sit up and rotate to a seated position at the side of the bed. Can use the bedrail. 2. Ask patient to reach out and grab your hand and shake making sure patient reaches across his/her midline. Pass- Patient is able to come to a seated position, maintain core strength. Maintains seated balance while reaching across midline. Move on to Assessment Level 2. Assessment Level 2- Stretch and Point   1. With patient in seated position at the side of the bed, have patient place both feet on the floor (or stool) with knees no higher than hips. 2. Ask patient to stretch one leg and straighten the knee, then bend the ankle/flex and point the toes. If appropriate, repeat with the other leg. Pass- Patient is able to demonstrate appropriate quad strength on intended weight bearing limb(s). Move onto Assessment Level 3. Assessment Level 3- Stand   1. Ask patient to elevate off the bed or chair (seated to standing) using an assistive device (cane, bedrail). 2. Patient should be able to raise buttocks off be and hold for a count of five. May repeat once.    Pass- Patient maintains standing stability for at least 5 seconds, proceed to assessment level 4. Assessment Level 4- Walk   1. Ask patient to march in place at bedside. 2. Then ask patient to advance step and return each foot. Some medical conditions may render a patient from stepping backwards, use your best clinical judgement. Pass- Patient demonstrates balance while shifting weight and ability to step, takes independent steps, does not use assistive device patient is MOBILITY LEVEL 4. Mobility Level- 4    Patient is able to demonstrate the ability to move from a reclining position to an upright position within the recliner.

## 2021-07-20 NOTE — PLAN OF CARE
Problem: Pain:  Goal: Pain level will decrease  Description: Pain level will decrease  7/19/2021 0952 by Shruti Myers RN  Outcome: Ongoing     Problem: Nausea/Vomiting:  Goal: Absence of nausea/vomiting  Description: Absence of nausea/vomiting  7/19/2021 0952 by Shruti Myers RN  Outcome: Ongoing     Problem: Falls - Risk of:  Goal: Will remain free from falls  Description: Will remain free from falls  7/19/2021 2043 by Swapna Elliott RN  Outcome: Ongoing  7/19/2021 0952 by Shruti Myers RN  Outcome: Ongoing  Goal: Absence of physical injury  Description: Absence of physical injury  Outcome: Ongoing

## 2021-07-20 NOTE — FLOWSHEET NOTE
07/20/21 1613   Encounter Summary   Services provided to: Patient   Referral/Consult From: Rounding   Continue Visiting   (7/20 Initial visit, listening)   Complexity of Encounter Low   Length of Encounter 15 minutes   Routine   Type Initial   Assessment Calm; Approachable; Hopeful   Intervention Active listening;Explored feelings, thoughts, concerns; Discussed illness/injury and it's impact   Outcome Shared life review;Engaged in conversation;Expressed gratitude

## 2021-07-20 NOTE — PROGRESS NOTES
-2640 ml       Physical Exam:  Gen: No distress. Alert. Elderly  female, moderately [place], hoarse voice  Eyes: No sclera icterus. No conjunctival injection. glasses  Neck: Trachea midline. Resp: No accessory muscle use. No crackles. No wheezes. No rhonchi. On RA  CV: Regular rate. Regular rhythm. + murmur. No rub. No edema. Peripheral Pulses: +2 palpable, equal bilaterally   GI: Soft, Non-tender. Non-distended. Normal bowel sounds. No hernia. PEG tube in place  Skin: Warm and dry. Decreased skin turgor. No rash on exposed extremities. Neuro: Awake. Grossly non-focal, moves all extremities, follows commands, no dysuria    Psych: Oriented x 4. No anxiety or agitation. Lab Data:  CBC:   Recent Labs     07/18/21  0530 07/19/21  0525   WBC 2.6* 4.3   RBC 2.18* 2.33*   HGB 7.6* 7.9*   HCT 21.1* 22.1*   MCV 96.6 95.0   RDW 13.2 12.9    223     BMP:   Recent Labs     07/18/21  0530 07/19/21  0525 07/20/21  0531   * 129* 128*   K 3.1* 3.6 3.9   CL 92* 95* 95*   CO2 21 22 20*   PHOS 4.2 3.4 3.4   BUN 36* 40* 41*   CREATININE 3.2* 3.0* 3.3*     BNP: No results for input(s): BNP in the last 72 hours. PT/INR: No results for input(s): PROTIME, INR in the last 72 hours. APTT:No results for input(s): APTT in the last 72 hours. CARDIAC ENZYMES: No results for input(s): CKMB, CKMBINDEX, TROPONINI in the last 72 hours. Invalid input(s): CKTOTAL;3  FASTING LIPID PANEL:  Lab Results   Component Value Date    CHOL 177 03/10/2021    HDL 54 03/10/2021    TRIG 109 03/10/2021     LIVER PROFILE:   No results for input(s): AST, ALT, ALB, BILIDIR, BILITOT, ALKPHOS in the last 72 hours. Radiology  US RETROPERITONEAL COMPLETE   Final Result   Normal to mild increase in renal parenchymal echogenicity with no   hydronephrosis. Limited evaluation of the urinary bladder due to incomplete distention.                  Assessment & Plan:       JESSICA  - recently treated with Cisplatin, now with elevated Cr 2.9 and BUN 33, previously 0.6 in 4/2021  - etiology: ATN, pre-renal with cisplatin use, nausea, vomiting and poor PO intake  - Admitted from Dr. Mata Strickland office to Med Surg  - avoid nephrotoxic meds, check renal U/S  - IVF, hold Lisinopril, strict I&Os  - nephrology consult  -Renal US negative  - monitor BMP ; crtn 2.9-> 3.2-> 3.0 -> 3.3 now. Cont IVF     Hyponatremia  - 125 on arrival  - has been low previously in 120s, most recently 131 in 4/2021  - IVF, monitor BMP. Na 125-> 131-> 126-> 129 -> 128  - nephro consulted    Hypokalemia  - Replace potassium. Hypocalcemia  - Being replaced      Squamous Cell Carcinoma of the Oropharynx  - f/w Dr. Sho Jurado with [facility] and Dr. Amaury Cason with ENT  - s/p Cisplatin & 32/27 rounds of radiation therapy  - cont PRN Xylocaine  -Dexamethasone started by oncology for nausea      Moderate protein calorie malnutrition   s/p PEG tube  - dietitian consult for TF    Persistent nausea  -Likely secondary to cisplatin therapy  -Continue symptomatic management with Zofran as needed. Decadron started  -Patient also feels nauseous with the tube feeding she is receiving in the hospital [facility]. We will start continuous tube feedings. Start Ensure bolus feeds per G-tube and monitor symptoms. She is not taking any p.o. diet     Crohn's Disease  - no acute flare     HTN  - stable  - cont Norvasc, hold Lisinopril     GERD  - cont Nexium     HLD  - cont Lipitor     Hx of CVA  - cont ASA and Lipitor     Chronic Anxiety  - cont Buspar, PRN ativan     Former Smoker  - quit in May     DVT Prophylaxis: SQ heparin  ADULT DIET;  Regular  Adult Oral Nutrition Supplement; Standard High Calorie/High Protein Oral Supplement; HS, 2PM    Code Status: Full Code    Lisa Ortega MD  7/20/2021

## 2021-07-20 NOTE — PROGRESS NOTES
Pt. Was able to tolerate half of an ensure through her G-tube before feeling sick. Flushed with 100mL of water. Will attempt again later.

## 2021-07-20 NOTE — PLAN OF CARE
Problem: Pain:  Goal: Pain level will decrease  Description: Pain level will decrease  Outcome: Ongoing     Problem: Nausea/Vomiting:  Goal: Absence of nausea/vomiting  Description: Absence of nausea/vomiting  Outcome: Ongoing     Problem: Falls - Risk of:  Goal: Will remain free from falls  Description: Will remain free from falls  7/19/2021 2043 by Faiza Perez RN  Outcome: Ongoing  Goal: Absence of physical injury  Description: Absence of physical injury  7/20/2021 0936 by Raoul Gomez RN  Outcome: Ongoing  7/19/2021 2043 by Faiza Perez RN  Outcome: Ongoing

## 2021-07-20 NOTE — PROGRESS NOTES
Pt sitting up in bed @ 30 degrees watching TV, shift assessment complete, all meds given per MAR. PRN Ativan given via PEG tube. Pt c/o nausea this evening. Pt denies any pain at this time. Pt informed nausea medication was already given and nurse would bring in medication when its available. Pt asked for tube feed to be turned off. IV flushed and in good working condition. Pt is A&O x 4, VSS. Vitals:    07/19/21 1924   BP: (!) 153/69   Pulse: 71   Resp: 16   Temp: 98.4 °F (36.9 °C)   SpO2: 97%     Bed in lowest position and call light within reach with all personal belongings. Pt denies any further needs at this time. Will continue to monitor and assess.

## 2021-07-20 NOTE — ONCOLOGY
ONCOLOGY HEMATOLOGY CARE PROGRESS NOTE      SUBJECTIVE:     She feels better today. She reports just feeling \"blah\". No other complaints or concerns. Creat seems to have plateaued. ROS:   The remaining 10 point review of symptoms is unremarkable. OBJECTIVE        Physical    VITALS:  /82   Pulse 72   Temp 97.5 °F (36.4 °C) (Oral)   Resp 16   Ht 5' 4\" (1.626 m)   Wt 142 lb 8 oz (64.6 kg)   SpO2 100%   BMI 24.46 kg/m²   TEMPERATURE:  Current - Temp: 97.5 °F (36.4 °C); Max - Temp  Av °F (36.7 °C)  Min: 97.5 °F (36.4 °C)  Max: 98.4 °F (36.9 °C)  PULSE OXIMETRY RANGE: SpO2  Av %  Min: 92 %  Max: 100 %  24HR INTAKE/OUTPUT:      Intake/Output Summary (Last 24 hours) at 2021 1048  Last data filed at 2021 0400  Gross per 24 hour   Intake 1541.75 ml   Output 2300 ml   Net -758.25 ml       CONSTITUTIONAL:  awake, alert, cooperative, no apparent distress, HEENT oral pharynx , no scleral icterus  HEMATOLOGIC/LYMPHATICS:  no cervical lymphadenopathy, no supraclavicular lymphadenopathy, no axillary lymphadenopathy and no inguinal lymphadenopathy  LUNGS:  No increased work of breathing, good air exchange, clear to auscultation bilaterally, no crackles or wheezing  CARDIOVASCULAR:  , regular rate and rhythm, normal S1 and S2, no S3 or S4, and no murmur noted  ABDOMEN:  No scars, normal bowel sounds, soft, non-distended, non-tender, no masses palpated, no hepatosplenomegally  MUSCULOSKELETAL:  There is no redness, warmth, or swelling of the joints. EXTREMETIES: No clubbing cynosis or edema  NEUROLOGIC:  Awake, alert, oriented to name, place and time. Cranial nerves II-XII are grossly intact. Motor is 5 out of 5 bilaterally.    SKIN:  no bruising or bleeding      Data      Recent Labs     21  0530 21  0525   WBC 2.6* 4.3   HGB 7.6* 7.9*   HCT 21.1* 22.1*    223   MCV 96.6 95.0        Recent Labs     21  0530 21  0525

## 2021-07-21 LAB
ALBUMIN SERPL-MCNC: 3.2 G/DL (ref 3.4–5)
ANION GAP SERPL CALCULATED.3IONS-SCNC: 13 MMOL/L (ref 3–16)
BUN BLDV-MCNC: 46 MG/DL (ref 7–20)
CALCIUM SERPL-MCNC: 6.8 MG/DL (ref 8.3–10.6)
CHLORIDE BLD-SCNC: 99 MMOL/L (ref 99–110)
CO2: 16 MMOL/L (ref 21–32)
CREAT SERPL-MCNC: 3.2 MG/DL (ref 0.6–1.2)
GFR AFRICAN AMERICAN: 17
GFR NON-AFRICAN AMERICAN: 14
GLUCOSE BLD-MCNC: 108 MG/DL (ref 70–99)
GLUCOSE BLD-MCNC: 109 MG/DL (ref 70–99)
GLUCOSE BLD-MCNC: 109 MG/DL (ref 70–99)
GLUCOSE BLD-MCNC: 110 MG/DL (ref 70–99)
GLUCOSE BLD-MCNC: 116 MG/DL (ref 70–99)
GLUCOSE BLD-MCNC: 117 MG/DL (ref 70–99)
GLUCOSE BLD-MCNC: 98 MG/DL (ref 70–99)
PERFORMED ON: ABNORMAL
PERFORMED ON: NORMAL
PHOSPHORUS: 3.2 MG/DL (ref 2.5–4.9)
POTASSIUM SERPL-SCNC: 3.8 MMOL/L (ref 3.5–5.1)
SODIUM BLD-SCNC: 128 MMOL/L (ref 136–145)

## 2021-07-21 PROCEDURE — 99232 SBSQ HOSP IP/OBS MODERATE 35: CPT | Performed by: INTERNAL MEDICINE

## 2021-07-21 PROCEDURE — 6370000000 HC RX 637 (ALT 250 FOR IP): Performed by: INTERNAL MEDICINE

## 2021-07-21 PROCEDURE — 6360000002 HC RX W HCPCS: Performed by: INTERNAL MEDICINE

## 2021-07-21 PROCEDURE — 80069 RENAL FUNCTION PANEL: CPT

## 2021-07-21 PROCEDURE — 2580000003 HC RX 258: Performed by: INTERNAL MEDICINE

## 2021-07-21 PROCEDURE — 36415 COLL VENOUS BLD VENIPUNCTURE: CPT

## 2021-07-21 PROCEDURE — 2500000003 HC RX 250 WO HCPCS: Performed by: INTERNAL MEDICINE

## 2021-07-21 PROCEDURE — 2580000003 HC RX 258: Performed by: PHYSICIAN ASSISTANT

## 2021-07-21 PROCEDURE — 6360000002 HC RX W HCPCS: Performed by: PHYSICIAN ASSISTANT

## 2021-07-21 PROCEDURE — 6370000000 HC RX 637 (ALT 250 FOR IP): Performed by: PHYSICIAN ASSISTANT

## 2021-07-21 PROCEDURE — 1200000000 HC SEMI PRIVATE

## 2021-07-21 RX ADMIN — Medication 10 ML: at 08:03

## 2021-07-21 RX ADMIN — CALCIUM GLUCONATE 4000 MG: 98 INJECTION, SOLUTION INTRAVENOUS at 10:29

## 2021-07-21 RX ADMIN — HEPARIN SODIUM 5000 UNITS: 5000 INJECTION INTRAVENOUS; SUBCUTANEOUS at 15:07

## 2021-07-21 RX ADMIN — POTASSIUM CHLORIDE AND SODIUM CHLORIDE: 900; 150 INJECTION, SOLUTION INTRAVENOUS at 05:20

## 2021-07-21 RX ADMIN — ONDANSETRON HYDROCHLORIDE 4 MG: 2 INJECTION, SOLUTION INTRAMUSCULAR; INTRAVENOUS at 00:44

## 2021-07-21 RX ADMIN — POTASSIUM CHLORIDE AND SODIUM CHLORIDE: 900; 150 INJECTION, SOLUTION INTRAVENOUS at 23:55

## 2021-07-21 RX ADMIN — SODIUM BICARBONATE 50 MEQ: 84 INJECTION INTRAVENOUS at 12:39

## 2021-07-21 RX ADMIN — HEPARIN SODIUM 5000 UNITS: 5000 INJECTION INTRAVENOUS; SUBCUTANEOUS at 05:21

## 2021-07-21 RX ADMIN — HEPARIN SODIUM 5000 UNITS: 5000 INJECTION INTRAVENOUS; SUBCUTANEOUS at 22:07

## 2021-07-21 RX ADMIN — LORAZEPAM 0.5 MG: 0.5 TABLET ORAL at 22:15

## 2021-07-21 RX ADMIN — ONDANSETRON HYDROCHLORIDE 4 MG: 2 INJECTION, SOLUTION INTRAMUSCULAR; INTRAVENOUS at 16:12

## 2021-07-21 RX ADMIN — AMLODIPINE BESYLATE 5 MG: 5 TABLET ORAL at 08:02

## 2021-07-21 RX ADMIN — Medication 1000 UNITS: at 08:03

## 2021-07-21 RX ADMIN — DEXAMETHASONE SODIUM PHOSPHATE 4 MG: 4 INJECTION, SOLUTION INTRAMUSCULAR; INTRAVENOUS at 12:40

## 2021-07-21 RX ADMIN — ONDANSETRON HYDROCHLORIDE 4 MG: 2 INJECTION, SOLUTION INTRAMUSCULAR; INTRAVENOUS at 22:07

## 2021-07-21 RX ADMIN — DEXAMETHASONE SODIUM PHOSPHATE 4 MG: 4 INJECTION, SOLUTION INTRAMUSCULAR; INTRAVENOUS at 00:46

## 2021-07-21 RX ADMIN — POTASSIUM CHLORIDE AND SODIUM CHLORIDE: 900; 150 INJECTION, SOLUTION INTRAVENOUS at 15:04

## 2021-07-21 RX ADMIN — DEXAMETHASONE SODIUM PHOSPHATE 4 MG: 4 INJECTION, SOLUTION INTRAMUSCULAR; INTRAVENOUS at 23:56

## 2021-07-21 RX ADMIN — ONDANSETRON HYDROCHLORIDE 4 MG: 2 INJECTION, SOLUTION INTRAMUSCULAR; INTRAVENOUS at 08:02

## 2021-07-21 RX ADMIN — Medication 10 ML: at 22:07

## 2021-07-21 ASSESSMENT — PAIN SCALES - GENERAL
PAINLEVEL_OUTOF10: 0
PAINLEVEL_OUTOF10: 0

## 2021-07-21 NOTE — PLAN OF CARE
Nutrition Problem #1: Inadequate oral intake  Intervention: Food and/or Nutrient Delivery: Continue Current Diet, Modify Oral Nutrition Supplement, Modify Tube Feeding  Nutritional Goals: pt will tolerate a 4oz bolus of ensure compact 6 x per day w/o nausea and weight will remains stable

## 2021-07-21 NOTE — PROGRESS NOTES
Shift assessment completed per flowsheets:    Alert and Oriented x self, location, day, situation. Vitals are   Vitals:    07/20/21 1926   BP: (!) 139/55   Pulse: 68   Resp: 16   Temp: 97 °F (36.1 °C)   SpO2: 99%    Respiratory status is regular, even, unlabored, and SpO2 is 99% on room air. Denies pain. SR up 2/4. Bed in lowest position. Call light within reach. Bed alarm is on. PEG in place. Denies additional need at time of assessment.  Iban Morales RN

## 2021-07-21 NOTE — PROGRESS NOTES
End of shift report given; care transferred to oncoming RN [facility]. Bedside handoff given- pt aware of change in nurses, whiteboard updated. IVF infusing per MAR. Call light within reach.  Anjana Franco, ELIF

## 2021-07-21 NOTE — PROGRESS NOTES
[facility]  [facility]           Reason for Consult:   JESSICA  Requesting Physician:  Dr. Ju Simon history  She has been off of tube feeds from last night due to increased nausea   creatinine 3.2-->3-->3.3--> 3.2  Tolerated ensure    Last 24 h uop1.1L    ROS: No chest pain/shortness of breath/fever  PSFH: No visitor    Scheduled Meds:   sodium bicarbonate  50 mEq Intravenous Once    Vitamin D  1,000 Units Oral Daily    amLODIPine  5 mg Oral Daily    dexamethasone  4 mg Intravenous Q12H    sodium chloride flush  5-40 mL Intravenous 2 times per day    heparin (porcine)  5,000 Units Subcutaneous 3 times per day     Continuous Infusions:   0.9% NaCl with KCl 20 mEq 125 mL/hr at 07/21/21 0614    sodium chloride       PRN Meds:.sodium chloride flush, sodium chloride, ondansetron **OR** ondansetron, acetaminophen **OR** acetaminophen, lidocaine viscous hcl, LORazepam, HYDROmorphone    History of Present Illness on 7/17/21:    80 y.o. 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, last on 7/14/21 who is admitted for jessica  Cr was normal in the past but was 2.2 on 7/14 when she received her 3rd dose of cisplatin  She has been dry heaving for the last 2 weeks as well  Has PEG t and getting TFs    Physical exam:   Constitutional:  VITALS:  /63   Pulse 65   Temp 97.5 °F (36.4 °C) (Oral)   Resp 16   Ht 5' 4\" (1.626 m)   Wt 143 lb 14.4 oz (65.3 kg)   SpO2 100%   BMI 24.70 kg/m²   Gen: alert, awake, nad  HEENT: pupils reactive  Neck: no bruits or jvd noted  Cardiovascular:  S1, S2 without m/r/g; no lower extremity edema  Respiratory: CTA B without w/r/r; respiratory effort normal  Abdomen:  +bs, soft, nt, nd, no hepatosplenomegaly  Neuro/Psy: AAoriented times 3 ; moves all 4 ext    Data/  Recent Labs     07/19/21  0525   WBC 4.3   HGB 7.9*   HCT 22.1*   MCV 95.0        Recent Labs     07/19/21  0525 07/20/21  0531 07/21/21  0549   NA

## 2021-07-21 NOTE — PROGRESS NOTES
Pt. Refused nighttime supplement per PEG tube. Agreed to flush with medications per [facility]. Pt. Voiced, \"I don't eat a lot\".  Anjana Franco RN

## 2021-07-21 NOTE — PLAN OF CARE
Problem: Pain:  Goal: Pain level will decrease  Description: Pain level will decrease  7/20/2021 2123 by Zaid Scott RN  Outcome: Ongoing  7/20/2021 0936 by Romy Singh RN  Outcome: Ongoing  Goal: Control of acute pain  Description: Control of acute pain  Outcome: Ongoing  Goal: Control of chronic pain  Description: Control of chronic pain  Outcome: Ongoing     Problem: Nausea/Vomiting:  Goal: Absence of nausea/vomiting  Description: Absence of nausea/vomiting  7/20/2021 2123 by Zaid Scott RN  Outcome: Ongoing  7/20/2021 0936 by Romy Singh RN  Outcome: Ongoing  Goal: Able to drink  Description: Able to drink  Outcome: Ongoing  Goal: Able to eat  Description: Able to eat  Outcome: Ongoing  Goal: Ability to achieve adequate nutritional intake will improve  Description: Ability to achieve adequate nutritional intake will improve  Outcome: Ongoing     Problem: Falls - Risk of:  Goal: Will remain free from falls  Description: Will remain free from falls  Outcome: Ongoing  Goal: Absence of physical injury  Description: Absence of physical injury  7/20/2021 2123 by Zaid Scott RN  Outcome: Ongoing  7/20/2021 0936 by Romy Singh RN  Outcome: Ongoing     Problem: Nutrition  Goal: Optimal nutrition therapy  Outcome: Ongoing  Goal: Understanding of nutritional guidelines  Outcome: Ongoing   Zaid Scott RN

## 2021-07-21 NOTE — CARE COORDINATION
INTERDISCIPLINARY PLAN OF CARE CONFERENCE    Date/Time: 7/21/2021 2:12 PM  Completed by: Wes Lieberman RN, Case Management      Patient Name:  Alberto Bull  YOB: 1950  Admitting Diagnosis: Acute kidney injury [facility]) [N17.9]     Admit Date/Time:  7/16/2021  1:33 PM    Chart reviewed. Interdisciplinary team contacted or reviewed plan related to patient progress and discharge plans. Disciplines included Case Management, Nursing, and Dietitian. Current Status: Stable  PT/OT recommendation for discharge plan of care: N/A    Expected D/C Disposition:  Home  Confirmed plan with patient  Yes    Met with: patient  Discharge Plan Comments:  Reviewed chart and attempted to meet with pt who is sleeping. Will cont with plan for return home. Will follow for dc needs that may arise.      Home O2 in place on admit: No  Pt informed of need to bring portable home O2 tank on day of discharge for nursing to connect prior to leaving:  Not Indicated  Verbalized agreement/Understanding:  Not Indicated

## 2021-07-21 NOTE — PROGRESS NOTES
Admit: 2021    Name:  Bruce Paz  Room:  0224/0224-01  MRN:    [id number]     Daily Progress Note for 2021       Interval History:     A 42-year-old female with a history of squamous cell carcinoma of the oropharynx , admitted directly from oncologist office for acute kidney injury. Patient has been receiving cisplatin for chemotherapy. Has developed severe nausea and poor intake. Patient has PEG tube in place, on tube feeds. Patient describes persistent nausea from the tube feeds. She has been getting Jevity here. Tube feeds are running at goal rate with no residuals, however patient complains of persistent sensation of nausea with this. She states she takes Ensure at home and she tolerates this better. Renal function not improved. Creatinine is up to 3.3 today. Continued on IV fluids. On Zofran and Decadron due to nausea. Seen by nephrology and oncology.   Patient feels a little better today , her tube feedings were switched to Ensure bolus feeds 4 times daily and she tolerated this better. creatinine is improved down to 3.2 today    Scheduled Meds:   sodium bicarbonate  50 mEq Intravenous Once    Vitamin D  1,000 Units Oral Daily    amLODIPine  5 mg Oral Daily    dexamethasone  4 mg Intravenous Q12H    sodium chloride flush  5-40 mL Intravenous 2 times per day    heparin (porcine)  5,000 Units Subcutaneous 3 times per day       Continuous Infusions:   0.9% NaCl with KCl 20 mEq 125 mL/hr at 21 0614    sodium chloride         PRN Meds:  sodium chloride flush, sodium chloride, ondansetron **OR** ondansetron, acetaminophen **OR** acetaminophen, lidocaine viscous hcl, LORazepam, HYDROmorphone            Objective:     Temp  Av.5 °F (36.4 °C)  Min: 97 °F (36.1 °C)  Max: 98 °F (36.7 °C)  Pulse  Av.5  Min: 65  Max: 87  BP  Min: 135/63  Max: 165/72  SpO2  Av.8 %  Min: 99 %  Max: 100 %  No data found.       Intake/Output Summary (Last Assessment & Plan:       JESSICA  - recently treated with Cisplatin, now with elevated Cr 2.9 and BUN 33, previously 0.6 in 4/2021  - etiology: ATN, pre-renal with cisplatin use, nausea, vomiting and poor PO intake  - Admitted from Dr. Keyona Nur office to Med Surg  - avoid nephrotoxic meds, check renal U/S  - IVF, hold Lisinopril, strict I&Os  - nephrology consult  -Renal US negative  - monitor BMP ; crtn 2.9-> 3.2-> 3.0 -> 3.3-> 3.2 now. Cont IVF     Hyponatremia  - 125 on arrival  - has been low previously in 120s, most recently 131 in 4/2021  - IVF, monitor BMP. Na 125-> 131-> 126-> 129 -> 128  - nephro consulted    Hypokalemia  Hypocalcemia  - replaced      Squamous Cell Carcinoma of the Oropharynx  - f/w Dr. Rebel Armas with [facility] and Dr. Elinor Morley with ENT  - s/p Cisplatin & 32/27 rounds of radiation therapy  - cont PRN Xylocaine  -Dexamethasone started by oncology for nausea      Moderate protein calorie malnutrition   s/p PEG tube  - dietitian consult for TF    Persistent nausea  -Likely secondary to cisplatin therapy  -Continue symptomatic management with Zofran as needed. Decadron started  -Patient also feels nauseous with the tube feeding she is receiving in the hospital Jevity. We will start continuous tube feedings. Started Ensure bolus feeds per G-tube and monitor symptoms.-> tolerating better  She is not taking any p.o. diet     Crohn's Disease  - no acute flare     HTN  - stable  - cont Norvasc, hold Lisinopril     GERD  - cont Nexium     HLD  - cont Lipitor     Hx of CVA  - cont ASA and Lipitor     Chronic Anxiety  - cont Buspar, PRN ativan     Former Smoker  - quit in May     DVT Prophylaxis: SQ heparin  ADULT DIET;  Regular  Adult Oral Nutrition Supplement; Standard High Calorie/High Protein Oral Supplement; HS, 2PM    Code Status: Full Code    Celina Givens MD  7/21/2021

## 2021-07-21 NOTE — PROGRESS NOTES
Pt resting in bed with eyes closed, quiet. Respirations even, unlabored. No visible signs/symptoms of distress/ discomfort at this time. Call light within reach.   Nelida Ibarra RN

## 2021-07-21 NOTE — PROGRESS NOTES
Comprehensive Nutrition Assessment    Type and Reason for Visit:  Reassess    Nutrition Recommendations/Plan:   1. Continue the Regular diet  2. D/C ensure Enlive 6 x per day  3. Initiate Ensure Compact  6x per day to be bloused per peg tube or consumed by mouth. Nutrition Assessment:  patient has declined nutritionally and is currently mildly malnourished AEB > 9% wt loss in < 3 months d/t new dx of  d/t squamous cell carcinoma of pharynx which has caused a decreased PO intake r/t N/V r/t chemo + radiation  she is at risk for further compromise d/t need for EN as sole source of nutrition and currently unable to tolerate a continues feeding nor the 8 oz boluses; will continue regular diet selection and begin ensure compact 4oz per G tube 6 x daily     Malnutrition Assessment:  Malnutrition Status:  Mild malnutrition    Context:  Acute Illness     Findings of the 6 clinical characteristics of malnutrition:  Energy Intake:  Mild decrease in energy intake (Comment)  Weight Loss:  7 - Greater than 7.5% over 3 months     Body Fat Loss:  1 - Mild body fat loss Orbital   Muscle Mass Loss:  1 - Mild muscle mass loss Clavicles (pectoralis & deltoids)  Fluid Accumulation:  No significant fluid accumulation Extremities   Strength:  Not Performed    Estimated Daily Nutrient Needs:  Energy (kcal):  3457-3127 based ~ 25-30 kcal/kg ibw; Weight Used for Energy Requirements:  Current     Protein (g):  79-93 g protein based on 1.2-1.4 g/kg/CBW;  Weight Used for Protein Requirements:  Ideal        Fluid (ml/day):  9946-3116; Method Used for Fluid Requirements:  1 ml/kcal      Nutrition Related Findings:  pt was awake and sitting up in her bed withhusband at her bedside; reports that she did not tolerat the Jevity continous feed and feels better since it stopped; PEG tube placed 3 months ago at the time of dx with cancer; she takes~ 3 bottles of ensure plus per day bolus fed and eats smallo amounts and prior to recent Chemo she has been tolerating; weight loss ~ 15# in 3 months since admission; Wounds:  None       Current Nutrition Therapies:    ADULT DIET; Regular  Adult Oral Nutrition Supplement; Standard High Calorie/High Protein Oral Supplement; HS, 2PM    Anthropometric Measures:  · Height: 5' 4\" (162.6 cm)  · Current Body Weight: 143 lb 14.4 oz (65.3 kg)   · Admission Body Weight: 142 lb 11.2 oz (64.7 kg) (obtained 7/17/21; actual)    · Usual Body Weight: 150 lb 6.4 oz (68.2 kg) (obtained 4/13/21 per past encounters)     · Ideal Body Weight: 120 lbs; % Ideal Body Weight 118.9 %   · BMI: 24.7  · Adjusted Body Weight:  ; No Adjustment   ·   · BMI Categories: Normal Weight (BMI 18.5-24. 9)       Nutrition Diagnosis:   · Inadequate oral intake related to inadequate protein-energy intake, catabolic illness, altered GI function, altered GI structure, psychological cause or life stress, increase demand for energy/nutrients, renal dysfunction, swallowing difficulty, pain as evidenced by nutrition support - enteral nutrition, poor intake prior to admission, weight loss, lab values, GI abnormality, nausea, vomiting, other (comment) (squamous cell carcinmoa of pharynx, currently receiving chemo and radiation, worsened depression and anxiety d/t life stresses)      Nutrition Interventions:   Food and/or Nutrient Delivery:  Continue Current Diet, Modify Oral Nutrition Supplement, Modify Tube Feeding  Nutrition Education/Counseling:  Education completed (TF boluses)   Coordination of Nutrition Care:  Continue to monitor while inpatient    Goals:  pt will tolerate a 4oz bolus of ensure compact 6 x per day w/o nausea and weight will remains stable       Nutrition Monitoring and Evaluation:   Behavioral-Environmental Outcomes:  None Identified   Food/Nutrient Intake Outcomes:  Food and Nutrient Intake, Supplement Intake, Enteral Nutrition Intake/Tolerance  Physical Signs/Symptoms Outcomes:  Biochemical Data, Nutrition Focused Physical

## 2021-07-21 NOTE — ONCOLOGY
ONCOLOGY HEMATOLOGY CARE PROGRESS NOTE      SUBJECTIVE:     Remains admitted. Afebrile. Cr 3.2.    ROS:   The remaining 10 point review of symptoms is unremarkable. OBJECTIVE        Physical    VITALS:  /63   Pulse 65   Temp 97.5 °F (36.4 °C) (Oral)   Resp 16   Ht 5' 4\" (1.626 m)   Wt 143 lb 14.4 oz (65.3 kg)   SpO2 100%   BMI 24.70 kg/m²   TEMPERATURE:  Current - Temp: 97.5 °F (36.4 °C); Max - Temp  Av.5 °F (36.4 °C)  Min: 97 °F (36.1 °C)  Max: 98 °F (36.7 °C)  PULSE OXIMETRY RANGE: SpO2  Av.8 %  Min: 99 %  Max: 100 %  24HR INTAKE/OUTPUT:      Intake/Output Summary (Last 24 hours) at 2021 0820  Last data filed at 2021 4591  Gross per 24 hour   Intake 3598.69 ml   Output 1100 ml   Net 2498.69 ml       CONSTITUTIONAL:  awake, alert, cooperative, no apparent distress, HEENT oral pharynx , no scleral icterus  HEMATOLOGIC/LYMPHATICS:  no cervical lymphadenopathy, no supraclavicular lymphadenopathy, no axillary lymphadenopathy and no inguinal lymphadenopathy  LUNGS:  No increased work of breathing, good air exchange, clear to auscultation bilaterally, no crackles or wheezing  CARDIOVASCULAR:  , regular rate and rhythm, normal S1 and S2, no S3 or S4, and no murmur noted  ABDOMEN:  No scars, normal bowel sounds, soft, non-distended, non-tender, no masses palpated, no hepatosplenomegally  MUSCULOSKELETAL:  There is no redness, warmth, or swelling of the joints. EXTREMETIES: No clubbing cynosis or edema  NEUROLOGIC:  Awake, alert, oriented to name, place and time. Cranial nerves II-XII are grossly intact. Motor is 5 out of 5 bilaterally.    SKIN:  no bruising or bleeding      Data      Recent Labs     21  0525   WBC 4.3   HGB 7.9*   HCT 22.1*      MCV 95.0        Recent Labs     21  0525 21  0531 21  0549   * 128* 128*   K 3.6 3.9 3.8   CL 95* 95* 99   CO2 22 20* 16*   PHOS 3.4 3.4 3.2   BUN 40* 41* 46*   CREATININE 3. 0* 3.3* 3.2*     No results for input(s): AST, ALT, ALB, BILIDIR, BILITOT, ALKPHOS in the last 72 hours.     Magnesium:    Lab Results   Component Value Date    MG 2.00 07/17/2021    MG 2.00 03/04/2020    MG 2.00 10/07/2019         Problem List  Patient Active Problem List   Diagnosis    Chronic anxiety    [facility] disease without complication [facility] [address]    Nicotine dependence    Aortic insufficiency    Essential hypertension    Hyperlipidemia    Cigarette nicotine dependence without complication    Smoking    Mixed incontinence urge and stress    Localized swelling of both lower legs    ASCVD (arteriosclerotic cardiovascular disease)    Acute kidney injury ([address]    Squamous cell carcinoma of pharynx (HCC)    Hyponatremia    Dysphagia    Nausea       ASSESSMENT AND PLAN    Acute kidney injury:  -Likely secondary to volume depletion, made worse by cis-platinum  -Nephrology has been consulted  -IVF and avoid nephrotoxins.  -Serum creat with slight improvement at 3.2 today     Hyponatremia:  -suspect this is more from volume rather than SIADH  -This is fluctuating, but she is still slightly low today at 128- renal team to manage.      Squamous cell carcinoma oropharynx  -Status post 3 cycles of cisplatin along with current radiation  -Cisplatin is given 100 mg/m² every 3 weeks  -encouraged needs to complete RT when improved     Nutrition:  -The patient has a PEG     Crohn's disease:  -Appears to be stable     Hypertension:  -Managed per the hospitalist     Nausea  -Has Ondansetron ordered  - Dex   -This appears better with the tube feeding off    ONCOLOGIC DISPOSITION:    -Per nephrology    July Santiago MD

## 2021-07-22 LAB
ALBUMIN SERPL-MCNC: 3 G/DL (ref 3.4–5)
ANION GAP SERPL CALCULATED.3IONS-SCNC: 10 MMOL/L (ref 3–16)
BUN BLDV-MCNC: 45 MG/DL (ref 7–20)
CALCIUM SERPL-MCNC: 7.5 MG/DL (ref 8.3–10.6)
CHLORIDE BLD-SCNC: 101 MMOL/L (ref 99–110)
CO2: 18 MMOL/L (ref 21–32)
CREAT SERPL-MCNC: 2.9 MG/DL (ref 0.6–1.2)
GFR AFRICAN AMERICAN: 19
GFR NON-AFRICAN AMERICAN: 16
GLUCOSE BLD-MCNC: 105 MG/DL (ref 70–99)
GLUCOSE BLD-MCNC: 106 MG/DL (ref 70–99)
GLUCOSE BLD-MCNC: 107 MG/DL (ref 70–99)
GLUCOSE BLD-MCNC: 113 MG/DL (ref 70–99)
GLUCOSE BLD-MCNC: 125 MG/DL (ref 70–99)
GLUCOSE BLD-MCNC: 133 MG/DL (ref 70–99)
GLUCOSE BLD-MCNC: 144 MG/DL (ref 70–99)
PERFORMED ON: ABNORMAL
PHOSPHORUS: 3.3 MG/DL (ref 2.5–4.9)
POTASSIUM SERPL-SCNC: 3.7 MMOL/L (ref 3.5–5.1)
SODIUM BLD-SCNC: 129 MMOL/L (ref 136–145)

## 2021-07-22 PROCEDURE — 80069 RENAL FUNCTION PANEL: CPT

## 2021-07-22 PROCEDURE — 6360000002 HC RX W HCPCS: Performed by: INTERNAL MEDICINE

## 2021-07-22 PROCEDURE — 6360000002 HC RX W HCPCS: Performed by: PHYSICIAN ASSISTANT

## 2021-07-22 PROCEDURE — 2580000003 HC RX 258: Performed by: PHYSICIAN ASSISTANT

## 2021-07-22 PROCEDURE — 36415 COLL VENOUS BLD VENIPUNCTURE: CPT

## 2021-07-22 PROCEDURE — 6370000000 HC RX 637 (ALT 250 FOR IP): Performed by: INTERNAL MEDICINE

## 2021-07-22 PROCEDURE — 1200000000 HC SEMI PRIVATE

## 2021-07-22 PROCEDURE — 99232 SBSQ HOSP IP/OBS MODERATE 35: CPT | Performed by: INTERNAL MEDICINE

## 2021-07-22 RX ADMIN — POTASSIUM CHLORIDE AND SODIUM CHLORIDE: 900; 150 INJECTION, SOLUTION INTRAVENOUS at 07:11

## 2021-07-22 RX ADMIN — DEXAMETHASONE SODIUM PHOSPHATE 4 MG: 4 INJECTION, SOLUTION INTRAMUSCULAR; INTRAVENOUS at 13:46

## 2021-07-22 RX ADMIN — HEPARIN SODIUM 5000 UNITS: 5000 INJECTION INTRAVENOUS; SUBCUTANEOUS at 20:50

## 2021-07-22 RX ADMIN — AMLODIPINE BESYLATE 5 MG: 5 TABLET ORAL at 08:58

## 2021-07-22 RX ADMIN — Medication 10 ML: at 08:59

## 2021-07-22 RX ADMIN — ONDANSETRON HYDROCHLORIDE 4 MG: 2 INJECTION, SOLUTION INTRAMUSCULAR; INTRAVENOUS at 11:51

## 2021-07-22 RX ADMIN — DEXAMETHASONE SODIUM PHOSPHATE 4 MG: 4 INJECTION, SOLUTION INTRAMUSCULAR; INTRAVENOUS at 23:43

## 2021-07-22 RX ADMIN — Medication 10 ML: at 20:50

## 2021-07-22 RX ADMIN — ONDANSETRON HYDROCHLORIDE 4 MG: 2 INJECTION, SOLUTION INTRAMUSCULAR; INTRAVENOUS at 04:23

## 2021-07-22 RX ADMIN — Medication 1000 UNITS: at 08:58

## 2021-07-22 RX ADMIN — ONDANSETRON HYDROCHLORIDE 4 MG: 2 INJECTION, SOLUTION INTRAMUSCULAR; INTRAVENOUS at 18:26

## 2021-07-22 RX ADMIN — HEPARIN SODIUM 5000 UNITS: 5000 INJECTION INTRAVENOUS; SUBCUTANEOUS at 13:46

## 2021-07-22 RX ADMIN — HEPARIN SODIUM 5000 UNITS: 5000 INJECTION INTRAVENOUS; SUBCUTANEOUS at 04:23

## 2021-07-22 ASSESSMENT — PAIN SCALES - GENERAL: PAINLEVEL_OUTOF10: 0

## 2021-07-22 NOTE — ONCOLOGY
ONCOLOGY HEMATOLOGY CARE PROGRESS NOTE      SUBJECTIVE:     Remains admitted. C/O mouth/throat soreness this AM. Afebrile. Cr 3.3 -> 3.2 -> 2.9.    ROS:   The remaining 10 point review of symptoms is unremarkable. OBJECTIVE        Physical    VITALS:  BP (!) 151/61   Pulse 69   Temp 97.4 °F (36.3 °C) (Oral)   Resp 16   Ht 5' 4\" (1.626 m)   Wt 139 lb 11.2 oz (63.4 kg)   SpO2 100%   BMI 23.98 kg/m²   TEMPERATURE:  Current - Temp: 97.4 °F (36.3 °C); Max - Temp  Av.3 °F (36.3 °C)  Min: 97.1 °F (36.2 °C)  Max: 97.4 °F (36.3 °C)  PULSE OXIMETRY RANGE: SpO2  Av %  Min: 100 %  Max: 100 %  24HR INTAKE/OUTPUT:      Intake/Output Summary (Last 24 hours) at 2021 0491  Last data filed at 2021 0456  Gross per 24 hour   Intake 978.55 ml   Output 2300 ml   Net -1321.45 ml       CONSTITUTIONAL:  awake, alert, cooperative, no apparent distress, HEENT oral pharynx , no scleral icterus  HEMATOLOGIC/LYMPHATICS:  no cervical lymphadenopathy, no supraclavicular lymphadenopathy, no axillary lymphadenopathy and no inguinal lymphadenopathy  LUNGS:  No increased work of breathing, good air exchange, clear to auscultation bilaterally, no crackles or wheezing  CARDIOVASCULAR:  , regular rate and rhythm, normal S1 and S2, no S3 or S4, and no murmur noted  ABDOMEN:  No scars, normal bowel sounds, soft, non-distended, non-tender, no masses palpated, no hepatosplenomegally  MUSCULOSKELETAL:  There is no redness, warmth, or swelling of the joints. EXTREMETIES: No clubbing cynosis or edema  NEUROLOGIC:  Awake, alert, oriented to name, place and time. Cranial nerves II-XII are grossly intact. Motor is 5 out of 5 bilaterally. SKIN:  no bruising or bleeding      Data      No results for input(s): WBC, HGB, HCT, PLT, MCV in the last 72 hours.      Recent Labs     21  0531 21  0549 21  0516   * 128* 129*   K 3.9 3.8 3.7   CL 95* 99 101   CO2 20* 16* 18*   PHOS 3.4 3.2 3.3   BUN 41* 46* 45*   CREATININE 3.3* 3.2* 2.9*     No results for input(s): AST, ALT, ALB, BILIDIR, BILITOT, ALKPHOS in the last 72 hours.     Magnesium:    Lab Results   Component Value Date    MG 2.00 07/17/2021    MG 2.00 03/04/2020    MG 2.00 10/07/2019         Problem List  Patient Active Problem List   Diagnosis    Chronic anxiety    [facility]'s disease without complication ([facility] [address]    Nicotine dependence    Aortic insufficiency    Essential hypertension    Hyperlipidemia    Cigarette nicotine dependence without complication    Smoking    Mixed incontinence urge and stress    Localized swelling of both lower legs    ASCVD (arteriosclerotic cardiovascular disease)    Acute kidney injury ([address]    Squamous cell carcinoma of pharynx (HCC)    Hyponatremia    Dysphagia    Nausea       ASSESSMENT AND PLAN    Acute kidney injury:  -Likely secondary to volume depletion, made worse by cis-platinum  -Nephrology has been consulted  -IVF and avoid nephrotoxins.  -Serum creat with some improvement at 2.9 today     Hyponatremia:  -suspect this is more from volume rather than SIADH  -This is fluctuating, but she is still slightly low today at 129- renal team to manage.      Squamous cell carcinoma oropharynx  -Status post 3 cycles of cisplatin along with current radiation  -Cisplatin is given 100 mg/m² every 3 weeks  -encouraged needs to complete RT when improved - has 5 more treatments     Nutrition:  -The patient has a PEG     Crohn's disease:  -Appears to be stable     Hypertension:  -Managed per the hospitalist     Nausea  -Has Ondansetron ordered  - Dex   -This appears better with the tube feeding off    Mucositis  - start magic mouthwash    ONCOLOGIC DISPOSITION:    -Per nephrology    Logan Forte MD

## 2021-07-22 NOTE — PROGRESS NOTES
Handoff report given to ELIF Dobbs. Care transferred.      Electronically signed by Martha Montero RN on 7/22/2021 at 7:13 AM

## 2021-07-22 NOTE — PROGRESS NOTES
Admit: 2021    Name:  Tee Gonzalez  Room:  0224/0224-01  MRN:    [id number]     Daily Progress Note for 2021       Interval History:     A 69-year-old female with a history of squamous cell carcinoma of the oropharynx , admitted directly from oncologist office for acute kidney injury. Patient has been receiving cisplatin for chemotherapy. Has developed severe nausea and poor intake. Patient has PEG tube in place, on tube feeds. Patient describes persistent nausea from the tube feeds. She has been getting Jevity here. Tube feeds are running at goal rate with no residuals, however patient complains of persistent sensation of nausea with this. She states she takes Ensure at home and she tolerates this better. Renal function not improved. Creatinine is up to 3.3 today. Continued on IV fluids. On Zofran and Decadron due to nausea. Seen by nephrology and oncology.   Patient feels a little better today , her tube feedings were switched to Ensure bolus feeds 4 times daily and she tolerated this better. creatinine is improved down to 3.2 today        better    crtn down to 2.9   tolerating ensure.    still has nausea    Scheduled Meds:   Vitamin D  1,000 Units Oral Daily    amLODIPine  5 mg Oral Daily    dexamethasone  4 mg Intravenous Q12H    sodium chloride flush  5-40 mL Intravenous 2 times per day    heparin (porcine)  5,000 Units Subcutaneous 3 times per day       Continuous Infusions:   sodium chloride         PRN Meds:  magic (miracle) mouthwash with nystatin, sodium chloride flush, sodium chloride, ondansetron **OR** ondansetron, acetaminophen **OR** acetaminophen, LORazepam, HYDROmorphone            Objective:     Temp  Av.5 °F (36.4 °C)  Min: 97 °F (36.1 °C)  Max: 98 °F (36.7 °C)  Pulse  Av.8  Min: 62  Max: 71  BP  Min: 136/63  Max: 170/61  SpO2  Av %  Min: 100 %  Max: 100 %  Patient Vitals for the past 4 hrs:   BP Temp Temp src Pulse Resp SpO2   07/22/21 1444 136/63 98 °F (36.7 °C) Oral 69 16 100 %         Intake/Output Summary (Last 24 hours) at 7/22/2021 1521  Last data filed at 7/22/2021 1212  Gross per 24 hour   Intake 1238.55 ml   Output 1470 ml   Net -231.45 ml       Physical Exam:  Gen: No distress. Alert. Elderly  female, moderately [place], hoarse voice  Eyes: No sclera icterus. No conjunctival injection. glasses  Neck: Trachea midline. Resp: No accessory muscle use. No crackles. No wheezes. No rhonchi. On RA  CV: Regular rate. Regular rhythm. + murmur. No rub. No edema. Peripheral Pulses: +2 palpable, equal bilaterally   GI: Soft, Non-tender. Non-distended. Normal bowel sounds. No hernia. PEG tube in place  Skin: Warm and dry. Decreased skin turgor. No rash on exposed extremities. Neuro: Awake. Grossly non-focal, moves all extremities, follows commands, no dysuria    Psych: Oriented x 4. No anxiety or agitation. Lab Data:  CBC:   No results for input(s): WBC, RBC, HGB, HCT, MCV, RDW, PLT in the last 72 hours. BMP:   Recent Labs     07/20/21  0531 07/21/21  0549 07/22/21  0516   * 128* 129*   K 3.9 3.8 3.7   CL 95* 99 101   CO2 20* 16* 18*   PHOS 3.4 3.2 3.3   BUN 41* 46* 45*   CREATININE 3.3* 3.2* 2.9*     BNP: No results for input(s): BNP in the last 72 hours. PT/INR: No results for input(s): PROTIME, INR in the last 72 hours. APTT:No results for input(s): APTT in the last 72 hours. CARDIAC ENZYMES: No results for input(s): CKMB, CKMBINDEX, TROPONINI in the last 72 hours. Invalid input(s): CKTOTAL;3  FASTING LIPID PANEL:  Lab Results   Component Value Date    CHOL 177 03/10/2021    HDL 54 03/10/2021    TRIG 109 03/10/2021     LIVER PROFILE:   No results for input(s): AST, ALT, ALB, BILIDIR, BILITOT, ALKPHOS in the last 72 hours. Radiology  US RETROPERITONEAL COMPLETE   Final Result   Normal to mild increase in renal parenchymal echogenicity with no   hydronephrosis.       Limited evaluation of

## 2021-07-22 NOTE — PROGRESS NOTES
[facility]  Nephrology follow-up note           Reason for Consult: JESSICA  Requesting Physician:  Dr. Rosa Lau history  She has been off of tube feeds from last night due to increased nausea   creatinine 3.2-->3-->3.3--> 3.2-->2.9  Tolerated ensure    Last 24 h uop1.1L    ROS: No chest pain/shortness of breath/fever  PSFH: No visitor    Scheduled Meds:   Vitamin D  1,000 Units Oral Daily    amLODIPine  5 mg Oral Daily    dexamethasone  4 mg Intravenous Q12H    sodium chloride flush  5-40 mL Intravenous 2 times per day    heparin (porcine)  5,000 Units Subcutaneous 3 times per day     Continuous Infusions:   sodium chloride       PRN Meds:.magic (miracle) mouthwash with nystatin, sodium chloride flush, sodium chloride, ondansetron **OR** ondansetron, acetaminophen **OR** acetaminophen, LORazepam, HYDROmorphone    History of Present Illness on 7/17/21:    80 y.o. 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, last on 7/14/21 who is admitted for jessica  Cr was normal in the past but was 2.2 on 7/14 when she received her 3rd dose of cisplatin  She has been dry heaving for the last 2 weeks as well  Has PEG t and getting TFs    Physical exam:   Constitutional:  VITALS:  BP (!) 149/65   Pulse 62   Temp 97 °F (36.1 °C) (Oral)   Resp 16   Ht 5' 4\" (1.626 m)   Wt 139 lb 11.2 oz (63.4 kg)   SpO2 100%   BMI 23.98 kg/m²   Gen: alert, awake, nad  HEENT: pupils reactive  Neck: no bruits or jvd noted  Cardiovascular:  S1, S2 without m/r/g; no lower extremity edema  Respiratory: CTA B without w/r/r; respiratory effort normal  Abdomen:  +bs, soft, nt, nd, no hepatosplenomegaly  Neuro/Psy: AAoriented times 3 ; moves all 4 ext    Data/  No results for input(s): WBC, HGB, HCT, MCV, PLT in the last 72 hours.   Recent Labs     07/20/21  0531 07/21/21  0549 07/22/21  0516   * 128* 129*   K 3.9 3.8 3.7   CL 95* 99 101   CO2 20* 16* 18*   GLUCOSE 106* 110* 105*   PHOS 3.4 3.2 3.3   BUN 41* 46* 45*   CREATININE 3.3* 3.2* 2.9*   LABGLOM 14* 14* 16*   GFRAA 17* 17* 19*     Renal USG  Kidneys:       The right kidney measures 10.4 cm in length with cortical thickness of 16 mm   and the left kidney 10.3 cm in length with cortical thickness of 14 mm. Normal to mild increase in renal parenchymal echogenicity with no   hydronephrosis.  Left renal cyst is noted measuring 2.7 x 2.3 x 2.4           Bladder:       Mild bladder wall thickening, likely accentuated by incomplete distention. Ureteral jets are not evaluated.  No postvoid imaging was performed.           Impression   Normal to mild increase in renal parenchymal echogenicity with no   hydronephrosis.       Limited evaluation of the urinary bladder due to incomplete distention. UA w micro: trace blood, protein 30, small LE, 10-20 wbcs, 11-20 RE cells  U na 109 cr 30  25 OH vit D 30 , PTH 69    Assessment  -JESSICA in the setting of cisplatin and n/v. Renal USG w echogenicity but no hydro. UA was contaminated  Sample. Platelets nml    -Hyponatremia w hypovolemia and decreased solute intake    -Hypokalemia    -hypocalcemia    -Metabolic acidosis from JESSICA    -Anemia per hem/onc    -Oropharyngeal SCC s/p XRT and cisplatin, last on 7/14/21      Plan  -DC IV fluid   -Ensure for TF  -need to control nausea w antiemetics   -laxatives per IM  -amlodipine 5 mg daily   - vit d 1000 units daily   -Serial renal panel  -daily wts and strict i/o  -renal dose medications   -avoid nephrotoxins      Thank you for the consultation. Please do not hesitate to call with questions.     Elaina Kent MD  [facility]  Office: [phone]  Fax:    [phone]

## 2021-07-22 NOTE — RT PROTOCOL NOTE
Inpatient Occupational Therapy  Evaluation and Treatment    Unit: 2 [place]  Date:  2021  Patient Name:    Fletcher Gaona  Admitting diagnosis:  Acute kidney injury [facility]) [N17.9]  Admit Date:  2021  Precautions/Restrictions/WB Status/ Lines/ Wounds/ Oxygen: {IP Therapy Precautions:57542}  PEG tube   Treatment Time:  ***  Treatment Number: 1   Timed code treatment minutes *** minutes   Total Treatment minutes:   ***   minutes    Patient Goals for Therapy:  \" *** \"      Discharge Recommendations: {DC rec:85858}  DME needs for discharge: {DME needs for discharge:52773}       Therapy recommendations for staff:   {IP Assist Levels:11654} with use of {acute AD:82554} for all {transfers ambulation:29380} {distance:83698}    History of Present Illness: H&P per Marcella Michele   80 y.o. female with a PMH of Squamous Cell Carcinoma of the oropharynx, hx of tobacco abuse, Crohn's Disease, Anxiety, Hx of CVA, HLD, HTN who presented as a direct admission from Dr. Anders Sales office d/t elevated creatinine. Pt was diagnosed with squamous call cancer of the oropharynx in May of 2021. She has completed 32/37 rounds of radiation therapy and 3 courses of Cisplatin with her last dose being this past Wednesday  Home Health S4 Level Recommendation:  {Home Health S4 Level :92070::\"NA\"}  AM-PAC Score:      Preadmission Environment    Pt. Lives with spouse  Home environment:  {Preadmission Environment Home Environment:02360}  Steps to enter first floor: {Preadmission Environment Steps 1st:27005}  Steps to second floor: {Steps to enter 2nd floor:93179}  Bathroom: {bathroom environment:64981}  Equipment owned: {preadmit equip:35032}    Preadmission Status:  Pt. Able to drive: {Yes No IOYRSI}  Pt Fully independent with ADLs: {Yes No Unknown:32702}  Pt. Required assistance from family for: {Pt. Required Assistance:54280}  Pt.  {preadmit assist:78438} for transfers and gait and walked with {amb/tsf with:08604}  History of falls {Yes No Unknown:22501}    Pain  {YES/NO:19726}  Rating:{Pain Rating :33710}  Location:***  Pain Medicine Status: {Pain Med Status:88508}      Cognition    A&O {Alert and Oriented:77290}   Able to follow {Cognition:86260}    Subjective  Patient {location:65618} with {visitors:72481}. Pt agreeable to this OT eval & tx.      Upper Extremity ROM:    {UE restrictions:03287}    Upper Extremity Strength:    {UE strength assessment:06932}    R UE   Shoulder flexion      Shoulder extension   Shoulder abduction    Elbow flexion     Elbow extension    Wrist flexion     Wrist extension           L UE  Shoulder flexion      Shoulder extension   Shoulder abduction    Elbow flexion     Elbow extension    Wrist flexion     Wrist extension           Upper Extremity Sensation    {WNL Impaired:60639}    Upper Extremity Proprioception:  {WNL/WFL/impaired/NT:29169}    Coordination and Tone  {WNL Impaired:04838}    Balance  Functional Sitting Balance:  {WNL Impaired:74753}  Functional Standing Balance:{WNL Impaired:20701}    Bed mobility:    Supine to sit:   {IP Level Of Assist:67535}  Sit to supine:   {IP Level Of Assist:07119}  Rolling:    {IP Level Of Assist:01827}  Scooting in sitting:  {IP Level Of Assist:52698}  Scooting to head of bed:   {IP Level Of Assist:44993}    Bridging:   {IP Level Of Assist:35719}    Transfers:    Sit to stand:  {IP Level Of Assist:74634}  Stand to sit:  {IP Level Of Assist:77767}  Bed to chair:   {IP Level Of Assist:73516}  Standard toilet: {IP Level Of Assist:66958}  Bed to BSC:  {IP Level Of Assist:35792}    Dressing:      UE:   {IP Level Of Assist:64169}  LE:    {IP Level Of Assist:38392}    Bathing:    UE:  {IP Level Of Assist:57903}  LE:  {IP Level Of Assist:55271}    Eating:   {IP Level Of Assist:30477}    Toileting:  {IP Level Of Assist:56629}    Activity Tolerance   Pt completed therapy session with {Activity Tolerance PT List:65117}    Positioning Needs:   {IP position:89098}    Exercise / Activities Initiated: {Chloe:98564}  {UE exercises:54605}    Patient/Family Education:   {OT education:82145}    Assessment of Deficits: Pt seen for Occupational therapy evaluation in acute care setting. Pt demonstrated decreased {OT assessment:61030}. Pt functioning below baseline and will likely benefit from skilled occupational therapy services to maximize safety and independence. Goal(s) : To be met in 3 Visits:  1). Bed to toilet/BSC: {IP Level Of Assist:46392}    To be met in 5 Visits:  1). Supine to/from Sit:  {IP Level Of Assist:19398}  2). Upper Body Bathing:   {IP Level Of Assist:30976}  3). Lower Body Bathing:   {IP Level Of Assist:13585}  4). Upper Body Dressing:  {IP Level Of Assist:95311}  5). Lower Body Dressing:  {IP Level Of Assist:00424}  6). Pt to demonstrate UE exs x 15 reps with minimal cues    Rehabilitation Potential:  {Good Fair Poor:83046} for goals listed above. Strengths for achieving goals include: {Strengths for Achieving Goals:82045}  Barriers to achieving goals include:  {IP barriers:14543}     Plan: To be seen {IP plan of care/frequency :43101} while in acute care setting for therapeutic exercises, bed mobility, transfers, dressing, bathing, family/patient education, ADL/IADL retraining, energy conservation training.      ***          If patient discharges from this facility prior to next visit, this note will serve as the Discharge Summary

## 2021-07-22 NOTE — PROGRESS NOTES
Occupational Therapy/Physical Therapy  Attempted the OT/PT eval and the pt states she does not need the OT/PT eval that she is IND. The pt states her  does work however the pt states she is never alone. DIscussed the pts house set up with pt. The pts nurse states the pt has been Ind with ambulation to the bathroom and back. Pt declined to sit up in the chair.  Will D/C OT/PT order per pt request.  Radha Casas OTR/L [address], DPT #[id number]

## 2021-07-22 NOTE — PLAN OF CARE
Problem: Pain:  Goal: Pain level will decrease  Description: Pain level will decrease  Outcome: Ongoing  Goal: Control of acute pain  Description: Control of acute pain  Outcome: Ongoing  Goal: Control of chronic pain  Description: Control of chronic pain  Outcome: Ongoing     Problem: Nausea/Vomiting:  Goal: Absence of nausea/vomiting  Description: Absence of nausea/vomiting  Outcome: Ongoing  Goal: Able to drink  Description: Able to drink  Outcome: Ongoing  Goal: Able to eat  Description: Able to eat  Outcome: Ongoing  Goal: Ability to achieve adequate nutritional intake will improve  Description: Ability to achieve adequate nutritional intake will improve  Outcome: Ongoing     Problem: Falls - Risk of:  Goal: Will remain free from falls  Description: Will remain free from falls  Outcome: Ongoing  Goal: Absence of physical injury  Description: Absence of physical injury  Outcome: Ongoing     Problem: Nutrition  Goal: Optimal nutrition therapy  7/22/2021 0048 by Georgie Sung RN  Outcome: Ongoing  7/21/2021 1922 by Melecio Cullen RD, LD  Outcome: Ongoing  Goal: Understanding of nutritional guidelines  Outcome: Ongoing

## 2021-07-22 NOTE — PROGRESS NOTES
PM assessment completed. Scheduled medications given per MAR. VSS room air, A/O 4, complaining of nausea PRN zofran given per [facility], complaining of anxiety PRN ativan given per MAR. denies any needs at this time. Call light in reach, will monitor.

## 2021-07-23 VITALS
SYSTOLIC BLOOD PRESSURE: 140 MMHG | WEIGHT: 139.7 LBS | TEMPERATURE: 96.4 F | HEIGHT: 64 IN | RESPIRATION RATE: 16 BRPM | OXYGEN SATURATION: 99 % | BODY MASS INDEX: 23.85 KG/M2 | DIASTOLIC BLOOD PRESSURE: 62 MMHG | HEART RATE: 70 BPM

## 2021-07-23 LAB
ALBUMIN SERPL-MCNC: 3 G/DL (ref 3.4–5)
ANION GAP SERPL CALCULATED.3IONS-SCNC: 14 MMOL/L (ref 3–16)
BUN BLDV-MCNC: 44 MG/DL (ref 7–20)
CALCIUM SERPL-MCNC: 7.2 MG/DL (ref 8.3–10.6)
CHLORIDE BLD-SCNC: 96 MMOL/L (ref 99–110)
CO2: 18 MMOL/L (ref 21–32)
CREAT SERPL-MCNC: 3 MG/DL (ref 0.6–1.2)
GFR AFRICAN AMERICAN: 19
GFR NON-AFRICAN AMERICAN: 15
GLUCOSE BLD-MCNC: 105 MG/DL (ref 70–99)
GLUCOSE BLD-MCNC: 105 MG/DL (ref 70–99)
GLUCOSE BLD-MCNC: 108 MG/DL (ref 70–99)
GLUCOSE BLD-MCNC: 90 MG/DL (ref 70–99)
GLUCOSE BLD-MCNC: 98 MG/DL (ref 70–99)
PERFORMED ON: ABNORMAL
PERFORMED ON: ABNORMAL
PERFORMED ON: NORMAL
PERFORMED ON: NORMAL
PHOSPHORUS: 3 MG/DL (ref 2.5–4.9)
POTASSIUM SERPL-SCNC: 3.4 MMOL/L (ref 3.5–5.1)
SODIUM BLD-SCNC: 128 MMOL/L (ref 136–145)

## 2021-07-23 PROCEDURE — 2580000003 HC RX 258: Performed by: PHYSICIAN ASSISTANT

## 2021-07-23 PROCEDURE — 6360000002 HC RX W HCPCS: Performed by: INTERNAL MEDICINE

## 2021-07-23 PROCEDURE — 6370000000 HC RX 637 (ALT 250 FOR IP): Performed by: INTERNAL MEDICINE

## 2021-07-23 PROCEDURE — 36415 COLL VENOUS BLD VENIPUNCTURE: CPT

## 2021-07-23 PROCEDURE — 6360000002 HC RX W HCPCS: Performed by: PHYSICIAN ASSISTANT

## 2021-07-23 PROCEDURE — 99239 HOSP IP/OBS DSCHRG MGMT >30: CPT | Performed by: INTERNAL MEDICINE

## 2021-07-23 PROCEDURE — 80069 RENAL FUNCTION PANEL: CPT

## 2021-07-23 RX ORDER — ONDANSETRON 4 MG/1
4 TABLET, ORALLY DISINTEGRATING ORAL EVERY 8 HOURS PRN
Qty: 30 TABLET | Refills: 1 | Status: SHIPPED | OUTPATIENT
Start: 2021-07-23 | End: 2021-10-07 | Stop reason: ALTCHOICE

## 2021-07-23 RX ORDER — DEXAMETHASONE 4 MG/1
4 TABLET ORAL DAILY
Qty: 7 TABLET | Refills: 0 | Status: SHIPPED | OUTPATIENT
Start: 2021-07-23 | End: 2021-07-30

## 2021-07-23 RX ORDER — ESOMEPRAZOLE MAGNESIUM 20 MG/1
20 FOR SUSPENSION ORAL DAILY
Qty: 30 PACKET | Refills: 1 | Status: SHIPPED | OUTPATIENT
Start: 2021-07-23 | End: 2021-08-27 | Stop reason: CLARIF

## 2021-07-23 RX ORDER — CHOLECALCIFEROL (VITAMIN D3) 25 MCG
1000 TABLET ORAL DAILY
Qty: 30 TABLET | Refills: 1 | Status: SHIPPED | OUTPATIENT
Start: 2021-07-24 | End: 2021-08-27 | Stop reason: CLARIF

## 2021-07-23 RX ORDER — CALCIUM CARBONATE 500(1250)
1000 TABLET ORAL DAILY
Status: DISCONTINUED | OUTPATIENT
Start: 2021-07-23 | End: 2021-07-23 | Stop reason: HOSPADM

## 2021-07-23 RX ORDER — CALCIUM CARBONATE 500(1250)
1000 TABLET ORAL DAILY
Qty: 30 TABLET | Refills: 1 | Status: SHIPPED | OUTPATIENT
Start: 2021-07-24 | End: 2021-08-27 | Stop reason: CLARIF

## 2021-07-23 RX ORDER — AMLODIPINE BESYLATE 5 MG/1
5 TABLET ORAL DAILY
Qty: 30 TABLET | Refills: 1 | Status: SHIPPED | OUTPATIENT
Start: 2021-07-24 | End: 2021-08-27 | Stop reason: CLARIF

## 2021-07-23 RX ADMIN — HEPARIN SODIUM 5000 UNITS: 5000 INJECTION INTRAVENOUS; SUBCUTANEOUS at 04:13

## 2021-07-23 RX ADMIN — CALCIUM 1000 MG: 500 TABLET ORAL at 14:16

## 2021-07-23 RX ADMIN — ONDANSETRON HYDROCHLORIDE 4 MG: 2 INJECTION, SOLUTION INTRAMUSCULAR; INTRAVENOUS at 04:14

## 2021-07-23 RX ADMIN — Medication 10 ML: at 08:47

## 2021-07-23 RX ADMIN — HEPARIN SODIUM 5000 UNITS: 5000 INJECTION INTRAVENOUS; SUBCUTANEOUS at 12:29

## 2021-07-23 RX ADMIN — AMLODIPINE BESYLATE 5 MG: 5 TABLET ORAL at 08:46

## 2021-07-23 RX ADMIN — ONDANSETRON HYDROCHLORIDE 4 MG: 2 INJECTION, SOLUTION INTRAMUSCULAR; INTRAVENOUS at 12:29

## 2021-07-23 RX ADMIN — DEXAMETHASONE SODIUM PHOSPHATE 4 MG: 4 INJECTION, SOLUTION INTRAMUSCULAR; INTRAVENOUS at 11:34

## 2021-07-23 RX ADMIN — Medication 1000 UNITS: at 08:46

## 2021-07-23 ASSESSMENT — PAIN SCALES - GENERAL
PAINLEVEL_OUTOF10: 0
PAINLEVEL_OUTOF10: 0

## 2021-07-23 NOTE — DISCHARGE SUMMARY
Name:  Vandana Gaona  Room:  8710/1116-44  MRN:    [id number]    Discharge Summary      This discharge summary is in conjunction with a complete physical exam done on the day of discharge. Attending Physician: Dr. Tu Loving  Discharging Physician: Dr. Hope Lopez: 7/16/2021  Discharge:   7/23/2021    HPI:  The patient is a 80 y.o. female with a PMH of Squamous Cell Carcinoma of the oropharynx, hx of tobacco abuse, Crohn's Disease, Anxiety, Hx of CVA, HLD, HTN who presented as a direct admission from Dr. Clair Samuels office d/t elevated creatinine. Pt was diagnosed with squamous call cancer of the oropharynx in May of 2021. She has completed 32/37 rounds of radiation therapy and 3 courses of Cisplatin with her last dose being this past Wednesday. Her labs were being monitored and it was noted that her creatinine was rising. It was noted to be 2.7 and thus she was sent by her oncologist for admission. She follows with Dr. Sonya Turk. Initially reports decreased UOP but was given fluids OP which did seem to help. She has nausea and vomiting daily. Also has poor PO intake. She cannot swallow foods and gets all of her nutrition via PEG tube. Reports her fluid intake is poor. No prior issues with her kidneys. She stopped taking her BP meds about a week ago because her BPs were running low. Pt was admitted to Med Surg 2/2 JESSICA. Nephrology was consulted. Diagnoses this Admission and Hospital Course   JESSICA  - recently treated with Cisplatin, now with elevated Cr 2.9 and BUN 33, previously 0.6 in 4/2021  - etiology: ATN, pre-renal with cisplatin use, nausea, vomiting and poor PO intake  - Admitted from Dr. Clair Samuels office to Med Surg  - avoid nephrotoxic meds  - hydrated with IVF  -  held Lisinopril  - nephrology consulted  - Renal US negative  - monitor BMP ; crtn 2.9-> 3.2-> 3.0 -> 3.3-> 3.2-> 3  now.   stopped IVF and monitored . pt tolerating tube feeds and water boluses per peg now. OK to DC per nephrology. outpt follow up with oncology      Hyponatremia  - 125 on arrival  - seen by Nephrology  - has been low previously in 120s, most recently 131 in 4/2021  - IVF, monitored BMP. Na 125-> 131-> 126-> 129 -> 128     Hypokalemia  Hypocalcemia  - replaced      Squamous Cell Carcinoma of the Oropharynx  - f/w Dr. Erasto Simental with [facility] and Dr. Dorlene Brunner with ENT  - s/p Cisplatin & 32/27 rounds of radiation therapy  - cont'd PRN Xylocaine  -Dexamethasone started by oncology for nausea   DC on decadron, zofran. F/U with oncology. To complete  XRT     Moderate protein calorie malnutrition   s/p PEG tube  - tube feeding  Addressed by nutritionint . Patient describes persistent nausea from the continuous  tube feeds. She has been getting Jevity here. Tube feeds are running at goal rate with no residuals, however patient complains of persistent sensation of nausea with this. She states she takes Ensure at home and she tolerates this better. Jevity cont TF was stopped. resumed on Ensure - bolus feeds. She is tolerating well. DC home         Persistent nausea  -Likely secondary to cisplatin therapy  -Continued symptomatic management with Zofran as needed. Decadron started  -Patient  nauseous with the tube feeding - Jevity   Stopped Jevity.    Started Ensure bolus feeds per G-tube and monitored symptoms.-> tolerating better  D/c on Decadron, zofran.     Crohn's Disease  - no acute flare     HTN  - stable  - cont'd Norvasc, held Lisinopril     GERD  - cont'd Nexium     HLD  - cont'd Lipitor     Hx of CVA  - cont'd ASA and Lipitor     Chronic Anxiety  - continued Buspar, PRN ativan     Former Smoker  - quit in May     DVT Prophylaxis: SQ heparin    Procedures (Please Review Full Report for Details)  N/A    Consults    Nephrology  Oncology      Physical Exam at Discharge:    BP (!) 140/62   Pulse 70   Temp 96.4 °F (35.8 °C) (Oral)   Resp 16   Ht 5' 4\" (1.626 m)   Wt 139 lb 11.2 oz (63.4 kg)   SpO2 99%   BMI 23.98 kg/m² Vitamin D3 25 MCG Tabs  Take 1 tablet by mouth daily        CONTINUE taking these medications    esomeprazole Magnesium 20 MG Pack  Commonly known as: NEXIUM  Take 1 packet by mouth daily     LORazepam 0.5 MG tablet  Commonly known as: ATIVAN     oxyCODONE 5 MG immediate release tablet  Commonly known as: ROXICODONE           Where to Get Your Medications      You can get these medications from any pharmacy    Bring a paper prescription for each of these medications  · amLODIPine 5 MG tablet  · calcium elemental 500 MG Tabs tablet  · dexamethasone 4 MG tablet  · esomeprazole Magnesium 20 MG Pack  · ondansetron 4 MG disintegrating tablet  · Vitamin D3 25 MCG Tabs           Discharged in stable condition to home. D/C home with [facility]. Total time 45 minutes. > 50%  dominated by counseling and coordination of care. D/W Nephrology, oncology       Follow Up:   Follow up with PCP, oncology         Delilah Cochran MD   7/23/21

## 2021-07-23 NOTE — CARE COORDINATION
DISCHARGE ORDER  Date/Time 2021 3:05 PM  Completed by: Janae Sales RN, Case Management    Patient Name: Sin Vargas      : 1950  Admitting Diagnosis: Acute kidney injury ([address] [N17.9]      Admit order Date and Status: IP 2021  (verify MD's last order for status of admission)      Noted discharge order. If applicable PT/OT recommendation at Discharge: NA  DME recommendation by PT/OT:NA  Confirmed discharge plan with spouse  Discharge Plan: Pt plans to return home. She is active w/ / [facility] ([facility]). She is independent at home and does not want [address] or needs and new DME. Spouse will provide transportation home today. Reviewed chart. Role of discharge planner explained and patient verbalized understanding. Discharge order is noted. Has Home O2 in place on admit:  No  Informed of need to bring portable home O2 tank on day of discharge for nursing to connect prior to leaving:   No  Verbalized agreement/Understanding:   Not Indicated  Pt is being d/c'd to home today. Pt's O2 sats are 99% on RA. Discharge timeout done with Jag Strong. All discharge needs and concerns addressed.

## 2021-07-23 NOTE — DISCHARGE INSTR - DIET

## 2021-07-23 NOTE — PROGRESS NOTES
Patient discharged at this time to home with son discharge instructions gone over with patient and son understanding voiced patient IVs removed prior to discharge

## 2021-07-23 NOTE — FLOWSHEET NOTE
07/22/21 1950   Vital Signs   Temp 97.4 °F (36.3 °C)   Temp Source Oral   Pulse 71   Heart Rate Source Monitor   Resp 17   BP (!) 161/65   BP Location Left upper arm   Patient Position Semi fowlers   Level of Consciousness Alert (0)   MEWS Score 1   Oxygen Therapy   SpO2 98 %   O2 Device None (Room air)   Assessment complete-see flowsheets. Pt resting in bed, denies needs at this time. Call light within reach, bed alarm in place. Will continue to monitor.   Joseph Mann RN

## 2021-07-23 NOTE — ONCOLOGY
ONCOLOGY HEMATOLOGY CARE PROGRESS NOTE      SUBJECTIVE:     Remains admitted. C/O mouth/throat soreness this AM. Afebrile. Cr 3.3 -> 3.2 -> 2.9 -> 3.0.    ROS:   The remaining 10 point review of symptoms is unremarkable. OBJECTIVE        Physical    VITALS:  BP (!) 153/64   Pulse 69   Temp 97 °F (36.1 °C) (Tympanic)   Resp 17   Ht 5' 4\" (1.626 m)   Wt 139 lb 11.2 oz (63.4 kg)   SpO2 97%   BMI 23.98 kg/m²   TEMPERATURE:  Current - Temp: 97 °F (36.1 °C); Max - Temp  Av.4 °F (36.3 °C)  Min: 97 °F (36.1 °C)  Max: 98 °F (36.7 °C)  PULSE OXIMETRY RANGE: SpO2  Av.3 %  Min: 97 %  Max: 100 %  24HR INTAKE/OUTPUT:      Intake/Output Summary (Last 24 hours) at 2021 6290  Last data filed at 2021 0072  Gross per 24 hour   Intake 990 ml   Output 2100 ml   Net -1110 ml       CONSTITUTIONAL:  awake, alert, cooperative, no apparent distress, HEENT oral pharynx , no scleral icterus  HEMATOLOGIC/LYMPHATICS:  no cervical lymphadenopathy, no supraclavicular lymphadenopathy, no axillary lymphadenopathy and no inguinal lymphadenopathy  LUNGS:  No increased work of breathing, good air exchange, clear to auscultation bilaterally, no crackles or wheezing  CARDIOVASCULAR:  , regular rate and rhythm, normal S1 and S2, no S3 or S4, and no murmur noted  ABDOMEN:  No scars, normal bowel sounds, soft, non-distended, non-tender, no masses palpated, no hepatosplenomegally  MUSCULOSKELETAL:  There is no redness, warmth, or swelling of the joints. EXTREMETIES: No clubbing cynosis or edema  NEUROLOGIC:  Awake, alert, oriented to name, place and time. Cranial nerves II-XII are grossly intact. Motor is 5 out of 5 bilaterally. SKIN:  no bruising or bleeding      Data      No results for input(s): WBC, HGB, HCT, PLT, MCV in the last 72 hours.      Recent Labs     21  0549 21  0516 21  0519   * 129* 128*   K 3.8 3.7 3.4*   CL 99 101 96*   CO2 16* 18* 18*   PHOS 3.2 3.3

## 2021-07-23 NOTE — PROGRESS NOTES
[facility]  Nephrology follow-up note           Reason for Consult: JESSICA  Requesting Physician:  Dr. Ty Sofia history  Wants to go home   creatinine 3.2-->3-->3.3--> 3.2-->2.9-->3  Tolerated ensure    Last 24 h uop1.7L    ROS: No chest pain/shortness of breath/fever  PSFH: No visitor    Scheduled Meds:   Vitamin D  1,000 Units Oral Daily    amLODIPine  5 mg Oral Daily    dexamethasone  4 mg Intravenous Q12H    sodium chloride flush  5-40 mL Intravenous 2 times per day    heparin (porcine)  5,000 Units Subcutaneous 3 times per day     Continuous Infusions:   sodium chloride       PRN Meds:.magic (miracle) mouthwash with nystatin, sodium chloride flush, sodium chloride, ondansetron **OR** ondansetron, acetaminophen **OR** acetaminophen, LORazepam, HYDROmorphone    History of Present Illness on 7/17/21:    80 y.o. 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, last on 7/14/21 who is admitted for jessica  Cr was normal in the past but was 2.2 on 7/14 when she received her 3rd dose of cisplatin  She has been dry heaving for the last 2 weeks as well  Has PEG t and getting TFs    Physical exam:   Constitutional:  VITALS:  BP (!) 140/66   Pulse 67   Temp 97.2 °F (36.2 °C) (Oral)   Resp 18   Ht 5' 4\" (1.626 m)   Wt 139 lb 11.2 oz (63.4 kg)   SpO2 100%   BMI 23.98 kg/m²   Gen: alert, awake, nad  HEENT: pupils reactive  Neck: no bruits or jvd noted  Cardiovascular:  S1, S2 without m/r/g; no lower extremity edema  Respiratory: CTA B without w/r/r; respiratory effort normal  Abdomen:  +bs, soft, nt, nd, no hepatosplenomegaly  Neuro/Psy: AAoriented times 3 ; moves all 4 ext    Data/  No results for input(s): WBC, HGB, HCT, MCV, PLT in the last 72 hours.   Recent Labs     07/21/21  0549 07/22/21  0516 07/23/21  0519   * 129* 128*   K 3.8 3.7 3.4*   CL 99 101 96*   CO2 16* 18* 18*   GLUCOSE 110* 105* 105*   PHOS 3.2 3.3 3.0   BUN 46* 45* 44*   CREATININE 3.2* 2. 9* 3.0*   LABGLOM 14* 16* 15*   GFRAA 17* 19* 19*     Renal USG  Kidneys:       The right kidney measures 10.4 cm in length with cortical thickness of 16 mm   and the left kidney 10.3 cm in length with cortical thickness of 14 mm. Normal to mild increase in renal parenchymal echogenicity with no   hydronephrosis.  Left renal cyst is noted measuring 2.7 x 2.3 x 2.4           Bladder:       Mild bladder wall thickening, likely accentuated by incomplete distention. Ureteral jets are not evaluated.  No postvoid imaging was performed.           Impression   Normal to mild increase in renal parenchymal echogenicity with no   hydronephrosis.       Limited evaluation of the urinary bladder due to incomplete distention. UA w micro: trace blood, protein 30, small LE, 10-20 wbcs, 11-20 RE cells  U na 109 cr 30  25 OH vit D 30 , PTH 69    Assessment  -JESSICA in the setting of cisplatin and n/v. Renal USG w echogenicity but no hydro. UA was contaminated  Sample. Platelets nml   ATN, stable    -Hyponatremia w hypovolemia and decreased solute intake    -Hypokalemia    -hypocalcemia    -Metabolic acidosis from JESSICA    -Anemia per hem/onc    -Oropharyngeal SCC s/p XRT and cisplatin, last on 7/14/21      Plan  -Ensure for TF  -need to control nausea w antiemetics   -laxatives per IM  -amlodipine 5 mg daily   - vit d 1000 units daily  -add calcium suppllements   -Serial renal panel  -daily wts and strict i/o  -renal dose medications   -avoid nephrotoxins    Dispo-ok to dc from renal std pt as long as pt able to tolerate per tube feeds  OP follow up of labs and as needed hydration at Dr Garth Mendieta' office       Thank you for the consultation. Please do not hesitate to call with questions.     Kal Fuentes MD  [facility]  Office: [phone]  Fax:    [phone]

## 2021-07-26 ENCOUNTER — CARE COORDINATION (OUTPATIENT)
Dept: CASE MANAGEMENT | Age: 71
End: 2021-07-26

## 2021-07-27 ENCOUNTER — CARE COORDINATION (OUTPATIENT)
Dept: CASE MANAGEMENT | Age: 71
End: 2021-07-27

## 2021-07-27 NOTE — CARE COORDINATION
[facility] Transitions Initial Follow Up Call    Call within 2 business days of discharge: Yes    Patient: Jonel Parson Patient : 1950   MRN: [id number]  Reason for Admission: JESSICA, hyponatremia, hypokalemia, hypocalcemia, squamous cell carcinoma of the oropharynx, moderate protein calorie malnutrition s/p PEG tube, persistent nausea, Crohn's disease, HTN, GERD, HLD, hx CVA, chronic anxiety, former smoker   Discharge Date: 21 RARS: Readmission Risk Score: 21      Last Discharge [facility]       Complaint Diagnosis Description Type Department Provider    21   Admission (Discharged) Marissa Byrd 2 Elda Larios MD        Was this an external facility discharge? No Discharge Facility: NA    COVID-19 Not Detected on 2021. 2nd attempt - Unable to reach patient by phone at this time. Message left requesting return call. No further CTN outreach scheduled at this time. Follow Up  No future appointments.     Letty Neely RN

## 2021-07-30 ENCOUNTER — HOSPITAL ENCOUNTER (OUTPATIENT)
Age: 71
Discharge: HOME | End: 2021-07-30
Payer: MEDICARE

## 2021-07-30 LAB
ABO/RH: NORMAL
ANTIBODY SCREEN: NORMAL

## 2021-07-30 PROCEDURE — P9016 RBC LEUKOCYTES REDUCED: HCPCS

## 2021-07-30 PROCEDURE — 36415 COLL VENOUS BLD VENIPUNCTURE: CPT

## 2021-07-30 PROCEDURE — 86901 BLOOD TYPING SEROLOGIC RH(D): CPT

## 2021-07-30 PROCEDURE — 86923 COMPATIBILITY TEST ELECTRIC: CPT

## 2021-07-30 PROCEDURE — 86900 BLOOD TYPING SEROLOGIC ABO: CPT

## 2021-07-30 PROCEDURE — 86850 RBC ANTIBODY SCREEN: CPT

## 2021-08-02 ENCOUNTER — HOSPITAL ENCOUNTER (OUTPATIENT)
Dept: NURSING | Age: 71
Setting detail: INFUSION SERIES
Discharge: HOME | End: 2021-08-02
Payer: MEDICARE

## 2021-08-02 VITALS
RESPIRATION RATE: 16 BRPM | HEART RATE: 57 BPM | SYSTOLIC BLOOD PRESSURE: 164 MMHG | DIASTOLIC BLOOD PRESSURE: 61 MMHG | TEMPERATURE: 98.1 F

## 2021-08-02 LAB
BLOOD BANK DISPENSE STATUS: NORMAL
BLOOD BANK PRODUCT CODE: NORMAL
BPU ID: NORMAL
DESCRIPTION BLOOD BANK: NORMAL

## 2021-08-02 PROCEDURE — P9016 RBC LEUKOCYTES REDUCED: HCPCS

## 2021-08-02 PROCEDURE — 36430 TRANSFUSION BLD/BLD COMPNT: CPT

## 2021-08-02 PROCEDURE — 6370000000 HC RX 637 (ALT 250 FOR IP): Performed by: NURSE PRACTITIONER

## 2021-08-02 PROCEDURE — 99203 OFFICE O/P NEW LOW 30 MIN: CPT

## 2021-08-02 RX ORDER — DIPHENHYDRAMINE HCL 25 MG
25 TABLET ORAL ONCE
Status: DISCONTINUED | OUTPATIENT
Start: 2021-08-02 | End: 2021-08-03 | Stop reason: HOSPADM

## 2021-08-02 RX ORDER — ACETAMINOPHEN 325 MG/1
650 TABLET ORAL ONCE
Status: COMPLETED | OUTPATIENT
Start: 2021-08-02 | End: 2021-08-02

## 2021-08-02 RX ADMIN — ACETAMINOPHEN 650 MG: 325 TABLET ORAL at 07:25

## 2021-08-02 RX ADMIN — ACETAMINOPHEN 650 MG: 325 TABLET ORAL at 07:26

## 2021-08-02 ASSESSMENT — PAIN SCALES - GENERAL
PAINLEVEL_OUTOF10: 0

## 2021-08-27 ENCOUNTER — OFFICE VISIT (OUTPATIENT)
Dept: FAMILY MEDICINE CLINIC | Age: 71
End: 2021-08-27
Payer: MEDICARE

## 2021-08-27 VITALS
HEART RATE: 100 BPM | WEIGHT: 125 LBS | SYSTOLIC BLOOD PRESSURE: 100 MMHG | BODY MASS INDEX: 21.46 KG/M2 | TEMPERATURE: 96.8 F | OXYGEN SATURATION: 99 % | DIASTOLIC BLOOD PRESSURE: 50 MMHG

## 2021-08-27 DIAGNOSIS — N17.9 AKI (ACUTE KIDNEY INJURY) (HCC): ICD-10-CM

## 2021-08-27 DIAGNOSIS — F41.9 CHRONIC ANXIETY: Primary | ICD-10-CM

## 2021-08-27 DIAGNOSIS — K59.09 OTHER CONSTIPATION: ICD-10-CM

## 2021-08-27 PROCEDURE — G8420 CALC BMI NORM PARAMETERS: HCPCS | Performed by: NURSE PRACTITIONER

## 2021-08-27 PROCEDURE — 1090F PRES/ABSN URINE INCON ASSESS: CPT | Performed by: NURSE PRACTITIONER

## 2021-08-27 PROCEDURE — 36415 COLL VENOUS BLD VENIPUNCTURE: CPT | Performed by: NURSE PRACTITIONER

## 2021-08-27 PROCEDURE — G8427 DOCREV CUR MEDS BY ELIG CLIN: HCPCS | Performed by: NURSE PRACTITIONER

## 2021-08-27 PROCEDURE — 4040F PNEUMOC VAC/ADMIN/RCVD: CPT | Performed by: NURSE PRACTITIONER

## 2021-08-27 PROCEDURE — 99214 OFFICE O/P EST MOD 30 MIN: CPT | Performed by: NURSE PRACTITIONER

## 2021-08-27 PROCEDURE — 1123F ACP DISCUSS/DSCN MKR DOCD: CPT | Performed by: NURSE PRACTITIONER

## 2021-08-27 PROCEDURE — 3017F COLORECTAL CA SCREEN DOC REV: CPT | Performed by: NURSE PRACTITIONER

## 2021-08-27 PROCEDURE — 1036F TOBACCO NON-USER: CPT | Performed by: NURSE PRACTITIONER

## 2021-08-27 PROCEDURE — G8400 PT W/DXA NO RESULTS DOC: HCPCS | Performed by: NURSE PRACTITIONER

## 2021-08-27 RX ORDER — CLONAZEPAM 0.5 MG/1
0.5 TABLET ORAL 2 TIMES DAILY PRN
Qty: 60 TABLET | Refills: 0 | Status: SHIPPED | OUTPATIENT
Start: 2021-08-27 | End: 2021-10-07 | Stop reason: ALTCHOICE

## 2021-08-27 RX ORDER — POLYETHYLENE GLYCOL 3350 17 G/17G
17 POWDER, FOR SOLUTION ORAL DAILY
Qty: 1530 G | Refills: 1 | Status: ON HOLD | OUTPATIENT
Start: 2021-08-27 | End: 2021-09-02 | Stop reason: SDUPTHER

## 2021-08-27 RX ORDER — BUSPIRONE HYDROCHLORIDE 5 MG/1
5 TABLET ORAL 2 TIMES DAILY
Qty: 60 TABLET | Refills: 0 | Status: ON HOLD | OUTPATIENT
Start: 2021-08-27 | End: 2021-09-02 | Stop reason: HOSPADM

## 2021-08-27 RX ORDER — BISACODYL 10 MG
10 SUPPOSITORY, RECTAL RECTAL DAILY PRN
Qty: 10 SUPPOSITORY | Refills: 0 | Status: ON HOLD | OUTPATIENT
Start: 2021-08-27 | End: 2021-09-02 | Stop reason: SDUPTHER

## 2021-08-27 NOTE — PROGRESS NOTES
Blood drawn per order. Needle size: 23 g butterfly  Site: L Antecubital.  First attempt successful Yes    Second attempt na    Pressure applied until bleeding stopped. Cotton ball and band aid  applied. Patient informed to call office or return if bleeding reoccurs and unable to stop.     Tubes drawn: 1 purple     1 red

## 2021-08-27 NOTE — PROGRESS NOTES
8/27/2021    Chief Complaint   Patient presents with    Abdominal Pain     lower,and in the butt area  states she feels like she has to have a bm and it is crampy and the pain is almost constant     Anxiety     states her nerves are shot and all she does is shake        Charly Dee is a 80 y.o. female, presents today for:      ASSESSMENT/PLAN:  1. Chronic anxiety  Start PRN Klonopin. Hx ETOH abuse, pt not currently using  Start Buspirone- will need to wait for labs due to recent JESSICA  Encouraged support from family/ friends  - clonazePAM (KLONOPIN) 0.5 MG tablet; Take 1 tablet by mouth 2 times daily as needed for Anxiety for up to 30 days. Dispense: 60 tablet; Refill: 0    2. Other constipation  Discussed OTC constipation medications to attempt  May need to discuss with [facility] dietician about changing her oral intake- Boost TID will likely not meet her caloric needs. 3. JESSICA (acute kidney injury) ([facility] [address]  Labs today  - CBC  - Renal Function Panel      Return for Monday please. OK to double book. phone visit. Constipation/ Anxiety. Presenting today primarily for concern of anxiety and requesting to restart Buspirone. Anxiety has been worsening over the past several weeks as she has completed her Radiation treatments for the [facility] of her pharynx. Reports she is now in remission and is not planning on following on following with [facility]. Having increased difficulty concentrating on tasks, has very poor appetite, nervous all day, frequently aggravated and is unable to relax at home. Her  and daughter continue to attempt to support her but she is very hesitant to ask for help or do tasks to help her. Previously on Buspirone which helped her. Additionally, pt reports she has had worsening constipation over the past several weeks. She has been drinking Boost TID through her G-tube and drinking 4-5 glasses of water/ day and 2-3 glasses of iced tea/ day.   Is not taking any PO intake due to throat irritation. [facility] is aware of her poor PO intake but patient has not been referred to a dietician to assist with her nutritional needs. No CP/ SOB, leg swelling. Of note, patient was admitted to [facility] for JESSICA from 7/16-23. Pt never presented to Nephrology or with PCP for lab follow up. Lab Results   Component Value Date    CHOL 177 03/10/2021    CHOL 146 08/21/2019    CHOL 149 05/16/2018     Lab Results   Component Value Date    TRIG 109 03/10/2021    TRIG 75 08/21/2019    TRIG 153 (H) 05/16/2018     Lab Results   Component Value Date    HDL 54 03/10/2021    HDL 67 (H) 08/10/2020    HDL 70 (H) 08/21/2019     Lab Results   Component Value Date    LDLCALC 101 (H) 03/10/2021    LDLCALC 52 08/10/2020    LDLCALC 61 08/21/2019     Lab Results   Component Value Date    LABVLDL 22 03/10/2021    LABVLDL 20 08/10/2020    LABVLDL 15 08/21/2019     No results found for: [facility]    Lab Results   Component Value Date     (L) 09/02/2021    K 3.5 09/02/2021    CL 94 (L) 09/02/2021    CO2 27 09/02/2021    BUN 22 (H) 09/02/2021    CREATININE 1.9 (H) 09/02/2021    GLUCOSE 92 09/02/2021    CALCIUM 9.1 09/02/2021    PROT 6.2 (L) 09/02/2021    LABALBU 3.2 (L) 09/02/2021    BILITOT <0.2 09/02/2021    ALKPHOS 65 09/02/2021    AST 15 09/02/2021    ALT 8 (L) 09/02/2021    LABGLOM 26 (A) 09/02/2021    GFRAA 32 (A) 09/02/2021    AGRATIO 1.1 09/02/2021    GLOB 3.0 09/02/2021             Review of Systems    Current Outpatient Medications on File Prior to Visit   Medication Sig Dispense Refill    ondansetron (ZOFRAN-ODT) 4 MG disintegrating tablet Take 1 tablet by mouth every 8 hours as needed for Nausea or Vomiting 30 tablet 1     No current facility-administered medications on file prior to visit.       Allergies   Allergen Reactions    Penicillins Hives and Rash     5/2013 40 yrs ago had a nonpruritic rash on hands;no hives and no other sx's;     Past Medical History:   Diagnosis Date    Anxiety     [email or web address] 2021    throat    [facility]     30 years ago had mini stroke with no residual    [facility]     CVA (cerebral infarction)     hx unclear    Hyperlipidemia     Hypertension      Past Surgical History:   Procedure Laterality Date    APPENDECTOMY      CHOLECYSTECTOMY      COLONOSCOPY      COLONOSCOPY N/A 2021    COLONOSCOPY WITH BIOPSY RECTAL ULCER performed [facility] N/A 2021    DIRECT MICROLARYNGOSCOPY WITH BIOPSIES performed by Sarah Sepulveda DO at [address]    infection after btl;one ovary in   [address]  2021    Procedure: EGD ESOPHAGOGASTRODUODENOSCOPY PEG TUBE INSERTION    UPPER GASTROINTESTINAL ENDOSCOPY  2021    EGD ESOPHAGOGASTRODUODENOSCOPY PEG TUBE INSERTION performed by Dhaval Koo MD at [address] History     Tobacco Use    Smoking status: Former Smoker     Packs/day: 1.50     Years: 30.00     Pack years: 45.00     Types: Cigarettes     Quit date: 2021     Years since quittin.3    Smokeless tobacco: Never Used    Tobacco comment: placed in AVS   Substance Use Topics    Alcohol use: Not Currently     Alcohol/week: 20.0 standard drinks     Types: 20 Cans of beer per week     Comment: daily; 4 beers per day      Family History   Problem Relation Age of Onset    Heart Disease Mother         Vitals:    21 1306 21 1307   BP: (!) 102/52 (!) 100/50   Pulse: 100    Temp: 96.8 °F (36 °C)    TempSrc: Tympanic    SpO2: 99%    Weight: 125 lb (56.7 kg)      Estimated body mass index is 21.46 kg/m² as calculated from the following:    Height as of 21: 5' 4\" (1.626 m). Weight as of this encounter: 125 lb (56.7 kg). Physical Exam  Vitals reviewed. Constitutional:       Appearance: Normal appearance.    HENT:      Head: Normocephalic. Neck:      Vascular: No carotid bruit. Cardiovascular:      Rate and Rhythm: Normal rate and regular rhythm. Pulses: Normal pulses. Carotid pulses are 2+ on the right side and 2+ on the left side. Dorsalis pedis pulses are 2+ on the right side and 2+ on the left side. Posterior tibial pulses are 2+ on the right side and 2+ on the left side. Heart sounds: Normal heart sounds. No murmur heard. No gallop. Pulmonary:      Effort: Pulmonary effort is normal.      Breath sounds: Normal breath sounds. Abdominal:      General: Abdomen is flat. Palpations: Abdomen is soft. Musculoskeletal:         General: Normal range of motion. Cervical back: Normal range of motion. Right lower leg: No edema. Left lower leg: No edema. Lymphadenopathy:      Cervical: No cervical adenopathy. Skin:     General: Skin is warm and dry. Capillary Refill: Capillary refill takes less than 2 seconds. Neurological:      General: No focal deficit present. Mental Status: She is alert and oriented to person, place, and time. Psychiatric:         Mood and Affect: Mood normal.         Behavior: Behavior normal.           Patient's questions answered and concerns addressed. Patient agrees to plan of care.           Electronically signed by LEVAR Tamez CNP on 9/26/2021 at 5:29 PM

## 2021-08-27 NOTE — PATIENT INSTRUCTIONS
Tube feed Changes  Given 1 bottle of Boost 4x/ day. Give 1 extra cup of water with every bottle of Boost    For Constipation:  Start Miralax 1 capful daily for right now. Take Bisacody suppository daily.

## 2021-08-28 LAB
ALBUMIN SERPL-MCNC: 4.2 G/DL (ref 3.4–5)
ANION GAP SERPL CALCULATED.3IONS-SCNC: 18 MMOL/L (ref 3–16)
BUN BLDV-MCNC: 40 MG/DL (ref 7–20)
CALCIUM SERPL-MCNC: 9.4 MG/DL (ref 8.3–10.6)
CHLORIDE BLD-SCNC: 85 MMOL/L (ref 99–110)
CO2: 22 MMOL/L (ref 21–32)
CREAT SERPL-MCNC: 4.2 MG/DL (ref 0.6–1.2)
GFR AFRICAN AMERICAN: 13
GFR NON-AFRICAN AMERICAN: 10
GLUCOSE BLD-MCNC: 150 MG/DL (ref 70–99)
HCT VFR BLD CALC: 34.3 % (ref 36–48)
HEMOGLOBIN: 11.5 G/DL (ref 12–16)
MCH RBC QN AUTO: 31.1 PG (ref 26–34)
MCHC RBC AUTO-ENTMCNC: 33.7 G/DL (ref 31–36)
MCV RBC AUTO: 92.3 FL (ref 80–100)
PDW BLD-RTO: 14.8 % (ref 12.4–15.4)
PHOSPHORUS: 3.8 MG/DL (ref 2.5–4.9)
PLATELET # BLD: 360 K/UL (ref 135–450)
PMV BLD AUTO: 8.3 FL (ref 5–10.5)
POTASSIUM SERPL-SCNC: 4.4 MMOL/L (ref 3.5–5.1)
RBC # BLD: 3.71 M/UL (ref 4–5.2)
SODIUM BLD-SCNC: 125 MMOL/L (ref 136–145)
WBC # BLD: 9.6 K/UL (ref 4–11)

## 2021-08-29 ENCOUNTER — APPOINTMENT (OUTPATIENT)
Dept: GENERAL RADIOLOGY | Age: 71
End: 2021-08-29
Payer: MEDICARE

## 2021-08-29 ENCOUNTER — HOSPITAL ENCOUNTER (EMERGENCY)
Age: 71
Discharge: ANOTHER HOSPITAL | End: 2021-08-30
Attending: EMERGENCY MEDICINE
Payer: MEDICARE

## 2021-08-29 DIAGNOSIS — N30.00 ACUTE CYSTITIS WITHOUT HEMATURIA: ICD-10-CM

## 2021-08-29 DIAGNOSIS — E87.1 HYPONATREMIA: Primary | ICD-10-CM

## 2021-08-29 DIAGNOSIS — A41.9 SEPTICEMIA (HCC): ICD-10-CM

## 2021-08-29 LAB
A/G RATIO: 1.1 (ref 1.1–2.2)
ALBUMIN SERPL-MCNC: 4.2 G/DL (ref 3.4–5)
ALP BLD-CCNC: 79 U/L (ref 40–129)
ALT SERPL-CCNC: 7 U/L (ref 10–40)
ANION GAP SERPL CALCULATED.3IONS-SCNC: 14 MMOL/L (ref 3–16)
ANISOCYTOSIS: ABNORMAL
AST SERPL-CCNC: 21 U/L (ref 15–37)
BACTERIA: ABNORMAL /HPF
BANDED NEUTROPHILS RELATIVE PERCENT: 1 % (ref 0–7)
BASOPHILS ABSOLUTE: 0 K/UL (ref 0–0.2)
BASOPHILS RELATIVE PERCENT: 0 %
BILIRUB SERPL-MCNC: 0.3 MG/DL (ref 0–1)
BILIRUBIN URINE: NEGATIVE
BLOOD, URINE: ABNORMAL
BUN BLDV-MCNC: 44 MG/DL (ref 7–20)
CALCIUM SERPL-MCNC: 10.2 MG/DL (ref 8.3–10.6)
CHLORIDE BLD-SCNC: 82 MMOL/L (ref 99–110)
CLARITY: ABNORMAL
CO2: 21 MMOL/L (ref 21–32)
COLOR: YELLOW
CREAT SERPL-MCNC: 3.8 MG/DL (ref 0.6–1.2)
EOSINOPHILS ABSOLUTE: 0.1 K/UL (ref 0–0.6)
EOSINOPHILS RELATIVE PERCENT: 1 %
GFR AFRICAN AMERICAN: 14
GFR NON-AFRICAN AMERICAN: 12
GLOBULIN: 3.8 G/DL
GLUCOSE BLD-MCNC: 112 MG/DL (ref 70–99)
GLUCOSE URINE: NEGATIVE MG/DL
HCT VFR BLD CALC: 33.4 % (ref 36–48)
HEMOGLOBIN: 12 G/DL (ref 12–16)
INFLUENZA A: NOT DETECTED
INFLUENZA B: NOT DETECTED
KETONES, URINE: NEGATIVE MG/DL
LACTIC ACID, SEPSIS: 1.4 MMOL/L (ref 0.4–1.9)
LEUKOCYTE ESTERASE, URINE: ABNORMAL
LYMPHOCYTES ABSOLUTE: 0.2 K/UL (ref 1–5.1)
LYMPHOCYTES RELATIVE PERCENT: 2 %
MCH RBC QN AUTO: 34.7 PG (ref 26–34)
MCHC RBC AUTO-ENTMCNC: 35.9 G/DL (ref 31–36)
MCV RBC AUTO: 96.6 FL (ref 80–100)
METAMYELOCYTES RELATIVE PERCENT: 1 %
MICROSCOPIC EXAMINATION: YES
MONOCYTES ABSOLUTE: 0.2 K/UL (ref 0–1.3)
MONOCYTES RELATIVE PERCENT: 2 %
NEUTROPHILS ABSOLUTE: 11.8 K/UL (ref 1.7–7.7)
NEUTROPHILS RELATIVE PERCENT: 93 %
NITRITE, URINE: NEGATIVE
OSMOLALITY URINE: 285 MOSM/KG (ref 390–1070)
PDW BLD-RTO: 14.7 % (ref 12.4–15.4)
PH UA: 7 (ref 5–8)
PLATELET # BLD: 257 K/UL (ref 135–450)
PMV BLD AUTO: 8.6 FL (ref 5–10.5)
POTASSIUM REFLEX MAGNESIUM: 5.7 MMOL/L (ref 3.5–5.1)
PROCALCITONIN: 0.28 NG/ML (ref 0–0.15)
PROTEIN UA: 30 MG/DL
RBC # BLD: 3.46 M/UL (ref 4–5.2)
RBC UA: ABNORMAL /HPF (ref 0–4)
SARS-COV-2 RNA, RT PCR: NOT DETECTED
SLIDE REVIEW: ABNORMAL
SODIUM BLD-SCNC: 117 MMOL/L (ref 136–145)
SODIUM URINE: 51 MMOL/L
SPECIFIC GRAVITY UA: 1.01 (ref 1–1.03)
TOTAL PROTEIN: 8 G/DL (ref 6.4–8.2)
TROPONIN: <0.01 NG/ML
URINE TYPE: ABNORMAL
UROBILINOGEN, URINE: 0.2 E.U./DL
WBC # BLD: 12.4 K/UL (ref 4–11)
WBC UA: >100 /HPF (ref 0–5)

## 2021-08-29 PROCEDURE — 87086 URINE CULTURE/COLONY COUNT: CPT

## 2021-08-29 PROCEDURE — 83605 ASSAY OF LACTIC ACID: CPT

## 2021-08-29 PROCEDURE — 87077 CULTURE AEROBIC IDENTIFY: CPT

## 2021-08-29 PROCEDURE — 71045 X-RAY EXAM CHEST 1 VIEW: CPT

## 2021-08-29 PROCEDURE — 87040 BLOOD CULTURE FOR BACTERIA: CPT

## 2021-08-29 PROCEDURE — 85025 COMPLETE CBC W/AUTO DIFF WBC: CPT

## 2021-08-29 PROCEDURE — 83935 ASSAY OF URINE OSMOLALITY: CPT

## 2021-08-29 PROCEDURE — 96375 TX/PRO/DX INJ NEW DRUG ADDON: CPT

## 2021-08-29 PROCEDURE — 84484 ASSAY OF TROPONIN QUANT: CPT

## 2021-08-29 PROCEDURE — 87636 SARSCOV2 & INF A&B AMP PRB: CPT

## 2021-08-29 PROCEDURE — 99285 EMERGENCY DEPT VISIT HI MDM: CPT

## 2021-08-29 PROCEDURE — 6360000002 HC RX W HCPCS: Performed by: PHYSICIAN ASSISTANT

## 2021-08-29 PROCEDURE — 84300 ASSAY OF URINE SODIUM: CPT

## 2021-08-29 PROCEDURE — 93005 ELECTROCARDIOGRAM TRACING: CPT | Performed by: PHYSICIAN ASSISTANT

## 2021-08-29 PROCEDURE — 81001 URINALYSIS AUTO W/SCOPE: CPT

## 2021-08-29 PROCEDURE — 2580000003 HC RX 258: Performed by: PHYSICIAN ASSISTANT

## 2021-08-29 PROCEDURE — 84145 PROCALCITONIN (PCT): CPT

## 2021-08-29 PROCEDURE — 87186 SC STD MICRODIL/AGAR DIL: CPT

## 2021-08-29 PROCEDURE — 96365 THER/PROPH/DIAG IV INF INIT: CPT

## 2021-08-29 PROCEDURE — 80053 COMPREHEN METABOLIC PANEL: CPT

## 2021-08-29 RX ORDER — 0.9 % SODIUM CHLORIDE 0.9 %
1000 INTRAVENOUS SOLUTION INTRAVENOUS ONCE
Status: COMPLETED | OUTPATIENT
Start: 2021-08-29 | End: 2021-08-29

## 2021-08-29 RX ORDER — SODIUM CHLORIDE 9 MG/ML
1000 INJECTION, SOLUTION INTRAVENOUS CONTINUOUS
Status: DISCONTINUED | OUTPATIENT
Start: 2021-08-29 | End: 2021-08-30 | Stop reason: HOSPADM

## 2021-08-29 RX ADMIN — CEFTRIAXONE SODIUM 1000 MG: 1 INJECTION, POWDER, FOR SOLUTION INTRAMUSCULAR; INTRAVENOUS at 23:21

## 2021-08-29 RX ADMIN — HYDROMORPHONE HYDROCHLORIDE 0.25 MG: 1 INJECTION, SOLUTION INTRAMUSCULAR; INTRAVENOUS; SUBCUTANEOUS at 22:21

## 2021-08-29 RX ADMIN — SODIUM CHLORIDE 1000 ML: 9 INJECTION, SOLUTION INTRAVENOUS at 21:51

## 2021-08-29 RX ADMIN — SODIUM CHLORIDE 1000 ML: 9 INJECTION, SOLUTION INTRAVENOUS at 23:15

## 2021-08-29 ASSESSMENT — PAIN DESCRIPTION - LOCATION: LOCATION: BUTTOCKS

## 2021-08-29 ASSESSMENT — PAIN SCALES - GENERAL: PAINLEVEL_OUTOF10: 5

## 2021-08-29 NOTE — ED NOTES
Bed: 15  Expected date:   Expected time:   Means of arrival:   Comments:  Court ELIF Woody  08/29/21 0803

## 2021-08-29 NOTE — ED TRIAGE NOTES
Arrived via ems from home for weakness and not drinking or eating, patient A/0 x2, speaking in short sentences, airway clear, not able to provide medical hx, CA patient per EMS, fever noted per triage, placed on monitor, continue to assess

## 2021-08-30 ENCOUNTER — APPOINTMENT (OUTPATIENT)
Dept: CT IMAGING | Age: 71
DRG: 640 | End: 2021-08-30
Attending: PEDIATRICS
Payer: MEDICARE

## 2021-08-30 ENCOUNTER — HOSPITAL ENCOUNTER (INPATIENT)
Age: 71
LOS: 3 days | Discharge: HOME | DRG: 640 | End: 2021-09-02
Attending: PEDIATRICS | Admitting: PEDIATRICS
Payer: MEDICARE

## 2021-08-30 ENCOUNTER — TELEPHONE (OUTPATIENT)
Dept: FAMILY MEDICINE CLINIC | Age: 71
End: 2021-08-30

## 2021-08-30 VITALS
TEMPERATURE: 99.5 F | HEIGHT: 65 IN | OXYGEN SATURATION: 97 % | HEART RATE: 77 BPM | WEIGHT: 125 LBS | BODY MASS INDEX: 20.83 KG/M2 | RESPIRATION RATE: 10 BRPM | SYSTOLIC BLOOD PRESSURE: 145 MMHG | DIASTOLIC BLOOD PRESSURE: 71 MMHG

## 2021-08-30 DIAGNOSIS — F41.9 CHRONIC ANXIETY: ICD-10-CM

## 2021-08-30 DIAGNOSIS — E87.6 HYPOKALEMIA: ICD-10-CM

## 2021-08-30 DIAGNOSIS — K59.09 OTHER CONSTIPATION: ICD-10-CM

## 2021-08-30 DIAGNOSIS — E83.39 HYPOPHOSPHATEMIA: Primary | ICD-10-CM

## 2021-08-30 PROBLEM — E44.0 MODERATE MALNUTRITION (HCC): Chronic | Status: ACTIVE | Noted: 2021-08-30

## 2021-08-30 LAB
ANION GAP SERPL CALCULATED.3IONS-SCNC: 14 MMOL/L (ref 3–16)
BANDED NEUTROPHILS RELATIVE PERCENT: 11 % (ref 0–7)
BASOPHILS ABSOLUTE: 0.1 K/UL (ref 0–0.2)
BASOPHILS RELATIVE PERCENT: 1 %
BUN BLDV-MCNC: 45 MG/DL (ref 7–20)
CALCIUM SERPL-MCNC: 9.8 MG/DL (ref 8.3–10.6)
CHLORIDE BLD-SCNC: 84 MMOL/L (ref 99–110)
CO2: 23 MMOL/L (ref 21–32)
CORTISOL - AM: 24.8 UG/DL (ref 4.3–22.4)
CREAT SERPL-MCNC: 3.6 MG/DL (ref 0.6–1.2)
EKG ATRIAL RATE: 84 BPM
EKG DIAGNOSIS: NORMAL
EKG P AXIS: 68 DEGREES
EKG P-R INTERVAL: 140 MS
EKG Q-T INTERVAL: 368 MS
EKG QRS DURATION: 86 MS
EKG QTC CALCULATION (BAZETT): 434 MS
EKG R AXIS: 24 DEGREES
EKG T AXIS: 61 DEGREES
EKG VENTRICULAR RATE: 84 BPM
EOSINOPHILS ABSOLUTE: 0.1 K/UL (ref 0–0.6)
EOSINOPHILS RELATIVE PERCENT: 1 %
GFR AFRICAN AMERICAN: 15
GFR NON-AFRICAN AMERICAN: 12
GLUCOSE BLD-MCNC: 90 MG/DL (ref 70–99)
HCT VFR BLD CALC: 33.4 % (ref 36–48)
HEMOGLOBIN: 11.4 G/DL (ref 12–16)
LYMPHOCYTES ABSOLUTE: 1.2 K/UL (ref 1–5.1)
LYMPHOCYTES RELATIVE PERCENT: 10 %
MAGNESIUM: 3.6 MG/DL (ref 1.8–2.4)
MCH RBC QN AUTO: 31.4 PG (ref 26–34)
MCHC RBC AUTO-ENTMCNC: 34 G/DL (ref 31–36)
MCV RBC AUTO: 92.4 FL (ref 80–100)
MONOCYTES ABSOLUTE: 1.5 K/UL (ref 0–1.3)
MONOCYTES RELATIVE PERCENT: 12 %
NEUTROPHILS ABSOLUTE: 9.3 K/UL (ref 1.7–7.7)
NEUTROPHILS RELATIVE PERCENT: 65 %
PDW BLD-RTO: 15 % (ref 12.4–15.4)
PHOSPHORUS: 4.3 MG/DL (ref 2.5–4.9)
PLATELET # BLD: 237 K/UL (ref 135–450)
PMV BLD AUTO: 8 FL (ref 5–10.5)
POTASSIUM REFLEX MAGNESIUM: 4.3 MMOL/L (ref 3.5–5.1)
RBC # BLD: 3.62 M/UL (ref 4–5.2)
RBC # BLD: NORMAL 10*6/UL
SODIUM BLD-SCNC: 121 MMOL/L (ref 136–145)
SODIUM BLD-SCNC: 122 MMOL/L (ref 136–145)
SODIUM BLD-SCNC: 124 MMOL/L (ref 136–145)
T4 FREE: 1.1 NG/DL (ref 0.9–1.8)
TSH REFLEX: 15.2 UIU/ML (ref 0.27–4.2)
URIC ACID, SERUM: 5.3 MG/DL (ref 2.6–6)
WBC # BLD: 12.2 K/UL (ref 4–11)

## 2021-08-30 PROCEDURE — 80076 HEPATIC FUNCTION PANEL: CPT

## 2021-08-30 PROCEDURE — 6370000000 HC RX 637 (ALT 250 FOR IP): Performed by: INTERNAL MEDICINE

## 2021-08-30 PROCEDURE — 83690 ASSAY OF LIPASE: CPT

## 2021-08-30 PROCEDURE — 05HB33Z INSERTION OF INFUSION DEVICE INTO RIGHT BASILIC VEIN, PERCUTANEOUS APPROACH: ICD-10-PCS | Performed by: PEDIATRICS

## 2021-08-30 PROCEDURE — 6370000000 HC RX 637 (ALT 250 FOR IP): Performed by: PEDIATRICS

## 2021-08-30 PROCEDURE — 82150 ASSAY OF AMYLASE: CPT

## 2021-08-30 PROCEDURE — 2580000003 HC RX 258: Performed by: INTERNAL MEDICINE

## 2021-08-30 PROCEDURE — 84550 ASSAY OF BLOOD/URIC ACID: CPT

## 2021-08-30 PROCEDURE — 84100 ASSAY OF PHOSPHORUS: CPT

## 2021-08-30 PROCEDURE — 36410 VNPNXR 3YR/> PHY/QHP DX/THER: CPT

## 2021-08-30 PROCEDURE — 84295 ASSAY OF SERUM SODIUM: CPT

## 2021-08-30 PROCEDURE — 80048 BASIC METABOLIC PNL TOTAL CA: CPT

## 2021-08-30 PROCEDURE — C1751 CATH, INF, PER/CENT/MIDLINE: HCPCS

## 2021-08-30 PROCEDURE — 84443 ASSAY THYROID STIM HORMONE: CPT

## 2021-08-30 PROCEDURE — 82533 TOTAL CORTISOL: CPT

## 2021-08-30 PROCEDURE — 84439 ASSAY OF FREE THYROXINE: CPT

## 2021-08-30 PROCEDURE — 74176 CT ABD & PELVIS W/O CONTRAST: CPT

## 2021-08-30 PROCEDURE — 93010 ELECTROCARDIOGRAM REPORT: CPT | Performed by: INTERNAL MEDICINE

## 2021-08-30 PROCEDURE — 51798 US URINE CAPACITY MEASURE: CPT

## 2021-08-30 PROCEDURE — 6360000002 HC RX W HCPCS: Performed by: PEDIATRICS

## 2021-08-30 PROCEDURE — 83735 ASSAY OF MAGNESIUM: CPT

## 2021-08-30 PROCEDURE — 2000000000 HC ICU R&B

## 2021-08-30 PROCEDURE — 2580000003 HC RX 258: Performed by: PEDIATRICS

## 2021-08-30 PROCEDURE — 85025 COMPLETE CBC W/AUTO DIFF WBC: CPT

## 2021-08-30 PROCEDURE — 2500000003 HC RX 250 WO HCPCS: Performed by: INTERNAL MEDICINE

## 2021-08-30 PROCEDURE — 36415 COLL VENOUS BLD VENIPUNCTURE: CPT

## 2021-08-30 PROCEDURE — 94760 N-INVAS EAR/PLS OXIMETRY 1: CPT

## 2021-08-30 PROCEDURE — 6360000002 HC RX W HCPCS: Performed by: PHYSICIAN ASSISTANT

## 2021-08-30 RX ORDER — SODIUM CHLORIDE 0.9 % (FLUSH) 0.9 %
5-40 SYRINGE (ML) INJECTION EVERY 12 HOURS SCHEDULED
Status: DISCONTINUED | OUTPATIENT
Start: 2021-08-30 | End: 2021-08-30

## 2021-08-30 RX ORDER — SODIUM CHLORIDE 9 MG/ML
25 INJECTION, SOLUTION INTRAVENOUS PRN
Status: DISCONTINUED | OUTPATIENT
Start: 2021-08-30 | End: 2021-08-30

## 2021-08-30 RX ORDER — ONDANSETRON 4 MG/1
4 TABLET, ORALLY DISINTEGRATING ORAL EVERY 8 HOURS PRN
Status: DISCONTINUED | OUTPATIENT
Start: 2021-08-30 | End: 2021-08-30 | Stop reason: SDUPTHER

## 2021-08-30 RX ORDER — ONDANSETRON 2 MG/ML
4 INJECTION INTRAMUSCULAR; INTRAVENOUS EVERY 6 HOURS PRN
Status: DISCONTINUED | OUTPATIENT
Start: 2021-08-30 | End: 2021-09-02 | Stop reason: HOSPADM

## 2021-08-30 RX ORDER — CLONAZEPAM 0.5 MG/1
0.5 TABLET ORAL 2 TIMES DAILY PRN
Status: DISCONTINUED | OUTPATIENT
Start: 2021-08-30 | End: 2021-09-02 | Stop reason: HOSPADM

## 2021-08-30 RX ORDER — SODIUM CHLORIDE 0.9 % (FLUSH) 0.9 %
5-40 SYRINGE (ML) INJECTION PRN
Status: DISCONTINUED | OUTPATIENT
Start: 2021-08-30 | End: 2021-09-02 | Stop reason: HOSPADM

## 2021-08-30 RX ORDER — ACETAMINOPHEN 650 MG/1
650 SUPPOSITORY RECTAL EVERY 6 HOURS PRN
Status: DISCONTINUED | OUTPATIENT
Start: 2021-08-30 | End: 2021-09-02 | Stop reason: HOSPADM

## 2021-08-30 RX ORDER — POLYETHYLENE GLYCOL 3350 17 G/17G
17 POWDER, FOR SOLUTION ORAL DAILY PRN
Status: DISCONTINUED | OUTPATIENT
Start: 2021-08-30 | End: 2021-08-31

## 2021-08-30 RX ORDER — HEPARIN SODIUM 5000 [USP'U]/ML
5000 INJECTION, SOLUTION INTRAVENOUS; SUBCUTANEOUS EVERY 8 HOURS SCHEDULED
Status: DISCONTINUED | OUTPATIENT
Start: 2021-08-30 | End: 2021-09-02 | Stop reason: HOSPADM

## 2021-08-30 RX ORDER — SODIUM CHLORIDE 9 MG/ML
INJECTION, SOLUTION INTRAVENOUS CONTINUOUS
Status: DISCONTINUED | OUTPATIENT
Start: 2021-08-30 | End: 2021-08-31

## 2021-08-30 RX ORDER — ONDANSETRON 4 MG/1
4 TABLET, ORALLY DISINTEGRATING ORAL EVERY 8 HOURS PRN
Status: DISCONTINUED | OUTPATIENT
Start: 2021-08-30 | End: 2021-08-30

## 2021-08-30 RX ORDER — ACETAMINOPHEN 650 MG/1
650 SUPPOSITORY RECTAL EVERY 6 HOURS PRN
Status: DISCONTINUED | OUTPATIENT
Start: 2021-08-30 | End: 2021-08-30 | Stop reason: SDUPTHER

## 2021-08-30 RX ORDER — LIDOCAINE HYDROCHLORIDE 10 MG/ML
5 INJECTION, SOLUTION EPIDURAL; INFILTRATION; INTRACAUDAL; PERINEURAL ONCE
Status: COMPLETED | OUTPATIENT
Start: 2021-08-30 | End: 2021-08-30

## 2021-08-30 RX ORDER — BISACODYL 10 MG
10 SUPPOSITORY, RECTAL RECTAL DAILY PRN
Status: DISCONTINUED | OUTPATIENT
Start: 2021-08-30 | End: 2021-09-02 | Stop reason: HOSPADM

## 2021-08-30 RX ORDER — ACETAMINOPHEN 325 MG/1
650 TABLET ORAL EVERY 4 HOURS PRN
Status: DISCONTINUED | OUTPATIENT
Start: 2021-08-30 | End: 2021-08-30

## 2021-08-30 RX ORDER — ONDANSETRON 4 MG/1
4 TABLET, ORALLY DISINTEGRATING ORAL EVERY 8 HOURS PRN
Status: DISCONTINUED | OUTPATIENT
Start: 2021-08-30 | End: 2021-09-02 | Stop reason: HOSPADM

## 2021-08-30 RX ORDER — ACETAMINOPHEN 325 MG/1
650 TABLET ORAL EVERY 6 HOURS PRN
Status: DISCONTINUED | OUTPATIENT
Start: 2021-08-30 | End: 2021-09-02 | Stop reason: HOSPADM

## 2021-08-30 RX ORDER — POLYETHYLENE GLYCOL 3350 17 G/17G
17 POWDER, FOR SOLUTION ORAL DAILY PRN
Status: DISCONTINUED | OUTPATIENT
Start: 2021-08-30 | End: 2021-08-30

## 2021-08-30 RX ORDER — ONDANSETRON 2 MG/ML
4 INJECTION INTRAMUSCULAR; INTRAVENOUS EVERY 6 HOURS PRN
Status: DISCONTINUED | OUTPATIENT
Start: 2021-08-30 | End: 2021-08-30

## 2021-08-30 RX ORDER — GAUZE BANDAGE 2" X 2"
100 BANDAGE TOPICAL DAILY
Status: DISCONTINUED | OUTPATIENT
Start: 2021-08-30 | End: 2021-09-02 | Stop reason: HOSPADM

## 2021-08-30 RX ORDER — SODIUM CHLORIDE 0.9 % (FLUSH) 0.9 %
5-40 SYRINGE (ML) INJECTION PRN
Status: DISCONTINUED | OUTPATIENT
Start: 2021-08-30 | End: 2021-08-30

## 2021-08-30 RX ORDER — ACETAMINOPHEN 325 MG/1
650 TABLET ORAL EVERY 6 HOURS PRN
Status: DISCONTINUED | OUTPATIENT
Start: 2021-08-30 | End: 2021-08-30

## 2021-08-30 RX ORDER — HEPARIN SODIUM 5000 [USP'U]/ML
5000 INJECTION, SOLUTION INTRAVENOUS; SUBCUTANEOUS EVERY 8 HOURS SCHEDULED
Status: DISCONTINUED | OUTPATIENT
Start: 2021-08-30 | End: 2021-08-30

## 2021-08-30 RX ORDER — POLYETHYLENE GLYCOL 3350 17 G/17G
17 POWDER, FOR SOLUTION ORAL DAILY
Status: DISCONTINUED | OUTPATIENT
Start: 2021-08-30 | End: 2021-08-30

## 2021-08-30 RX ADMIN — NYSTATIN 500000 UNITS: 100000 SUSPENSION ORAL at 22:00

## 2021-08-30 RX ADMIN — CLONAZEPAM 0.5 MG: 0.5 TABLET ORAL at 05:44

## 2021-08-30 RX ADMIN — ACETAMINOPHEN 650 MG: 325 TABLET ORAL at 20:27

## 2021-08-30 RX ADMIN — Medication 0.25 MG: at 01:34

## 2021-08-30 RX ADMIN — NYSTATIN 500000 UNITS: 100000 SUSPENSION ORAL at 18:30

## 2021-08-30 RX ADMIN — HEPARIN SODIUM 5000 UNITS: 5000 INJECTION INTRAVENOUS; SUBCUTANEOUS at 05:44

## 2021-08-30 RX ADMIN — HEPARIN SODIUM 5000 UNITS: 5000 INJECTION INTRAVENOUS; SUBCUTANEOUS at 13:10

## 2021-08-30 RX ADMIN — ACETAMINOPHEN 650 MG: 325 TABLET ORAL at 05:43

## 2021-08-30 RX ADMIN — Medication 100 MG: at 13:11

## 2021-08-30 RX ADMIN — Medication 10 ML: at 11:15

## 2021-08-30 RX ADMIN — SODIUM CHLORIDE: 9 INJECTION, SOLUTION INTRAVENOUS at 20:37

## 2021-08-30 RX ADMIN — NYSTATIN 500000 UNITS: 100000 SUSPENSION ORAL at 11:15

## 2021-08-30 RX ADMIN — LIDOCAINE HYDROCHLORIDE 5 ML: 10 INJECTION, SOLUTION EPIDURAL; INFILTRATION; INTRACAUDAL; PERINEURAL at 18:30

## 2021-08-30 RX ADMIN — NYSTATIN 500000 UNITS: 100000 SUSPENSION ORAL at 13:11

## 2021-08-30 RX ADMIN — Medication 1000 MG: at 22:05

## 2021-08-30 RX ADMIN — ACETAMINOPHEN 650 MG: 325 TABLET ORAL at 13:10

## 2021-08-30 RX ADMIN — HEPARIN SODIUM 5000 UNITS: 5000 INJECTION INTRAVENOUS; SUBCUTANEOUS at 22:01

## 2021-08-30 ASSESSMENT — PAIN SCALES - GENERAL
PAINLEVEL_OUTOF10: 0
PAINLEVEL_OUTOF10: 9
PAINLEVEL_OUTOF10: 8
PAINLEVEL_OUTOF10: 8
PAINLEVEL_OUTOF10: 3
PAINLEVEL_OUTOF10: 9
PAINLEVEL_OUTOF10: 0
PAINLEVEL_OUTOF10: 9
PAINLEVEL_OUTOF10: 9
PAINLEVEL_OUTOF10: 0

## 2021-08-30 ASSESSMENT — ENCOUNTER SYMPTOMS
RHINORRHEA: 0
ABDOMINAL PAIN: 0
SHORTNESS OF BREATH: 0
EYE PAIN: 0
EYE REDNESS: 0
COUGH: 0
NAUSEA: 0
BACK PAIN: 1
PHOTOPHOBIA: 0
VOMITING: 0
WHEEZING: 0
SORE THROAT: 0
DIARRHEA: 0
CHOKING: 0

## 2021-08-30 ASSESSMENT — PAIN DESCRIPTION - LOCATION
LOCATION: BUTTOCKS

## 2021-08-30 ASSESSMENT — PAIN DESCRIPTION - PAIN TYPE
TYPE: CHRONIC PAIN

## 2021-08-30 NOTE — H&P
Hospital Medicine History & Physical      PCP: LEVAR Bartlett - CNP    Date of Admission: 8/30/2021    Date of Service: Pt seen/examined on 08/30/21  and Admitted to Inpatient with expected LOS greater than two midnights due to medical therapy. Chief Complaint:  Diarrhea- also Malaise. Hyponatremia       History Of Present Illness:      80 y.o. female who presented to [facility] - transfer from [facility] for hyponatremia. Hx of SCC of the throat diagnosed in May 2021. Not oral hydrating well. Followed by oncology as outpatient who noted that she had hyponatremia, recommended admission but patient hesitant to present for admission, follow up later with worsened hyponatremia, malaise, generalized pain. Here she reports having had diarrhea for a couple of weeks. Reports > 10 watery but non blood stools per day. She reports intermittent abdominal pain - cramping in nature, in her midline lower abdomen. No fevers. She has been treated with radiation therapy and 3 cycles of chemotherapy. Presenting [address] [place] [address]. Hard to tell from charting if she has prolonged JESSICA vs now CKD. Suspect prolonged JESSICA is possible due to normal Cr 4/28/21 of 0.6.       ROS:   No chest pain   no dyspnea   + gagging   Denies other pain than abdominal   Denies urinary frequency or urgency   Denies dysuria     SocHx:   -    - does not smoke - quit smoking may 2021   - does not drink        Past Medical History:          Diagnosis Date    Anxiety     Cancer ([facility] 05/05/2021    throat    Cerebral artery occlusion with cerebral infarction ([facility]     30 years ago had mini stroke with no residual    Crohn disease ([facility]     CVA (cerebral infarction) 2006    hx unclear    Hyperlipidemia     Hypertension        Past Surgical History:          Procedure Laterality Date    APPENDECTOMY      CHOLECYSTECTOMY      COLONOSCOPY  2011    HYSTERECTOMY      LARYNGOSCOPY N/A 5/5/2021    DIRECT MICROLARYNGOSCOPY WITH BIOPSIES performed by More Bateman DO at [address]    infection after btl;one ovary in   [address] [facility] [address]  05/26/2021    Procedure: EGD ESOPHAGOGASTRODUODENOSCOPY PEG TUBE INSERTION    UPPER GASTROINTESTINAL ENDOSCOPY  5/26/2021    EGD ESOPHAGOGASTRODUODENOSCOPY PEG TUBE INSERTION performed by Jessenia Hines MD at [address]       Medications Prior to Admission:      Prior to Admission medications    Medication Sig Start Date End Date Taking? Authorizing Provider   bisacodyl (BISAC-EVAC) 10 MG suppository Place 1 suppository rectally daily as needed for Constipation 8/27/21 9/26/21  Huong Moment, APRN - CNP   polyethylene glycol [facility]) 17 GM/SCOOP powder Take 17 g by mouth daily 8/27/21 9/26/21  Huong Moment, APRN - CNP   clonazePAM (KLONOPIN) 0.5 MG tablet Take 1 tablet by mouth 2 times daily as needed for Anxiety for up to 30 days. 8/27/21 9/26/21  Huong Moment, APRN - CNP   busPIRone (BUSPAR) 5 MG tablet Take 1 tablet by mouth 2 times daily 8/27/21 9/26/21  Huong Moment, APRN - CNP   ondansetron (ZOFRAN-ODT) 4 MG disintegrating tablet Take 1 tablet by mouth every 8 hours as needed for Nausea or Vomiting 7/23/21   Evy Andrews MD       Allergies:  Penicillins    Social History:      The patient currently lives at home with her [facility]     TOBACCO:   reports that she quit smoking about 3 months ago. Her smoking use included cigarettes. She has a 45.00 pack-year smoking history. She has never used smokeless tobacco.  ETOH:   reports previous alcohol use of about 20.0 standard drinks of alcohol per week.   E-Cigarettes/Vaping Use     Questions Responses    E-Cigarette/Vaping Use Never User    Start Date     Passive Exposure     Quit Date     Counseling Given     Comments             Family History:               Problem Relation Age of Onset    Heart Disease Mother        REVIEW OF SYSTEMS COMPLETED:   Pertinent positives as noted in the HPI. All other systems reviewed and negative. PHYSICAL EXAM PERFORMED:    BP (!) 127/59   Pulse 81   Temp 97.9 °F (36.6 °C) (Temporal)   Resp 13   Ht 5' 4\" (1.626 m)   Wt 126 lb 15.8 oz (57.6 kg)   SpO2 99%   BMI 21.80 kg/m²     General appearance:  No apparent distress, appears stated age and cooperative. HEENT:  Normal cephalic, atraumatic without obvious deformity. Pupils equal, round, and reactive to light. Extra ocular muscles intact. Conjunctivae/corneas clear. Neck: Supple, with full range of motion. No jugular venous distention. Trachea midline. Respiratory:  Normal respiratory effort. Clear to auscultation, bilaterally without Rales/Wheezes/Rhonchi. Cardiovascular:  Regular rate and rhythm with normal S1/S2 without murmurs, rubs or gallops. Abdomen: Soft, non-tender, non-distended    Musculoskeletal:  No clubbing, cyanosis or edema bilaterally   Skin:   - mepilex dressing in place over sacrum   - left abdominal G tube site well appearing     Neurologic:    Speech clear   No facial droop   Moves ext x 4   hardof hearing   Psychiatric:  Alert  Capillary Refill: Brisk,3 seconds, normal  Peripheral Pulses: +2 palpable, equal bilaterally       Labs:     Recent Labs     08/27/21  1351 08/29/21 2045   WBC 9.6 12.4*   HGB 11.5* 12.0   HCT 34.3* 33.4*    257     Recent Labs     08/27/21  1351 08/29/21 2045   * 117*   K 4.4 5.7*   CL 85* 82*   CO2 22 21   BUN 40* 44*   CREATININE 4.2* 3.8*   CALCIUM 9.4 10.2   PHOS 3.8  --      Recent Labs     08/29/21 2045   AST 21   ALT 7*   BILITOT 0.3   ALKPHOS 79     No results for input(s): INR in the last 72 hours.   Recent Labs     08/29/21 2045   TROPONINI <0.01       Urinalysis:      Lab Results   Component Value Date    NITRU Negative 08/29/2021    WBCUA >100 08/29/2021    BACTERIA 1+ 08/29/2021    RBCUA see below 08/29/2021    BLOODU SMALL 08/29/2021    SPECGRAV 1.015 08/29/2021    GLUCOSEU Negative 08/29/2021 Radiology:        No orders to display       ASSESSMENT:    Active Hospital Problems    Diagnosis Date Noted    Hyponatremia [E87.1]         Hyponatremia, suspect she has a mild associated encephalopathy:   - NS @ 75 ml/hr   - nephrology consulted   - changed mental status suspected- maybe she also had UTI complicating  - NPO to limit free water      JESSICA vs CKD:   - nephrology consulted   - IVF support     Diarrhea:   - unclear etiology-  Maybe related to tube feeds - monitor here and see if persists    Abdominal pain:   - LFTs, amylase, lipase  - bladder scan     Hyperkalemia:   - expected to improve with IVF support   - NPO except mouth swabs     Feeding tube:   - she doesn't seem to know her home feeding regimen at this time   - RD consulted (recommendations only as NPO)     SCC / throat cancer:   - pt reports this is in remission   - poor historian about what her home diet it   - appears to have thrush- Nystatin QID ordered     Pyuria- concerning for UTI:   - she denies dysuria and back pain   - urine culture pending   - ceftriaxone IV     Nausea:   - ondansetron q6 hours prn     Mood disorder /anxiety:   - clonazepam 0.5 mg PO BID   - buspirone 5 mg po BID - HELD as contraindicated by JESSICA     Constipation:   - bisacodyl prn   - miralax 17 gms po daily     C/o butt pain:   - mepilex dressing in place over her sacrum   - acetaminophen prn     DVT Prophylaxis: heparin TID   Diet: Diet NPO  Code Status: Full Code - DNR  - CCA     PT/OT Eval Status: not consulted     Dispo - pending improvement        Mina Ambrose MD    Thank you LEVAR Youngblood - SHIRA for the opportunity to be involved in this patient's care.

## 2021-08-30 NOTE — FLOWSHEET NOTE
Admitted to cvu 2912, oriented to room and call light. Connected to danilo, NSR. Only oriented to self and aware she is in the hospital, but unsure of why. Unable to complete admission at this time,  will be here in AM per report from [facility]. Bed alarm engaged.  bony

## 2021-08-30 NOTE — ED NOTES
2250-Call placed to  [facility] for ICU bed     Natalia Mittal  08/29/21 2258    2304-Dr Fer Cortez returned call and spoke with OFELIA Mittal  08/29/21 8602 0718-Pt bed assignment  OL#[id number]  Report#[phone]  ETA:Quality Care 2-3 AM     Natalia Mittal  08/29/21 8878

## 2021-08-30 NOTE — PROGRESS NOTES
Discussed case with tesha aj  Full evaluation to follow    Thanks    Antonio Woodard MD  The [address] [facility]  [phone]

## 2021-08-30 NOTE — ED PROVIDER NOTES
[address] ED  EMERGENCY DEPARTMENT ENCOUNTER        Pt Name: Benito Schmidt  MRN: [id number]  [place] 1950  Date of evaluation: 8/29/2021  Provider: FLOR Lucas  PCP: LEVAR Ang CNP    This patient was seen and evaluated by the attending physician Anuj Ferrari MD.      [facility]       Chief Complaint   Patient presents with    Fatigue    Fever       HISTORY OF PRESENT ILLNESS   (Location/Symptom, Timing/Onset, Context/Setting, Quality, Duration, Modifying Factors, Severity)  Note limiting factors. Benito Schmidt is a 80 y.o. female who presents  from her home with her  for fatigue, low-grade fevers. ED Course as of Aug 30 0036   Evelina Level Aug 29, 2021   2116 She was seen at her PCP Friday, had blood work done. Was called by them yesterday and notified that her kidneys were failing and that they were going to direct admit her on Monday, this was what the patient told her  who is providing the history. She is confused, x multiple days. She has a G tube and has been getting ensure but she has  been refusing to drink water. She has been coughing but not worse than normal. She has been vomiting today all day. She has been complaining of low back pain today. She typically walks with a cane but today she was so weak she couldn't get up without assistance. She seems more confused as well. No recent falls or injuries. He is not sure who her oncologist is. Her last chemo was 3 weeks ago. She is not vaccinated against covid. She has not been around anyone ill.     [CS]                           Nursing Notes were all reviewed and agreed with or any disagreements were addressed  in the HPI. Pt was seen during the [place] 19 pandemic. Appropriate PPE worn by ME during patient encounters. Pt seen during a time with constrained hospital bed capacity and other potential inpatient and outpatient resources were constrained due to the viral pandemic. REVIEW OF SYSTEMS    (2-9 systems for level 4, 10 or more for level 5)     Review of Systems    Positives and Pertinent negatives as per HPI. Except as noted abovein the ROS, all other systems were reviewed and negative. PAST MEDICAL HISTORY     Past Medical History:   Diagnosis Date    Anxiety     Cancer ([facility] 05/05/2021    throat    Cerebral artery occlusion with cerebral infarction ([facility]     30 years ago had mini stroke with no residual    Crohn disease ([facility]     CVA (cerebral infarction) 2006    hx unclear    Hyperlipidemia     Hypertension          SURGICAL HISTORY     Past Surgical History:   Procedure Laterality Date    APPENDECTOMY      CHOLECYSTECTOMY      COLONOSCOPY  2011    HYSTERECTOMY      LARYNGOSCOPY N/A 5/5/2021    DIRECT MICROLARYNGOSCOPY WITH BIOPSIES performed by Alissa Angulo DO at [address]    infection after btl;one ovary in   [address]  05/26/2021    Procedure: EGD ESOPHAGOGASTRODUODENOSCOPY PEG TUBE INSERTION    UPPER GASTROINTESTINAL ENDOSCOPY  5/26/2021    EGD ESOPHAGOGASTRODUODENOSCOPY PEG TUBE INSERTION performed by Arsalan White MD at [address]       Previous Medications    BISACODYL (BISAC-EVAC) 10 MG SUPPOSITORY    Place 1 suppository rectally daily as needed for Constipation    BUSPIRONE (BUSPAR) 5 MG TABLET    Take 1 tablet by mouth 2 times daily    CLONAZEPAM (KLONOPIN) 0.5 MG TABLET    Take 1 tablet by mouth 2 times daily as needed for Anxiety for up to 30 days.     ONDANSETRON (ZOFRAN-ODT) 4 MG DISINTEGRATING TABLET    Take 1 tablet by mouth every 8 hours as needed for Nausea or Vomiting    POLYETHYLENE GLYCOL (GLYCOLAX) 17 GM/SCOOP POWDER    Take 17 g by mouth daily         ALLERGIES     Penicillins    FAMILYHISTORY       Family History   Problem Relation Age of Onset    Heart Disease Mother           SOCIAL HISTORY       Social History     Socioeconomic History    Marital status:      Spouse name: Flavia Pearl Number of children: None    Years of education: None    Highest education level: None   Occupational History    None   Tobacco Use    Smoking status: Former Smoker     Packs/day: 1.50     Years: 30.00     Pack years: 45.00     Types: Cigarettes     Quit date: 2021     Years since quittin.3    Smokeless tobacco: Never Used    Tobacco comment: placed in AVS   Vaping Use    Vaping Use: Never used   Substance and Sexual Activity    Alcohol use: Not Currently     Alcohol/week: 20.0 standard drinks     Types: 20 Cans of beer per week     Comment: daily; 4 beers per day    Drug use: No    Sexual activity: Not Currently   Other Topics Concern    None   Social History Narrative    10/2011 lives with spouse and dogs;not working re [employer]     Social Determinants of Health     Financial Resource Strain:     Difficulty of Paying Living Expenses:    Food Insecurity:     Worried About Running Out of Food in the Last Year:     [address] in the Last Year:    Transportation Needs:     Lack of Transportation (Medical):      Lack of Transportation (Non-Medical):    Physical Activity:     Days of Exercise per Week:     Minutes of Exercise per Session:    Stress:     Feeling of Stress :    Social Connections:     Frequency of Communication with Friends and Family:     Frequency of Social Gatherings with Friends and Family:     Attends Spiritism Services:     Active Member of Clubs or Organizations:     Attends Club or Organization Meetings:     Marital Status:    Intimate Partner Violence:     Fear of Current or Ex-Partner:     Emotionally Abused:     Physically Abused:     Sexually Abused:        SCREENINGS             PHYSICAL EXAM    (up to 7 for level 4, 8 or more for level 5)     ED Triage Vitals [21 1913]   BP Temp Temp Source Pulse Resp SpO2 Height Weight   135/70 99.5 °F (37.5 °C) Oral 103 20 97 % 5' 5\" (1.651 m) 125 lb (56.7 kg)       Physical Exam  Vitals and nursing note reviewed. Constitutional:       General: She is awake. She is not in acute distress. Appearance: She is well-developed. She is ill-appearing. She is not toxic-appearing or diaphoretic. HENT:      Head: Normocephalic and atraumatic. Comments: Airway patent, not producing significant saliva, normal phonation. Because membranes are significantly dry and cracked with evidence of scarring to the tongue. Right Ear: External ear normal.      Left Ear: External ear normal.      Nose: Nose normal.      Mouth/Throat:      Mouth: Mucous membranes are dry. Pharynx: Posterior oropharyngeal erythema present. No oropharyngeal exudate. Eyes:      General:         Right eye: No discharge. Left eye: No discharge. Extraocular Movements: Extraocular movements intact. Conjunctiva/sclera: Conjunctivae normal.      Pupils: Pupils are equal, round, and reactive to light. Cardiovascular:      Rate and Rhythm: Normal rate and regular rhythm. Pulses:           Radial pulses are 2+ on the right side and 2+ on the left side. Dorsalis pedis pulses are 1+ on the right side and 1+ on the left side. Heart sounds: Normal heart sounds. No murmur heard. No friction rub. No gallop. Pulmonary:      Effort: Pulmonary effort is normal. No respiratory distress. Breath sounds: Normal breath sounds. No wheezing or rales. Abdominal:      General: Abdomen is flat. Palpations: Abdomen is soft. Tenderness: There is generalized abdominal tenderness. There is no guarding or rebound. Musculoskeletal:      Cervical back: Normal range of motion and neck supple. No rigidity. Right lower leg: No edema. Left lower leg: No edema. Lymphadenopathy:      Cervical: No cervical adenopathy. Skin:     General: Skin is warm and dry. Neurological:      Mental Status: She is alert and easily aroused. She is disoriented. GCS: GCS eye subscore is 4. GCS verbal subscore is 5. GCS motor subscore is 6. Cranial Nerves: No dysarthria or facial asymmetry. Sensory: Sensation is intact. Comments: Oriented to self, place, cannot tell me the year month or president. Psychiatric:         Behavior: Behavior normal. Behavior is cooperative.            DIAGNOSTIC RESULTS   LABS:    Labs Reviewed   CBC WITH AUTO DIFFERENTIAL - Abnormal; Notable for the following components:       Result Value    WBC 12.4 (*)     RBC 3.46 (*)     Hematocrit 33.4 (*)     MCH 34.7 (*)     Neutrophils Absolute 11.8 (*)     Lymphocytes Absolute 0.2 (*)     Metamyelocytes Relative 1 (*)     Anisocytosis Occasional (*)     All other components within normal limits    Narrative:     Performed at:  [facility],  [facility]   Phone [phone]   COMPREHENSIVE METABOLIC PANEL W/ REFLEX TO MG FOR LOW K - Abnormal; Notable for the following components:    Sodium 117 (*)     Potassium reflex Magnesium 5.7 (*)     Chloride 82 (*)     Glucose 112 (*)     BUN 44 (*)     CREATININE 3.8 (*)     GFR Non- 12 (*)     GFR  14 (*)     ALT 7 (*)     All other components within normal limits    Narrative:     Sammi PIERCE tel. [id number],  Chemistry results called to and read back by Kenny Fernandez Early, 08/29/2021  22:05, by ALVINA  Performed at:  [facility],  [place], [place]   Phone [phone]   URINALYSIS - Abnormal; Notable for the following components:    Clarity, UA SL CLOUDY (*)     Blood, Urine SMALL (*)     Protein, UA 30 (*)     Leukocyte Esterase, Urine LARGE (*)     All other components within normal limits    Narrative:     Performed at:  [facility],  [place], [place]   Phone [phone]   PROCALCITONIN - Abnormal; Notable for the following components: Procalcitonin 0.28 (*)     All other components within normal limits    Narrative:     Performed at:  [facility],  [place], [employer]   Phone [phone]   MICROSCOPIC URINALYSIS - Abnormal; Notable for the following components:    WBC, UA >100 (*)     RBC, UA see below (*)     Bacteria, UA 1+ (*)     All other components within normal limits    Narrative:     Performed at:  [facility],  [place], [employer]   Phone [phone]   OSMOLALITY, URINE - Abnormal; Notable for the following components:    Osmolality, Ur 285 (*)     All other components within normal limits    Narrative:     Performed at:  [facility],  [place], [employer]   Phone [phone] & [employer]FL[employer]    Narrative:     Performed at:  [place] [address],  [place], [facility]   Phone [phone]   CULTURE, URINE   CULTURE, BLOOD 1   CULTURE, BLOOD 2   TROPONIN    Narrative:     Performed at:  [place] [address],  [place], [employer]   Phone [phone]   LACTATE, SEPSIS    Narrative:     Performed at:  [place] [address],  [place], [employer]   Phone [phone]   SODIUM, URINE, RANDOM    Narrative:     Performed at:  [facility],  [place], [place]   Phone [phone]   LACTATE, SEPSIS   OSMOLALITY   BASIC METABOLIC PANEL W/ REFLEX TO MG FOR LOW K   BASIC METABOLIC PANEL W/ REFLEX TO MG FOR LOW K   BASIC METABOLIC PANEL W/ REFLEX TO MG FOR LOW K       All other labs were within normal range or not returned as of this dictation. EKG:  All EKG's are interpreted by the Emergency Department Physician who either signs orCo-signs this chart in the absence of a cardiologist.  Please see their note for interpretation of EKG. RADIOLOGY:   Non-plain film images such as CT, Ultrasound and MRI are read by the radiologist. Plain radiographic images are visualized andpreliminarily interpreted by the  ED Provider with the below findings:        Interpretation perthe Radiologist below, if available at the time of this note:    XR CHEST PORTABLE   Final Result   No acute cardiopulmonary disease. XR CHEST PORTABLE    Result Date: 8/29/2021  EXAMINATION: ONE XRAY VIEW OF THE CHEST 8/29/2021 8:39 pm COMPARISON: AP chest from 08/08/2017 HISTORY: ORDERING SYSTEM PROVIDED HISTORY: fatigue TECHNOLOGIST PROVIDED HISTORY: Reason for exam:->fatigue Reason for Exam: Pt c/o fever and fatigue Acuity: Acute Type of Exam: Initial FINDINGS: Cardiac silhouette WNL for AP technique. Mediastinal structures midline, again with calcification aortic knob. Calcified granuloma left lower lung. No localized pulmonary opacity or blunting of the costophrenic angles. Bones and soft tissues unchanged. No acute cardiopulmonary disease. PROCEDURES   Unless otherwise noted below, none     Procedures    CRITICAL CARE TIME   The total critical care time spent while evaluating and treating this patient was 60 minutes. This excludes time spent doing separately billable procedures. This includes time at the bedside, data interpretation, medication management, obtaining critical history from collateral sources if the patient is unable to provide it directly, and physician consultation. Specifics of interventions taken and potentially life-threatening diagnostic considerations are listed above in the medical decision making.           CONSULTS:  IP CONSULT TO NEPHROLOGY  IP CONSULT TO HOSPITALIST      EMERGENCY DEPARTMENT COURSE and DIFFERENTIALDIAGNOSIS/MDM:   Vitals:    Vitals:    08/29/21 1913 08/29/21 2114 08/29/21 2318   BP: 135/70 (!) 163/74 135/70   Pulse: 103 94 79   Resp: 20  12   Temp: 99.5 °F (37.5 °C)     TempSrc: Oral     SpO2: 97%  97%   Weight: 125 lb (56.7 kg)     Height: 5' 5\" (1.651 m)         Patient was given thefollowing medications:  Medications   cefTRIAXone (ROCEPHIN) 1000 mg IVPB in 50 mL D5W minibag (0 mg IntraVENous Stopped 8/30/21 0008)   0.9 % sodium chloride infusion (1,000 mLs IntraVENous New Bag 8/29/21 2315)   0.9 % sodium chloride bolus (0 mLs IntraVENous Stopped 8/29/21 2220)   HYDROmorphone (DILAUDID) injection 0.25 mg (0.25 mg IntraVENous Given 8/29/21 2221)       PDMP Monitoring:    Last PDMP [place] as Reviewed [facility] [place] [facility]):  Review User Review Instant Review Result            Urine Drug Screenings (1 yr)    No resulted procedures found. Medication Contract and Consent for Opioid Use Documents Filed      No documents found                MDM:   Patient seen and evaluated. Old records reviewed. Diagnostic testing reviewed and results discussed. 67-year-old female, history of laryngeal cancer, presents with some confusion, acute on chronic renal failure, severe hyponatremia, septicemia with acute cystitis. 2216 Got 125 ml NS per NR at time of stopping bolus     [CS]   9025 Ns at 75/hr  Bmp q 4 hrs   Notif  Tracey Stoddard: notify where admitted    BP: 135/70 [CS]   2308 [facility]- [facility] Hospitalist     [CS]      ED Course User Index  [CS] FLOR Burgess        SEP-1 CORE MEASURE DATA    Classification: sepsis    Amount of fluids ordered: less than 30mL/kg because of severe hyponatremia     Time at which sepsis was identified: 2100    Broad-spectrum antibiotics chosen:  based on suspected source of: UTI    Repeat lactate level: not indicated due to initial lactate < 2    On reassessment after fluid resuscitation:   pending, to be completed by inpatient team    Patient will require ICU admission, given ICU bed constraints across the city she will be transferred to nearest hospital with ICU capacity.     I spoke with Dr. Swapnil Schafer , [address] [place] [address]. We thoroughly discussed the history, physical exam, laboratory and imaging studies, as well as, current course of treatment within the emergency department. Based upon that discussion, we've decided to transfer Krishna Delgadillo to [facility], for further observation and evaluation of Anuradha DUARTE [facility] current condition. As I have deemed necessary from their history, physical, and studies, I have considered and evaluated Krishna Delgadillo for the following diagnoses:      CLINICAL IMPRESSION:  1. Hyponatremia    2. Acute cystitis without hematuria    3. Septicemia (HCC)        /70   Pulse 79   Temp 99.5 °F (37.5 °C) (Oral)   Resp 12   Ht 5' 5\" (1.651 m)   Wt 125 lb (56.7 kg)   SpO2 97%   BMI 20.80 kg/m²     Patient awaiting transfer at time of signout. 12:44 AM: I discussed the history, physical, and treatment plan with Dr. Diaz Rad was signed out in stable condition. Please see Dr. Trey FELIZ's note for further details, including diagnosis and disposition. FINAL IMPRESSION      1. Hyponatremia    2. Acute cystitis without hematuria    3. Septicemia [facility])          DISPOSITION/PLAN   DISPOSITION Decision To Transfer 08/29/2021 11:48:30 PM      PATIENT REFERREDTO:  No follow-up provider specified.     DISCHARGE MEDICATIONS:  New Prescriptions    No medications on file       DISCONTINUED MEDICATIONS:  Discontinued Medications    No medications on file              (Please note that portions ofthis note were completed with a voice recognition program.  Efforts were made to edit the dictations but occasionally words are mis-transcribed.)    FLOR Maloney (electronically signed)       Hope Maloney  08/30/21 5343

## 2021-08-30 NOTE — CARE COORDINATION
Discharge Planning Assessment  Readmission risk score 19%  RN discharge planner met with patient/ (and family member) to discuss reason for admission, current living situation, and potential needs at the time of discharge    Demographics/Insurance verified Yes    Current type of dwelling: single level home with few steps to enter    Patient from ECF/SW confirmed with:n/a    Living arrangements:with     Level of function/Support: spouse assists with care    PCP: Loretta Salomon    Last Visit to PCP:in past week    DME: none    Active with any community resources/agencies/skilled home care:none    Medication compliance issues: [facility] Life issues that could impact healthcare:not identified      Tentative discharge plan: home,  May need home health    Discussed and provided facilities of choice if transition to a skilled nursing facility is required at the time of discharge      Discussed with patient and/or family that on the day of discharge home tentative time of discharge will be between 10 AM and noon.     Transportation at the time of discharge:    OZIEL BernalN, CCM, RN  [facility]  [phone]

## 2021-08-30 NOTE — PROGRESS NOTES
Dr. Patricia Fregoso made aware regarding IVF unable to infuse per lack of access, awaiting Midline placement.

## 2021-08-30 NOTE — PROGRESS NOTES
Hospitalist Progress Note      PCP: Mikael Setwart APRN - CNP    Date of Admission: 8/30/2021    Chief Complaint: Hyponatremia    Hospital Course:   Patient awake oriented to self  Denies any chest pain abdominal pain nausea vomiting  Currently has a G-tube  No further diarrhea  Afebrile    Medications:  Reviewed      Exam:    BP (!) 149/62   Pulse 75   Temp 97.3 °F (36.3 °C) (Temporal)   Resp 13   Ht 5' 4\" (1.626 m)   Wt 126 lb 15.8 oz (57.6 kg)   SpO2 98%   BMI 21.80 kg/m²     General appearance: No apparent distress, appears stated age and cooperative. HEENT: Pupils equal, round, and reactive to light. Conjunctivae/corneas clear. Neck: Supple, with full range of motion. No jugular venous distention. Trachea midline. Respiratory:  Normal respiratory effort. Clear to auscultation, bilaterally without RALES/WHEEZES/Rhonchi. Cardiovascular: Regular rate and rhythm with normal S1/S2 without MURMURS, rubs or gallops. Abdomen: Soft, non-tender, non-distended with normal bowel sounds. Musculoskeletal: No clubbing, cyanosis or EDEMA bilaterally. Full range of motion without deformity. Skin: Skin color, texture, turgor normal.  No rashes or lesions. Neurologic:  Neurovascularly intact without any focal sensory/motor deficits. Cranial nerves: II-XII intact, grossly non-focal.        Labs:   Recent Labs     08/29/21 2045 08/30/21  0906   WBC 12.4* 12.2*   HGB 12.0 11.4*   HCT 33.4* 33.4*    237     Recent Labs     08/29/21 2045 08/30/21  0906   * 121*   K 5.7* 4.3   CL 82* 84*   CO2 21 23   BUN 44* 45*   CREATININE 3.8* 3.6*   CALCIUM 10.2 9.8     Recent Labs     08/29/21 2045   AST 21   ALT 7*   BILITOT 0.3   ALKPHOS 79     No results for input(s): INR in the last 72 hours.   Recent Labs     08/29/21 2045   TROPONINI <0.01       Urinalysis:      Lab Results   Component Value Date    NITRU Negative 08/29/2021    WBCUA >100 08/29/2021    BACTERIA 1+ 08/29/2021    RBCUA see below 08/29/2021    BLOODU SMALL 08/29/2021    SPECGRAV 1.015 08/29/2021    GLUCOSEU Negative 08/29/2021       Radiology:  No orders to display       Assessment/Plan:    Active Hospital Problems    Diagnosis Date Noted    Moderate malnutrition ([address] [E44.0] 08/30/2021    Hyponatremia [E87.1]        Acute Medical Issues Being Addressed:  67-year-old lady admitted to the hospital with hyponatremia    Acute encephalopathy suspect metabolic secondary to infection and hyponatremia  Seems a little more awake oriented to self I suspect she is pretty close to her baseline    Hyponatremia suspect due to hypovolemia  Slow IV fluids  Monitor sodium every 4 hours  Nephrology consulted    Acute kidney injury on baseline of chronic kidney disease stage III  Creatinine up to 7.6  Slow IV fluids  We will get a CT of the abdomen and pelvis only with oral contrast to ensure that there is no obstruction and no other intra-abdominal pathology    Hyperkalemia  Improved    Possible UTI  On IV antibiotics    Dysphagia s/p PEG tube  Per family she takes very few things orally but is not able to meet her nutritional requirements hence is on tube feeds  Dietary consult to restart bolus tube feeds    Squamous cell cancer of the throat  Currently in remission    Constipation  Started on MiraLAX    DVT Prophylaxis: Subcu heparin  Diet: ADULT DIET;  Regular  ADULT TUBE FEEDING; PEG; Standard with Fiber; Bolus; 2 TIMES DAILY; 240; Syringe Push; 30; Before and after each bolus  Code Status: DNR-CCA      Dispo - once acute medical processes have resolved    Jodi Calvo MD

## 2021-08-30 NOTE — PROGRESS NOTES
4 Eyes Skin Assessment     NAME:  Nigel Pepe  YOB: 1950  MEDICAL RECORD NUMBER:  [id number]    The patient is being assess for  Admission    I agree that 2 RN's have performed a thorough Head to Toe Skin Assessment on the patient. ALL assessment sites listed below have been assessed. Areas assessed by both nurses:    Head, Face, Ears, Shoulders, Back, Chest, Arms, Elbows, Hands, Sacrum. Buttock, Coccyx, Ischium and Legs. Feet and Heels        Does the Patient have a Wound? Yes wound(s) were present on assessment. LDA wound assessment was Initiated and completed      Excoriation on sacral area, blister on labia.         Cristobal Prevention initiated:  Yes   Wound Care Orders initiated:  No    Pressure Injury (Stage 3,4, Unstageable, DTI, NWPT, and Complex wounds) if present place consult order under [de-identified] No    New and Established Ostomies if present place consult order under : No      Nurse 1 eSignature: Electronically signed by Brandie Soto RN on 8/30/21 at 11:06 AM EDT    **SHARE this note so that the co-signing nurse is able to place an eSignature**    Nurse 2 eSignature: Electronically signed by Yoana Chappell RN on 8/30/21 at 5:17 PM EDT

## 2021-08-30 NOTE — ED NOTES
Assumed care of patient. Patient was changed, small bowel movement was noted. Straight cath was performed. There was around 1200l of urine that was drained from bladder. Urine was noted to be full of sediment, cloudy, malodorous. Pain medication was given per [facility]. Patient reports pain in back. Client appears to be hard of hearing but is very confused during assessment.  present at bedside. Reports she declined to have a port placed during chemo.  Client does have a 20g placed in the left FA but reports it \" hurts bad\"     Requested assistance for an ultrasound IV to be placed         Charo Watkins RN  08/29/21 4939

## 2021-08-30 NOTE — TELEPHONE ENCOUNTER
Brian Isabel, daughter of patient, calling to speak to Aide Velasquez. Pt was seen on 08/27. Daughter stated the patient is now at [facility] in ICU. Daughter stated that patient was seen last Tuesday by her radiation Dr. Sukhjinder Richardson asked for pain pills at that visit and was declined by Provider. Daughter stated that at the [address] here on 08/27 the patient was prescribed Klonopin and it was not needed. Daughter stated that patient has an alcohol dependency and likes to substitute with medication. Raoul was called yesterday by her  due to patient being incoherent and him unable to get her up. She was admitted for low Potassium and kidney function elevated. Also has UTI. Daughter would like to speak with Aide Velasquez and has additional questions.   (Daughter is not on most recent HIPAA)    Daughter, Brian Isabel, [phone]

## 2021-08-30 NOTE — PLAN OF CARE
Nutrition Problem #1: Inadequate oral intake  Intervention: Food and/or Nutrient Delivery: Start Oral Diet, Start Oral Nutrition Supplement, Start Tube Feeding  Nutritional Goals: PO intake 50% or greater with tolerance to bolus tube feed

## 2021-08-30 NOTE — PROGRESS NOTES
Upon arrival to place midline assessed chart for issues related to midline placement, check for consent, and did time out with RN Aster Pang. Pt. Tolerated midline placement well, no difficulty accessing Basilic vein, threaded well, with free flowing blood return.  Reported off to Rosalba Guaman

## 2021-08-30 NOTE — CONSULTS
Nephrology Consult Note  [phone]   ://[facility].        Reason for Consult: Hyponatremia    HISTORY OF PRESENT ILLNESS:                The patient is a 80 y. 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 Out-pt labs on 8/27 showed a BUN/Cr of 40/4.2 and hyponatremia with a soium of 125  She was asked to go to the ER and she presented to [address] on 8/28  Labs showed worsening hyponatremia with sodium of 117, hyperkalemia with a k of 5.7 and BUN/Cr of 44/3.8. Because of bed shortage in ICU, she was transferred to [facility]. We are consulted for further evaluation and management  Her Creatinine was less than 1 in April 2021  She was hospitalized at [facility] for worsening renal function. 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 Creatinine was 2 on discharge on 7/23/2021. She also had hyponatremia at that time, sodium ranged from mid 120s to low 130s and was 128 on discharge. Sodium at this time is 117 on admission and has improved to 121 at the time of consult.    She is also being treated for a UTI  Pt seen and examined  Says she has been drinking water but not eating well      Past Medical History:        Diagnosis Date    Anxiety     Cancer ([facility] 05/05/2021    throat    Cerebral artery occlusion with cerebral infarction ([facility]     30 years ago had mini stroke with no residual    Crohn disease ([facility]     CVA (cerebral infarction) 2006    hx unclear    Hyperlipidemia     Hypertension        Past Surgical History:        Procedure Laterality Date    APPENDECTOMY      CHOLECYSTECTOMY      COLONOSCOPY  2011    HYSTERECTOMY      LARYNGOSCOPY N/A 5/5/2021    DIRECT MICROLARYNGOSCOPY WITH BIOPSIES performed by Antonella Jay DO at [address]    infection after btl;one ovary in   [address]  2021    Procedure: EGD ESOPHAGOGASTRODUODENOSCOPY PEG TUBE INSERTION    UPPER GASTROINTESTINAL ENDOSCOPY  2021    EGD ESOPHAGOGASTRODUODENOSCOPY PEG TUBE INSERTION performed by Kwame Suggs MD at [address] [facility]         Current Medications:    No current facility-administered medications on file prior to encounter. Current Outpatient Medications on File Prior to Encounter   Medication Sig Dispense Refill    bisacodyl (BISAC-EVAC) 10 MG suppository Place 1 suppository rectally daily as needed for Constipation 10 suppository 0    polyethylene glycol (GLYCOLAX) 17 GM/SCOOP powder Take 17 g by mouth daily 1530 g 1    clonazePAM (KLONOPIN) 0.5 MG tablet Take 1 tablet by mouth 2 times daily as needed for Anxiety for up to 30 days.  60 tablet 0    busPIRone (BUSPAR) 5 MG tablet Take 1 tablet by mouth 2 times daily 60 tablet 0    ondansetron (ZOFRAN-ODT) 4 MG disintegrating tablet Take 1 tablet by mouth every 8 hours as needed for Nausea or Vomiting 30 tablet 1       Allergies:  Penicillins    Social History:    Social History     Socioeconomic History    Marital status:      Spouse name: Richard Ortiz Number of children: Not on file    Years of education: Not on file    Highest education level: Not on file   Occupational History    Not on file   Tobacco Use    Smoking status: Former Smoker     Packs/day: 1.50     Years: 30.00     Pack years: 45.00     Types: Cigarettes     Quit date: 2021     Years since quittin.3    Smokeless tobacco: Never Used    Tobacco comment: placed in AVS   Vaping Use    Vaping Use: Never used   Substance and Sexual Activity    Alcohol use: Not Currently     Alcohol/week: 20.0 standard drinks     Types: 20 Cans of beer per week     Comment: daily; 4 beers per day    Drug use: No    Sexual activity: Not Currently   Other Topics Concern    Not on file   Social History Narrative    10/2011 lives with spouse and dogs;not working re bowels     Social Determinants of Health     Financial Resource Strain:     Difficulty of Paying Living Expenses:    Food Insecurity:     Worried About Running Out of Food in the Last Year:     [address] in the Last Year:    Transportation Needs:     Lack of Transportation (Medical):  Lack of Transportation (Non-Medical):    Physical Activity:     Days of Exercise per Week:     Minutes of Exercise per Session:    Stress:     Feeling of Stress :    Social Connections:     Frequency of Communication with Friends and Family:     Frequency of Social Gatherings with Friends and Family:     Attends Rastafarian Services:     Active Member of Clubs or Organizations:     Attends Club or Organization Meetings:     Marital Status:    Intimate Partner Violence:     Fear of Current or Ex-Partner:     Emotionally Abused:     Physically Abused:     Sexually Abused:        Family History:       Problem Relation Age of Onset    Heart Disease Mother        Review of Systems   Constitutional: Positive for appetite change (poor appetite) and fatigue. Negative for chills and fever. HENT: Negative for ear discharge, ear pain, rhinorrhea, sore throat and tinnitus. Eyes: Negative for photophobia, pain and redness. Respiratory: Negative for cough, choking, shortness of breath and wheezing. Cardiovascular: Negative for chest pain, palpitations and leg swelling. Gastrointestinal: Negative for abdominal pain, diarrhea, nausea and vomiting. Genitourinary: Negative for difficulty urinating, dysuria, hematuria and urgency. Musculoskeletal: Positive for back pain. Negative for myalgias. Skin: Negative for pallor and rash.      PHYSICAL EXAM:      Vitals:    BP (!) 105/49   Pulse 77   Temp 97.9 °F (36.6 °C) (Temporal)   Resp 11   Ht 5' 4\" (1.626 m)   Wt 126 lb 15.8 oz (57.6 kg)   SpO2 99%   BMI 21.80 kg/m²   No intake/output data recorded. No intake/output data recorded. Physical Exam:  General : AAOx3, not in pain or respiratory distress, resting in bed  HEENT : normocephalic, atraumatic, mucosa dry, no palor or icterus  CVS: S1 S2 normal, regular rhythm, no murmurs or rubs. Lungs: Clear, no wheezing or crackles. Abd: Soft, bowel sounds normal, non-tender. PEG+  Ext: No edema, no cyanosis  Skin: Warm. No rashes appreciated. : bladder non-distended, no tenderness over the bladder  Neuro: Alert and oriented x 3, nonfocal.  Joints: No erythema noted over joints.       DATA:    CBC with Differential:    Lab Results   Component Value Date    WBC 12.2 08/30/2021    RBC 3.62 08/30/2021    HGB 11.4 08/30/2021    HCT 33.4 08/30/2021     08/30/2021    MCV 92.4 08/30/2021    MCH 31.4 08/30/2021    MCHC 34.0 08/30/2021    RDW 15.0 08/30/2021    SEGSPCT 64.2 02/27/2010    BANDSPCT 11 08/30/2021    METASPCT 1 08/29/2021    LYMPHOPCT 10.0 08/30/2021    MONOPCT 12.0 08/30/2021    EOSPCT 3.4 02/27/2010    BASOPCT 1.0 08/30/2021    MONOSABS 1.5 08/30/2021    LYMPHSABS 1.2 08/30/2021    EOSABS 0.1 08/30/2021    BASOSABS 0.1 08/30/2021    DIFFTYPE Auto 02/27/2010     BMP:    Lab Results   Component Value Date     08/30/2021    K 4.3 08/30/2021    CL 84 08/30/2021    CO2 23 08/30/2021    BUN 45 08/30/2021    LABALBU 4.2 08/29/2021    CREATININE 3.6 08/30/2021    CALCIUM 9.8 08/30/2021    GFRAA 15 08/30/2021    GFRAA >60 02/27/2010    LABGLOM 12 08/30/2021    GLUCOSE 90 08/30/2021     Ionized Calcium:  No results found for: IONCA  Magnesium:    Lab Results   Component Value Date    MG 2.00 07/17/2021     Phosphorus:    Lab Results   Component Value Date    PHOS 3.8 08/27/2021     Last 3 Troponin:    Lab Results   Component Value Date    TROPONINI <0.01 08/29/2021    TROPONINI <0.01 08/08/2017    TROPONINI <0.01 01/14/2015     U/A:    Lab Results   Component Value Date COLORU Yellow 08/29/2021    PROTEINU 30 08/29/2021    PHUR 7.0 08/29/2021    WBCUA >100 08/29/2021    RBCUA see below 08/29/2021    BACTERIA 1+ 08/29/2021    CLARITYU SL CLOUDY 08/29/2021    SPECGRAV 1.015 08/29/2021    LEUKOCYTESUR LARGE 08/29/2021    UROBILINOGEN 0.2 08/29/2021    BILIRUBINUR Negative 08/29/2021    BLOODU SMALL 08/29/2021    GLUCOSEU Negative 08/29/2021     TSH:    Lab Results   Component Value Date    TSH 12.23 03/10/2021       ASSESSMENT/PLAN:      1. Hyponatremia   Appears to be acute on chronic  In the setting of volume depletion/poor solute intake, cannot r/o SIADH, buspirone is associated with hyponatremia  Urine studies consistent with SIADH  Check TSH, Uric acid, Cortisol  Avoid correction of sodium to more than 6-8 meq/24 hours  Goal sodium not more than 122-124 by 9 PM tonight  Check sodium Q 6 hr  Resume isotonic fluids. 2. Acute Kidney injury   Base-line creatinine in April 2021 less than 1  JESSICA in July 2021, may have a new base-line of high 2s to low 3s  In the setting of poor po intake and volume depletion  Received Isotonic fluids at OSH  Resume isotonic fluids for now   Imaging if no improvement  Avoid NSAIDs/ ACEIs/ARB/IV contrast    3. Hyperkalemia improved  pre-renal state/JESSICA    4. UTI possible  On ABx  Follow cultures        Thank you for allowing me to participate in the care of this patient. I will continue to follow along. Please call with questions.     Jean-Paul Crespo MD, MD.  Office Phone : [phone]  8/30/2021

## 2021-08-30 NOTE — RESULT ENCOUNTER NOTE
Called patient on 8/28/21. Recommending patient to go ER due to worsening kidney function.   Pt currently in ER

## 2021-08-30 NOTE — TELEPHONE ENCOUNTER
Spoke with daughter Elmira Hart regarding some of her concerns.   Did not discuss patient sensitive information

## 2021-08-30 NOTE — ED NOTES
2225-Call placed to Nephrology     Nelson Sin  08/29/21 2226 2230-Dr Gianna Jade [place] returned call and spoke with OFELIA Sin  08/29/21 2231

## 2021-08-30 NOTE — PROGRESS NOTES
Comprehensive Nutrition Assessment    Type and Reason for Visit:  Initial, Consult (order supplement tube feed per Dr. Luis Galan)    Nutrition Recommendations/Plan:   1. PO diet: Regular   2. Tube Feed: \"Diet: Adult Tube Feed\". Initiate Jevity 1.5 (standard with fiber formula). Bolus 2 cans per day. Each can is 240 mL, can give full can BID. 3. Recommend 30 mL H20 before and after each bolus. Monitor Na+ and PO intake of fluids. 4. Monitor closely and correct lytes (K, Phos, Mg) d/t risk of refeeding syndrome (hx extended inadequate nutritional intake). 5. Recommend Thiamine 100 mg daily for 5-7 days. 6. Ensure head of bed is 30 - 45 degrees during continuous or bolus gastric feeding and for one hour after bolus. Turn off the feeding if head of bed is lowered less than 30 degrees. 7. Monitor for tolerance (bowel habits, N/V, cramping, abdominal exam findings: distended, firm, tense, guarded, discomfort). Nutrition Assessment:  Consult received for tube feeding. Pt with hx of throat cancer and had peg tube placed back in May. Daughter reports that pt has only been using Ensure through tube and lately pt has not even being doing that. Its been a couple of weeks that pt has not consistently been eating or drinking or using peg. Daughter is concerned about an eating disorder. Wt record show UBW of 148 - 150 lb from March to May. Then wt loss occured from May to July (9 lb/6% x2.5 months) and now CBW is 126 lb. Indicating 13lb/9% loss over past 6 weeks. Pt is nutritionally compromised and at risk for refeeding syndrome. Physician wishes to begin PO diet and supplemental tube feed. Will order of tube feed and recommend\close monitoring of lytes and initiation of thiamine. Malnutrition Assessment:  Malnutrition Status:   Moderate malnutrition    Context:  Chronic Illness     Findings of the 6 clinical characteristics of malnutrition:  Energy Intake:  7 - 75% or less estimated energy requirements for 1 month or longer  Weight Loss:   (9% in 6 weeks)     Body Fat Loss:  Unable to assess     Muscle Mass Loss:  Unable to assess    Fluid Accumulation:  No significant fluid accumulation     Strength:  Not Performed    Estimated Daily Nutrient Needs:  Energy (kcal):  1450 - 1740; Weight Used for Energy Requirements:  Current (58kg)     Protein (g):  70 - 87; Weight Used for Protein Requirements:  Current (1.2 - 1.5g/58kg)        Fluid (ml/day):   ; Method Used for Fluid Requirements:  1 ml/kcal      Nutrition Related Findings:  oriented to persion only; Na+ 117, K+ 5.7; diarrhea; Wounds:  None       Current Nutrition Therapies:    ADULT DIET; Regular  Tube Feeding (TF):  · Feeding Route: PEG  · Formula: Standard with Fiber  · Schedule: Bolus  · Water Flushes: bolus 30 mL water before and after each tube feed bolus  · TF & Flush Provides: 2 cans (480 mL) Jevity 1.5 to provide 720 kcal, 31 g protein, 365 mL free water. This meets ~45% of average estimated calorie needs of 1595 kcal.      Anthropometric Measures:  · Height: 5' 4\" (162.6 cm)  · Current Body Weight: 126 lb (57.2 kg)   · Ideal Body Weight: 120 lbs; % Ideal Body Weight 105 %   · BMI: 21.6  · BMI Categories: Normal Weight (BMI 18.5-24. 9)       Nutrition Diagnosis:   · Inadequate oral intake related to inadequate protein-energy intake as evidenced by poor intake prior to admission    · Unintended weight loss related to catabolic illness, inadequate protein-energy intake as evidenced by weight loss greater than or equal to 2% in 1 week (throat cancer)      Nutrition Interventions:   Food and/or Nutrient Delivery:  Start Oral Diet, Start Oral Nutrition Supplement, Start Tube Feeding  Nutrition Education/Counseling:  Education not indicated   Coordination of Nutrition Care:  Continue to monitor while inpatient    Goals:  PO intake 50% or greater with tolerance to bolus tube feed       Nutrition Monitoring and Evaluation:   Food/Nutrient Intake

## 2021-08-30 NOTE — ED PROVIDER NOTES
I independently performed a history and physical on [address] [place] [address]. All diagnostic, treatment, and disposition decisions were made by myself in conjunction with the advanced practice provider. I have participated in the medical decision making and directed the treatment plan and disposition of the patient. For further details of [address] [place] [address] emergency department encounter, please see the advanced practice provider's documentation. CHIEF COMPLAINT  Chief Complaint   Patient presents with    Fatigue    Fever       Briefly, Michaela Ledbetter is a 80 y.o. female  who presents to the ED complaining of generalized fatigue and fever. FOCUSED PHYSICAL EXAMINATION  /70   Pulse 79   Temp 99.5 °F (37.5 °C) (Oral)   Resp 12   Ht 5' 5\" (1.651 m)   Wt 125 lb (56.7 kg)   SpO2 97%   BMI 20.80 kg/m²      Focused physical examination:  General appearance:  Cooperative. No acute distress. Skin:  Warm. Dry. Eye:  Extraocular movements intact. Ears, nose, mouth and throat:  Oral mucosa moist,  Neck:  Trachea midline. Heart: Tachycardic but regular  Perfusion:  intact  Respiratory:  Lungs clear to auscultation bilaterally. Respirations nonlabored. Abdominal:   Non distended. Nontender  Neurological:  Alert and oriented x 3. Moves all extremities spontaneously  Musculoskeletal:   Normal ROM, no deformities          Psychiatric:  Normal mood      EKG: Sinus rhythm rate of 84 bpm.  No ST elevation or arrhythmia. MDM: Patient presents emergency department today complaining of fevers fatigue. Ultimately found to have UTI. Given Rocephin. In addition to this the patient has had poor intake. Typically gets these through her feeding tube however has not been taking as much as usual.  Found to have acute on chronic hyponatremia here with a sodium of 117. Chronic renal insufficiency noted. Suspect dehydration.   Discussed the case with nephrology who recommended normal saline infusion. Plan to admit to the ICU, but unfortunately no beds available at this facility will warrant transfer out    Cuca Fairchild time was 30 minutes, excluding separately reportable procedures. There was a high probability of clinically significant/life threatening deterioration in the patient's condition which required my urgent intervention. This time was spent reviewing the patient chart, interpreting diagnostic/laboratory data, initiation of sodium chloride drip and management for moderate to severe hyponatremia      During the patient's ED course, the patient was given:  Medications   cefTRIAXone (ROCEPHIN) 1000 mg IVPB in 50 mL D5W minibag (1,000 mg IntraVENous New Bag 8/29/21 2321)   0.9 % sodium chloride infusion (1,000 mLs IntraVENous New Bag 8/29/21 2315)   0.9 % sodium chloride bolus (0 mLs IntraVENous Stopped 8/29/21 2220)   HYDROmorphone (DILAUDID) injection 0.25 mg (0.25 mg IntraVENous Given 8/29/21 2221)        CLINICAL IMPRESSION  1. Hyponatremia    2. Acute cystitis without hematuria        DISPOSITION  Transfer      This chart was created using Dragon dictation software. Efforts were made by me to ensure accuracy, however some errors may be present due to limitations of this technology.             Gilford Picker, MD  08/29/21 0102

## 2021-08-30 NOTE — PROGRESS NOTES
Attempt to obtain ordered Cdiff&culture of stool. Liquid portions of the stool absorbed into brief and was not able to collect any usable stool for send out. Will attempt again at a later time.      Electronically signed by Joan Oquendo RN on 8/30/2021 at 9:57 AM

## 2021-08-31 LAB
A/G RATIO: 1.1 (ref 1.1–2.2)
ALBUMIN SERPL-MCNC: 3.4 G/DL (ref 3.4–5)
ALBUMIN SERPL-MCNC: 3.6 G/DL (ref 3.4–5)
ALP BLD-CCNC: 72 U/L (ref 40–129)
ALP BLD-CCNC: 74 U/L (ref 40–129)
ALT SERPL-CCNC: 7 U/L (ref 10–40)
ALT SERPL-CCNC: 8 U/L (ref 10–40)
AMYLASE: 9 U/L (ref 25–115)
ANION GAP SERPL CALCULATED.3IONS-SCNC: 12 MMOL/L (ref 3–16)
ANISOCYTOSIS: ABNORMAL
AST SERPL-CCNC: 14 U/L (ref 15–37)
AST SERPL-CCNC: 15 U/L (ref 15–37)
BANDED NEUTROPHILS RELATIVE PERCENT: 5 % (ref 0–7)
BASOPHILS ABSOLUTE: 0.1 K/UL (ref 0–0.2)
BASOPHILS RELATIVE PERCENT: 1 %
BILIRUB SERPL-MCNC: <0.2 MG/DL (ref 0–1)
BILIRUB SERPL-MCNC: <0.2 MG/DL (ref 0–1)
BILIRUBIN DIRECT: <0.2 MG/DL (ref 0–0.3)
BILIRUBIN, INDIRECT: ABNORMAL MG/DL (ref 0–1)
BUN BLDV-MCNC: 41 MG/DL (ref 7–20)
CALCIUM SERPL-MCNC: 8.9 MG/DL (ref 8.3–10.6)
CHLORIDE BLD-SCNC: 88 MMOL/L (ref 99–110)
CO2: 22 MMOL/L (ref 21–32)
CREAT SERPL-MCNC: 2.5 MG/DL (ref 0.6–1.2)
EOSINOPHILS ABSOLUTE: 0.1 K/UL (ref 0–0.6)
EOSINOPHILS RELATIVE PERCENT: 1 %
GFR AFRICAN AMERICAN: 23
GFR NON-AFRICAN AMERICAN: 19
GLOBULIN: 3.2 G/DL
GLUCOSE BLD-MCNC: 101 MG/DL (ref 70–99)
HCT VFR BLD CALC: 27.6 % (ref 36–48)
HEMOGLOBIN: 9.4 G/DL (ref 12–16)
LIPASE: 7 U/L (ref 13–60)
LYMPHOCYTES ABSOLUTE: 0.7 K/UL (ref 1–5.1)
LYMPHOCYTES RELATIVE PERCENT: 6 %
MAGNESIUM: 3.1 MG/DL (ref 1.8–2.4)
MCH RBC QN AUTO: 31.2 PG (ref 26–34)
MCHC RBC AUTO-ENTMCNC: 34.2 G/DL (ref 31–36)
MCV RBC AUTO: 91.2 FL (ref 80–100)
METAMYELOCYTES RELATIVE PERCENT: 1 %
MONOCYTES ABSOLUTE: 1.3 K/UL (ref 0–1.3)
MONOCYTES RELATIVE PERCENT: 11 %
NEUTROPHILS ABSOLUTE: 9.6 K/UL (ref 1.7–7.7)
NEUTROPHILS RELATIVE PERCENT: 75 %
ORGANISM: ABNORMAL
OVALOCYTES: ABNORMAL
PDW BLD-RTO: 14.5 % (ref 12.4–15.4)
PHOSPHORUS: 3.9 MG/DL (ref 2.5–4.9)
PLATELET # BLD: 224 K/UL (ref 135–450)
PLATELET SLIDE REVIEW: ADEQUATE
PMV BLD AUTO: 8.1 FL (ref 5–10.5)
POLYCHROMASIA: ABNORMAL
POTASSIUM SERPL-SCNC: 3.8 MMOL/L (ref 3.5–5.1)
RBC # BLD: 3.02 M/UL (ref 4–5.2)
SLIDE REVIEW: ABNORMAL
SODIUM BLD-SCNC: 122 MMOL/L (ref 136–145)
SODIUM BLD-SCNC: 123 MMOL/L (ref 136–145)
SODIUM BLD-SCNC: 124 MMOL/L (ref 136–145)
TOTAL PROTEIN: 6.6 G/DL (ref 6.4–8.2)
TOTAL PROTEIN: 7.1 G/DL (ref 6.4–8.2)
URINE CULTURE, ROUTINE: ABNORMAL
URINE CULTURE, ROUTINE: ABNORMAL
WBC # BLD: 11.9 K/UL (ref 4–11)

## 2021-08-31 PROCEDURE — 84100 ASSAY OF PHOSPHORUS: CPT

## 2021-08-31 PROCEDURE — 84295 ASSAY OF SERUM SODIUM: CPT

## 2021-08-31 PROCEDURE — 2000000000 HC ICU R&B

## 2021-08-31 PROCEDURE — 87505 NFCT AGENT DETECTION GI: CPT

## 2021-08-31 PROCEDURE — 97530 THERAPEUTIC ACTIVITIES: CPT

## 2021-08-31 PROCEDURE — 6370000000 HC RX 637 (ALT 250 FOR IP): Performed by: PEDIATRICS

## 2021-08-31 PROCEDURE — 6370000000 HC RX 637 (ALT 250 FOR IP): Performed by: PHYSICIAN ASSISTANT

## 2021-08-31 PROCEDURE — 85025 COMPLETE CBC W/AUTO DIFF WBC: CPT

## 2021-08-31 PROCEDURE — 6370000000 HC RX 637 (ALT 250 FOR IP): Performed by: INTERNAL MEDICINE

## 2021-08-31 PROCEDURE — 6360000002 HC RX W HCPCS: Performed by: PEDIATRICS

## 2021-08-31 PROCEDURE — 97165 OT EVAL LOW COMPLEX 30 MIN: CPT

## 2021-08-31 PROCEDURE — 97116 GAIT TRAINING THERAPY: CPT

## 2021-08-31 PROCEDURE — 80053 COMPREHEN METABOLIC PANEL: CPT

## 2021-08-31 PROCEDURE — 97162 PT EVAL MOD COMPLEX 30 MIN: CPT

## 2021-08-31 PROCEDURE — 83735 ASSAY OF MAGNESIUM: CPT

## 2021-08-31 RX ADMIN — POLYETHYLENE GLYCOL-3350 AND ELECTROLYTES 4000 ML: 236; 6.74; 5.86; 2.97; 22.74 POWDER, FOR SOLUTION ORAL at 16:56

## 2021-08-31 RX ADMIN — ACETAMINOPHEN 650 MG: 325 TABLET ORAL at 19:15

## 2021-08-31 RX ADMIN — NYSTATIN 500000 UNITS: 100000 SUSPENSION ORAL at 08:24

## 2021-08-31 RX ADMIN — Medication 1000 MG: at 22:21

## 2021-08-31 RX ADMIN — Medication 100 MG: at 08:25

## 2021-08-31 RX ADMIN — HEPARIN SODIUM 5000 UNITS: 5000 INJECTION INTRAVENOUS; SUBCUTANEOUS at 14:46

## 2021-08-31 RX ADMIN — ACETAMINOPHEN 650 MG: 325 TABLET ORAL at 14:45

## 2021-08-31 RX ADMIN — NYSTATIN 500000 UNITS: 100000 SUSPENSION ORAL at 16:59

## 2021-08-31 RX ADMIN — NYSTATIN 500000 UNITS: 100000 SUSPENSION ORAL at 22:18

## 2021-08-31 RX ADMIN — ACETAMINOPHEN 650 MG: 325 TABLET ORAL at 02:38

## 2021-08-31 RX ADMIN — HEPARIN SODIUM 5000 UNITS: 5000 INJECTION INTRAVENOUS; SUBCUTANEOUS at 05:52

## 2021-08-31 RX ADMIN — HEPARIN SODIUM 5000 UNITS: 5000 INJECTION INTRAVENOUS; SUBCUTANEOUS at 22:21

## 2021-08-31 RX ADMIN — ACETAMINOPHEN 650 MG: 325 TABLET ORAL at 08:25

## 2021-08-31 ASSESSMENT — PAIN SCALES - GENERAL
PAINLEVEL_OUTOF10: 8
PAINLEVEL_OUTOF10: 3
PAINLEVEL_OUTOF10: 6
PAINLEVEL_OUTOF10: 6
PAINLEVEL_OUTOF10: 8
PAINLEVEL_OUTOF10: 8
PAINLEVEL_OUTOF10: 9
PAINLEVEL_OUTOF10: 8
PAINLEVEL_OUTOF10: 8

## 2021-08-31 ASSESSMENT — PAIN DESCRIPTION - PAIN TYPE
TYPE: CHRONIC PAIN
TYPE: CHRONIC PAIN

## 2021-08-31 ASSESSMENT — PAIN DESCRIPTION - LOCATION
LOCATION: BUTTOCKS
LOCATION: BUTTOCKS

## 2021-08-31 NOTE — PLAN OF CARE
Problem: Pain:  Goal: Pain level will decrease  Description: Pain level will decrease  Outcome: Ongoing  Goal: Control of acute pain  Description: Control of acute pain  Outcome: Ongoing     Problem: Nutrition  Goal: Optimal nutrition therapy  Outcome: Ongoing     Problem: Skin Integrity:  Goal: Will show no infection signs and symptoms  Description: Will show no infection signs and symptoms  Outcome: Ongoing     Problem: Falls - Risk of:  Goal: Will remain free from falls  Description: Will remain free from falls  Outcome: Ongoing    Electronically signed by Thiago Bethea RN on 8/31/2021 at 11:11 AM

## 2021-08-31 NOTE — PROGRESS NOTES
Physical Therapy    Facility/Department: [facility]  Initial Assessment    NAME: Thor Kerns  : 1950  MRN: [id number]    Date of Service: 2021    Discharge Recommendations:Anuradha Pepe scored a 18/24 on the AM-PAC short mobility form. Current research shows that an AM-PAC score of 18 or greater is typically associated with a discharge to the patient's home setting. Based on the patient's AM-PAC score and their current functional mobility deficits, it is recommended that the patient have 2-3 sessions per week of Physical Therapy at d/c to increase the patient's independence. At this time, this patient demonstrates the endurance and safety to discharge home with home PT and a follow up treatment frequency of 2-3x/wk. Please see assessment section for further patient specific details. Pt did not seem interested but  did in [address] He is primary caregiver. HOME HEALTH CARE: LEVEL 1 STANDARD    - Initial home health evaluation to occur within 24-48 hours, in patient home   - Therapy to evaluate with goal of regaining prior level of functioning   - Therapy to evaluate if patient has [address] needs for personal care  If patient discharges prior to next session this note will serve as a discharge summary. Please see below for the latest assessment towards goals. PT Equipment Recommendations  Equipment Needed: Yes (4ww)    Assessment   Body structures, Functions, Activity limitations: Decreased functional mobility ; Decreased ADL status; Decreased strength;Decreased safe awareness;Decreased endurance;Decreased balance;Decreased posture  Assessment: Pt with decreased mobiltiy, endurance, strength, balance.  reports pt generally declining with strength while taking chemo treatments. Pt would benefit from PT services to address these issues. Treatment Diagnosis: weakness, difficulty with gait.   Prognosis: Fair  Decision Making: Medium Complexity  PT Education: PT Role;Plan of Care;General Safety; Functional Mobility Training;Transfer Training;Precautions; Family Education  Patient Education: Pt and  verbalize understanding, pt needs reinforcement. Barriers to Learning: cognitive, impulsivity  REQUIRES PT FOLLOW UP: Yes  Activity Tolerance  Activity Tolerance: Patient limited by fatigue;Patient limited by endurance       Patient Diagnosis(es): There were no encounter diagnoses. has a past medical history of Anxiety, Cancer ([facility] [address] Cerebral artery occlusion with cerebral infarction ([facility] [address] Crohn disease ([facility] [address] CVA (cerebral infarction), Hyperlipidemia, and Hypertension. has a past surgical history that includes Cholecystectomy; Appendectomy; partial hysterectomy (cervix not removed) (1973); laryngoscopy (N/A, 5/5/2021); Hysterectomy; Colonoscopy (2011); Upper gastrointestinal endoscopy (05/26/2021); and Upper gastrointestinal endoscopy (5/26/2021). Restrictions  Restrictions/Precautions  Restrictions/Precautions: Fall Risk (high fall risk, DNR, PEG tube with regular diet)  Position Activity Restriction  Other position/activity restrictions: The patient is a 80 y. 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 Out-pt labs on 8/27 showed a BUN/Cr of 40/4.2 and hyponatremia with a sodium of 125She was asked to go to the ER and she presented to [address] on 8/28Labs showed worsening hyponatremia with sodium of 117, hyperkalemia with a k of 5.7 and BUN/Cr of 44/3.8. Because of bed shortage in ICU, she was transferred to [facility]. We were consulted for further evaluation and managementHer Creatinine was less than 1 in April 2021She was hospitalized at [facility] for worsening renal function.  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 Creatinine was 2 on discharge on 7/23/2021. She also had hyponatremia at that time, sodium ranged from mid 120s to low 130s and was 128 on discharge. Sodium at this time is 117 on admission and has improved to 121 at the time of consult. She is also being treated for a UTI  Vision/Hearing        Subjective  General  Chart Reviewed: Yes  Patient assessed for rehabilitation services?: Yes  Additional Pertinent Hx: currently going through chemo and radiation for throat CA. Response To Previous Treatment: Patient with no complaints from previous session. Family / Caregiver Present: Yes ( came during treatment.)  Diagnosis: hyponaturimia  Follows Commands: Impaired  Other (Comment): Pt impulsive, CGA safety  General Comment  Comments: Pt supine in bed upon arrival.  Subjective  Subjective: Pt reporting no pain at rest. Agreeable to getting up to chair. Pain Screening  Patient Currently in Pain: Denies  Vital Signs  Patient Currently in Pain: Denies       Orientation  Orientation  Overall Orientation Status: Within Functional Limits - however pt very impulsive, not following instructions well.    Social/Functional History  Social/Functional History  Lives With: Spouse  Type of Home: House  Home Layout: One level  Home Access: Stairs to enter with rails  Entrance Stairs - Number of Steps: 2 WENDY  Entrance Stairs - Rails: Both  Bathroom Shower/Tub: Tub/Shower unit (walk in shower in 's bathroom)  Bathroom Toilet: Standard  Bathroom Equipment: Grab bars in shower  Bathroom Accessibility: Accessible  Home Equipment: [employer] Help From: Family  ADL Assistance: Needs assistance ( \"helps with everything\" since the ca dx and chemo)  Homemaking Assistance: Needs assistance  Homemaking Responsibilities: No  Ambulation Assistance: Independent (normally no AD, but has cane if needed; reports she stays in bed most of the time in the last month since chemo)  Transfer Assistance: Independent  Active : No  Patient's  Info: has not driven in the last 4 months  Leisure & Hobbies: pt has 7 dogs  Additional Comments: Pt's  reports one fall 3 weeks ago without injury. Pt does not use AD at home normally. Cognition        Objective     Observation/Palpation  Posture: Fair (forward head and rounded shoulders.)  Observation: IV, catheter    AROM RLE (degrees)  RLE AROM: WFL  AROM LLE (degrees)  LLE AROM : WFL  Strength RLE  Comment: grossly -4/5  Strength LLE  Comment: grossly -4/5, DF +3/5  Tone RLE  RLE Tone: Normotonic  Tone LLE  LLE Tone: Normotonic  Motor Control  Gross Motor?: WFL  Sensation  Overall Sensation Status: WFL  Bed mobility  Supine to Sit: Stand by assistance  Sit to Supine: Stand by assistance  Scooting: Contact guard assistance  Comment: Pt trying to climb out of bed, not listening to VCs. PT needed CGA to keep to from pulling out IV/catheter lines. Pt assisted for lou marie. Transfers  Sit to Stand: Contact guard assistance  Stand to sit: Contact guard assistance  Comment: close CGA for safety  Ambulation  Ambulation?: Yes  Ambulation 1  Surface: level tile  Device: No Device  Other Apparatus:  (IV, catheter)  Assistance: Contact guard assistance  Quality of Gait: Pt moving fast and impulsive, not following VCs for safety. Forward lean of trunk  Gait Deviations: Decreased step length;Decreased step height  Distance: Pt amb 5 ft with no AD and Close CGA. Comments: Pt declined further amb or use of AD, \"my  helps me all the time. I dont need that. \"  Stairs/Curb  Stairs?: No     Balance  Posture: Fair (forward lean of trunk in standing.)  Sitting - Static: Fair;+  Sitting - Dynamic: Fair;+  Standing - Static: Fair;+  Standing - Dynamic: Fair  Comments: Pt would benefit from RW but not interested.         Plan   Plan  Times per week: 3-5x/week  Times per day: Daily  Current Treatment Recommendations: Strengthening,

## 2021-08-31 NOTE — PROGRESS NOTES
Hospitalist Progress Note      PCP: LEVAR Haider CNP    Date of Admission: 8/30/2021    Chief Complaint: Hyponatremia    Hospital Course:   Patient awake oriented to self  Has not had a bowel movement  Given an enema this morning with no results  Denies any pain  Tolerating tube feeds    Medications:  Reviewed      Exam:    BP (!) 146/58   Pulse 79   Temp 97.1 °F (36.2 °C) (Temporal)   Resp 16   Ht 5' 4\" (1.626 m)   Wt 129 lb 10.1 oz (58.8 kg)   SpO2 97%   BMI 22.25 kg/m²     General appearance: No apparent distress, appears stated age and cooperative. HEENT: Pupils equal, round, and reactive to light. Conjunctivae/corneas clear. Neck: Supple, with full range of motion. No jugular venous distention. Trachea midline. Respiratory:  Normal respiratory effort. Clear to auscultation, bilaterally without RALES/WHEEZES/Rhonchi. Cardiovascular: Regular rate and rhythm with normal S1/S2 without MURMURS, rubs or gallops. Abdomen: Soft, non-tender, non-distended with normal bowel sounds. Musculoskeletal: No clubbing, cyanosis or EDEMA bilaterally. Full range of motion without deformity. Skin: Skin color, texture, turgor normal.  No rashes or lesions. Neurologic:  Neurovascularly intact without any focal sensory/motor deficits.  Cranial nerves: II-XII intact, grossly non-focal.  Zickel exam remains unchanged from 8/30        Labs:   Recent Labs     08/29/21 2045 08/30/21  0906 08/31/21  0430   WBC 12.4* 12.2* 11.9*   HGB 12.0 11.4* 9.4*   HCT 33.4* 33.4* 27.6*    237 224     Recent Labs     08/29/21 2045 08/29/21 2045 08/30/21  0906 08/30/21  0906 08/30/21  1615 08/30/21 2015 08/31/21  0430 08/31/21  0950 08/31/21  1522   *   < > 121*   < > 124*   < > 122* 124* 123*   K 5.7*  --  4.3  --   --   --  3.8  --   --    CL 82*  --  84*  --   --   --  88*  --   --    CO2 21  --  23  --   --   --  22  --   --    BUN 44*  --  45*  --   --   --  41*  --   --    CREATININE 3.8*  --  3.6*  -- --   --  2.5*  --   --    CALCIUM 10.2  --  9.8  --   --   --  8.9  --   --    PHOS  --   --   --   --  4.3  --  3.9  --   --     < > = values in this interval not displayed. Recent Labs     08/29/21 2045 08/30/21 2015 08/31/21  0430   AST 21 15 14*   ALT 7* 8* 7*   BILIDIR  --  <0.2  --    BILITOT 0.3 <0.2 <0.2   ALKPHOS 79 74 72     No results for input(s): INR in the last 72 hours. Recent Labs     08/29/21 2045   TROPONINI <0.01       Urinalysis:      Lab Results   Component Value Date    NITRU Negative 08/29/2021    WBCUA >100 08/29/2021    BACTERIA 1+ 08/29/2021    RBCUA see below 08/29/2021    BLOODU SMALL 08/29/2021    SPECGRAV 1.015 08/29/2021    GLUCOSEU Negative 08/29/2021       Radiology:  CT ABDOMEN PELVIS WO CONTRAST Additional Contrast? Oral (via g tube)   Final Result   Wall thickening involving the rectum with moderate to large amount retained   stool rectosigmoid junction with surrounding inflammatory change. Findings   could be due to underlying proctitis versus developing stercoral ulcer,   additionally, neoplasm may also considered in the appropriate clinical   setting. Please correlate exam findings.       Rectal wall thickening             Assessment/Plan:    Active Hospital Problems    Diagnosis Date Noted    Moderate malnutrition ([address] [E44.0] 08/30/2021    Hyponatremia [E87.1]        Acute Medical Issues Being Addressed:  43-year-old lady admitted to the hospital with hyponatremia    Acute encephalopathy suspect metabolic secondary to infection and hyponatremia  Once more awake alert oriented more close to her baseline    Hyponatremia suspect due to hypovolemia  Slow IV fluids  Monitor sodium every 4 hours her sodium has slowly improved from 117 up to 124  Nephrology consulted    Acute kidney injury on baseline of chronic kidney disease stage III  Creatinine up to 7.6  On IV fluids  Creatinine peaked at around 7 and is now down to 4.3  Continue IV fluids  CT abdomen and pelvis

## 2021-08-31 NOTE — PROGRESS NOTES
bisacodyl, sodium chloride flush, ondansetron **OR** ondansetron, polyethylene glycol, acetaminophen **OR** acetaminophen, iohexol      Vitals:  BP (!) 164/66   Pulse 87   Temp 97.4 °F (36.3 °C) (Temporal)   Resp 14   Ht 5' 4\" (1.626 m)   Wt 129 lb 10.1 oz (58.8 kg)   SpO2 100%   BMI 22.25 kg/m²       Physical Exam  General : AAOx3, not in pain or respiratory distress, resting in bed  HEENT : normocephalic, atraumatic, mucosa dry, no palor or icterus  CVS: S1 S2 normal, regular rhythm, no murmurs or rubs. Lungs: Clear, no wheezing or crackles. Abd: Soft, bowel sounds normal, non-tender. PEG+  Ext: No edema, no cyanosis  Skin: Warm. No rashes appreciated.   : bladder non-distended, no tenderness over the bladder    Labs:  CBC with Differential:    Lab Results   Component Value Date    WBC 11.9 08/31/2021    RBC 3.02 08/31/2021    HGB 9.4 08/31/2021    HCT 27.6 08/31/2021     08/31/2021    MCV 91.2 08/31/2021    MCH 31.2 08/31/2021    MCHC 34.2 08/31/2021    RDW 14.5 08/31/2021    SEGSPCT 64.2 02/27/2010    BANDSPCT 5 08/31/2021    METASPCT 1 08/31/2021    LYMPHOPCT 6.0 08/31/2021    MONOPCT 11.0 08/31/2021    EOSPCT 3.4 02/27/2010    BASOPCT 1.0 08/31/2021    MONOSABS 1.3 08/31/2021    LYMPHSABS 0.7 08/31/2021    EOSABS 0.1 08/31/2021    BASOSABS 0.1 08/31/2021    DIFFTYPE Auto 02/27/2010     BMP:    Lab Results   Component Value Date     08/31/2021    K 3.8 08/31/2021    K 4.3 08/30/2021    CL 88 08/31/2021    CO2 22 08/31/2021    BUN 41 08/31/2021    LABALBU 3.4 08/31/2021    CREATININE 2.5 08/31/2021    CALCIUM 8.9 08/31/2021    GFRAA 23 08/31/2021    GFRAA >60 02/27/2010    LABGLOM 19 08/31/2021    GLUCOSE 101 08/31/2021     Ionized Calcium:  No results found for: IONCA  Magnesium:    Lab Results   Component Value Date    MG 3.10 08/31/2021     Phosphorus:    Lab Results   Component Value Date    PHOS 3.9 08/31/2021     Last 3 Troponin:    Lab Results   Component Value Date    TROPONINI <0.01 08/29/2021    TROPONINI <0.01 08/08/2017    TROPONINI <0.01 01/14/2015     U/A:    Lab Results   Component Value Date    COLORU Yellow 08/29/2021    PROTEINU 30 08/29/2021    PHUR 7.0 08/29/2021    WBCUA >100 08/29/2021    RBCUA see below 08/29/2021    BACTERIA 1+ 08/29/2021    CLARITYU SL CLOUDY 08/29/2021    SPECGRAV 1.015 08/29/2021    LEUKOCYTESUR LARGE 08/29/2021    UROBILINOGEN 0.2 08/29/2021    BILIRUBINUR Negative 08/29/2021    BLOODU SMALL 08/29/2021    GLUCOSEU Negative 08/29/2021       Assessment/Plan:    1. Hyponatremia   Appears to be acute on chronic  In the setting of volume depletion/poor solute intake, cannot r/o SIADH, buspirone is associated with hyponatremia  Urine studies consistent with SIADH  Treat hypothyroidism if needed, appears to be subclinical  Avoid correction of sodium to more than 6-8 meq/24 hours  Check sodium Q 6 hr  slow isotonic fluids. Continue protein supplements     2. Acute Kidney injury   Base-line creatinine in April 2021 less than 1  JESSICA in July 2021, may have a new base-line of high 2s to low 3s  In the setting of poor po intake and volume depletion  Slow down isotonic fluids. Avoid NSAIDs/ ACEIs/ARB/IV contrast     3. Hyperkalemia improved  pre-renal state/JESSICA     4. UTI possible  On ABx  Follow cultures    Plan  Follow stat sodium  Slow isotonic fluids  Continue sodium check Q 6 hr    Discussed with Dr. Bryce Thomas MD, MD.  8/31/2021  Office Phone : [phone]    Thank you for allowing us to participate in the care of this pt. I willcontinue to follow along. Please call with questions or concerns.

## 2021-08-31 NOTE — PROGRESS NOTES
Pt is back to room from CT scan. A&Ox4, NSR, on RA, complains chronic buttock pain 8/10. Tylenol given for pain relief. Loose BM x1, nadia care, moisture barrier cream and mepilex applied, diaper and bed pad changed, Michaels secured. Bed locked in the lowest position with bed alarm on, call light within reach.

## 2021-08-31 NOTE — CONSULTS
Gastroenterology Consult Note        Patient: Simon Pickard  : 1950  Acct#:      Date:  2021    Subjective:       History of Present Illness  Patient is a 80 y.o.  female admitted with Hyponatremia [E87.1] who is seen in consult for abnormal CT abdomen pelvis. Patient with history of squamous cell carcinoma of the throat diagnosed in May of this year. Has been undergoing radiation and chemotherapy. Has had a PEG tube placed in May at an outside hospital.    Patient went to [facility] yesterday for fatigue. Was found to have JESSICA and hyponatremia. Was transferred here for admission. Patient reported diarrhea but also constipation. CT abdomen pelvis was done which showed moderate to large retained stool in the rectosigmoid with surrounding inflammation concerning for proctitis versus stercoral ulcer versus neoplasm. Patient reports chronic constipation and takes an over-the-counter pill for this at home. Reports her last colonoscopy was over 10 years ago. Denies any abdominal pain, nausea, vomiting. Reports a lot of rectal pressure. CT also showed esophageal wall thickening. Denies dysphagia. Has heartburn occasionally and will drink whole milk which will help. Patient states she was not eating at home. Was taking 3  Ensure daily via PEG.     Past Medical History:   Diagnosis Date    Anxiety     Cancer ([address] 2021    throat    Cerebral artery occlusion with cerebral infarction ([address]     30 years ago had mini stroke with no residual    Crohn disease ([address]     CVA (cerebral infarction)     hx unclear    Hyperlipidemia     Hypertension       Past Surgical History:   Procedure Laterality Date    APPENDECTOMY      CHOLECYSTECTOMY      COLONOSCOPY      HYSTERECTOMY      LARYNGOSCOPY N/A 2021    DIRECT MICROLARYNGOSCOPY WITH BIOPSIES performed by Prateek Espinoza DO at [address]    infection after btl;one ovary in   [facility] ENDOSCOPY  2021    Procedure: EGD ESOPHAGOGASTRODUODENOSCOPY PEG TUBE INSERTION    UPPER GASTROINTESTINAL ENDOSCOPY  2021    EGD ESOPHAGOGASTRODUODENOSCOPY PEG TUBE INSERTION performed by Gigi Leger MD at [address] [facility]      Past Endoscopic History  EGD with PEG placement 21 with Dr Juan Calhoun   POSTPROCEDURE DIAGNOSES:  1. Normal EGD. 2.  Successful placement of 20-French EnTake ConMed PEG tube. 3.  External bolster at 3.5 cm esteban. Admission Meds  No current facility-administered medications on file prior to encounter. Current Outpatient Medications on File Prior to Encounter   Medication Sig Dispense Refill    bisacodyl (BISAC-EVAC) 10 MG suppository Place 1 suppository rectally daily as needed for Constipation 10 suppository 0    polyethylene glycol (GLYCOLAX) 17 GM/SCOOP powder Take 17 g by mouth daily 1530 g 1    clonazePAM (KLONOPIN) 0.5 MG tablet Take 1 tablet by mouth 2 times daily as needed for Anxiety for up to 30 days.  60 tablet 0    busPIRone (BUSPAR) 5 MG tablet Take 1 tablet by mouth 2 times daily 60 tablet 0    ondansetron (ZOFRAN-ODT) 4 MG disintegrating tablet Take 1 tablet by mouth every 8 hours as needed for Nausea or Vomiting 30 tablet 1      Allergies  Allergies   Allergen Reactions    Penicillins Hives and Rash     2013 40 yrs ago had a nonpruritic rash on hands;no hives and no other sx's;      Social   Social History     Tobacco Use    Smoking status: Former Smoker     Packs/day: 1.50     Years: 30.00     Pack years: 45.00     Types: Cigarettes     Quit date: 2021     Years since quittin.3    Smokeless tobacco: Never Used    Tobacco comment: placed in AVS   Substance Use Topics    Alcohol use: Not Currently     Alcohol/week: 20.0 standard drinks     Types: 20 Cans of beer per week     Comment: daily; 4 beers per day        Family History   Problem Relation Age of Onset    Heart Disease Mother Review of Systems  Constitutional: negative for fevers, chills, sweats    Ears, nose, mouth, throat, and face: negative for nasal congestion and sore throat   Respiratory: negative for cough and shortness of breath   Cardiovascular: negative for chest pain and dyspnea   Gastrointestinal: see hpi   Genitourinary:negative for dysuria and frequency   Integument/breast: negative for pruritus and rash   Hematologic/lymphatic: negative for bleeding and easy bruising   Musculoskeletal:negative for arthralgias and myalgias   Neurological: negative for dizziness and weakness         Physical Exam  Blood pressure (!) 164/66, pulse 87, temperature 97.4 °F (36.3 °C), temperature source Temporal, resp. rate 14, height 5' 4\" (1.626 m), weight 129 lb 10.1 oz (58.8 kg), SpO2 100 %, not currently breastfeeding. General appearance: alert, cooperative, no distress, appears stated age  Eyes: Anicteric  Head: Normocephalic. Hyperpigmentation of the neck  Lungs: clear to auscultation bilaterally, Normal Effort  Heart: regular rate and rhythm, normal S1 and S2, no murmurs or rubs  Abdomen: soft, non-tender. Bowel sounds normal. No masses,  no organomegaly. PEG in epigastrium. Extremities: atraumatic, no cyanosis or edema  Skin: warm and dry, no jaundice  Neuro: Grossly intact, A&OX3  Musculoskeletal: 5/5  strength BUE  Rectal: hard stool in rectal vault. Attempted to disimpact but pt reported pain and requested exam to end.        Data Review:    Recent Labs     08/29/21 2045 08/30/21 0906 08/31/21  0430   WBC 12.4* 12.2* 11.9*   HGB 12.0 11.4* 9.4*   HCT 33.4* 33.4* 27.6*   MCV 96.6 92.4 91.2    237 224     Recent Labs     08/29/21 2045 08/29/21 2045 08/30/21 0906 08/30/21  0906 08/30/21  1615 08/30/21 2015 08/31/21  0430   *   < > 121*   < > 124* 122* 122*   K 5.7*  --  4.3  --   --   --  3.8   CL 82*  --  84*  --   --   --  88*   CO2 21  --  23  --   --   --  22   PHOS  --   --   --   --  4.3  --  3.9 BUN 44*  --  45*  --   --   --  41*   CREATININE 3.8*  --  3.6*  --   --   --  2.5*    < > = values in this interval not displayed. Recent Labs     08/29/21 2045 08/30/21 2015 08/31/21  0430   AST 21 15 14*   ALT 7* 8* 7*   BILIDIR  --  <0.2  --    BILITOT 0.3 <0.2 <0.2   ALKPHOS 79 74 72     Recent Labs     08/30/21 2015   LIPASE 7.0*   AMYLASE 9*     No results for input(s): PROTIME, INR in the last 72 hours. No results for input(s): PTT in the last 72 hours. No results for input(s): OCCULTBLD in the last 72 hours. Imaging Studies:                CT-scan of abdomen and pelvis wo contrast 8/31/21:  Impression:     Wall thickening involving the rectum with moderate to large amount retained   stool rectosigmoid junction with surrounding inflammatory change.  Findings   could be due to underlying proctitis versus developing stercoral ulcer,   additionally, neoplasm may also considered in the appropriate clinical   setting.  Please correlate exam findings. Rectal wall thickening                     Assessment:     Principal Problem: Moderate malnutrition ([address]  Active Problems:    Hyponatremia  Resolved Problems:    * No resolved hospital problems. *    Fecal impaction -per exam and CT. There was surrounding inflammation and rectal wall thickening which is likely related to the impaction. Cannot rule out neoplasm without colonoscopy. Attempted to disimpact patient but she requested exam to be ended. Esophageal wall thickening -per CT. Esophagus was normal at time of EGD in May 2021. Patient denies dysphagia. Has occasional heartburn only. JESSICA - nephrology following. Hyponatremia  Squamous cell carcinoma of the throat -status post chemo and radiation. Recommendations:   -Golytely via PEG  - plan colonoscopy once prepped and hyponatremia improved.     Discussed with Dr. Christie Alexandre, PA-C  [facility]  I have personally performed a face to face diagnostic evaluation on this patient. I have interviewed and examined the patient and I agree with the findings and recommended plan of care. In summary, my findings and plan are the following: As above, elderly woman with SqCellCa throat, existing PEG admitted with dehydration/hyponatremia found to have impacted stool with stercoral changes of rectosigmoid vs neoplasm on CT. She has chronic constipation. On exam abd soft and NT. Will plan Golytely prep through g-tube to hopefully resolve constipation and plan colonoscopy when clear -- hopefully 9/1.       Odalis Siu, [address] [place] [address]  8/31/2021

## 2021-08-31 NOTE — PROGRESS NOTES
Called ED CT scan for abd pelvis imaging and is informed to wait when the room is cleaned and ready. CT scan staff will call me back when ready.

## 2021-09-01 ENCOUNTER — ANESTHESIA EVENT (OUTPATIENT)
Dept: ENDOSCOPY | Age: 71
DRG: 640 | End: 2021-09-01
Payer: MEDICARE

## 2021-09-01 PROBLEM — Z93.1 GASTROSTOMY STATUS (HCC): Status: ACTIVE | Noted: 2021-09-01

## 2021-09-01 LAB
ANION GAP SERPL CALCULATED.3IONS-SCNC: 14 MMOL/L (ref 3–16)
ANISOCYTOSIS: ABNORMAL
BANDED NEUTROPHILS RELATIVE PERCENT: 10 % (ref 0–7)
BASOPHILS ABSOLUTE: 0 K/UL (ref 0–0.2)
BASOPHILS RELATIVE PERCENT: 0 %
BUN BLDV-MCNC: 33 MG/DL (ref 7–20)
CALCIUM SERPL-MCNC: 8.9 MG/DL (ref 8.3–10.6)
CHLORIDE BLD-SCNC: 90 MMOL/L (ref 99–110)
CO2: 26 MMOL/L (ref 21–32)
CREAT SERPL-MCNC: 1.9 MG/DL (ref 0.6–1.2)
EOSINOPHILS ABSOLUTE: 0.3 K/UL (ref 0–0.6)
EOSINOPHILS RELATIVE PERCENT: 3 %
GFR AFRICAN AMERICAN: 32
GFR NON-AFRICAN AMERICAN: 26
GI BACTERIAL PATHOGENS BY PCR: NORMAL
GLUCOSE BLD-MCNC: 99 MG/DL (ref 70–99)
HCT VFR BLD CALC: 26.1 % (ref 36–48)
HEMOGLOBIN: 9.2 G/DL (ref 12–16)
LYMPHOCYTES ABSOLUTE: 1 K/UL (ref 1–5.1)
LYMPHOCYTES RELATIVE PERCENT: 10 %
MAGNESIUM: 2.8 MG/DL (ref 1.8–2.4)
MCH RBC QN AUTO: 31.8 PG (ref 26–34)
MCHC RBC AUTO-ENTMCNC: 35.2 G/DL (ref 31–36)
MCV RBC AUTO: 90.4 FL (ref 80–100)
METAMYELOCYTES RELATIVE PERCENT: 3 %
MONOCYTES ABSOLUTE: 0.9 K/UL (ref 0–1.3)
MONOCYTES RELATIVE PERCENT: 9 %
NEUTROPHILS ABSOLUTE: 7.8 K/UL (ref 1.7–7.7)
NEUTROPHILS RELATIVE PERCENT: 65 %
OVALOCYTES: ABNORMAL
PDW BLD-RTO: 15 % (ref 12.4–15.4)
PHOSPHORUS: 3.7 MG/DL (ref 2.5–4.9)
PLATELET # BLD: 229 K/UL (ref 135–450)
PMV BLD AUTO: 7.8 FL (ref 5–10.5)
POTASSIUM SERPL-SCNC: 3.1 MMOL/L (ref 3.5–5.1)
RBC # BLD: 2.88 M/UL (ref 4–5.2)
SODIUM BLD-SCNC: 130 MMOL/L (ref 136–145)
SODIUM BLD-SCNC: 130 MMOL/L (ref 136–145)
WBC # BLD: 10 K/UL (ref 4–11)

## 2021-09-01 PROCEDURE — 84100 ASSAY OF PHOSPHORUS: CPT

## 2021-09-01 PROCEDURE — 85025 COMPLETE CBC W/AUTO DIFF WBC: CPT

## 2021-09-01 PROCEDURE — 6370000000 HC RX 637 (ALT 250 FOR IP): Performed by: INTERNAL MEDICINE

## 2021-09-01 PROCEDURE — 80048 BASIC METABOLIC PNL TOTAL CA: CPT

## 2021-09-01 PROCEDURE — 6370000000 HC RX 637 (ALT 250 FOR IP): Performed by: PEDIATRICS

## 2021-09-01 PROCEDURE — 2000000000 HC ICU R&B

## 2021-09-01 PROCEDURE — 2580000003 HC RX 258: Performed by: INTERNAL MEDICINE

## 2021-09-01 PROCEDURE — 6360000002 HC RX W HCPCS: Performed by: PEDIATRICS

## 2021-09-01 PROCEDURE — 83735 ASSAY OF MAGNESIUM: CPT

## 2021-09-01 RX ORDER — POTASSIUM CHLORIDE 20 MEQ/1
40 TABLET, EXTENDED RELEASE ORAL ONCE
Status: COMPLETED | OUTPATIENT
Start: 2021-09-01 | End: 2021-09-01

## 2021-09-01 RX ORDER — LIDOCAINE HYDROCHLORIDE 20 MG/ML
JELLY TOPICAL PRN
Status: DISCONTINUED | OUTPATIENT
Start: 2021-09-01 | End: 2021-09-02 | Stop reason: HOSPADM

## 2021-09-01 RX ORDER — POTASSIUM CHLORIDE 7.45 MG/ML
10 INJECTION INTRAVENOUS
Status: DISCONTINUED | OUTPATIENT
Start: 2021-09-01 | End: 2021-09-01 | Stop reason: ALTCHOICE

## 2021-09-01 RX ORDER — SODIUM CHLORIDE 9 MG/ML
25 INJECTION, SOLUTION INTRAVENOUS PRN
Status: DISCONTINUED | OUTPATIENT
Start: 2021-09-01 | End: 2021-09-02 | Stop reason: HOSPADM

## 2021-09-01 RX ORDER — POTASSIUM CHLORIDE 20 MEQ/1
40 TABLET, EXTENDED RELEASE ORAL PRN
Status: DISCONTINUED | OUTPATIENT
Start: 2021-09-01 | End: 2021-09-01

## 2021-09-01 RX ORDER — SODIUM CHLORIDE 0.9 % (FLUSH) 0.9 %
5-40 SYRINGE (ML) INJECTION EVERY 12 HOURS SCHEDULED
Status: DISCONTINUED | OUTPATIENT
Start: 2021-09-01 | End: 2021-09-02 | Stop reason: HOSPADM

## 2021-09-01 RX ORDER — POTASSIUM CHLORIDE 7.45 MG/ML
10 INJECTION INTRAVENOUS PRN
Status: DISCONTINUED | OUTPATIENT
Start: 2021-09-01 | End: 2021-09-01

## 2021-09-01 RX ORDER — SODIUM CHLORIDE 0.9 % (FLUSH) 0.9 %
5-40 SYRINGE (ML) INJECTION PRN
Status: DISCONTINUED | OUTPATIENT
Start: 2021-09-01 | End: 2021-09-02 | Stop reason: HOSPADM

## 2021-09-01 RX ADMIN — POLYETHYLENE GLYCOL-3350 AND ELECTROLYTES 4000 ML: 236; 6.74; 5.86; 2.97; 22.74 POWDER, FOR SOLUTION ORAL at 14:03

## 2021-09-01 RX ADMIN — HEPARIN SODIUM 5000 UNITS: 5000 INJECTION INTRAVENOUS; SUBCUTANEOUS at 20:51

## 2021-09-01 RX ADMIN — HEPARIN SODIUM 5000 UNITS: 5000 INJECTION INTRAVENOUS; SUBCUTANEOUS at 14:03

## 2021-09-01 RX ADMIN — ACETAMINOPHEN 650 MG: 325 TABLET ORAL at 00:54

## 2021-09-01 RX ADMIN — Medication 100 MG: at 09:31

## 2021-09-01 RX ADMIN — Medication 1000 MG: at 23:25

## 2021-09-01 RX ADMIN — POTASSIUM CHLORIDE 40 MEQ: 20 TABLET, EXTENDED RELEASE ORAL at 10:26

## 2021-09-01 RX ADMIN — ACETAMINOPHEN 650 MG: 325 TABLET ORAL at 13:32

## 2021-09-01 RX ADMIN — ACETAMINOPHEN 650 MG: 325 TABLET ORAL at 23:34

## 2021-09-01 RX ADMIN — NYSTATIN 500000 UNITS: 100000 SUSPENSION ORAL at 09:30

## 2021-09-01 RX ADMIN — NYSTATIN 500000 UNITS: 100000 SUSPENSION ORAL at 14:03

## 2021-09-01 RX ADMIN — LIDOCAINE HYDROCHLORIDE: 20 JELLY TOPICAL at 23:26

## 2021-09-01 RX ADMIN — Medication 10 ML: at 23:25

## 2021-09-01 RX ADMIN — Medication 10 ML: at 20:54

## 2021-09-01 RX ADMIN — HEPARIN SODIUM 5000 UNITS: 5000 INJECTION INTRAVENOUS; SUBCUTANEOUS at 06:06

## 2021-09-01 RX ADMIN — NYSTATIN 500000 UNITS: 100000 SUSPENSION ORAL at 20:52

## 2021-09-01 RX ADMIN — Medication 10 ML: at 10:28

## 2021-09-01 RX ADMIN — ACETAMINOPHEN 650 MG: 325 TABLET ORAL at 08:23

## 2021-09-01 ASSESSMENT — PAIN SCALES - GENERAL
PAINLEVEL_OUTOF10: 9
PAINLEVEL_OUTOF10: 8
PAINLEVEL_OUTOF10: 9
PAINLEVEL_OUTOF10: 0
PAINLEVEL_OUTOF10: 8
PAINLEVEL_OUTOF10: 8
PAINLEVEL_OUTOF10: 0
PAINLEVEL_OUTOF10: 8
PAINLEVEL_OUTOF10: 0
PAINLEVEL_OUTOF10: 8
PAINLEVEL_OUTOF10: 8
PAINLEVEL_OUTOF10: 0
PAINLEVEL_OUTOF10: 9
PAINLEVEL_OUTOF10: 8
PAINLEVEL_OUTOF10: 8

## 2021-09-01 ASSESSMENT — PAIN DESCRIPTION - ORIENTATION: ORIENTATION: MID

## 2021-09-01 ASSESSMENT — PAIN DESCRIPTION - PROGRESSION
CLINICAL_PROGRESSION: NOT CHANGED
CLINICAL_PROGRESSION: GRADUALLY IMPROVING
CLINICAL_PROGRESSION: GRADUALLY IMPROVING
CLINICAL_PROGRESSION: NOT CHANGED

## 2021-09-01 ASSESSMENT — PAIN DESCRIPTION - LOCATION
LOCATION: HEAD;BUTTOCKS
LOCATION: BUTTOCKS
LOCATION: BUTTOCKS

## 2021-09-01 ASSESSMENT — PAIN DESCRIPTION - DESCRIPTORS: DESCRIPTORS: SORE;ACHING

## 2021-09-01 ASSESSMENT — PAIN DESCRIPTION - ONSET
ONSET: ON-GOING
ONSET: ON-GOING

## 2021-09-01 ASSESSMENT — PAIN DESCRIPTION - PAIN TYPE
TYPE: ACUTE PAIN
TYPE: CHRONIC PAIN
TYPE: ACUTE PAIN

## 2021-09-01 ASSESSMENT — PAIN DESCRIPTION - FREQUENCY: FREQUENCY: INTERMITTENT

## 2021-09-01 NOTE — PROGRESS NOTES
GI group called in and wants pt to have 2nd round of bowel prep because pt still has soiled BM this morning.

## 2021-09-01 NOTE — PROGRESS NOTES
Nephrology Progress Note  [phone]  [phone]   [facility]. [employer]    Patient:  Rhonda Campa   : 1950        HPI: The patient is a 80 y. 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 Out-pt labs on  showed a BUN/Cr of 40/4.2 and hyponatremia with a sodium of 125  She was asked to go to the ER and she presented to [address] on   Labs showed worsening hyponatremia with sodium of 117, hyperkalemia with a k of 5.7 and BUN/Cr of 44/3.8. Because of bed shortage in ICU, she was transferred to [facility]. We were consulted for further evaluation and management  Her Creatinine was less than 1 in 2021  She was hospitalized at [facility] for worsening renal function. 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 Creatinine was 2 on discharge on 2021. She also had hyponatremia at that time, sodium ranged from mid 120s to low 130s and was 128 on discharge. Sodium at this time is 117 on admission and has improved to 121 at the time of consult. She is also being treated for a UT      Subjective:  -pt seen and examined  -PMSHx and meds reviewed  -No family at bedside      No acute events ON    ROS:   Neg chest pain/SOB        Meds:  Reviewed     Vitals:  /63   Pulse 78   Temp 98 °F (36.7 °C) (Temporal)   Resp 16   Ht 5' 4\" (1.626 m)   Wt 124 lb 9 oz (56.5 kg)   SpO2 99%   BMI 21.38 kg/m²     Physical Exam:  General : AAOx3, not in pain or respiratory distress, resting in bed  HEENT : normocephalic, atraumatic, mucosa dry, no palor or icterus  CVS: S1 S2 normal, regular rhythm, no murmurs or rubs. Lungs: Clear, no wheezing or crackles.   Abd: Soft, bowel sounds normal, non-tender. PEG+  Ext: No edema, no cyanosis  Skin: Warm.  No rashes appreciated. : bladder non-distended, no tenderness over the bladder.     Labs:  CBC:   Lab Results   Component Value Date    WBC 10.0 09/01/2021    RBC 2.88 09/01/2021    HGB 9.2 09/01/2021    HCT 26.1 09/01/2021    MCV 90.4 09/01/2021    MCH 31.8 09/01/2021    MCHC 35.2 09/01/2021    RDW 15.0 09/01/2021     09/01/2021    MPV 7.8 09/01/2021     BMP:    Lab Results   Component Value Date     09/01/2021    K 3.1 09/01/2021    K 4.3 08/30/2021    CL 90 09/01/2021    CO2 26 09/01/2021    BUN 33 09/01/2021    LABALBU 3.4 08/31/2021    CREATININE 1.9 09/01/2021    CALCIUM 8.9 09/01/2021    GFRAA 32 09/01/2021    GFRAA >60 02/27/2010    LABGLOM 26 09/01/2021    GLUCOSE 99 09/01/2021       Assessment/Plan:    Hyponatremia: decreased free water clearance in the setting of JESSICA/CKD   -bossman Na 117-Na 130 today  -continue FR  -off IVF  -replace K  -check lytes daily    JESSICA on AKD/CKD:  Onset 07/2021,baseline prior to that < 1.0, since then Scr has been ranging in high 2-low 3 range  -recurrent JESSICA attributed to pre-renal due to poor PO intake  -improving - Cr 1.9 today    Hyperkalemia: due to JESSICA-resolved    UTI: on ceftriaxone    H/o throat SCC: s/p cisplatin/XRT-complicated by JESSICA        Tameka Osuna MD,

## 2021-09-01 NOTE — ANESTHESIA PRE PROCEDURE
suppository 10 mg  10 mg Rectal Daily PRN Fernandez Land MD        sodium chloride flush 0.9 % injection 5-40 mL  5-40 mL IntraVENous PRN Fernandez Land MD        heparin (porcine) injection 5,000 Units  5,000 Units SubCUTAneous 3 times per day Fernandez Land MD   5,000 Units at 09/01/21 0606    ondansetron (ZOFRAN-ODT) disintegrating tablet 4 mg  4 mg Oral Q8H PRN Israel Wynn MD        Or    ondansetron (ZOFRAN) injection 4 mg  4 mg IntraVENous Q6H PRN Israel Wynn MD        acetaminophen (TYLENOL) tablet 650 mg  650 mg Oral Q6H PRN Fernandez Land MD   650 mg at 09/01/21 7148    Or    acetaminophen (TYLENOL) suppository 650 mg  650 mg Rectal Q6H PRN Israel Wynn MD        cefTRIAXone (ROCEPHIN) 1000 mg in sterile water 10 mL IV syringe  1,000 mg IntraVENous Q24H Israel Angelo MD   1,000 mg at 08/31/21 2221    nystatin (MYCOSTATIN) 032043 UNIT/ML suspension 500,000 Units  5 mL Oral 4x Daily Fernandez Land MD   500,000 Units at 09/01/21 0930    thiamine mononitrate tablet 100 mg  100 mg Oral Daily Joey Levy MD   100 mg at 09/01/21 0931    iohexol (OMNIPAQUE 240) injection 50 mL  50 mL Oral ONCE PRN Joey Levy MD           Allergies:     Allergies   Allergen Reactions    Penicillins Hives and Rash     5/2013 40 yrs ago had a nonpruritic rash on hands;no hives and no other sx's;       Problem List:    Patient Active Problem List   Diagnosis Code    Chronic anxiety F41.9    Crohn's disease without complication ([facility] R42.19    Nicotine dependence F17.200    Aortic insufficiency I35.1    Essential hypertension I10    Hyperlipidemia E78.5    Cigarette nicotine dependence without complication Q52.315    Smoking F17.200    Mixed incontinence urge and stress N39.46    Localized swelling of both lower legs R22.43    ASCVD (arteriosclerotic cardiovascular disease) I25.10    Acute kidney injury ([facility] N17.9    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    Hyponatremia E87.1    Dysphagia R13.10    Nausea R11.0    Moderate malnutrition (HCC) E44.0       Past Medical History:        Diagnosis Date    Anxiety     Cancer ([facility] 2021    throat    Cerebral artery occlusion with cerebral infarction ([facility]     30 years ago had mini stroke with no residual    Crohn disease ([facility]     CVA (cerebral infarction)     hx unclear    Hyperlipidemia     Hypertension        Past Surgical History:        Procedure Laterality Date    APPENDECTOMY      CHOLECYSTECTOMY      COLONOSCOPY      HYSTERECTOMY      LARYNGOSCOPY N/A 2021    DIRECT MICROLARYNGOSCOPY WITH BIOPSIES performed by Chris Painter DO at [address]    infection after btl;one ovary in   [address] [facility] [address]  2021    Procedure: EGD ESOPHAGOGASTRODUODENOSCOPY PEG TUBE INSERTION    UPPER GASTROINTESTINAL ENDOSCOPY  2021    EGD ESOPHAGOGASTRODUODENOSCOPY PEG TUBE INSERTION performed by Kd Altamirano MD at [facility]. [address] History:    Social History     Tobacco Use    Smoking status: Former Smoker     Packs/day: 1.50     Years: 30.00     Pack years: 45.00     Types: Cigarettes     Quit date: 2021     Years since quittin.3    Smokeless tobacco: Never Used    Tobacco comment: placed in AVS   Substance Use Topics    Alcohol use: Not Currently     Alcohol/week: 20.0 standard drinks     Types: 20 Cans of beer per week     Comment: daily; 4 beers per day                                Counseling given: Not Answered  Comment: placed in AVS      Vital Signs (Current): There were no vitals filed for this visit.                                            BP Readings from Last 3 Encounters:   21 (!) 148/56   21 (!) 145/71   21 (!) 100/50       NPO Status:                                                                                 BMI:   Wt Readings from Last 3 Encounters:   21 124 lb 9 oz (56.5 kg)   08/29/21 125 lb (56.7 kg)   08/27/21 125 lb (56.7 kg)     There is no height or weight on file to calculate BMI.    CBC:   Lab Results   Component Value Date    WBC 10.0 09/01/2021    RBC 2.88 09/01/2021    HGB 9.2 09/01/2021    HCT 26.1 09/01/2021    MCV 90.4 09/01/2021    RDW 15.0 09/01/2021     09/01/2021       CMP:   Lab Results   Component Value Date     09/01/2021    K 3.1 09/01/2021    K 4.3 08/30/2021    CL 90 09/01/2021    CO2 26 09/01/2021    BUN 33 09/01/2021    CREATININE 1.9 09/01/2021    GFRAA 32 09/01/2021    GFRAA >60 02/27/2010    AGRATIO 1.1 08/31/2021    LABGLOM 26 09/01/2021    GLUCOSE 99 09/01/2021    PROT 6.6 08/31/2021    PROT 7.1 02/27/2010    CALCIUM 8.9 09/01/2021    BILITOT <0.2 08/31/2021    ALKPHOS 72 08/31/2021    AST 14 08/31/2021    ALT 7 08/31/2021       POC Tests: No results for input(s): POCGLU, POCNA, POCK, POCCL, POCBUN, POCHEMO, POCHCT in the last 72 hours. Coags:   Lab Results   Component Value Date    PROTIME 10.9 08/08/2017    INR 0.94 08/08/2017    APTT 27.7 08/08/2017       HCG (If Applicable): No results found for: PREGTESTUR, PREGSERUM, HCG, HCGQUANT     ABGs: No results found for: PHART, PO2ART, OJC4XTN, ZVE0JWQ, BEART, R0TTTORY     Type & Screen (If Applicable):  No results found for: LABABO, LABRH    Drug/Infectious Status (If Applicable):  No results found for: HIV, HEPCAB    COVID-19 Screening (If Applicable):   Lab Results   Component Value Date    COVID19 NOT DETECTED 08/29/2021           Anesthesia Evaluation  Patient summary reviewed and Nursing notes reviewed no history of anesthetic complications:   Airway: Mallampati: II     Neck ROM: full   Dental:          Pulmonary:                              Cardiovascular:    (+) hypertension:, hyperlipidemia      ECG reviewed                        Neuro/Psych:   (+) CVA:, depression/anxiety             GI/Hepatic/Renal:            ROS comment: crohn's.    Endo/Other:    (+) electrolyte abnormalities, malignancy/cancer (  last chemo treatment july 2021). Abdominal:             Vascular: Other Findings:               Anesthesia Plan      MAC     ASA 3     (Medications & allergies reviewed  All available lab & EKG data reviewed)  Induction: intravenous. Anesthetic plan and risks discussed with patient. Plan discussed with attending.                   LEVAR Evans - RK   9/1/2021

## 2021-09-01 NOTE — PROGRESS NOTES
Pt had multiple loose bowel movements d/t bowel prep. Get her bathed, diaper, gown and complete linen changed several times. Pt is A&Ox4, VSS, NSR, complains buttock pain 8/10.

## 2021-09-01 NOTE — PLAN OF CARE
Problem: Pain:  Goal: Pain level will decrease  Description: Pain level will decrease  Outcome: Ongoing     Problem: Nutrition  Goal: Optimal nutrition therapy  Outcome: Ongoing     Problem: Skin Integrity:  Goal: Absence of new skin breakdown  Description: Absence of new skin breakdown  Outcome: Ongoing     Problem: Musculor/Skeletal Functional Status  Goal: Absence of falls  Outcome: Ongoing

## 2021-09-01 NOTE — PROGRESS NOTES
Gastroenterology Progress Note            Whitney Fernandez is a 80 y.o. female patient. SUBJECTIVE:  Lots of BM's with Golytely, but still dark stool coming out. Physical    VITALS:  BP (!) 148/56   Pulse 92   Temp 97.4 °F (36.3 °C) (Temporal)   Resp 19   Ht 5' 4\" (1.626 m)   Wt 124 lb 9 oz (56.5 kg)   SpO2 99%   BMI 21.38 kg/m²   TEMPERATURE:  Current - Temp: 97.4 °F (36.3 °C); Max - Temp  Av.4 °F (36.3 °C)  Min: 97.1 °F (36.2 °C)  Max: 98 °F (36.7 °C)    Abdomen soft, ND, NT, no HSM, Bowel sounds normal   Rectal: Solid stool impaction of rectum - partially disimpacted, but stopped after three large pieces removed due to patient discomfort. Data      Recent Labs     21  0921  0430 21  0507   WBC 12.2* 11.9* 10.0   HGB 11.4* 9.4* 9.2*   HCT 33.4* 27.6* 26.1*   MCV 92.4 91.2 90.4    224 229     Recent Labs     21  0906 21  0906 21  1615 21  0430 21  0950 21  1522 21  2340 21  0507   *   < > 124*   < > 122*   < > 123* 130* 130*   K 4.3  --   --   --  3.8  --   --   --  3.1*   CL 84*  --   --   --  88*  --   --   --  90*   CO2 23  --   --   --  22  --   --   --  26   PHOS  --   --  4.3  --  3.9  --   --   --  3.7   BUN 45*  --   --   --  41*  --   --   --  33*   CREATININE 3.6*  --   --   --  2.5*  --   --   --  1.9*    < > = values in this interval not displayed. Recent Labs     21  0430   AST 21 15 14*   ALT 7* 8* 7*   BILIDIR  --  <0.2  --    BILITOT 0.3 <0.2 <0.2   ALKPHOS 79 74 72     Recent Labs     21   LIPASE 7.0*   AMYLASE 9*         ASSESSMENT :    Fecal impaction -per exam and CT. There was surrounding inflammation and rectal wall thickening which is likely related to the impaction. Cannot rule out neoplasm without colonoscopy. Attempted to disimpact patient but she requested exam to be ended.     Esophageal wall Pre-Visit Chart Review  For Appointment Scheduled on 6-14-19    Health Maintenance Due   Topic Date Due    TETANUS VACCINE  06/09/1994    Pneumococcal Vaccine (Medium Risk) (1 of 1 - PPSV23) 06/09/1995                    thickening -per CT. Esophagus was normal at time of EGD in May 2021. Patient denies dysphagia. Has occasional heartburn only. JESSICA - nephrology following. Hyponatremia    Squamous cell carcinoma of the throat -status post chemo and radiation.       PLAN   :  - Additional 4 liters Golytely via PEG today  - Plan colonoscopy in AM - will perform additional disimpaction then, if need be.     Rowan [place], [address] [place] [address]  9/1/2021

## 2021-09-01 NOTE — PROGRESS NOTES
Elevated SBP. Messaged hospitalist the following message \" Pt's SBP high 172/57. Would you please order PRN BP medication such as hydralazine? Pt is here for generalized weakness and fatigue 2/2 UTI and hyponatremia. A&O, room air, NSR. Bowel prep for colonoscopy tomorrow.   THX\"

## 2021-09-01 NOTE — PROGRESS NOTES
Hospitalist Progress Note      PCP: LEVAR Briceno - CNP    Date of Admission: 8/30/2021    Chief Complaint: Diarrhea and malaise. Hospital Course: 80 y.o. female who presented to [facility] - transfer from [facility] for hyponatremia. Hx of SCC of the throat diagnosed in May 2021, followed by oncology as outpatient who noted that she was hyponatremic. Recommended admission but patient refused and follow up later showed worsening hyponatremia & malaise. She reported having had diarrhea for a couple of weeks; > 10 episodes daily, watery but non- blood stools associated with rectal and intermittent abdominal pain, cramping in nature, worse in the lower abdomen. Denied any fevers. She had been treated with radiation therapy and 3 cycles of chemotherapy.           Subjective: Patient seen and examined. Complaining of rectal pain and back pain. No other complaints. Requesting regular diet. Medications:  Reviewed    Infusion Medications    sodium chloride       Scheduled Medications    sodium chloride flush  5-40 mL IntraVENous 2 times per day    heparin (porcine)  5,000 Units SubCUTAneous 3 times per day    cefTRIAXone (ROCEPHIN) IV  1,000 mg IntraVENous Q24H    nystatin  5 mL Oral 4x Daily    thiamine mononitrate  100 mg Oral Daily     PRN Meds: sodium chloride flush, sodium chloride, lidocaine, clonazePAM, bisacodyl, sodium chloride flush, ondansetron **OR** ondansetron, acetaminophen **OR** acetaminophen, iohexol      Intake/Output Summary (Last 24 hours) at 9/1/2021 1422  Last data filed at 9/1/2021 1400  Gross per 24 hour   Intake 240 ml   Output 1185 ml   Net -945 ml       Physical Exam Performed:    BP (!) 148/56   Pulse 92   Temp 97.4 °F (36.3 °C) (Temporal)   Resp 19   Ht 5' 4\" (1.626 m)   Wt 124 lb 9 oz (56.5 kg)   SpO2 99%   BMI 21.38 kg/m²     General appearance: No apparent distress, appears stated age and cooperative.   HEENT: Pupils equal, round, and reactive to light. Conjunctivae clear. Neck: Supple, with full range of motion. No jugular venous distention. Trachea midline. Respiratory:  Normal respiratory effort. Clear to auscultation, bilaterally without RALES/WHEEZES/Rhonchi. Cardiovascular: Regular rate and rhythm with normal S1/S2 without MURMURS, rubs or gallops. Abdomen: Soft, non-tender, non-distended with normal bowel sounds. PEG+. Musculoskeletal: No clubbing, cyanosis or EDEMA bilaterally. Full range of motion without deformity. Skin: Skin color, texture, turgor normal.  No rashes or lesions. Neurologic:  Neurovascularly intact without any focal sensory/motor deficits. Cranial nerves: II-XII intact, grossly non-focal.      Labs:   Recent Labs     08/30/21  0906 08/31/21  0430 09/01/21  0507   WBC 12.2* 11.9* 10.0   HGB 11.4* 9.4* 9.2*   HCT 33.4* 27.6* 26.1*    224 229     Recent Labs     08/30/21  0906 08/30/21  0906 08/30/21  1615 08/30/21 2015 08/31/21  0430 08/31/21  0950 08/31/21  1522 08/31/21  2340 09/01/21  0507   *   < > 124*   < > 122*   < > 123* 130* 130*   K 4.3  --   --   --  3.8  --   --   --  3.1*   CL 84*  --   --   --  88*  --   --   --  90*   CO2 23  --   --   --  22  --   --   --  26   BUN 45*  --   --   --  41*  --   --   --  33*   CREATININE 3.6*  --   --   --  2.5*  --   --   --  1.9*   CALCIUM 9.8  --   --   --  8.9  --   --   --  8.9   PHOS  --   --  4.3  --  3.9  --   --   --  3.7    < > = values in this interval not displayed. Recent Labs     08/29/21  2045 08/30/21 2015 08/31/21  0430   AST 21 15 14*   ALT 7* 8* 7*   BILIDIR  --  <0.2  --    BILITOT 0.3 <0.2 <0.2   ALKPHOS 79 74 72     No results for input(s): INR in the last 72 hours.   Recent Labs     08/29/21 2045   TROPONINI <0.01       Urinalysis:      Lab Results   Component Value Date    NITRU Negative 08/29/2021    WBCUA >100 08/29/2021    BACTERIA 1+ 08/29/2021    RBCUA see below 08/29/2021    BLOODU SMALL 08/29/2021    SPECGRAV 1.015 08/29/2021 GLUCOSEU Negative 08/29/2021       Radiology:  CT ABDOMEN PELVIS WO CONTRAST Additional Contrast? Oral (via g tube)   Final Result   Wall thickening involving the rectum with moderate to large amount retained   stool rectosigmoid junction with surrounding inflammatory change. Findings   could be due to underlying proctitis versus developing stercoral ulcer,   additionally, neoplasm may also considered in the appropriate clinical   setting. Please correlate exam findings. Rectal wall thickening                 Assessment/Plan:    Active Hospital Problems    Diagnosis     Gastrostomy status ([facility] [address] [Z93.1]     Moderate malnutrition ([address] [E44.0]     Hyponatremia [E87.1]        Acute metabolic encephalopathy due to infection and hyponatremia:  Improved with current management. Patient currently at baseline. Hyponatremia: Resolving  Nephrology consulted: decreased free water clearance in the setting of JESSICA/CKD. Na 117 ==> 130. Off IV fluids  Monitor BMP daily. Acute kidney injury on baseline of chronic kidney disease stage III:  Prerenal due to poor oral intake. Creatinine trending down. 7.6 ==> 1.9. CT abdomen and pelvis did not show any obstruction  Off IV fluids. Monitor renal function. Hyperkalemia: Resolved. Monitor potassium level. Impacted stool with some stercoral colitis  GI consulted: Additional GoLYTELY via PEG today. Colonoscopy in the a.m.        Klebsiella UTI:  Pan sensitive. On IV ceftriaxone day 2. Changed over to PO ciprofloxacin total course of antibiotics will be 7 days.     Dysphagia s/p PEG tube:  History of esophageal cancer:  Per family she takes very few things orally but is not able to meet her nutritional requirements hence is on tube feeds  Dietary consulted to restart bolus tube feeds.       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       DVT Prophylaxis: Heparin  Diet: ADULT DIET;  Clear Liquid  Diet NPO Exceptions are: Sips of Water with Meds  Code Status: DNR-CCA    PT/OT Eval Status: Home with HHPT. Dispo -Home with HHPT when stable.     Zaria Hess MD

## 2021-09-01 NOTE — PROGRESS NOTES
Called nephrologist group @[phone] regarding sodium level 130 @2340pm, which was increased by 7 meq from 123 meq @1522 pm within about 8 hrs. Dr. Zita Perez is on duty. Waiting for his call back.

## 2021-09-01 NOTE — PROGRESS NOTES
Nephrologist Dr. Nisha Almanzar called back. No further intervention needed at the moment regarding correction of sodium level up to 130.

## 2021-09-01 NOTE — CARE COORDINATION
CM notes therapy evaluations and recommendations, spoke with patient to offer home health, she declines.     RACHAEL Nelson, CCM, RN  [facility]  [phone]

## 2021-09-01 NOTE — PROGRESS NOTES
Pt tolerating bowel prep via peg tube. Stool brown, some solid pieces but mostly watery brown, seedy stool.

## 2021-09-02 ENCOUNTER — ANESTHESIA (OUTPATIENT)
Dept: ENDOSCOPY | Age: 71
DRG: 640 | End: 2021-09-02
Payer: MEDICARE

## 2021-09-02 VITALS — DIASTOLIC BLOOD PRESSURE: 51 MMHG | SYSTOLIC BLOOD PRESSURE: 101 MMHG | OXYGEN SATURATION: 100 %

## 2021-09-02 VITALS
HEART RATE: 71 BPM | BODY MASS INDEX: 19.23 KG/M2 | HEIGHT: 64 IN | DIASTOLIC BLOOD PRESSURE: 85 MMHG | SYSTOLIC BLOOD PRESSURE: 97 MMHG | TEMPERATURE: 97.5 F | RESPIRATION RATE: 12 BRPM | WEIGHT: 112.66 LBS | OXYGEN SATURATION: 99 %

## 2021-09-02 PROBLEM — E87.6 HYPOKALEMIA: Status: ACTIVE | Noted: 2021-09-02

## 2021-09-02 PROBLEM — B96.89 UTI DUE TO KLEBSIELLA SPECIES: Status: ACTIVE | Noted: 2021-09-02

## 2021-09-02 PROBLEM — K59.00 CONSTIPATION: Status: ACTIVE | Noted: 2021-09-02

## 2021-09-02 PROBLEM — E83.39 HYPOPHOSPHATEMIA: Status: ACTIVE | Noted: 2021-09-02

## 2021-09-02 PROBLEM — N39.0 UTI DUE TO KLEBSIELLA SPECIES: Status: ACTIVE | Noted: 2021-09-02

## 2021-09-02 LAB
A/G RATIO: 1.1 (ref 1.1–2.2)
ALBUMIN SERPL-MCNC: 3.2 G/DL (ref 3.4–5)
ALP BLD-CCNC: 65 U/L (ref 40–129)
ALT SERPL-CCNC: 8 U/L (ref 10–40)
ANION GAP SERPL CALCULATED.3IONS-SCNC: 13 MMOL/L (ref 3–16)
AST SERPL-CCNC: 15 U/L (ref 15–37)
BILIRUB SERPL-MCNC: <0.2 MG/DL (ref 0–1)
BLOOD CULTURE, ROUTINE: NORMAL
BUN BLDV-MCNC: 22 MG/DL (ref 7–20)
CALCIUM SERPL-MCNC: 9.1 MG/DL (ref 8.3–10.6)
CHLORIDE BLD-SCNC: 94 MMOL/L (ref 99–110)
CO2: 27 MMOL/L (ref 21–32)
CREAT SERPL-MCNC: 1.9 MG/DL (ref 0.6–1.2)
GFR AFRICAN AMERICAN: 32
GFR NON-AFRICAN AMERICAN: 26
GLOBULIN: 3 G/DL
GLUCOSE BLD-MCNC: 92 MG/DL (ref 70–99)
HCT VFR BLD CALC: 23.6 % (ref 36–48)
HEMOGLOBIN: 8.4 G/DL (ref 12–16)
MAGNESIUM: 2.4 MG/DL (ref 1.8–2.4)
MCH RBC QN AUTO: 33.3 PG (ref 26–34)
MCHC RBC AUTO-ENTMCNC: 35.6 G/DL (ref 31–36)
MCV RBC AUTO: 93.4 FL (ref 80–100)
PDW BLD-RTO: 15.3 % (ref 12.4–15.4)
PHOSPHORUS: 2.4 MG/DL (ref 2.5–4.9)
PLATELET # BLD: 220 K/UL (ref 135–450)
PMV BLD AUTO: 8 FL (ref 5–10.5)
POTASSIUM SERPL-SCNC: 3 MMOL/L (ref 3.5–5.1)
POTASSIUM SERPL-SCNC: 3.5 MMOL/L (ref 3.5–5.1)
RBC # BLD: 2.53 M/UL (ref 4–5.2)
SODIUM BLD-SCNC: 134 MMOL/L (ref 136–145)
TOTAL PROTEIN: 6.2 G/DL (ref 6.4–8.2)
WBC # BLD: 8.5 K/UL (ref 4–11)

## 2021-09-02 PROCEDURE — 2500000003 HC RX 250 WO HCPCS: Performed by: INTERNAL MEDICINE

## 2021-09-02 PROCEDURE — 6360000002 HC RX W HCPCS: Performed by: INTERNAL MEDICINE

## 2021-09-02 PROCEDURE — 6370000000 HC RX 637 (ALT 250 FOR IP): Performed by: INTERNAL MEDICINE

## 2021-09-02 PROCEDURE — 0DBP8ZX EXCISION OF RECTUM, VIA NATURAL OR ARTIFICIAL OPENING ENDOSCOPIC, DIAGNOSTIC: ICD-10-PCS | Performed by: INTERNAL MEDICINE

## 2021-09-02 PROCEDURE — 7100000000 HC PACU RECOVERY - FIRST 15 MIN: Performed by: INTERNAL MEDICINE

## 2021-09-02 PROCEDURE — 84100 ASSAY OF PHOSPHORUS: CPT

## 2021-09-02 PROCEDURE — 97535 SELF CARE MNGMENT TRAINING: CPT

## 2021-09-02 PROCEDURE — 3609010300 HC COLONOSCOPY W/BIOPSY SINGLE/MULTIPLE: Performed by: INTERNAL MEDICINE

## 2021-09-02 PROCEDURE — 6360000002 HC RX W HCPCS: Performed by: NURSE ANESTHETIST, CERTIFIED REGISTERED

## 2021-09-02 PROCEDURE — 3700000001 HC ADD 15 MINUTES (ANESTHESIA): Performed by: INTERNAL MEDICINE

## 2021-09-02 PROCEDURE — 88305 TISSUE EXAM BY PATHOLOGIST: CPT

## 2021-09-02 PROCEDURE — 7100000001 HC PACU RECOVERY - ADDTL 15 MIN: Performed by: INTERNAL MEDICINE

## 2021-09-02 PROCEDURE — 6360000002 HC RX W HCPCS: Performed by: PEDIATRICS

## 2021-09-02 PROCEDURE — 88342 IMHCHEM/IMCYTCHM 1ST ANTB: CPT

## 2021-09-02 PROCEDURE — 6370000000 HC RX 637 (ALT 250 FOR IP): Performed by: PEDIATRICS

## 2021-09-02 PROCEDURE — 2500000003 HC RX 250 WO HCPCS: Performed by: NURSE ANESTHETIST, CERTIFIED REGISTERED

## 2021-09-02 PROCEDURE — 2580000003 HC RX 258: Performed by: INTERNAL MEDICINE

## 2021-09-02 PROCEDURE — 3700000000 HC ANESTHESIA ATTENDED CARE: Performed by: INTERNAL MEDICINE

## 2021-09-02 PROCEDURE — 2709999900 HC NON-CHARGEABLE SUPPLY: Performed by: INTERNAL MEDICINE

## 2021-09-02 PROCEDURE — 80053 COMPREHEN METABOLIC PANEL: CPT

## 2021-09-02 PROCEDURE — 85027 COMPLETE CBC AUTOMATED: CPT

## 2021-09-02 PROCEDURE — 2580000003 HC RX 258: Performed by: NURSE ANESTHETIST, CERTIFIED REGISTERED

## 2021-09-02 PROCEDURE — 84132 ASSAY OF SERUM POTASSIUM: CPT

## 2021-09-02 PROCEDURE — 83735 ASSAY OF MAGNESIUM: CPT

## 2021-09-02 RX ORDER — BISACODYL 10 MG
10 SUPPOSITORY, RECTAL RECTAL DAILY PRN
Qty: 10 SUPPOSITORY | Refills: 0 | Status: SHIPPED | OUTPATIENT
Start: 2021-09-02 | End: 2021-10-02

## 2021-09-02 RX ORDER — POTASSIUM CHLORIDE 7.45 MG/ML
10 INJECTION INTRAVENOUS
Status: DISCONTINUED | OUTPATIENT
Start: 2021-09-02 | End: 2021-09-02

## 2021-09-02 RX ORDER — CIPROFLOXACIN 500 MG/1
500 TABLET, FILM COATED ORAL DAILY
Qty: 5 TABLET | Refills: 0 | Status: SHIPPED | OUTPATIENT
Start: 2021-09-02 | End: 2021-09-07

## 2021-09-02 RX ORDER — LIDOCAINE HYDROCHLORIDE 20 MG/ML
INJECTION, SOLUTION EPIDURAL; INFILTRATION; INTRACAUDAL; PERINEURAL PRN
Status: DISCONTINUED | OUTPATIENT
Start: 2021-09-02 | End: 2021-09-02 | Stop reason: SDUPTHER

## 2021-09-02 RX ORDER — POTASSIUM CHLORIDE 29.8 MG/ML
20 INJECTION INTRAVENOUS
Status: DISCONTINUED | OUTPATIENT
Start: 2021-09-02 | End: 2021-09-02

## 2021-09-02 RX ORDER — POLYETHYLENE GLYCOL 3350 17 G/17G
17 POWDER, FOR SOLUTION ORAL DAILY
Status: DISCONTINUED | OUTPATIENT
Start: 2021-09-02 | End: 2021-09-02 | Stop reason: HOSPADM

## 2021-09-02 RX ORDER — PROPOFOL 10 MG/ML
INJECTION, EMULSION INTRAVENOUS CONTINUOUS PRN
Status: DISCONTINUED | OUTPATIENT
Start: 2021-09-02 | End: 2021-09-02 | Stop reason: SDUPTHER

## 2021-09-02 RX ORDER — PROPOFOL 10 MG/ML
INJECTION, EMULSION INTRAVENOUS PRN
Status: DISCONTINUED | OUTPATIENT
Start: 2021-09-02 | End: 2021-09-02 | Stop reason: SDUPTHER

## 2021-09-02 RX ORDER — POTASSIUM BICARBONATE 25 MEQ/1
25 TABLET, EFFERVESCENT ORAL DAILY
Qty: 5 TABLET | Refills: 0 | Status: SHIPPED | OUTPATIENT
Start: 2021-09-02 | End: 2021-10-07

## 2021-09-02 RX ORDER — SODIUM CHLORIDE 9 MG/ML
INJECTION, SOLUTION INTRAVENOUS CONTINUOUS PRN
Status: DISCONTINUED | OUTPATIENT
Start: 2021-09-02 | End: 2021-09-02 | Stop reason: SDUPTHER

## 2021-09-02 RX ORDER — POLYETHYLENE GLYCOL 3350 17 G/17G
17 POWDER, FOR SOLUTION ORAL DAILY
Qty: 30 EACH | Refills: 2 | Status: SHIPPED | OUTPATIENT
Start: 2021-09-02 | End: 2021-10-02

## 2021-09-02 RX ORDER — POTASSIUM CHLORIDE 7.45 MG/ML
10 INJECTION INTRAVENOUS
Status: DISPENSED | OUTPATIENT
Start: 2021-09-02 | End: 2021-09-02

## 2021-09-02 RX ORDER — POTASSIUM BICARBONATE 25 MEQ/1
50 TABLET, EFFERVESCENT ORAL ONCE
Status: COMPLETED | OUTPATIENT
Start: 2021-09-02 | End: 2021-09-02

## 2021-09-02 RX ADMIN — PROPOFOL 50 MG: 10 INJECTION, EMULSION INTRAVENOUS at 08:30

## 2021-09-02 RX ADMIN — HEPARIN SODIUM 5000 UNITS: 5000 INJECTION INTRAVENOUS; SUBCUTANEOUS at 05:08

## 2021-09-02 RX ADMIN — ACETAMINOPHEN 650 MG: 325 TABLET ORAL at 05:34

## 2021-09-02 RX ADMIN — PROPOFOL 100 MCG/KG/MIN: 10 INJECTION, EMULSION INTRAVENOUS at 08:30

## 2021-09-02 RX ADMIN — POTASSIUM CHLORIDE 10 MEQ: 7.46 INJECTION, SOLUTION INTRAVENOUS at 06:22

## 2021-09-02 RX ADMIN — SODIUM CHLORIDE: 9 INJECTION, SOLUTION INTRAVENOUS at 08:28

## 2021-09-02 RX ADMIN — POTASSIUM BICARBONATE 50 MEQ: 978 TABLET, EFFERVESCENT ORAL at 12:24

## 2021-09-02 RX ADMIN — POTASSIUM PHOSPHATE, MONOBASIC POTASSIUM PHOSPHATE, DIBASIC 15 MMOL: 224; 236 INJECTION, SOLUTION, CONCENTRATE INTRAVENOUS at 10:01

## 2021-09-02 RX ADMIN — Medication 100 MG: at 12:32

## 2021-09-02 RX ADMIN — Medication 10 ML: at 10:02

## 2021-09-02 RX ADMIN — LIDOCAINE HYDROCHLORIDE 80 MG: 20 INJECTION, SOLUTION EPIDURAL; INFILTRATION; INTRACAUDAL; PERINEURAL at 08:30

## 2021-09-02 ASSESSMENT — PULMONARY FUNCTION TESTS
PIF_VALUE: 1
PIF_VALUE: 0
PIF_VALUE: 1
PIF_VALUE: 1
PIF_VALUE: 0
PIF_VALUE: 1

## 2021-09-02 ASSESSMENT — PAIN SCALES - GENERAL
PAINLEVEL_OUTOF10: 0
PAINLEVEL_OUTOF10: 0
PAINLEVEL_OUTOF10: 8
PAINLEVEL_OUTOF10: 0

## 2021-09-02 ASSESSMENT — PAIN DESCRIPTION - PAIN TYPE: TYPE: ACUTE PAIN

## 2021-09-02 ASSESSMENT — PAIN DESCRIPTION - LOCATION: LOCATION: HEAD

## 2021-09-02 NOTE — PROGRESS NOTES
Pt return from Endo. Pt awake and alert, pt attached to cvu monitors, VSS. Pt denies pain.   Pt incontinent of small amount liquid stool, nadia care completed, clean brief placed on pt per request.

## 2021-09-02 NOTE — DISCHARGE SUMMARY
abdomen. Denied any fevers. She had been treated with radiation therapy and 3 cycles of chemotherapy. Acute metabolic encephalopathy due to infection and hyponatremia:  Improved with management underlying medical problems. Patient currently at baseline.         Hyponatremia: Resolving  Nephrology consulted: decreased free water clearance in the setting of JESSICA/CKD. Na 117 ==> 134. Repeat BMP on 9/6/2021 with outpatient follow-up with nephrology.       Acute kidney injury on baseline of chronic kidney disease stage III:  Prerenal due to poor oral intake. Creatinine trended down. 7.6 ==> 1.9. CT abdomen and pelvis did not show any obstruction  Off IV fluids. Cleared for discharge home with outpatient follow-up with nephrology.       Hypokalemia & hypophosphatemia:   Replaced and discharged home with potassium supplements will need repeat phosphorus level and BMP on 9/6/2021.       Impacted stool with some stercoral colitis  GI consulted: Colonoscopy 9-21 with findings of circumferential and rectal ulcer consistent with stercoral ulcers otherwise normal colon and terminal ileum. MiraLAX 17 g daily and Benefiber twice daily recommended. Outpatient follow-up with GI in 3 months. Will need outpatient follow-up of biopsy results.        Klebsiella UTI:  Pan sensitive. On IV ceftriaxone day 3. Changed over to PO ciprofloxacin to complete a 7 day total course.     Dysphagia s/p PEG tube:  History of esophageal cancer:  Per family she takes very few things orally but is not able to meet her nutritional requirements hence is on tube feeds  Dietary consulted and tube feeds resumed. Discharged to resume preadmission oral & tube feeding.        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 Patient follow-up with oncology. Consults.   IP CONSULT TO DIETITIAN  IP CONSULT TO NEPHROLOGY  IP CONSULT TO GI  PHARMACY TO DOSE MEDICATION  IP CONSULT TO HOME CARE NEEDS    Physical examination on discharge day. BP 97/85   Pulse 71   Temp 97.5 °F (36.4 °C) (Temporal)   Resp 12   Ht 5' 4\" (1.626 m)   Wt 112 lb 10.5 oz (51.1 kg)   SpO2 99%   BMI 19.34 kg/m²     General appearance: No apparent distress, appears stated age and cooperative. HEENT: Pupils equal, round, and reactive to light. Conjunctivae clear. Neck: Supple, with full range of motion. No jugular venous distention. Trachea midline. Respiratory:  Normal respiratory effort. Clear to auscultation, bilaterally without RALES/WHEEZES/Rhonchi. Cardiovascular: Regular rate and rhythm with normal S1/S2 without MURMURS, rubs or gallops. Abdomen: Soft, non-tender, non-distended with normal bowel sounds. PEG+. Musculoskeletal: No clubbing, cyanosis or EDEMA bilaterally.  Full range of motion without deformity. Skin: Skin color, texture, turgor normal.  No rashes or lesions. Neurologic:  Neurovascularly intact without any focal sensory/motor deficits. Cranial nerves: II-XII intact, grossly non-focal      Condition at time of discharge: Stable. Medication instructions provided to patient at discharge. Medication List      START taking these medications    ciprofloxacin 500 MG tablet  Commonly known as: CIPRO  Take 1 tablet by mouth daily for 5 days     potassium bicarbonate 25 MEQ disintegrating tablet  Commonly known as: Effer-K  Take 1 tablet by mouth daily for 5 days     psyllium 95 % Pack packet  Commonly known as: HYDROCIL  Take 1 packet by mouth 2 times daily        CONTINUE taking these medications    bisacodyl 10 MG suppository  Commonly known as: Bisac-Evac  Place 1 suppository rectally daily as needed for Constipation     clonazePAM 0.5 MG tablet  Commonly known as: KLONOPIN  Take 1 tablet by mouth 2 times daily as needed for Anxiety for up to 30 days.      ondansetron 4 MG disintegrating tablet  Commonly known as: ZOFRAN-ODT  Take 1 tablet by mouth every 8 hours as needed for Nausea or Vomiting     polyethylene glycol 17 GM/SCOOP powder  Commonly known as: GLYCOLAX  Take 17 g by mouth daily        STOP taking these medications    busPIRone 5 MG tablet  Commonly known as: BUSPAR           Where to Get Your Medications      These medications were sent to [facility] [address] [place] [address]  [address] [facility], [address] [place] [address]    Phone: [phone]   · bisacodyl 10 MG suppository  · ciprofloxacin 500 MG tablet  · polyethylene glycol 17 GM/SCOOP powder  · potassium bicarbonate 25 MEQ disintegrating tablet  · psyllium 95 % Pack packet         Discharge recommendations given to patient. Follow Up. PCP in 1 week   Disposition. Home with home care. Activity. activity as tolerated  Diet: ADULT DIET; Regular      Spent 35 minutes in discharge process.     Signed:  Darius Uribe MD     9/2/2021 1:37 PM

## 2021-09-02 NOTE — PROGRESS NOTES
Pt unsure of last time bowel prep given via PEG tube. Contacted floor RN to find out from night shift RN. Confirmed time of midnight as last dose of bowel prep given. Anesthesia informed.

## 2021-09-02 NOTE — BRIEF OP NOTE
Brief Postoperative Note - Full Note in Chart Review/Procedures tab       Patient: Codi Vega  YOB: 1950  MRN: [id number]    Date of Procedure: 9/2/2021    Pre-Op Diagnosis: Constipation    Post-Op Diagnosis: Same       Procedure(s):  COLONOSCOPY WITH BIOPSY RECTAL ULCER    Surgeon(s):  Gil Pablo MD    Assistant:  * No surgical staff found *    Anesthesia: Monitor Anesthesia Care    Estimated Blood Loss (mL): Minimal    Complications: None    Specimens:   ID Type Source Tests Collected by Time Destination   A : RECTAL ULCER BIOPSY  Tissue Colon SURGICAL PATHOLOGY Gil Pabol MD 9/2/2021 3560        Implants:  * No implants in log *      Drains:   Gastrostomy/Enterostomy/Jejunostomy Percutaneous Endoscopic Gastrostomy (PEG) LUQ 1 20 fr (Active)   Drainage Appearance None 08/31/21 2000   Dressing Type Open to air 08/31/21 2000   Tube Feeding Intake (mL) 0 ml 08/31/21 2000   Free Water Flush (mL) 1025 mL 09/01/21 2328   Tube Placement Verified Yes 08/30/21 2030       Urethral Catheter (Active)   Catheter Indications Urinary retention (acute or chronic), continuous bladder irrigation or bladder outlet obstruction 09/01/21 2328   Site Assessment No urethral drainage 09/01/21 2328   Urine Color Yellow 09/02/21 0724   Urine Appearance Hazy 09/02/21 0724   Urine Odor Malodorous 08/31/21 2000   Output (mL) 125 mL 09/02/21 0724       Findings:  1) Circumferential anorectal ulcer c/w stercoral ulcers    2) Otherwise normal colon and terminal ileum    Rec:  Resume diet  Start Benefiber 1 tbsp by mouth twice a day. Miralax 17 gm po daily. Continue present treatment. Await pathology results.    Followup colonoscopy is not recommeded  Followup with me in three months  Followup with referring physician as previously instucted  OK to discharge from GI standpoint today    Electronically signed by Gil Pablo MD on 9/2/2021 at 8:56 AM

## 2021-09-02 NOTE — ANESTHESIA POSTPROCEDURE EVALUATION
Department of Anesthesiology  Postprocedure Note    Patient: Lance Souza  MRN: [id number]  YOB: 1950  Date of evaluation: 9/2/2021  Time:  10:52 AM     Procedure Summary     Date: 09/02/21 Room / Location: [address] [place] [address]    Anesthesia Start: 2518 Anesthesia Stop: 0900    Procedure: COLONOSCOPY WITH BIOPSY RECTAL ULCER (N/A ) Diagnosis: (Constipation)    Surgeons: Bella Gardner MD Responsible Provider: John Hameed MD    Anesthesia Type: MAC ASA Status: 3          Anesthesia Type: MAC    Ricky Phase I: Ricky Score: 10    Ricky Phase II:      Last vitals: Reviewed and per EMR flowsheets.        Anesthesia Post Evaluation    Patient location during evaluation: PACU  Patient participation: complete - patient participated  Level of consciousness: awake  Airway patency: patent  Nausea & Vomiting: no vomiting  Complications: no  Cardiovascular status: hemodynamically stable  Respiratory status: acceptable  Hydration status: euvolemic

## 2021-09-02 NOTE — PLAN OF CARE
Problem: Falls - Risk of:  Goal: Will remain free from falls  Description: Will remain free from falls  Outcome: Ongoing  Note: Pt  has remained free from any falls so far this shift. Bed alarm activated. Non-skid socks on. Bed in lowest and locked position. Belongings within reach. Will continue to monitor. Call light within reach.

## 2021-09-02 NOTE — PROGRESS NOTES
Pt arrived from endo to PACU bay 4. Report received from endo staff. Pt on RA, NSR, VSS. Will continue to monitor.

## 2021-09-02 NOTE — PROGRESS NOTES
Occupational Therapy  Facility/Department: [facility] CVU  Daily Treatment Note  NAME: Markus Vogel  : 1950  MRN: [id number]    Date of Service: 2021    Discharge Recommendations:    Markus Vogel scored a 19/24 on the AM-PAC ADL Inpatient form. Current research shows that an AM-PAC score of 18 or greater is typically associated with a discharge to the patient's home setting. Based on the patient's AM-PAC score, and their current ADL deficits, it is recommended that the patient have 2-3 sessions per week of Occupational Therapy at d/c to increase the patient's independence. At this time, this patient demonstrates the endurance and safety to discharge home with [facility] and a follow up treatment frequency of 2-3x/wk. Please see assessment section for further patient specific details. If patient discharges prior to next session this note will serve as a discharge summary. Please see below for the latest assessment towards goals. Assessment   Performance deficits / Impairments: Decreased functional mobility ; Decreased ADL status; Decreased cognition;Decreased high-level IADLs;Decreased safe awareness  Assessment: Pt is limited in the above areas impacting independence and safety in ADLs and functional mobility. Pt would benefit from skilled inpatient OT services to address these deficits. Treatment Diagnosis: Decreased functional status secondary to hyponatremia  Prognosis: Good  OT Education: OT Role;Plan of Care;Transfer Training  Patient Education: scooby alexis/anthony- pt verbalized understanding  Barriers to Learning: cognition  Activity Tolerance  Activity Tolerance: Patient Tolerated treatment well  Safety Devices  Type of devices: Left in chair;Nurse notified  Restraints  Initially in place: No         Patient Diagnosis(es): The primary encounter diagnosis was Hypophosphatemia. Diagnoses of Other constipation, Hypokalemia, and Chronic anxiety were also pertinent to this visit.       has a past medical history of Anxiety, Cancer ([facility] [address] Cerebral artery occlusion with cerebral infarction ([facility] [address] Crohn disease ([facility] [address] CVA (cerebral infarction), Hyperlipidemia, and Hypertension. has a past surgical history that includes Cholecystectomy; Appendectomy; partial hysterectomy (cervix not removed) (1973); laryngoscopy (N/A, 5/5/2021); Hysterectomy; Colonoscopy (2011); Upper gastrointestinal endoscopy (05/26/2021); Upper gastrointestinal endoscopy (5/26/2021); and Colonoscopy (N/A, 9/2/2021). Restrictions  Restrictions/Precautions  Restrictions/Precautions: Fall Risk (high fall risk, DNR, PEG tube with regular diet)  Position Activity Restriction  Other position/activity restrictions: The patient is a 80 y. 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 Out-pt labs on 8/27 showed a BUN/Cr of 40/4.2 and hyponatremia with a sodium of 125She was asked to go to the ER and she presented to [address] on 8/28Labs showed worsening hyponatremia with sodium of 117, hyperkalemia with a k of 5.7 and BUN/Cr of 44/3.8. Because of bed shortage in ICU, she was transferred to [facility]. We were consulted for further evaluation and managementHer Creatinine was less than 1 in April 2021She was hospitalized at [facility] renal function. 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 Creatinine was 2 on discharge on 7/23/2021. She also had hyponatremia at that time, sodium ranged from mid 120s to low 130s and was 128 on discharge. Sodium at this time is 117 on admission and has improved to 121 at the time of consult.  She is also being treated for a UTI  Subjective   General  Chart Reviewed: Yes  Patient assessed for rehabilitation services?: Yes  Additional Pertinent Hx: hx alcohol abuse, HTN, HLD  Family / Caregiver Present: No  Diagnosis: hyponatremia  Subjective  Subjective: Pt supine in bed upon arrival; discharge imminent and RN in room removing IV - pt's  also present       Orientation   Oriented to place, person   Objective    ADL  UE Dressing: Stand by assistance  Additional Comments: pt preparing for discharge assist to change into gown with SBA (difficulty orienting nightgown) declined toileting prior to d/c - SBA to transfer to transport chair and with RN on exit from room      Cognition  Memory: Decreased recall of recent events  Initiation: Requires cues for some  Sequencing: Requires cues for some      Plan   Plan  Times per week: 3-5  Current Treatment Recommendations: Functional Mobility Training, Self-Care / ADL, Safety Education & Training, Home Management Training, Strengthening, Balance Training, Endurance Training           AM-PAC Score        AM-[facility] Inpatient Daily Activity Raw Score: 19 (09/02/21 1434)  AM-PAC Inpatient ADL T-Scale Score : 40.22 (09/02/21 1434)  ADL Inpatient CMS 0-100% Score: 42.8 (09/02/21 1434)  ADL Inpatient CMS G-Code Modifier : CK (09/02/21 1434)    Goals  Short term goals  Time Frame for Short term goals: d/c  Short term goal 1: Pt will complete bed mobility mod I. - goal met supine to sit  Short term goal 2: Pt will complete functional mobility mod I. Short term goal 3: Pt will complete functional transfer mod I. Short term goal 4: Pt will complete LB ADLs mod I. Short term goal 5: Pt will complete UB ADLs mod I. - SBA  Patient Goals   Patient goals : return home       Therapy Time   Individual Concurrent Group Co-treatment   Time In 3995         Time Out 1424         Minutes 16         Timed Code Treatment Minutes: 16 Minutes   Total minutes 16 Jan Jaswinder Glez, OT   Trent Goldman OTR/L [id number], 9/2/2021, 2:41 PM

## 2021-09-02 NOTE — DISCHARGE INSTR - COC
Continuity of Care Form    Patient Name: Akbar Cook   :  1950  MRN:  [id number]    Admit date:  2021  Discharge date:  ***    Code Status Order: DNR-CCA   Advance Directives:   [address] [facility] Documentation       Date/Time Healthcare Directive Type of Healthcare Directive Copy in [address] Agent's Name Healthcare Agent's Phone Number    21 0755  No, patient does not have an advance directive for healthcare treatment  --  --  --  --  --            Admitting Physician:  Nelli Thornton MD  PCP: Loretta Salomon APRN - CNP    Discharging Nurse: [facility] Unit/Room#: CVU-2912/2912-01  Discharging Unit Phone Number: ***    Emergency Contact:   Extended Emergency Contact Information  Primary Emergency Contact: Esteban Boyd  Address: [address] Srinivas Pierre of [address] [place] [address] Phone: [phone]  Relation: Spouse  Secondary Emergency Contact: [address] Phone: [phone]  Relation: Child   needed? No    Past Surgical History:  Past Surgical History:   Procedure Laterality Date    APPENDECTOMY      CHOLECYSTECTOMY      COLONOSCOPY      COLONOSCOPY N/A 2021    COLONOSCOPY WITH BIOPSY RECTAL ULCER performed by Mani Viveros MD at [place] [address] N/A 2021    DIRECT MICROLARYNGOSCOPY WITH BIOPSIES performed by Garo Mejia DO at [facility]    infection after btl;one ovary in    UPPER GASTROINTESTINAL ENDOSCOPY  2021    Procedure: EGD ESOPHAGOGASTRODUODENOSCOPY PEG TUBE INSERTION    UPPER GASTROINTESTINAL ENDOSCOPY  2021    EGD ESOPHAGOGASTRODUODENOSCOPY PEG TUBE INSERTION performed by Otis Rocha MD at [address] [facility]       Immunization History: There is no immunization history on file for this patient.     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    Hyponatremia E87.1    Dysphagia R13.10    Nausea R11.0    Moderate malnutrition (HCC) E44.0    Gastrostomy status (HCC) Z93.1    Hypokalemia E87.6    Hypophosphatemia E83.39    UTI due to Klebsiella species N39.0, B96.89    Constipation K59.00       Isolation/Infection:   Isolation            No Isolation          Patient Infection Status       Infection Onset Added Last Indicated Last Indicated By Review Planned Expiration Resolved Resolved By    None active    Resolved    C-diff Rule Out 08/30/21 08/30/21 08/31/21 GI Bacterial Pathogens By PCR (Ordered)   08/30/21 Isael Hammond RN    COVID-19 Rule Out 08/29/21 08/29/21 08/29/21 COVID-19 & Influenza Combo (Ordered)   08/29/21 Rule-Out Test Resulted    COVID-19 Rule Out 05/21/21 05/21/21 05/21/21 COVID-19 (Ordered)   05/21/21 Rule-Out Test Resulted            Nurse Assessment:  Last Vital Signs: BP 97/85   Pulse 71   Temp 97.5 °F (36.4 °C) (Temporal)   Resp 12   Ht 5' 4\" (1.626 m)   Wt 112 lb 10.5 oz (51.1 kg)   SpO2 99%   BMI 19.34 kg/m²     Last documented pain score (0-10 scale): Pain Level: 0  Last Weight:   Wt Readings from Last 1 Encounters:   09/02/21 112 lb 10.5 oz (51.1 kg)     Mental Status:  {IP PT MENTAL STATUS:20030:::0}    IV Access:  { JAVY IV ACCESS:769700210:::0}    Nursing Mobility/ADLs:  Walking   {[facility] DME ADLs:389764319:::0}  Transfer  {CHP DME ADLs:103685001:::0}  Bathing  {CHP DME ADLs:759005450:::0}  Dressing  {CHP DME ADLs:984293927:::0}  Toileting  {CHP DME ADLs:685473040:::0}  Feeding  {CHP DME ADLs:964629794:::0}  Med Admin  {CHP DME ADLs:983662343:::0}  Med Delivery   { JAVY MED Delivery:631749523:::0}    Wound Care Documentation and Therapy:        Elimination:  Continence: Bowel: {YES / :11664}  Bladder: {YES / EV:68651}  Urinary Catheter: {Urinary Catheter:433333631:::0}   Colostomy/Ileostomy/Ileal Conduit: {YES / TW:56017}       Date of Last BM: ***    Intake/Output Summary (Last 24 hours) at 2021 1331  Last data filed at 2021 1200  Gross per 24 hour   Intake 2015 ml   Output 715 ml   Net 1300 ml     I/O last 3 completed shifts: In: 7107 [P.O.:600;  I.V.:10; NG/GT:1025]  Out: 1 [Urine:485]    Safety Concerns:     [employer] Safety Concerns:652165689:::0}    Impairments/Disabilities:      [employer] Impairments/Disabilities:511576759:::0}    Nutrition Therapy:  Current Nutrition Therapy:   [employer] Diet List:231095662:::0}    Routes of Feeding: {CHP DME Other Feedings:783666777:::0}  Liquids: {Slp liquid thickness:26510}  Daily Fluid Restriction: {CHP DME Yes amt example:224649484:::0}  Last Modified Barium Swallow with Video (Video Swallowing Test): {Done Not Done NCUV:916463689:::5}    Treatments at the Time of Hospital Discharge:   Respiratory Treatments: ***  Oxygen Therapy:  {Therapy; copd oxygen:56279:::0}  Ventilator:    { CC Vent List:141127585:::0}    Rehab Therapies: {THERAPEUTIC INTERVENTION:3091949703}  Weight Bearing Status/Restrictions: [employer] Weight Bearin:::0}  Other Medical Equipment (for information only, NOT a DME order):  {EQUIPMENT:533158697}  Other Treatments: ***    Patient's personal belongings (please select all that are sent with patient):  {ADRIELP DME Belongings:551263877:::0}    RN SIGNATURE:  {Esignature:476424362:::0}    CASE MANAGEMENT/SOCIAL WORK SECTION    Inpatient Status Date: ***    Readmission Risk Assessment Score:  Readmission Risk              Risk of Unplanned Readmission:  20           Discharging to Facility/ Agency   Name:   Address:  Phone:  Fax:    Dialysis Facility (if applicable)   Name:  Address:  Dialysis Schedule:  Phone:  Fax:    / signature: {Esignature:201165702:::0}    PHYSICIAN SECTION    Prognosis: Fair    Condition at Discharge: Stable    Rehab Potential (if transferring to Rehab): Fair    Recommended Labs or Other Treatments After Discharge: BMP and Phos level on 9/6/21. Physician Certification: I certify the above information and transfer of Jose Alexander  is necessary for the continuing treatment of the diagnosis listed and that she requires [address] for greater 30 days.      Update Admission H&P: No change in H&P    PHYSICIAN SIGNATURE:  Electronically signed by Julisa Carey MD on 9/2/21 at 1:32 PM EDT

## 2021-09-02 NOTE — ANESTHESIA PRE PROCEDURE
Department of Anesthesiology  Preprocedure Note       Name:  Demetrius Tracey   Age:  80 y.o.  :  1950                                          MRN:  [id number]         Date:  2021      Surgeon: Yemi Diaz):  Ada Patton MD    Procedure: Procedure(s):  COLONOSCOPY DIAGNOSTIC    Medications prior to admission:   Prior to Admission medications    Medication Sig Start Date End Date Taking? Authorizing Provider   bisacodyl (BISAC-EVAC) 10 MG suppository Place 1 suppository rectally daily as needed for Constipation 21  LEVAR Corea CNP   polyethylene glycol [facility]) 17 GM/SCOOP powder Take 17 g by mouth daily 21  LEVAR Corea CNP   clonazePAM (KLONOPIN) 0.5 MG tablet Take 1 tablet by mouth 2 times daily as needed for Anxiety for up to 30 days. 21  LEVAR Corea CNP   busPIRone (BUSPAR) 5 MG tablet Take 1 tablet by mouth 2 times daily 21  LEVAR Corea CNP   ondansetron (ZOFRAN-ODT) 4 MG disintegrating tablet Take 1 tablet by mouth every 8 hours as needed for Nausea or Vomiting 21   Lupis Summers MD       Current medications:    No current facility-administered medications for this visit. No current outpatient medications on file.      Facility-Administered Medications Ordered in Other Visits   Medication Dose Route Frequency Provider Last Rate Last Admin    potassium chloride 10 mEq/100 mL IVPB (Peripheral Line)  10 mEq IntraVENous Q2H Jade Burris  mL/hr at 21 0622 10 mEq at 21 0622    sodium chloride flush 0.9 % injection 5-40 mL  5-40 mL IntraVENous 2 times per day Ada Patton MD   10 mL at 21    sodium chloride flush 0.9 % injection 5-40 mL  5-40 mL IntraVENous PRN Ada Patton MD   10 mL at 21    0.9 % sodium chloride infusion  25 mL IntraVENous PRN Ada Patton MD        lidocaine (XYLOCAINE) 2 % jelly   Topical PRN Roly Sink Farshad Saucedo MD   Given at 09/01/21 2326    clonazePAM (KLONOPIN) tablet 0.5 mg  0.5 mg Oral BID PRN Roland Garcia MD   0.5 mg at 08/30/21 0544    bisacodyl (DULCOLAX) suppository 10 mg  10 mg Rectal Daily PRN Roland Garcia MD        sodium chloride flush 0.9 % injection 5-40 mL  5-40 mL IntraVENous PRN Roland Garcia MD        heparin (porcine) injection 5,000 Units  5,000 Units SubCUTAneous 3 times per day Roland Garica MD   5,000 Units at 09/02/21 0508    ondansetron (ZOFRAN-ODT) disintegrating tablet 4 mg  4 mg Oral Q8H PRN Israel Dudley MD        Or    ondansetron (ZOFRAN) injection 4 mg  4 mg IntraVENous Q6H PRN Israel Dudley MD        acetaminophen (TYLENOL) tablet 650 mg  650 mg Oral Q6H PRN Roland Garcia MD   650 mg at 09/02/21 0534    Or    acetaminophen (TYLENOL) suppository 650 mg  650 mg Rectal Q6H PRN Israel Dudley MD        cefTRIAXone (ROCEPHIN) 1000 mg in sterile water 10 mL IV syringe  1,000 mg IntraVENous Q24H Israel Ceron MD   1,000 mg at 09/01/21 2325    nystatin (MYCOSTATIN) 091508 UNIT/ML suspension 500,000 Units  5 mL Oral 4x Daily Roland Garcia MD   500,000 Units at 09/01/21 2052    thiamine mononitrate tablet 100 mg  100 mg Oral Daily Michelle Trevino MD   100 mg at 09/01/21 0931    iohexol (OMNIPAQUE 240) injection 50 mL  50 mL Oral ONCE PRN Michelle Trevino MD           Allergies:     Allergies   Allergen Reactions    Penicillins Hives and Rash     5/2013 40 yrs ago had a nonpruritic rash on hands;no hives and no other sx's;       Problem List:    Patient Active Problem List   Diagnosis Code    Chronic anxiety F41.9    Crohn's disease without complication ([facility] [address] O17.89    Nicotine dependence F17.200    Aortic insufficiency I35.1    Essential hypertension I10    Hyperlipidemia E78.5    Cigarette nicotine dependence without complication F43.929    Smoking F17.200    Mixed incontinence urge and stress N39.46    Localized swelling of both lower legs R22.43    ASCVD (arteriosclerotic cardiovascular disease) I25.10    Acute kidney injury (HCC) N17.9    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 [address] Z93.1       Past Medical History:        Diagnosis Date    Anxiety     Cancer ([facility] [address] 2021    throat    Cerebral artery occlusion with cerebral infarction ([facility] [address]     30 years ago had mini stroke with no residual    Crohn disease ([facility] [address]     CVA (cerebral infarction)     hx unclear    Hyperlipidemia     Hypertension        Past Surgical History:        Procedure Laterality Date    APPENDECTOMY      CHOLECYSTECTOMY      COLONOSCOPY      HYSTERECTOMY      LARYNGOSCOPY N/A 2021    DIRECT MICROLARYNGOSCOPY WITH BIOPSIES performed by Zuleika Barger DO at [address]    infection after btl;one ovary in   [address]  2021    Procedure: EGD ESOPHAGOGASTRODUODENOSCOPY PEG TUBE INSERTION    UPPER GASTROINTESTINAL ENDOSCOPY  2021    EGD ESOPHAGOGASTRODUODENOSCOPY PEG TUBE INSERTION performed by Alayna Garner MD at [address] History:    Social History     Tobacco Use    Smoking status: Former Smoker     Packs/day: 1.50     Years: 30.00     Pack years: 45.00     Types: Cigarettes     Quit date: 2021     Years since quittin.3    Smokeless tobacco: Never Used    Tobacco comment: placed in AVS   Substance Use Topics    Alcohol use: Not Currently     Alcohol/week: 20.0 standard drinks     Types: 20 Cans of beer per week     Comment: daily; 4 beers per day                                Counseling given: Not Answered  Comment: placed in AVS      Vital Signs (Current): There were no vitals filed for this visit.                                            BP Readings from Last 3 Encounters:   21 Selam Olp ) 146/58   08/30/21 (!) 145/71   08/27/21 (!) 100/50       NPO Status:                                                                                 BMI:   Wt Readings from Last 3 Encounters:   09/02/21 112 lb 10.5 oz (51.1 kg)   08/29/21 125 lb (56.7 kg)   08/27/21 125 lb (56.7 kg)     There is no height or weight on file to calculate BMI.    CBC:   Lab Results   Component Value Date    WBC 8.5 09/02/2021    RBC 2.53 09/02/2021    HGB 8.4 09/02/2021    HCT 23.6 09/02/2021    MCV 93.4 09/02/2021    RDW 15.3 09/02/2021     09/02/2021       CMP:   Lab Results   Component Value Date     09/02/2021    K 3.0 09/02/2021    K 4.3 08/30/2021    CL 94 09/02/2021    CO2 27 09/02/2021    BUN 22 09/02/2021    CREATININE 1.9 09/02/2021    GFRAA 32 09/02/2021    GFRAA >60 02/27/2010    AGRATIO 1.1 09/02/2021    LABGLOM 26 09/02/2021    GLUCOSE 92 09/02/2021    PROT 6.2 09/02/2021    PROT 7.1 02/27/2010    CALCIUM 9.1 09/02/2021    BILITOT <0.2 09/02/2021    ALKPHOS 65 09/02/2021    AST 15 09/02/2021    ALT 8 09/02/2021       POC Tests: No results for input(s): POCGLU, POCNA, POCK, POCCL, POCBUN, POCHEMO, POCHCT in the last 72 hours.     Coags:   Lab Results   Component Value Date    PROTIME 10.9 08/08/2017    INR 0.94 08/08/2017    APTT 27.7 08/08/2017       HCG (If Applicable): No results found for: PREGTESTUR, PREGSERUM, HCG, HCGQUANT     ABGs: No results found for: PHART, PO2ART, ATT4JZQ, ORK2FLF, BEART, P5NFNRDV     Type & Screen (If Applicable):  No results found for: LABABO, LABRH    Drug/Infectious Status (If Applicable):  No results found for: HIV, HEPCAB    COVID-19 Screening (If Applicable):   Lab Results   Component Value Date    COVID19 NOT DETECTED 08/29/2021           Anesthesia Evaluation  Patient summary reviewed and Nursing notes reviewed no history of anesthetic complications:   Airway: Mallampati: II  TM distance: >3 FB   Neck ROM: full  Mouth opening: > = 3 FB Dental:    (+) edentulous Pulmonary: breath sounds clear to auscultation      (-) rhonchi, wheezes and rales                          ROS comment: Former smoker  Cough today per pt because mouth and throat so dry   Cardiovascular:    (+) hypertension:, hyperlipidemia    (-) dysrhythmias and  angina    ECG reviewed  Rhythm: regular  Rate: normal                    Neuro/Psych:   (+) CVA:, depression/anxiety             GI/Hepatic/Renal:        (-) GERD      ROS comment: Crohn's  Denies n/v or gerd sx today  Feeding tube . Endo/Other:    (+) electrolyte abnormalities, malignancy/cancer (  last chemo treatment july 2021, SCC, radiation. ). Abdominal:             Vascular: Other Findings:             Anesthesia Plan      MAC     ASA 3     (Medications & allergies reviewed  All available lab & EKG data reviewed  DNR suspension consent on chart)  Induction: intravenous. Anesthetic plan and risks discussed with patient. Plan discussed with CRNA.                 Stacey Mckinney MD   9/2/2021

## 2021-09-02 NOTE — PROGRESS NOTES
Nephrology Progress Note  [phone]  [phone]   [facility]. [employer]    Patient:  Lilian Peterson   : 1950        HPI: The patient is a 80 y. 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 Out-pt labs on  showed a BUN/Cr of 40/4.2 and hyponatremia with a sodium of 125  She was asked to go to the ER and she presented to [address] on   Labs showed worsening hyponatremia with sodium of 117, hyperkalemia with a k of 5.7 and BUN/Cr of 44/3.8. Because of bed shortage in ICU, she was transferred to [facility]. We were consulted for further evaluation and management  Her Creatinine was less than 1 in 2021  She was hospitalized at [facility] for worsening renal function. 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 Creatinine was 2 on discharge on 2021. She also had hyponatremia at that time, sodium ranged from mid 120s to low 130s and was 128 on discharge. Sodium at this time is 117 on admission and has improved to 121 at the time of consult. She is also being treated for a UT      Subjective:  -pt seen and examined  -PMSHx and meds reviewed  -No family at bedside      S/p colonoscopy  Denies pain or discomfort  Wants to go home    ROS:   Neg chest pain/SOB      Meds:  Reviewed     Vitals:  BP (!) 140/52   Pulse 72   Temp 97.8 °F (36.6 °C) (Temporal)   Resp 16   Ht 5' 4\" (1.626 m)   Wt 112 lb 10.5 oz (51.1 kg)   SpO2 99%   BMI 19.34 kg/m²     Physical Exam:  General : AAOx3, not in pain or respiratory distress, resting in bed  HEENT : normocephalic, atraumatic, mucosa dry, no palor or icterus  CVS: S1 S2 normal, regular rhythm, no murmurs or rubs.   Lungs: Clear, no wheezing or crackles. Abd: Soft, bowel sounds normal, non-tender. PEG+  Ext: No edema, no cyanosis  Skin: Warm.  No rashes appreciated. : bladder non-distended, no tenderness over the bladder.     Labs:  CBC:   Lab Results   Component Value Date    WBC 8.5 09/02/2021    RBC 2.53 09/02/2021    HGB 8.4 09/02/2021    HCT 23.6 09/02/2021    MCV 93.4 09/02/2021    MCH 33.3 09/02/2021    MCHC 35.6 09/02/2021    RDW 15.3 09/02/2021     09/02/2021    MPV 8.0 09/02/2021     BMP:    Lab Results   Component Value Date     09/02/2021    K 3.0 09/02/2021    K 4.3 08/30/2021    CL 94 09/02/2021    CO2 27 09/02/2021    BUN 22 09/02/2021    LABALBU 3.2 09/02/2021    CREATININE 1.9 09/02/2021    CALCIUM 9.1 09/02/2021    GFRAA 32 09/02/2021    GFRAA >60 02/27/2010    LABGLOM 26 09/02/2021    GLUCOSE 92 09/02/2021       Assessment/Plan:    Hyponatremia: decreased free water clearance in the setting of JESSICA/CKD   -bossman Na 117  -Na 134   -replace K    JESSICA on AKD/CKD:  Onset 07/2021,baseline prior to that < 1.0, since then Scr has been ranging in high 2-low 3 range  -recurrent JESSICA attributed to pre-renal due to poor PO intake  -Cr stable  -follow up with Dr. Forest Blas     Can be discharged from nephrology standpoint    Hypokalemia: replace, Mg is stable    Hypophosphatemia:   -getting replaced    UTI: on ceftriaxone    Constipation:   S/p colonoscopy-rectal ulcer biopsied    H/o throat SCC: s/p cisplatin/XRT-complicated by JESSICA        Benita Rivera MD,

## 2021-09-03 ENCOUNTER — CARE COORDINATION (OUTPATIENT)
Dept: CASE MANAGEMENT | Age: 71
End: 2021-09-03

## 2021-09-03 NOTE — CARE COORDINATION
[facility] Transitions Initial Follow Up Call    Call within 2 business days of discharge: Yes    Patient: Victor Hugo Alcaraz Patient : 1950   MRN: [id number]  Reason for Admission: Acute metabolic encephalopathy due to infection and hyponatremia:; UTI; CA  Discharge Date: 21 RARS: Readmission Risk Score: 21    First attempt at 24 hour discharge call, no answer, mailbox is full, unable to leave VM. HIPAA form indicated PHI is not to provided to anyone but the patient. CTN will continue with outreach call attempts. Baljinder Bey.  OZIEL GomezN, RN, CCM, Care Transition Coordinator  [phone]

## 2021-09-07 ENCOUNTER — CARE COORDINATION (OUTPATIENT)
Dept: CASE MANAGEMENT | Age: 71
End: 2021-09-07

## 2021-09-07 NOTE — CARE COORDINATION
[facility] Transitions Initial Follow Up Call    Call within 2 business days of discharge: Yes    Patient: Roberto Nathan Patient : 1950   MRN: [id number]  Reason for Admission:   Discharge Date: 21 RARS: Readmission Risk Score: 21      Last Discharge [facility]       Complaint Diagnosis Description Type Department Provider    21  Hypophosphatemia . .. Admission (Discharged) Johnson Sanford MD           2nd and final attempt at an initial 24 hour call, contact info left on vm      Follow Up  No future appointments.     Rachel Swain RN

## 2021-09-27 ENCOUNTER — TELEPHONE (OUTPATIENT)
Dept: ORTHOPEDIC SURGERY | Age: 71
End: 2021-09-27

## 2021-09-27 ENCOUNTER — TELEPHONE (OUTPATIENT)
Dept: FAMILY MEDICINE CLINIC | Age: 71
End: 2021-09-27

## 2021-09-27 NOTE — TELEPHONE ENCOUNTER
Called patient to inquire regarding status. Has not had repeat labs since hospitalization at the beginning of September. Reports she is now using 2-3 cans of Jevity daily and is overall feeling better. Encouraged patient to make updated appt for labs and for me to order the [address] for her. Pt will discuss with her daughter and call us back. Attempted to call daughter to schedule appt- mailbox full and unable to leave message.

## 2021-09-27 NOTE — TELEPHONE ENCOUNTER
Dear PCP Provider: [facility]) has partnered with [facility] to utilize predictive analytics to improve the care management for patients with arthritic knee pain. Utilizing claims data and patient visit features, we have developed an algorithmic risk score to determine which patient's quality of life may be most impacted to their knee pain. The selection criteria for this  program are: 1) risk score greater than .85; 2) existing [address] [place] [address] Floor patient; and 3) seen for knee pain in the past 3 years. Based upon the predictive analytics risk score  program, your patient has a risk score of greater than . 85 (i.e. 85% probability in having their quality of life impacted by their knee pain). To assist with care management of your patient, a navigator will be contacting them to understand their knee pain status and to educate on the [address] Pain Program, including scheduling an appointment with a joint specialist or their PCP. If you feel this patient not appropriate to be contacted given other medical reasons, please reply back to the navigator within 7 days with reason why not to be contacted. Otherwise, the navigator will follow up with you on the details of the patient encounter after the call.  Thank you for your support for this program.

## 2021-10-05 ENCOUNTER — TELEPHONE (OUTPATIENT)
Dept: ORTHOPEDIC SURGERY | Age: 71
End: 2021-10-05

## 2021-10-05 NOTE — TELEPHONE ENCOUNTER
Attempted to contact patient to inform them about the [address] [place] [address] joint pain program. I was unable to LVM.  Patient can call [phone] to find out more information or to schedule an appointment with a joint pain orthopedic specialist.

## 2021-10-07 ENCOUNTER — OFFICE VISIT (OUTPATIENT)
Dept: FAMILY MEDICINE CLINIC | Age: 71
End: 2021-10-07
Payer: MEDICARE

## 2021-10-07 VITALS
WEIGHT: 127 LBS | HEIGHT: 64 IN | HEART RATE: 70 BPM | OXYGEN SATURATION: 97 % | RESPIRATION RATE: 16 BRPM | BODY MASS INDEX: 21.68 KG/M2 | SYSTOLIC BLOOD PRESSURE: 128 MMHG | DIASTOLIC BLOOD PRESSURE: 82 MMHG

## 2021-10-07 DIAGNOSIS — E87.1 HYPONATREMIA: ICD-10-CM

## 2021-10-07 DIAGNOSIS — K59.09 OTHER CONSTIPATION: ICD-10-CM

## 2021-10-07 DIAGNOSIS — Z93.1 GASTROSTOMY STATUS (HCC): ICD-10-CM

## 2021-10-07 DIAGNOSIS — C10.9 SQUAMOUS CELL CARCINOMA OF OROPHARYNX (HCC): ICD-10-CM

## 2021-10-07 DIAGNOSIS — F41.9 CHRONIC ANXIETY: ICD-10-CM

## 2021-10-07 DIAGNOSIS — N17.9 AKI (ACUTE KIDNEY INJURY) (HCC): Primary | ICD-10-CM

## 2021-10-07 PROCEDURE — G8427 DOCREV CUR MEDS BY ELIG CLIN: HCPCS | Performed by: NURSE PRACTITIONER

## 2021-10-07 PROCEDURE — 1123F ACP DISCUSS/DSCN MKR DOCD: CPT | Performed by: NURSE PRACTITIONER

## 2021-10-07 PROCEDURE — G8400 PT W/DXA NO RESULTS DOC: HCPCS | Performed by: NURSE PRACTITIONER

## 2021-10-07 PROCEDURE — 36415 COLL VENOUS BLD VENIPUNCTURE: CPT | Performed by: NURSE PRACTITIONER

## 2021-10-07 PROCEDURE — 1036F TOBACCO NON-USER: CPT | Performed by: NURSE PRACTITIONER

## 2021-10-07 PROCEDURE — 4040F PNEUMOC VAC/ADMIN/RCVD: CPT | Performed by: NURSE PRACTITIONER

## 2021-10-07 PROCEDURE — G8420 CALC BMI NORM PARAMETERS: HCPCS | Performed by: NURSE PRACTITIONER

## 2021-10-07 PROCEDURE — G8484 FLU IMMUNIZE NO ADMIN: HCPCS | Performed by: NURSE PRACTITIONER

## 2021-10-07 PROCEDURE — 3017F COLORECTAL CA SCREEN DOC REV: CPT | Performed by: NURSE PRACTITIONER

## 2021-10-07 PROCEDURE — 1090F PRES/ABSN URINE INCON ASSESS: CPT | Performed by: NURSE PRACTITIONER

## 2021-10-07 PROCEDURE — 99213 OFFICE O/P EST LOW 20 MIN: CPT | Performed by: NURSE PRACTITIONER

## 2021-10-07 ASSESSMENT — ENCOUNTER SYMPTOMS
DIARRHEA: 0
CHEST TIGHTNESS: 0
BLOOD IN STOOL: 0
SORE THROAT: 1
VOMITING: 0
TROUBLE SWALLOWING: 1
COUGH: 0
CONSTIPATION: 0
SHORTNESS OF BREATH: 0
NAUSEA: 0

## 2021-10-07 NOTE — PROGRESS NOTES
10/7/2021    Chief Complaint   Patient presents with   Jorgitorebecca Gordon is a 80 y.o. female, presents today for:      ASSESSMENT/PLAN:    1. JESSICA (acute kidney injury) ([facility]  Clinically status appears improved. Will order updated labs today. Due to prior Renal issues will refer to Dr. Corina Rios.    - CBC Auto Differential  - Magnesium  - Comprehensive Metabolic Panel  - AFL([facility]) - Janel Matamoros MD, Nephrology, [facility]    2. Hyponatremia  Labs today    3. Chronic anxiety  Monitor for now, no longer on medications. Refusing to start additional meds today    4. Other constipation  Improving with increased oral intake    5. Squamous cell carcinoma of oropharynx (HCC)  Currently in remission. Continue to follow with [facility]. Continue to follow Dietician recommendations for Boost HiCalorie. 6. Gastrostomy status ([facility]  Monitor. Continue Boost TID      Return in about 3 months (around 1/7/2022). Presenting today with her daughter Remy Garcia for follow up on recent [address] [facility] admission for JESSICA, Hypophosphatemia and Hyponatremia from 8/30-9/2/21. Worsening malaise, decreased PO intake, diarrhea, intermittent abdominal pain. Recently completed radiation therapy and 3 cycles of chemo for oral SCC. Pt tube feeding was change from Ensure TID to Boost Hi Calorie on admission   Today pt reports she is doing well. Has had Boost Hi Calorie ordered and is being sent to her home. She is using 3 cans daily and is taking in small amounts of food. Continues to drink water throughout day but is unable to quantify how much she is actually taking in. Never made appt with Renal, requesting to see provider closer to her home if possible.        Lab Results   Component Value Date    CHOL 177 03/10/2021    CHOL 146 08/21/2019    CHOL 149 05/16/2018     Lab Results   Component Value Date    TRIG 109 03/10/2021    TRIG 75 08/21/2019    TRIG 153 (H) 05/16/2018     Lab Results   Component Value Date    HDL 54 03/10/2021    HDL 67 (H) 08/10/2020    HDL 70 (H) 08/21/2019     Lab Results   Component Value Date    LDLCALC 101 (H) 03/10/2021    LDLCALC 52 08/10/2020    LDLCALC 61 08/21/2019     Lab Results   Component Value Date    LABVLDL 22 03/10/2021    LABVLDL 20 08/10/2020    LABVLDL 15 08/21/2019     No results found for: [facility]    Lab Results   Component Value Date     (L) 09/02/2021    K 3.5 09/02/2021    CL 94 (L) 09/02/2021    CO2 27 09/02/2021    BUN 22 (H) 09/02/2021    CREATININE 1.9 (H) 09/02/2021    GLUCOSE 92 09/02/2021    CALCIUM 9.1 09/02/2021    PROT 6.2 (L) 09/02/2021    LABALBU 3.2 (L) 09/02/2021    BILITOT <0.2 09/02/2021    ALKPHOS 65 09/02/2021    AST 15 09/02/2021    ALT 8 (L) 09/02/2021    LABGLOM 26 (A) 09/02/2021    GFRAA 32 (A) 09/02/2021    AGRATIO 1.1 09/02/2021    GLOB 3.0 09/02/2021     Review of Systems   Constitutional: Negative. HENT: Positive for sore throat and trouble swallowing. Respiratory: Negative for cough, chest tightness and shortness of breath. Cardiovascular: Negative. Gastrointestinal: Negative for blood in stool, constipation, diarrhea, nausea and vomiting. Genitourinary: Negative. Musculoskeletal: Negative. Skin: Negative. Neurological: Negative for dizziness, tremors, light-headedness and headaches. Psychiatric/Behavioral: Negative for decreased concentration, dysphoric mood, self-injury, sleep disturbance and suicidal ideas. The patient is not nervous/anxious. Current Outpatient Medications on File Prior to Visit   Medication Sig Dispense Refill    psyllium (HYDROCIL) 95 % PACK packet Take 1 packet by mouth 2 times daily 240 each 2    potassium bicarbonate (EFFER-K) 25 MEQ disintegrating tablet Take 1 tablet by mouth daily for 5 days 5 tablet 0    clonazePAM (KLONOPIN) 0.5 MG tablet Take 1 tablet by mouth 2 times daily as needed for Anxiety for up to 30 days.  60 tablet 0    ondansetron (ZOFRAN-ODT) 4 MG disintegrating tablet Take 1 tablet by mouth every 8 hours as needed for Nausea or Vomiting 30 tablet 1     No current facility-administered medications on file prior to visit.      Allergies   Allergen Reactions    Penicillins Hives and Rash     2013 40 yrs ago had a nonpruritic rash on hands;no hives and no other sx's;     Past Medical History:   Diagnosis Date    Anxiety     Cancer ([facility] 2021    throat    Cerebral artery occlusion with cerebral infarction ([facility]     30 years ago had mini stroke with no residual    Crohn disease ([facility]     CVA (cerebral infarction)     hx unclear    Hyperlipidemia     Hypertension      Past Surgical History:   Procedure Laterality Date    APPENDECTOMY      CHOLECYSTECTOMY      COLONOSCOPY      COLONOSCOPY N/A 2021    COLONOSCOPY WITH BIOPSY RECTAL ULCER performed by Floresita Maynard MD at [place] [address] N/A 2021    DIRECT MICROLARYNGOSCOPY WITH BIOPSIES performed by Chelsea Moses DO at [address]    infection after btl;one ovary in   [address]  2021    Procedure: EGD ESOPHAGOGASTRODUODENOSCOPY PEG TUBE INSERTION    UPPER GASTROINTESTINAL ENDOSCOPY  2021    EGD ESOPHAGOGASTRODUODENOSCOPY PEG TUBE INSERTION performed by Gurinder Rivers MD at [place] [address]. History     Tobacco Use    Smoking status: Former Smoker     Packs/day: 1.50     Years: 30.00     Pack years: 45.00     Types: Cigarettes     Quit date: 2021     Years since quittin.4    Smokeless tobacco: Never Used    Tobacco comment: placed in AVS   Substance Use Topics    Alcohol use: Not Currently     Alcohol/week: 20.0 standard drinks     Types: 20 Cans of beer per week     Comment: daily; 4 beers per day     Family History   Problem Relation Age of Onset    Heart Disease Mother        Vitals:    10/07/21 1442   BP: 128/82   Site: Right Upper Arm   Position: Sitting   Cuff Size: Medium Adult   Pulse: 70   Resp: 16   SpO2: 97%   Weight: 127 lb (57.6 kg)   Height: 5' 4\" (1.626 m)     Estimated body mass index is 21.8 kg/m² as calculated from the following:    Height as of this encounter: 5' 4\" (1.626 m). Weight as of this encounter: 127 lb (57.6 kg). Physical Exam  Vitals reviewed. Constitutional:       Appearance: Normal appearance. HENT:      Head: Normocephalic. Neck:      Vascular: No carotid bruit. Cardiovascular:      Rate and Rhythm: Normal rate and regular rhythm. Pulses: Normal pulses. Carotid pulses are 2+ on the right side and 2+ on the left side. Dorsalis pedis pulses are 2+ on the right side and 2+ on the left side. Posterior tibial pulses are 2+ on the right side and 2+ on the left side. Heart sounds: Normal heart sounds. No murmur heard. No gallop. Pulmonary:      Effort: Pulmonary effort is normal.      Breath sounds: Normal breath sounds. Abdominal:      General: Abdomen is flat. Palpations: Abdomen is soft. Musculoskeletal:         General: Normal range of motion. Cervical back: Normal range of motion. Right lower leg: No edema. Left lower leg: No edema. Lymphadenopathy:      Cervical: No cervical adenopathy. Skin:     General: Skin is warm and dry. Capillary Refill: Capillary refill takes less than 2 seconds. Neurological:      General: No focal deficit present. Mental Status: She is alert and oriented to person, place, and time. Psychiatric:         Mood and Affect: Mood normal.         Behavior: Behavior normal.           Patient's questions answered and concerns addressed. Patient agrees to plan of care.         Electronically signed by LEVAR Esquivel CNP on 10/7/2021 at 7:28 PM

## 2021-10-07 NOTE — PATIENT INSTRUCTIONS
National Suicide Prevention Lifeline    Hours: Available 24 hours. Languages: [place], [place] Learn more  [phone]  **Please call hotline or call 911 if you become suicidal.      Other ways to help your Mental Health:  1. Find a safe person to discuss your feelings with. This can be a friend, family member, counselor, or your healthcare provider  2. Keep Active. Daily exercise can boost your energy, help your concentration, sleep better, and overall help you to feel better. Try to do at least 20 minutes 3 times per week if you are not doing any. 3. Eat Well. Research has show that eating more whole foods like vegetables, fruits, meats, and healthy grains will improve your mood and your mental functioning. Try to avoid processed, fried fatty foods as these tend to worse depression  4. Drink Sensibly. Increased alcohol intake can worsen, not improve our moods  5. Keep in touch with loved ones. It can be hard with distance but showing you care for someone will also improve your mood  6. Don't be afraid to ask for help- no one is a . Someone cannot help you unless you tell them. And you may find that friends and family know you better than you think they do. Counselors are also a great resource for this  7. Take a Break! A change of scene or a change of pace can change your whole day  8. Find something you are good at. These things can bring chalino. Don't have a hobby? Go try new things. Work on being brave enough to know you may fail at things and that is OK. 9. Accept who you are. This is a tough one. We are all different and have unique needs and wants. Accepting yourself and letting yourself be loved are some of the bravest things we can do. 10. Care for others. All of us need help now and then. Caring for others will help us to focus not so much on our issues. 11. Working getting a getting a good night's sleep. More rest will often improve our mood for the rest of the day. Work on going to bed at the same time every day and waking at the same time. Using your bed only for sleep and/or sex. Don't use your cell phone when you are in bed as it will stimulate your brain. Try to exercise 4 hours before you go to bed.

## 2021-10-08 LAB
A/G RATIO: 1.4 (ref 1.1–2.2)
ALBUMIN SERPL-MCNC: 4.5 G/DL (ref 3.4–5)
ALP BLD-CCNC: 99 U/L (ref 40–129)
ALT SERPL-CCNC: 8 U/L (ref 10–40)
ANION GAP SERPL CALCULATED.3IONS-SCNC: 14 MMOL/L (ref 3–16)
AST SERPL-CCNC: 14 U/L (ref 15–37)
BASOPHILS ABSOLUTE: 0 K/UL (ref 0–0.2)
BASOPHILS RELATIVE PERCENT: 0.2 %
BILIRUB SERPL-MCNC: 0.3 MG/DL (ref 0–1)
BUN BLDV-MCNC: 73 MG/DL (ref 7–20)
CALCIUM SERPL-MCNC: 10.5 MG/DL (ref 8.3–10.6)
CHLORIDE BLD-SCNC: 92 MMOL/L (ref 99–110)
CO2: 21 MMOL/L (ref 21–32)
CREAT SERPL-MCNC: 2.4 MG/DL (ref 0.6–1.2)
EOSINOPHILS ABSOLUTE: 0.2 K/UL (ref 0–0.6)
EOSINOPHILS RELATIVE PERCENT: 3.5 %
GFR AFRICAN AMERICAN: 24
GFR NON-AFRICAN AMERICAN: 20
GLOBULIN: 3.2 G/DL
GLUCOSE BLD-MCNC: 100 MG/DL (ref 70–99)
HCT VFR BLD CALC: 27 % (ref 36–48)
HEMOGLOBIN: 8.9 G/DL (ref 12–16)
LYMPHOCYTES ABSOLUTE: 0.4 K/UL (ref 1–5.1)
LYMPHOCYTES RELATIVE PERCENT: 6.2 %
MAGNESIUM: 2.4 MG/DL (ref 1.8–2.4)
MCH RBC QN AUTO: 32.4 PG (ref 26–34)
MCHC RBC AUTO-ENTMCNC: 33.1 G/DL (ref 31–36)
MCV RBC AUTO: 97.9 FL (ref 80–100)
MONOCYTES ABSOLUTE: 0.6 K/UL (ref 0–1.3)
MONOCYTES RELATIVE PERCENT: 10.9 %
NEUTROPHILS ABSOLUTE: 4.7 K/UL (ref 1.7–7.7)
NEUTROPHILS RELATIVE PERCENT: 79.2 %
PDW BLD-RTO: 16.7 % (ref 12.4–15.4)
PLATELET # BLD: 228 K/UL (ref 135–450)
PMV BLD AUTO: 8.9 FL (ref 5–10.5)
POTASSIUM SERPL-SCNC: 5.3 MMOL/L (ref 3.5–5.1)
RBC # BLD: 2.75 M/UL (ref 4–5.2)
SODIUM BLD-SCNC: 127 MMOL/L (ref 136–145)
TOTAL PROTEIN: 7.7 G/DL (ref 6.4–8.2)
WBC # BLD: 5.9 K/UL (ref 4–11)

## 2021-10-08 NOTE — RESULT ENCOUNTER NOTE
Kidney function continues to be poor. Please make an appt with the kidney doctor to help us manage this. Staff- please send referral for [facility] for tube feeds. They have a dietician who can helps with her feeds so we can prevent some of the issues we are having with her kidney function.   Thanks

## 2021-10-18 ENCOUNTER — OFFICE VISIT (OUTPATIENT)
Dept: ENT CLINIC | Age: 71
End: 2021-10-18
Payer: MEDICARE

## 2021-10-18 VITALS
HEART RATE: 76 BPM | SYSTOLIC BLOOD PRESSURE: 108 MMHG | BODY MASS INDEX: 21.56 KG/M2 | WEIGHT: 129.4 LBS | HEIGHT: 65 IN | TEMPERATURE: 97.3 F | DIASTOLIC BLOOD PRESSURE: 52 MMHG

## 2021-10-18 DIAGNOSIS — H91.93 BILATERAL HEARING LOSS, UNSPECIFIED HEARING LOSS TYPE: ICD-10-CM

## 2021-10-18 DIAGNOSIS — C01 SQUAMOUS CELL CARCINOMA OF BASE OF TONGUE (HCC): Primary | ICD-10-CM

## 2021-10-18 PROCEDURE — 99213 OFFICE O/P EST LOW 20 MIN: CPT | Performed by: STUDENT IN AN ORGANIZED HEALTH CARE EDUCATION/TRAINING PROGRAM

## 2021-10-18 PROCEDURE — 1123F ACP DISCUSS/DSCN MKR DOCD: CPT | Performed by: STUDENT IN AN ORGANIZED HEALTH CARE EDUCATION/TRAINING PROGRAM

## 2021-10-18 PROCEDURE — G8484 FLU IMMUNIZE NO ADMIN: HCPCS | Performed by: STUDENT IN AN ORGANIZED HEALTH CARE EDUCATION/TRAINING PROGRAM

## 2021-10-18 PROCEDURE — 1090F PRES/ABSN URINE INCON ASSESS: CPT | Performed by: STUDENT IN AN ORGANIZED HEALTH CARE EDUCATION/TRAINING PROGRAM

## 2021-10-18 PROCEDURE — G8420 CALC BMI NORM PARAMETERS: HCPCS | Performed by: STUDENT IN AN ORGANIZED HEALTH CARE EDUCATION/TRAINING PROGRAM

## 2021-10-18 PROCEDURE — G8400 PT W/DXA NO RESULTS DOC: HCPCS | Performed by: STUDENT IN AN ORGANIZED HEALTH CARE EDUCATION/TRAINING PROGRAM

## 2021-10-18 PROCEDURE — 1036F TOBACCO NON-USER: CPT | Performed by: STUDENT IN AN ORGANIZED HEALTH CARE EDUCATION/TRAINING PROGRAM

## 2021-10-18 PROCEDURE — 3017F COLORECTAL CA SCREEN DOC REV: CPT | Performed by: STUDENT IN AN ORGANIZED HEALTH CARE EDUCATION/TRAINING PROGRAM

## 2021-10-18 PROCEDURE — G8427 DOCREV CUR MEDS BY ELIG CLIN: HCPCS | Performed by: STUDENT IN AN ORGANIZED HEALTH CARE EDUCATION/TRAINING PROGRAM

## 2021-10-18 PROCEDURE — 4040F PNEUMOC VAC/ADMIN/RCVD: CPT | Performed by: STUDENT IN AN ORGANIZED HEALTH CARE EDUCATION/TRAINING PROGRAM

## 2021-10-18 ASSESSMENT — ENCOUNTER SYMPTOMS
RHINORRHEA: 0
NAUSEA: 0
COUGH: 0
VOMITING: 0
SHORTNESS OF BREATH: 0
EYE PAIN: 0

## 2021-10-18 NOTE — PROGRESS NOTES
Griselda      Patient Name: Markus Vogel  Medical Record Number:  [id number]  Primary Care Physician:  Mikael Stewart, LEVAR - CNP  Date of Consultation: 10/18/2021    Chief Complaint:   Chief Complaint   Patient presents with    Follow-up     States since completing treatment has been getting better. States after radiation has not been able to hear. HISTORY OF PRESENT ILLNESS  Monica Carrillo is a(n) 80 y.o. female who presents for follow-up after being diagnosed with stage VIA (T2N2M0) tongue base cancer with changes to the uvula and soft palate. She completed chemoradiation on August 3. She is tolerating food by mouth and supplementing with her PEG tube. She complains that her hearing has significantly decreased since chemotherapy. She also had difficulty with her kidneys during chemotherapy. 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 Her throat and neck pain have improved significantly since completing therapy. She is scheduled for a PET scan tomorrow at [facility].       Patient Active Problem List   Diagnosis    Chronic anxiety    Crohn's disease without complication ([address]    Nicotine dependence    Aortic insufficiency    Essential hypertension    Hyperlipidemia    Cigarette nicotine dependence without complication    Smoking    Mixed incontinence urge and stress    Localized swelling of both lower legs    ASCVD (arteriosclerotic cardiovascular disease)    Acute kidney injury ([address]    Squamous cell carcinoma of pharynx (HCC)    Hyponatremia    Dysphagia    Nausea    Moderate malnutrition (HCC)    Gastrostomy status (HCC)    Hypokalemia    Hypophosphatemia    UTI due to Klebsiella species    Constipation     Past Surgical History:   Procedure Laterality Date    APPENDECTOMY      CHOLECYSTECTOMY      COLONOSCOPY  2011    COLONOSCOPY N/A 9/2/2021    COLONOSCOPY WITH BIOPSY RECTAL ULCER performed by Lali Hunt [facility] N/A 2021    DIRECT MICROLARYNGOSCOPY WITH BIOPSIES performed by Lorrie Gutiérrez DO at [address]    infection after btl;one ovary in   [address]  2021    Procedure: EGD ESOPHAGOGASTRODUODENOSCOPY PEG TUBE INSERTION    UPPER GASTROINTESTINAL ENDOSCOPY  2021    EGD ESOPHAGOGASTRODUODENOSCOPY PEG TUBE INSERTION performed by Michela Jett MD at [address] [facility]     Family History   Problem Relation Age of Onset    Heart Disease Mother      Social History     Socioeconomic History    Marital status:      Spouse name: Katie Rodriguez Number of children: Not on file    Years of education: Not on file    Highest education level: Not on file   Occupational History    Not on file   Tobacco Use    Smoking status: Former Smoker     Packs/day: 1.50     Years: 30.00     Pack years: 45.00     Types: Cigarettes     Quit date: 2021     Years since quittin.4    Smokeless tobacco: Never Used    Tobacco comment: placed in AVS   Vaping Use    Vaping Use: Never used   Substance and Sexual Activity    Alcohol use: Not Currently     Alcohol/week: 20.0 standard drinks     Types: 20 Cans of beer per week     Comment: daily; 4 beers per day    Drug use: No    Sexual activity: Not Currently   Other Topics Concern    Not on file   Social History Narrative    10/2011 lives with spouse and dogs;not working re [employer]     Social Determinants of Health     Financial Resource Strain:     Difficulty of Paying Living Expenses:    Food Insecurity:     Worried About [address] in the Last Year:     [address] in the Last Year:    Transportation Needs:     Lack of Transportation (Medical):      Lack of Transportation (Non-Medical):    Physical Activity:     Days of Exercise per Week:     Minutes of Exercise per Session:    Stress:  Feeling of Stress :    Social Connections:     Frequency of Communication with Friends and Family:     Frequency of Social Gatherings with Friends and Family:     Attends Mandaeism Services:     Active Member of Clubs or Organizations:     Attends Club or Organization Meetings:     Marital Status:    Intimate Partner Violence:     Fear of Current or Ex-Partner:     Emotionally Abused:     Physically Abused:     Sexually Abused:        DRUG/FOOD ALLERGIES: Penicillins    CURRENT MEDICATIONS  Prior to Admission medications    Not on File       REVIEW OF SYSTEMS  The following systems were reviewed and revealed the following in addition to any already discussed in the HPI:    Review of Systems   Constitutional: Negative for fatigue and fever. HENT: Positive for hearing loss. Negative for congestion, ear pain, postnasal drip, rhinorrhea and sneezing. Eyes: Negative for pain and visual disturbance. Respiratory: Negative for cough and shortness of breath. Cardiovascular: Negative for chest pain. Gastrointestinal: Negative for nausea and vomiting. Endocrine: Negative. Genitourinary: Negative. Musculoskeletal: Negative for neck pain and neck stiffness. Skin: Negative for rash. Neurological: Negative for dizziness and headaches.           PHYSICAL EXAM  BP (!) 108/52 (Site: Right Upper Arm, Position: Sitting, Cuff Size: Medium Adult)   Pulse 76   Temp 97.3 °F (36.3 °C) (Infrared)   Ht 5' 5\" (1.651 m)   Wt 129 lb 6.4 oz (58.7 kg)   BMI 21.53 kg/m²     GENERAL: No Acute Distress, Alert and Oriented, no hoarseness  EYES: EOMI, Anti-icteric  NOSE: No epistaxis, nasal mucosa within normal limits, no purulent drainage  EARS: Normal external canal appearance, EAC patent bilaterally, right TM with a 20% anterior-inferior perforation, left TM intact with no evidence of effusions  FACE: 1/6 House-Brackmann Scale, symmetric, sensation equal bilaterally  ORAL CAVITY: No masses or lesions palpated, uvula is midline, moist mucous membranes, post radiation changes to the soft palate no abnormalities noted  NECK: Normal range of motion, no thyromegaly, trachea is midline, no lymphadenopathy, no neck masses, no crepitus  CHEST: Normal respiratory effort, no retractions, breathing comfortably  SKIN: No rashes, normal appearing skin, no evidence of skin lesions/tumors    RADIOLOGY  Summary of findings:      PROCEDURE      ASSESSMENT/PLAN  Hezekiah Goltz is a very pleasant 80 y.o. female with     1. 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 Physical exam today reveal no abnormalities. 2. Bilateral hearing loss, unspecified hearing loss type  She has a known right TM perforation and has reported increased hearing loss since finishing chemotherapy. We will obtain an audiogram at her next follow-up appointment. She will follow up in 3 months. She will call sooner if needed. Medical Decision Making:   The following items were considered in medical decision making:  Independent review of images  Review / order clinical lab tests  Review / order radiology tests  Decision to obtain old records

## 2021-11-09 ENCOUNTER — TELEPHONE (OUTPATIENT)
Dept: FAMILY MEDICINE CLINIC | Age: 71
End: 2021-11-09

## 2021-11-09 RX ORDER — LEVOTHYROXINE SODIUM 0.03 MG/1
25 TABLET ORAL DAILY
Qty: 90 TABLET | Refills: 1 | Status: SHIPPED | OUTPATIENT
Start: 2021-11-09 | End: 2021-12-03

## 2021-11-09 NOTE — TELEPHONE ENCOUNTER
Sent.  Schedule follow up in 4 weeks with me please. Reinforce needs to take meds on empty stomach. ALSO NEEDS TO MAKE APPT WITH RENAL.   WE HAVE TALKED ABOUT THIS ALREADY

## 2021-11-09 NOTE — TELEPHONE ENCOUNTER
Patient had blood work done at [facility]. blood work result showed hypothyroid condition.  Requesting medication for hypothyroidism, send to net

## 2021-11-09 NOTE — TELEPHONE ENCOUNTER
Pt scheduled   Went over instructions on how to take medication with pt   Pt has not made an appointment with nephrology pt said she needed to talk to her daughter first

## 2021-11-16 ENCOUNTER — TELEPHONE (OUTPATIENT)
Dept: FAMILY MEDICINE CLINIC | Age: 71
End: 2021-11-16

## 2021-11-17 NOTE — TELEPHONE ENCOUNTER
We have not discussed this before.   And given her prior electrolyte abnormalities she needs to come in for OV and we will give it to her then

## 2021-11-17 NOTE — TELEPHONE ENCOUNTER
Tried calling the patients daughter but her mailbox was full.   We can try to call patient and let her know   It looks like she has an appt on 12/7 with Yelena Mckeon

## 2021-11-23 ENCOUNTER — TELEPHONE (OUTPATIENT)
Dept: FAMILY MEDICINE CLINIC | Age: 71
End: 2021-11-23

## 2021-11-23 NOTE — TELEPHONE ENCOUNTER
Sarah Davis. Advised her to have her mom to go to the ER for evaluation or make sure she has Renal appt. Likely she is very dehydrated or has some electrolyte abnormalities.

## 2021-11-23 NOTE — TELEPHONE ENCOUNTER
Patient's daughter, Oracio Malhotra, is asking to have Juju Fernandez DNP call her to discuss an episode patient has. she weak in legs, back to smoking, she got drunk, patient telling her  to just kill her.  Want to discuss with Juju Fernandez DNP of what to do

## 2021-12-02 ENCOUNTER — OFFICE VISIT (OUTPATIENT)
Dept: FAMILY MEDICINE CLINIC | Age: 71
End: 2021-12-02
Payer: MEDICARE

## 2021-12-02 VITALS
HEART RATE: 78 BPM | TEMPERATURE: 97.2 F | DIASTOLIC BLOOD PRESSURE: 60 MMHG | WEIGHT: 131 LBS | OXYGEN SATURATION: 100 % | BODY MASS INDEX: 21.8 KG/M2 | SYSTOLIC BLOOD PRESSURE: 102 MMHG

## 2021-12-02 DIAGNOSIS — F41.9 CHRONIC ANXIETY: ICD-10-CM

## 2021-12-02 DIAGNOSIS — R63.4 UNINTENTIONAL WEIGHT LOSS: ICD-10-CM

## 2021-12-02 DIAGNOSIS — F10.10 ALCOHOL ABUSE: ICD-10-CM

## 2021-12-02 DIAGNOSIS — E87.1 HYPONATREMIA: ICD-10-CM

## 2021-12-02 DIAGNOSIS — E44.0 MODERATE MALNUTRITION (HCC): ICD-10-CM

## 2021-12-02 DIAGNOSIS — R79.89 ELEVATED TSH: ICD-10-CM

## 2021-12-02 DIAGNOSIS — N18.4 CKD (CHRONIC KIDNEY DISEASE) STAGE 4, GFR 15-29 ML/MIN (HCC): Primary | ICD-10-CM

## 2021-12-02 DIAGNOSIS — F17.210 CIGARETTE NICOTINE DEPENDENCE WITHOUT COMPLICATION: ICD-10-CM

## 2021-12-02 DIAGNOSIS — Z93.1 GASTROSTOMY STATUS (HCC): ICD-10-CM

## 2021-12-02 DIAGNOSIS — R53.82 CHRONIC FATIGUE: ICD-10-CM

## 2021-12-02 PROCEDURE — 99214 OFFICE O/P EST MOD 30 MIN: CPT | Performed by: NURSE PRACTITIONER

## 2021-12-02 PROCEDURE — 1090F PRES/ABSN URINE INCON ASSESS: CPT | Performed by: NURSE PRACTITIONER

## 2021-12-02 PROCEDURE — G8484 FLU IMMUNIZE NO ADMIN: HCPCS | Performed by: NURSE PRACTITIONER

## 2021-12-02 PROCEDURE — 3017F COLORECTAL CA SCREEN DOC REV: CPT | Performed by: NURSE PRACTITIONER

## 2021-12-02 PROCEDURE — G8400 PT W/DXA NO RESULTS DOC: HCPCS | Performed by: NURSE PRACTITIONER

## 2021-12-02 PROCEDURE — 4040F PNEUMOC VAC/ADMIN/RCVD: CPT | Performed by: NURSE PRACTITIONER

## 2021-12-02 PROCEDURE — G8427 DOCREV CUR MEDS BY ELIG CLIN: HCPCS | Performed by: NURSE PRACTITIONER

## 2021-12-02 PROCEDURE — 1036F TOBACCO NON-USER: CPT | Performed by: NURSE PRACTITIONER

## 2021-12-02 PROCEDURE — 1123F ACP DISCUSS/DSCN MKR DOCD: CPT | Performed by: NURSE PRACTITIONER

## 2021-12-02 PROCEDURE — 36415 COLL VENOUS BLD VENIPUNCTURE: CPT | Performed by: NURSE PRACTITIONER

## 2021-12-02 PROCEDURE — G8420 CALC BMI NORM PARAMETERS: HCPCS | Performed by: NURSE PRACTITIONER

## 2021-12-02 RX ORDER — ACETAMINOPHEN 500 MG
1000 TABLET ORAL EVERY 6 HOURS PRN
COMMUNITY
End: 2022-10-26

## 2021-12-02 ASSESSMENT — ENCOUNTER SYMPTOMS
COUGH: 0
CONSTIPATION: 0
TROUBLE SWALLOWING: 1
VOMITING: 0
BLOOD IN STOOL: 0
SHORTNESS OF BREATH: 0
CHEST TIGHTNESS: 0
DIARRHEA: 0
NAUSEA: 0

## 2021-12-02 NOTE — PROGRESS NOTES
Here today for chronic disease management. No new concerns. HTN: Not checking BP at home. No CP, SOB, leg swelling. No structured physical activity. Poor appetite, Boost 3/day. Hypothyroidism: No hyper/hypo thyroid symptoms. Taking Synthroid every morning on an empty stomach, tolerating well.

## 2021-12-02 NOTE — PROGRESS NOTES
cessation    8. Alcohol abuse  Encouraged cessation      Return in about 3 months (around 3/2/2022). Here today for f/u thyroid levels. Taking Synthroid on an empty stomach  Continues to have poor PO intake, drinking 3 boosts/day on most days of the week, states she does not feel hungry. She is up 2 lbs from her last visit but she has begun drinking 4 beers/day according to daughter. She followed up with GI to inquire about have PEG removed but was told she needed it to continue and increase supplemental nutrition. Declines nephrology referral. Awaiting updated TF recommendations from [address] [place] [address]. Lab Results   Component Value Date    CHOL 177 03/10/2021    CHOL 146 08/21/2019    CHOL 149 05/16/2018     Lab Results   Component Value Date    TRIG 109 03/10/2021    TRIG 75 08/21/2019    TRIG 153 (H) 05/16/2018     Lab Results   Component Value Date    HDL 54 03/10/2021    HDL 67 (H) 08/10/2020    HDL 70 (H) 08/21/2019     Lab Results   Component Value Date    LDLCALC 101 (H) 03/10/2021    LDLCALC 52 08/10/2020    LDLCALC 61 08/21/2019     Lab Results   Component Value Date    LABVLDL 22 03/10/2021    LABVLDL 20 08/10/2020    LABVLDL 15 08/21/2019     No results found for: [facility]    Lab Results   Component Value Date     (L) 10/07/2021    K 5.3 (H) 10/07/2021    CL 92 (L) 10/07/2021    CO2 21 10/07/2021    BUN 73 (H) 10/07/2021    CREATININE 2.4 (H) 10/07/2021    GLUCOSE 100 (H) 10/07/2021    CALCIUM 10.5 10/07/2021    PROT 7.7 10/07/2021    LABALBU 4.5 10/07/2021    BILITOT 0.3 10/07/2021    ALKPHOS 99 10/07/2021    AST 14 (L) 10/07/2021    ALT 8 (L) 10/07/2021    LABGLOM 20 (A) 10/07/2021    GFRAA 24 (A) 10/07/2021    AGRATIO 1.4 10/07/2021    GLOB 3.2 10/07/2021         Review of Systems   Constitutional: Positive for activity change, appetite change and fatigue. HENT: Positive for trouble swallowing (Chronic). Respiratory: Negative for cough, chest tightness and shortness of breath. Cardiovascular: Negative. Gastrointestinal: Negative for blood in stool, constipation, diarrhea, nausea and vomiting. Genitourinary: Negative. Musculoskeletal: Negative. Skin: Negative. Neurological: Negative for dizziness, tremors, light-headedness and headaches. Psychiatric/Behavioral: Negative for decreased concentration, dysphoric mood, self-injury, sleep disturbance and suicidal ideas. The patient is not nervous/anxious. Current Outpatient Medications on File Prior to Visit   Medication Sig Dispense Refill    acetaminophen (TYLENOL) 500 MG tablet Take 1,000 mg by mouth every 6 hours as needed for Pain      raNITIdine HCl (ZANTAC PO) Take by mouth      Alum Hydroxide-Mag Carbonate (ACID GONE ANTACID PO) Take by mouth      levothyroxine (SYNTHROID) 25 MCG tablet Take 1 tablet by mouth daily 90 tablet 1     No current facility-administered medications on file prior to visit.      Allergies   Allergen Reactions    Penicillins Hives and Rash     5/2013 40 yrs ago had a nonpruritic rash on hands;no hives and no other sx's;     Past Medical History:   Diagnosis Date    Anxiety     Cancer [facility] [address] 05/05/2021    throat    Cerebral artery occlusion with cerebral infarction ([facility] [address]     30 years ago had mini stroke with no residual    Crohn disease ([facility] [address]     CVA (cerebral infarction) 2006    hx unclear    Hyperlipidemia     Hypertension      Past Surgical History:   Procedure Laterality Date    APPENDECTOMY      CHOLECYSTECTOMY      COLONOSCOPY  2011    COLONOSCOPY N/A 9/2/2021    COLONOSCOPY WITH BIOPSY RECTAL ULCER performed by Mari Nina MD at [place] [address] N/A 5/5/2021    DIRECT MICROLARYNGOSCOPY WITH BIOPSIES performed by Blake Sanchez DO at [address]    infection after btl;one ovary in   [address] [place] [address] [address]  05/26/2021    Procedure: EGD ESOPHAGOGASTRODUODENOSCOPY PEG TUBE INSERTION    UPPER GASTROINTESTINAL ENDOSCOPY  5/26/2021    EGD ESOPHAGOGASTRODUODENOSCOPY PEG TUBE INSERTION performed by Berlin Hutson MD at [place] [address]. History     Tobacco Use    Smoking status: Former Smoker     Packs/day: 1.00     Years: 30.00     Pack years: 30.00     Types: Cigarettes    Smokeless tobacco: Never Used    Tobacco comment: placed in AVS   Substance Use Topics    Alcohol use: Yes     Alcohol/week: 4.0 standard drinks     Types: 4 Cans of beer per week     Comment: daily; 4 beers per day     Family History   Problem Relation Age of Onset    Heart Disease Mother        Vitals:    12/02/21 1142   BP: 102/60   Pulse: 78   Temp: 97.2 °F (36.2 °C)   TempSrc: Infrared   SpO2: 100%   Weight: 131 lb (59.4 kg)     Estimated body mass index is 21.8 kg/m² as calculated from the following:    Height as of 10/18/21: 5' 5\" (1.651 m). Weight as of this encounter: 131 lb (59.4 kg). Physical Exam  Vitals reviewed. Constitutional:       Appearance: Normal appearance. HENT:      Head: Normocephalic. Neck:      Vascular: No carotid bruit. Cardiovascular:      Rate and Rhythm: Normal rate and regular rhythm. Pulses: Normal pulses. Carotid pulses are 2+ on the right side and 2+ on the left side. Dorsalis pedis pulses are 2+ on the right side and 2+ on the left side. Posterior tibial pulses are 2+ on the right side and 2+ on the left side. Heart sounds: Normal heart sounds. No murmur heard. No gallop. Pulmonary:      Effort: Pulmonary effort is normal.      Breath sounds: Normal breath sounds. Abdominal:      General: Abdomen is flat. Palpations: Abdomen is soft. Musculoskeletal:         General: Normal range of motion. Cervical back: Normal range of motion. Right lower leg: No edema. Left lower leg: No edema. Lymphadenopathy:      Cervical: No cervical adenopathy. Skin:     General: Skin is warm and dry.       Capillary Refill: Capillary refill takes less than 2 seconds. Neurological:      General: No focal deficit present. Mental Status: She is alert and oriented to person, place, and time. Psychiatric:         Mood and Affect: Mood normal.         Behavior: Behavior normal.       I, Agustin TARANGO, was present for the medical student's face to face encounter with the patient including obtaining the history of present illness, exam and medical decision making. I also personally performed an exam and medical decision making including that documented by the medical student. I verify the accuracy of the documentation by the medical student with the following addition or changes, HPI, ROS, PE, Plan    Patient's questions answered and concerns addressed. Patient agrees to plan of care.         Electronically signed by LEVAR Machado CNP on 12/2/2021 at 4:33 PM

## 2021-12-03 ENCOUNTER — TELEPHONE (OUTPATIENT)
Dept: FAMILY MEDICINE CLINIC | Age: 71
End: 2021-12-03

## 2021-12-03 DIAGNOSIS — R53.82 CHRONIC FATIGUE: ICD-10-CM

## 2021-12-03 DIAGNOSIS — N18.4 CKD (CHRONIC KIDNEY DISEASE) STAGE 4, GFR 15-29 ML/MIN (HCC): ICD-10-CM

## 2021-12-03 DIAGNOSIS — F10.10 ALCOHOL ABUSE: ICD-10-CM

## 2021-12-03 DIAGNOSIS — R63.4 UNINTENTIONAL WEIGHT LOSS: ICD-10-CM

## 2021-12-03 DIAGNOSIS — E44.0 MODERATE MALNUTRITION (HCC): ICD-10-CM

## 2021-12-03 DIAGNOSIS — N17.9 AKI (ACUTE KIDNEY INJURY) (HCC): ICD-10-CM

## 2021-12-03 DIAGNOSIS — N18.4 CKD (CHRONIC KIDNEY DISEASE) STAGE 4, GFR 15-29 ML/MIN (HCC): Primary | ICD-10-CM

## 2021-12-03 LAB
A/G RATIO: 2 (ref 1.1–2.2)
ALBUMIN SERPL-MCNC: 4.9 G/DL (ref 3.4–5)
ALP BLD-CCNC: 75 U/L (ref 40–129)
ALT SERPL-CCNC: 6 U/L (ref 10–40)
ANION GAP SERPL CALCULATED.3IONS-SCNC: 17 MMOL/L (ref 3–16)
AST SERPL-CCNC: 17 U/L (ref 15–37)
BILIRUB SERPL-MCNC: 0.3 MG/DL (ref 0–1)
BUN BLDV-MCNC: 36 MG/DL (ref 7–20)
CALCIUM SERPL-MCNC: 9.9 MG/DL (ref 8.3–10.6)
CHLORIDE BLD-SCNC: 77 MMOL/L (ref 99–110)
CO2: 22 MMOL/L (ref 21–32)
CREAT SERPL-MCNC: 2.1 MG/DL (ref 0.6–1.2)
FERRITIN: 1137 NG/ML (ref 15–150)
FOLATE: 14.85 NG/ML (ref 4.78–24.2)
GFR AFRICAN AMERICAN: 28
GFR NON-AFRICAN AMERICAN: 23
GLUCOSE BLD-MCNC: 88 MG/DL (ref 70–99)
HCT VFR BLD CALC: 30 % (ref 36–48)
HEMOGLOBIN: 10.1 G/DL (ref 12–16)
IRON SATURATION: 24 % (ref 15–50)
IRON: 73 UG/DL (ref 37–145)
MAGNESIUM: 2.5 MG/DL (ref 1.8–2.4)
MCH RBC QN AUTO: 32.5 PG (ref 26–34)
MCHC RBC AUTO-ENTMCNC: 33.6 G/DL (ref 31–36)
MCV RBC AUTO: 96.7 FL (ref 80–100)
PDW BLD-RTO: 12.8 % (ref 12.4–15.4)
PHOSPHORUS: 3.5 MG/DL (ref 2.5–4.9)
PLATELET # BLD: 275 K/UL (ref 135–450)
PMV BLD AUTO: 7.7 FL (ref 5–10.5)
POTASSIUM SERPL-SCNC: 4.3 MMOL/L (ref 3.5–5.1)
RBC # BLD: 3.1 M/UL (ref 4–5.2)
SODIUM BLD-SCNC: 116 MMOL/L (ref 136–145)
T4 FREE: 0.9 NG/DL (ref 0.9–1.8)
TOTAL IRON BINDING CAPACITY: 308 UG/DL (ref 260–445)
TOTAL PROTEIN: 7.4 G/DL (ref 6.4–8.2)
TSH REFLEX: 61.83 UIU/ML (ref 0.27–4.2)
VITAMIN B-12: 568 PG/ML (ref 211–911)
VITAMIN D 25-HYDROXY: 38.9 NG/ML
WBC # BLD: 5.2 K/UL (ref 4–11)

## 2021-12-03 RX ORDER — LEVOTHYROXINE SODIUM 0.05 MG/1
50 TABLET ORAL DAILY
Qty: 30 TABLET | Refills: 1 | Status: SHIPPED | OUTPATIENT
Start: 2021-12-03 | End: 2022-01-04 | Stop reason: SDUPTHER

## 2021-12-03 RX ORDER — SODIUM CHLORIDE 1000 MG
TABLET, SOLUBLE MISCELLANEOUS
Qty: 90 TABLET | Refills: 3 | Status: SHIPPED | OUTPATIENT
Start: 2021-12-03 | End: 2021-12-10 | Stop reason: SDUPTHER

## 2021-12-03 NOTE — TELEPHONE ENCOUNTER
Attempted to call patient regarding abnormal labs. Mailbox is full. Discussed labs with daughter, Lena Dejesus. Her mother is refusing hospitalization for critically low sodium. Will likely refuse offer of infusion of sodium today at infusion center. Would be willing to be giving sodium tablets through G tub, has pill  at home. Lena Dejesus will also have patient limit water intake over the weekend to 1.5 L and will have all of the alcohol taken out of the home. Lena Dejesus verbalized understanding that patient has a critically low sodium level that could kill her. Also verbalized that if she were to have worsening behavior changes, seizures she needs to go to the hospital immediately for treatment. Sodium tablets sent to [facility] per her request.  She will have patient start them today.

## 2021-12-03 NOTE — TELEPHONE ENCOUNTER
Called Daughter Kesha Flores regarding Critical Low Sodium of 116. LVOM.   Asked for her to call me back

## 2021-12-03 NOTE — TELEPHONE ENCOUNTER
They could not run the zinc because it needs a royal blue tube and we don't keep those tubes in stock here at the office so she would have to go to the lab to have that drawn

## 2021-12-06 ENCOUNTER — TELEPHONE (OUTPATIENT)
Dept: FAMILY MEDICINE CLINIC | Age: 71
End: 2021-12-06

## 2021-12-06 DIAGNOSIS — N18.4 CKD (CHRONIC KIDNEY DISEASE) STAGE 4, GFR 15-29 ML/MIN (HCC): Primary | ICD-10-CM

## 2021-12-06 DIAGNOSIS — E87.1 HYPONATREMIA: ICD-10-CM

## 2021-12-06 DIAGNOSIS — R63.4 UNINTENTIONAL WEIGHT LOSS: ICD-10-CM

## 2021-12-06 DIAGNOSIS — E44.0 MODERATE MALNUTRITION (HCC): Primary | ICD-10-CM

## 2021-12-06 DIAGNOSIS — C10.9 SQUAMOUS CELL CARCINOMA OF OROPHARYNX (HCC): ICD-10-CM

## 2021-12-06 LAB — VITAMIN B6: 83 NMOL/L (ref 20–125)

## 2021-12-06 RX ORDER — LACTOSE-REDUCED FOOD/FIBER
1 LIQUID (ML) ORAL 3 TIMES DAILY
Qty: 90 EACH | Refills: 3 | Status: SHIPPED | OUTPATIENT
Start: 2021-12-06 | End: 2022-01-04 | Stop reason: CLARIF

## 2021-12-06 NOTE — TELEPHONE ENCOUNTER
Patient's daughter said she will not get her blood work done and she is not taking the med that you prescribed the other day she said it makes her stomach hurt.   So she is not going to take it anymore and she is not going to get the bloodwork     I am faxing over the order to Ezekiel Ma for [address] fax# [phone]

## 2021-12-06 NOTE — TELEPHONE ENCOUNTER
Order for lab for Zinc/blue top. Have not received a specimen. Do they need to cancel the order? Breath sounds clear and equal bilaterally.

## 2021-12-06 NOTE — TELEPHONE ENCOUNTER
OK.  Her only other option at this point is Hospice. There is no point for us to do anything else for her if she does not want to take care of herself. She can drink as much alcohol as she wants. Eat as little as she wants. Doesn't have to worry about lab work.

## 2021-12-06 NOTE — PROGRESS NOTES
Needs labs this week. Preferable today for low sodium. Send to [facility] as I am on vacation. As a reminder. Pt is a full code. Declined hospital evaluation for critical hyponatremia. Instructed to stop all ETOH use. Instructed to decrease fluid intake to 1.5-2L/ day. Started Sodium Chloride tablets to TID through G tube. [facility] Nephrology referral in the past.      Clarita Sahni has been working referral to [facility] [address] [address] for tube feeds. Needs to start Jevity at 1.2. Needs 3 cans daily. Can do Boost AS A SUPPLEMENT but not as PRIMARY NUTRITION. If [facility] has a dietician having them look at her recent vitamin and mineral results would be helpful to determine if we need to add supplements to her diet.

## 2021-12-09 LAB — VITAMIN B1, PLASMA: 5 NMOL/L (ref 4–15)

## 2021-12-10 DIAGNOSIS — E87.1 HYPONATREMIA: ICD-10-CM

## 2021-12-10 RX ORDER — SODIUM CHLORIDE 1000 MG
TABLET, SOLUBLE MISCELLANEOUS
Qty: 90 TABLET | Refills: 0 | Status: SHIPPED | OUTPATIENT
Start: 2021-12-10 | End: 2022-01-04 | Stop reason: CLARIF

## 2021-12-10 NOTE — TELEPHONE ENCOUNTER
Pt daughter called in to let lisa know her mom is taking the sodium chloride 3 tablets 3 times a day   Pt is about to run out and wanted to know if pt is to continue on this amount    [facility]

## 2022-01-04 ENCOUNTER — NURSE TRIAGE (OUTPATIENT)
Dept: OTHER | Facility: CLINIC | Age: 72
End: 2022-01-04

## 2022-01-04 ENCOUNTER — OFFICE VISIT (OUTPATIENT)
Dept: FAMILY MEDICINE CLINIC | Age: 72
End: 2022-01-04
Payer: MEDICARE

## 2022-01-04 VITALS
SYSTOLIC BLOOD PRESSURE: 138 MMHG | HEART RATE: 79 BPM | OXYGEN SATURATION: 99 % | TEMPERATURE: 97.2 F | WEIGHT: 134 LBS | BODY MASS INDEX: 22.3 KG/M2 | DIASTOLIC BLOOD PRESSURE: 80 MMHG

## 2022-01-04 DIAGNOSIS — C10.9 SQUAMOUS CELL CARCINOMA OF OROPHARYNX (HCC): ICD-10-CM

## 2022-01-04 DIAGNOSIS — R53.82 CHRONIC FATIGUE: ICD-10-CM

## 2022-01-04 DIAGNOSIS — R79.89 ELEVATED TSH: ICD-10-CM

## 2022-01-04 DIAGNOSIS — E87.1 HYPONATREMIA: ICD-10-CM

## 2022-01-04 DIAGNOSIS — E44.0 MODERATE MALNUTRITION (HCC): ICD-10-CM

## 2022-01-04 DIAGNOSIS — N18.4 CKD (CHRONIC KIDNEY DISEASE) STAGE 4, GFR 15-29 ML/MIN (HCC): ICD-10-CM

## 2022-01-04 DIAGNOSIS — R20.2 PARESTHESIA OF BOTH HANDS: Primary | ICD-10-CM

## 2022-01-04 PROCEDURE — G8400 PT W/DXA NO RESULTS DOC: HCPCS | Performed by: NURSE PRACTITIONER

## 2022-01-04 PROCEDURE — 99214 OFFICE O/P EST MOD 30 MIN: CPT | Performed by: NURSE PRACTITIONER

## 2022-01-04 PROCEDURE — G8420 CALC BMI NORM PARAMETERS: HCPCS | Performed by: NURSE PRACTITIONER

## 2022-01-04 PROCEDURE — 1090F PRES/ABSN URINE INCON ASSESS: CPT | Performed by: NURSE PRACTITIONER

## 2022-01-04 PROCEDURE — G8427 DOCREV CUR MEDS BY ELIG CLIN: HCPCS | Performed by: NURSE PRACTITIONER

## 2022-01-04 PROCEDURE — 1123F ACP DISCUSS/DSCN MKR DOCD: CPT | Performed by: NURSE PRACTITIONER

## 2022-01-04 PROCEDURE — G8484 FLU IMMUNIZE NO ADMIN: HCPCS | Performed by: NURSE PRACTITIONER

## 2022-01-04 PROCEDURE — 1036F TOBACCO NON-USER: CPT | Performed by: NURSE PRACTITIONER

## 2022-01-04 PROCEDURE — 36415 COLL VENOUS BLD VENIPUNCTURE: CPT | Performed by: NURSE PRACTITIONER

## 2022-01-04 PROCEDURE — 3017F COLORECTAL CA SCREEN DOC REV: CPT | Performed by: NURSE PRACTITIONER

## 2022-01-04 PROCEDURE — 4040F PNEUMOC VAC/ADMIN/RCVD: CPT | Performed by: NURSE PRACTITIONER

## 2022-01-04 RX ORDER — LEVOTHYROXINE SODIUM 0.05 MG/1
50 TABLET ORAL DAILY
Qty: 30 TABLET | Refills: 1 | Status: SHIPPED | OUTPATIENT
Start: 2022-01-04 | End: 2022-02-02

## 2022-01-04 ASSESSMENT — ENCOUNTER SYMPTOMS
TROUBLE SWALLOWING: 1
DIARRHEA: 0
CHEST TIGHTNESS: 0
VOMITING: 0
BLOOD IN STOOL: 0
NAUSEA: 0
COUGH: 0
SHORTNESS OF BREATH: 0
CONSTIPATION: 0

## 2022-01-04 NOTE — PROGRESS NOTES
1/4/2022    Chief Complaint   Patient presents with   Tayla Pump     started a few weeks ago hands and feet are tingling and numb     Numbness    Back Pain     started about a week ago     Shaking     bilateral legs get to shaking     Discuss Medications     there was some confusion with the thyroid med her daughter never told the patients  that there was an increase of the thyroid med. But she just started taking 2 of the 25mcg about a week ago        Benjamin Mckeon is a 80 y.o. female, presents today for:      ASSESSMENT/PLAN:    1. Paresthesia of both hands  Unclear etiology at this point but likely related to elevated TSH.  increased Synthroid to 50 mcg last week, will redraw level in 3 weeks. May also be related to iron or kidney function. Discussed etiologies with patient and .    - TSH with Reflex; Future  - Vitamin B12 & Folate; Future  - Ferritin; Future  - Iron and TIBC; Future    2. Elevated TSH  Will send updated script per  request.    TSH 12/2021 61.83 at Synthroid 25 mcg. Pt did not increase Synthroid dose at that time.  increased dose 1 week ago  Elevated TSH due to radiation for SCC of oropharynx  - levothyroxine (SYNTHROID) 50 MCG tablet; Take 1 tablet by mouth daily  Dispense: 30 tablet; Refill: 1    3. Squamous cell carcinoma of oropharynx (HCC)  Currently in remission. Continue care with ENT and [facility]    4. CKD (chronic kidney disease) stage 4, GFR 15-29 ml/min (HCC)  Labs today  Has declined referral to Nephrology in the past.   Will need to call Dietician to determine supplemental nutrition to give through G tube once updated labs are obtained. - Ferritin; Future  - Iron and TIBC; Future  - BASIC METABOLIC PANEL    5. Hyponatremia  Historically has declined hospital admission for severe hyponatremia. Discussed possible abnormal labs and possible scenarios with patient and  today. Pt continues to decline admission if warranted.   Pt 12/02/2021    GLUCOSE 88 12/02/2021    CALCIUM 9.9 12/02/2021    PROT 7.4 12/02/2021    LABALBU 4.9 12/02/2021    BILITOT 0.3 12/02/2021    ALKPHOS 75 12/02/2021    AST 17 12/02/2021    ALT 6 (L) 12/02/2021    LABGLOM 23 (A) 12/02/2021    GFRAA 28 (A) 12/02/2021    AGRATIO 2.0 12/02/2021    GLOB 3.2 10/07/2021         Review of Systems   Constitutional: Positive for activity change, appetite change and fatigue. HENT: Positive for trouble swallowing (Chronic). Respiratory: Negative for cough, chest tightness and shortness of breath. Cardiovascular: Negative. Gastrointestinal: Negative for blood in stool, constipation, diarrhea, nausea and vomiting. Genitourinary: Negative. Musculoskeletal: Negative. Skin: Negative. Neurological: Negative for dizziness, tremors, light-headedness and headaches. Parasthesias     Psychiatric/Behavioral: Negative for decreased concentration, dysphoric mood, self-injury, sleep disturbance and suicidal ideas. The patient is not nervous/anxious. Current Outpatient Medications on File Prior to Visit   Medication Sig Dispense Refill    Nutritional Supplements (BOOST PO) Take 1 each by mouth daily      acetaminophen (TYLENOL) 500 MG tablet Take 1,000 mg by mouth every 6 hours as needed for Pain       No current facility-administered medications on file prior to visit.      Allergies   Allergen Reactions    Penicillins Hives and Rash     5/2013 40 yrs ago had a nonpruritic rash on hands;no hives and no other sx's;     Past Medical History:   Diagnosis Date    Anxiety     Cancer ([facility] [place] [facility] [address] 05/05/2021    throat    Cerebral artery occlusion with cerebral infarction ([facility] [place] [facility] [address]     30 years ago had mini stroke with no residual    Crohn disease ([facility] [place] [facility] [address]     CVA (cerebral infarction) 2006    hx unclear    Hyperlipidemia     Hypertension      Past Surgical History:   Procedure Laterality Date    APPENDECTOMY      CHOLECYSTECTOMY      COLONOSCOPY  2011    COLONOSCOPY N/A 9/2/2021    COLONOSCOPY WITH BIOPSY RECTAL ULCER [facility] N/A 5/5/2021    DIRECT MICROLARYNGOSCOPY WITH BIOPSIES performed by Chace Torres DO at [address]    infection after btl;one ovary in   [address]  05/26/2021    Procedure: EGD ESOPHAGOGASTRODUODENOSCOPY PEG TUBE INSERTION    UPPER GASTROINTESTINAL ENDOSCOPY  5/26/2021    EGD ESOPHAGOGASTRODUODENOSCOPY PEG TUBE INSERTION performed by David Birch MD at [address] [place] [address] History     Tobacco Use    Smoking status: Former Smoker     Packs/day: 1.00     Years: 30.00     Pack years: 30.00     Types: Cigarettes    Smokeless tobacco: Never Used    Tobacco comment: placed in AVS   Substance Use Topics    Alcohol use: Yes     Alcohol/week: 4.0 standard drinks     Types: 4 Cans of beer per week     Comment: daily; 4 beers per day     Family History   Problem Relation Age of Onset    Heart Disease Mother        Vitals:    01/04/22 1440   BP: 138/80   Pulse: 79   Temp: 97.2 °F (36.2 °C)   TempSrc: Infrared   SpO2: 99%   Weight: 134 lb (60.8 kg)     Estimated body mass index is 22.3 kg/m² as calculated from the following:    Height as of 10/18/21: 5' 5\" (1.651 m). Weight as of this encounter: 134 lb (60.8 kg). Physical Exam  Vitals reviewed. Constitutional:       Appearance: Normal appearance. HENT:      Head: Normocephalic. Neck:      Vascular: No carotid bruit. Cardiovascular:      Rate and Rhythm: Normal rate and regular rhythm. Pulses: Normal pulses. Carotid pulses are 2+ on the right side and 2+ on the left side. Dorsalis pedis pulses are 2+ on the right side and 2+ on the left side. Posterior tibial pulses are 2+ on the right side and 2+ on the left side. Heart sounds: Normal heart sounds. No murmur heard. No gallop.     Pulmonary:      Effort: Pulmonary effort is normal.      Breath sounds: Normal breath sounds. Abdominal:      General: Abdomen is flat. Palpations: Abdomen is soft. Musculoskeletal:         General: Normal range of motion. Cervical back: Normal range of motion. Right lower leg: No edema. Left lower leg: No edema. Lymphadenopathy:      Cervical: No cervical adenopathy. Skin:     General: Skin is warm and dry. Capillary Refill: Capillary refill takes less than 2 seconds. Neurological:      General: No focal deficit present. Mental Status: She is alert and oriented to person, place, and time. Psychiatric:         Mood and Affect: Mood normal.         Behavior: Behavior normal.      Comments: Less slurring of words today  Not wearing pajamas like past several visits. Patient's questions answered and concerns addressed. Patient agrees to plan of care.         Electronically signed by LEVAR Leach CNP on 1/4/2022 at 7:49 PM

## 2022-01-04 NOTE — TELEPHONE ENCOUNTER
[place] [facility] caller [place] on line. Complaint: Numbness and tingling in hands and feet    Market: [employer] Name: Devyn telephone number verified as [phone]    Connected with caller via phone, please see below triage    Received call from [place] at [facility] with Red Flag Complaint. Subjective: Caller states \"I have been having numbness and tingling in my hands and feet for about a month\"     Current Symptoms: Legs will \"shake\" and states she has difficulty moving and will last a few minutes at a time and happen several times daily    Onset: 1 month ago; gradual    Associated Symptoms: reduced activity    Pain Severity: 0/10; numbness; intermittent    Temperature: N/a n/a    What has been tried: No methods tried at this time    LMP: NA Pregnant: NA    Recommended disposition: See PCP in office today or tomorrow. Patient has appointment scheduled for 3pm today. Care advice provided, patient verbalizes understanding; denies any other questions or concerns; instructed to call back for any new or worsening symptoms. Patient/caller to follow-up with PCP      Attention Provider: Thank you for allowing me to participate in the care of your patient. The patient was connected to triage in response to information provided to the ECC/PSC. Please do not respond through this encounter as the response is not directed to a shared pool.     Reason for Disposition   Numbness in a leg or foot (i.e., loss of sensation)    Protocols used: LEG PAIN-ADULT-[place]

## 2022-01-04 NOTE — PROGRESS NOTES
Blood drawn per order. Needle size: 23 g butterfly  Site: L Antecubital.  First attempt successful Yes    Second attempt na    Pressure applied until bleeding stopped. Cotton ball and band aid  applied. Patient informed to call office or return if bleeding reoccurs and unable to stop.     Tubes drawn: 1 purple     1 red PROCEDURES:  Transurethral removal of ureteral stent 14-Mar-2019 11:40:08  Lev Tucker  Removal of peritoneal dialysis catheter 14-Mar-2019 11:39:44  Lev Tucker

## 2022-01-05 LAB
ANION GAP SERPL CALCULATED.3IONS-SCNC: 18 MMOL/L (ref 3–16)
BUN BLDV-MCNC: 49 MG/DL (ref 7–20)
CALCIUM SERPL-MCNC: 9.4 MG/DL (ref 8.3–10.6)
CHLORIDE BLD-SCNC: 82 MMOL/L (ref 99–110)
CO2: 21 MMOL/L (ref 21–32)
CREAT SERPL-MCNC: 2.3 MG/DL (ref 0.6–1.2)
GFR AFRICAN AMERICAN: 25
GFR NON-AFRICAN AMERICAN: 21
GLUCOSE BLD-MCNC: 76 MG/DL (ref 70–99)
POTASSIUM SERPL-SCNC: 4.4 MMOL/L (ref 3.5–5.1)
SODIUM BLD-SCNC: 121 MMOL/L (ref 136–145)

## 2022-01-06 NOTE — RESULT ENCOUNTER NOTE
Sodium level has improved from prior but still not where I would like for it to be. If possible, I would like for you to try to crush the sodium pills I gave you before and put them in your tube. Kidney function is similar to prior. Still not great but at least stable. What we were hoping to see.

## 2022-02-01 ENCOUNTER — HOSPITAL ENCOUNTER (OUTPATIENT)
Age: 72
Discharge: HOME | End: 2022-02-01
Payer: MEDICARE

## 2022-02-01 DIAGNOSIS — N18.4 CKD (CHRONIC KIDNEY DISEASE) STAGE 4, GFR 15-29 ML/MIN (HCC): ICD-10-CM

## 2022-02-01 DIAGNOSIS — E87.1 HYPONATREMIA: ICD-10-CM

## 2022-02-01 DIAGNOSIS — R20.2 PARESTHESIA OF BOTH HANDS: ICD-10-CM

## 2022-02-01 DIAGNOSIS — R53.82 CHRONIC FATIGUE: ICD-10-CM

## 2022-02-01 DIAGNOSIS — E44.0 MODERATE MALNUTRITION (HCC): ICD-10-CM

## 2022-02-01 LAB
A/G RATIO: 1.3 (ref 1.1–2.2)
ALBUMIN SERPL-MCNC: 4.3 G/DL (ref 3.4–5)
ALP BLD-CCNC: 56 U/L (ref 40–129)
ALT SERPL-CCNC: 7 U/L (ref 10–40)
ANION GAP SERPL CALCULATED.3IONS-SCNC: 11 MMOL/L (ref 3–16)
AST SERPL-CCNC: 17 U/L (ref 15–37)
BILIRUB SERPL-MCNC: 0.3 MG/DL (ref 0–1)
BUN BLDV-MCNC: 35 MG/DL (ref 7–20)
CALCIUM SERPL-MCNC: 10 MG/DL (ref 8.3–10.6)
CHLORIDE BLD-SCNC: 86 MMOL/L (ref 99–110)
CO2: 23 MMOL/L (ref 21–32)
CREAT SERPL-MCNC: 1.9 MG/DL (ref 0.6–1.2)
GFR AFRICAN AMERICAN: 32
GFR NON-AFRICAN AMERICAN: 26
GLUCOSE BLD-MCNC: 97 MG/DL (ref 70–99)
POTASSIUM SERPL-SCNC: 4.7 MMOL/L (ref 3.5–5.1)
SODIUM BLD-SCNC: 120 MMOL/L (ref 136–145)
TOTAL PROTEIN: 7.6 G/DL (ref 6.4–8.2)

## 2022-02-01 PROCEDURE — 83550 IRON BINDING TEST: CPT

## 2022-02-01 PROCEDURE — 84443 ASSAY THYROID STIM HORMONE: CPT

## 2022-02-01 PROCEDURE — 83540 ASSAY OF IRON: CPT

## 2022-02-01 PROCEDURE — 36415 COLL VENOUS BLD VENIPUNCTURE: CPT

## 2022-02-01 PROCEDURE — 82746 ASSAY OF FOLIC ACID SERUM: CPT

## 2022-02-01 PROCEDURE — 82728 ASSAY OF FERRITIN: CPT

## 2022-02-01 PROCEDURE — 82607 VITAMIN B-12: CPT

## 2022-02-01 PROCEDURE — 80053 COMPREHEN METABOLIC PANEL: CPT

## 2022-02-01 PROCEDURE — 84439 ASSAY OF FREE THYROXINE: CPT

## 2022-02-02 DIAGNOSIS — R79.89 HYPOURICEMIA: Primary | ICD-10-CM

## 2022-02-02 DIAGNOSIS — R79.89 HYPOURICEMIA: ICD-10-CM

## 2022-02-02 DIAGNOSIS — R79.89 ELEVATED TSH: ICD-10-CM

## 2022-02-02 DIAGNOSIS — N18.4 CKD (CHRONIC KIDNEY DISEASE) STAGE 4, GFR 15-29 ML/MIN (HCC): ICD-10-CM

## 2022-02-02 LAB
FERRITIN: 1012 NG/ML (ref 15–150)
FOLATE: 14.41 NG/ML (ref 4.78–24.2)
IRON SATURATION: 28 % (ref 15–50)
IRON: 76 UG/DL (ref 37–145)
T4 FREE: 1.1 NG/DL (ref 0.9–1.8)
TOTAL IRON BINDING CAPACITY: 275 UG/DL (ref 260–445)
TRANSFERRIN: 251 MG/DL (ref 200–360)
TSH REFLEX: 33.01 UIU/ML (ref 0.27–4.2)
URIC ACID, SERUM: 4 MG/DL (ref 2.6–6)
VITAMIN B-12: 559 PG/ML (ref 211–911)

## 2022-02-02 RX ORDER — LEVOTHYROXINE SODIUM 0.1 MG/1
100 TABLET ORAL DAILY
Qty: 30 TABLET | Refills: 1 | Status: SHIPPED | OUTPATIENT
Start: 2022-02-02 | End: 2022-03-07 | Stop reason: SDUPTHER

## 2022-02-02 NOTE — RESULT ENCOUNTER NOTE
Can we add a Transferrin level? Dx R79.89 Other abnormal findings on blood chemistry or elevated Ferritin. AND  a Uric Acid- Dx CKD stage 4    Thyroid level is better but still not at goal.  I am increasing thyroid medication to 100- you can take 2 of what you have until you run out. Ferritin continues to be elevated but is slowly improving. Kidney function is slightly better. Sodium level is the same. I am going to start screening for other possible causes for the low sodium level- you will have to give us a urine specimen in the office. Iron levels are normal.     Please call if you have additional questions and/ or concerns. Please keep your next appt. Thank you.

## 2022-02-08 DIAGNOSIS — E87.1 HYPONATREMIA: ICD-10-CM

## 2022-02-08 DIAGNOSIS — N18.4 CKD (CHRONIC KIDNEY DISEASE) STAGE 4, GFR 15-29 ML/MIN (HCC): Primary | ICD-10-CM

## 2022-02-08 DIAGNOSIS — E87.1 LOW SODIUM LEVELS: ICD-10-CM

## 2022-02-21 ENCOUNTER — TELEPHONE (OUTPATIENT)
Dept: ENT CLINIC | Age: 72
End: 2022-02-21

## 2022-02-21 ENCOUNTER — OFFICE VISIT (OUTPATIENT)
Dept: ENT CLINIC | Age: 72
End: 2022-02-21
Payer: MEDICARE

## 2022-02-21 VITALS
DIASTOLIC BLOOD PRESSURE: 61 MMHG | TEMPERATURE: 97.2 F | SYSTOLIC BLOOD PRESSURE: 112 MMHG | HEART RATE: 69 BPM | WEIGHT: 135.58 LBS | HEIGHT: 64 IN | BODY MASS INDEX: 23.15 KG/M2

## 2022-02-21 DIAGNOSIS — C01 SQUAMOUS CELL CARCINOMA OF BASE OF TONGUE (HCC): Primary | ICD-10-CM

## 2022-02-21 DIAGNOSIS — H72.91 PERFORATION OF RIGHT TYMPANIC MEMBRANE: ICD-10-CM

## 2022-02-21 DIAGNOSIS — H91.93 BILATERAL HEARING LOSS, UNSPECIFIED HEARING LOSS TYPE: ICD-10-CM

## 2022-02-21 PROCEDURE — G8484 FLU IMMUNIZE NO ADMIN: HCPCS | Performed by: STUDENT IN AN ORGANIZED HEALTH CARE EDUCATION/TRAINING PROGRAM

## 2022-02-21 PROCEDURE — 4040F PNEUMOC VAC/ADMIN/RCVD: CPT | Performed by: STUDENT IN AN ORGANIZED HEALTH CARE EDUCATION/TRAINING PROGRAM

## 2022-02-21 PROCEDURE — 31575 DIAGNOSTIC LARYNGOSCOPY: CPT | Performed by: STUDENT IN AN ORGANIZED HEALTH CARE EDUCATION/TRAINING PROGRAM

## 2022-02-21 PROCEDURE — G8400 PT W/DXA NO RESULTS DOC: HCPCS | Performed by: STUDENT IN AN ORGANIZED HEALTH CARE EDUCATION/TRAINING PROGRAM

## 2022-02-21 PROCEDURE — 4004F PT TOBACCO SCREEN RCVD TLK: CPT | Performed by: STUDENT IN AN ORGANIZED HEALTH CARE EDUCATION/TRAINING PROGRAM

## 2022-02-21 PROCEDURE — G8420 CALC BMI NORM PARAMETERS: HCPCS | Performed by: STUDENT IN AN ORGANIZED HEALTH CARE EDUCATION/TRAINING PROGRAM

## 2022-02-21 PROCEDURE — G8427 DOCREV CUR MEDS BY ELIG CLIN: HCPCS | Performed by: STUDENT IN AN ORGANIZED HEALTH CARE EDUCATION/TRAINING PROGRAM

## 2022-02-21 PROCEDURE — 1123F ACP DISCUSS/DSCN MKR DOCD: CPT | Performed by: STUDENT IN AN ORGANIZED HEALTH CARE EDUCATION/TRAINING PROGRAM

## 2022-02-21 PROCEDURE — 1090F PRES/ABSN URINE INCON ASSESS: CPT | Performed by: STUDENT IN AN ORGANIZED HEALTH CARE EDUCATION/TRAINING PROGRAM

## 2022-02-21 PROCEDURE — 3017F COLORECTAL CA SCREEN DOC REV: CPT | Performed by: STUDENT IN AN ORGANIZED HEALTH CARE EDUCATION/TRAINING PROGRAM

## 2022-02-21 PROCEDURE — 99214 OFFICE O/P EST MOD 30 MIN: CPT | Performed by: STUDENT IN AN ORGANIZED HEALTH CARE EDUCATION/TRAINING PROGRAM

## 2022-02-21 ASSESSMENT — ENCOUNTER SYMPTOMS
EYE PAIN: 0
SHORTNESS OF BREATH: 0
RHINORRHEA: 0
NAUSEA: 0
VOMITING: 0
TROUBLE SWALLOWING: 1
COUGH: 0

## 2022-02-21 NOTE — TELEPHONE ENCOUNTER
----- Message from Ivett Inman DO sent at 2/21/2022 11:22 AM EST -----  I have placed a referral to Dr. Marino Banuelos.   Could you please call his office and help to arrange the appointment thank you

## 2022-02-21 NOTE — PROGRESS NOTES
[facility] [place]      Patient Name: Kimmy Patterson  Medical Record Number:  [id number]  Primary Care Physician:  Nata Martin, APRN - CNP  Date of Consultation: 2/21/2022    Chief Complaint:   Chief Complaint   Patient presents with    Follow-up     Had a PET scan completed on 02/09/2022. Radiation doctor would like Dr. Mary Hameed to decide what type of biopsy should be completed. HISTORY OF PRESENT ILLNESS  Carola Cabello is a(n) 80 y.o. female who presents for follow-up after being diagnosed with stage VIA (T2N2M0) tongue base cancer with changes to the uvula and soft palate. She completed chemoradiation on August 3. She is tolerating food by mouth and supplementing with her PEG tube. She complains that her hearing has significantly decreased since chemotherapy. She also had difficulty with her kidneys during chemotherapy. 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 Her throat and neck pain have improved significantly since completing therapy. She is scheduled for a PET scan tomorrow at [facility]. Interval History 2/21/2022  Carola Cabello had a PET scan performed on February 9 which showed likely persistence of disease in the tongue base tonsil area as well as neck nodes. Unfortunately she did begin smoking again in December after stopping for 7 to 8 months. She still has her PEG tube but states she does not use it and is eating fine.   She also has a known hearing loss but did not follow-up for her hearing test.    Patient Active Problem List   Diagnosis    Chronic anxiety    Crohn's disease without complication ([address]    Nicotine dependence    Aortic insufficiency    Essential hypertension    Hyperlipidemia    Cigarette nicotine dependence without complication    Smoking    Mixed incontinence urge and stress    Localized swelling of both lower legs    ASCVD (arteriosclerotic cardiovascular disease)  Acute kidney injury ([facility] [address]    Squamous cell carcinoma of pharynx (HCC)    Hyponatremia    Dysphagia    Nausea    Moderate malnutrition (HCC)    Gastrostomy status (HCC)    Hypokalemia    Hypophosphatemia    UTI due to Klebsiella species    Constipation     Past Surgical History:   Procedure Laterality Date    APPENDECTOMY      CHOLECYSTECTOMY      COLONOSCOPY  2011    COLONOSCOPY N/A 9/2/2021    COLONOSCOPY WITH BIOPSY RECTAL ULCER [facility] N/A 5/5/2021    DIRECT MICROLARYNGOSCOPY WITH BIOPSIES performed by Jazlyn Stoddard DO at [address]    infection after btl;one ovary in   [address]  05/26/2021    Procedure: EGD ESOPHAGOGASTRODUODENOSCOPY PEG TUBE INSERTION    UPPER GASTROINTESTINAL ENDOSCOPY  5/26/2021    EGD ESOPHAGOGASTRODUODENOSCOPY PEG TUBE INSERTION performed by Jurgen Dumont MD at [address] [facility]     Family History   Problem Relation Age of Onset    Heart Disease Mother      Social History     Socioeconomic History    Marital status:      Spouse name: Kalia Muller Number of children: Not on file    Years of education: Not on file    Highest education level: Not on file   Occupational History    Not on file   Tobacco Use    Smoking status: Current Every Day Smoker     Packs/day: 1.00     Years: 30.00     Pack years: 30.00     Types: Cigarettes    Smokeless tobacco: Never Used    Tobacco comment: placed in AVS   Vaping Use    Vaping Use: Never used   Substance and Sexual Activity    Alcohol use:  Yes     Alcohol/week: 4.0 standard drinks     Types: 4 Cans of beer per week     Comment: daily; 4 beers per day    Drug use: No    Sexual activity: Not Currently   Other Topics Concern    Not on file   Social History Narrative    10/2011 lives with spouse and dogs;not working re [employer]     Social Determinants of Health     Financial Resource Strain:     Difficulty of Paying Living Expenses: Not on file   Food Insecurity:     Worried About Running Out of Food in the Last Year: Not on file    Rosalina of Food in the Last Year: Not on file   Transportation Needs:     Lack of Transportation (Medical): Not on file    Lack of Transportation (Non-Medical): Not on file   Physical Activity:     Days of Exercise per Week: Not on file    Minutes of Exercise per Session: Not on file   Stress:     Feeling of Stress : Not on file   Social Connections:     Frequency of Communication with Friends and Family: Not on file    Frequency of Social Gatherings with Friends and Family: Not on file    Attends Latter day Services: Not on file    Active Member of [address] [place] [address] [employer] or Organizations: Not on file    Attends Club or Organization Meetings: Not on file    Marital Status: Not on file   Intimate Partner Violence:     Fear of Current or Ex-Partner: Not on file    Emotionally Abused: Not on file    Physically Abused: Not on file    Sexually Abused: Not on file   Housing Stability:     Unable to Pay for Housing in the Last Year: Not on file    Number of [place] in the Last Year: Not on file    Unstable Housing in the Last Year: Not on file       DRUG/FOOD ALLERGIES: Penicillins    CURRENT MEDICATIONS  Prior to Admission medications    Medication Sig Start Date End Date Taking? Authorizing Provider   levothyroxine (SYNTHROID) 100 MCG tablet Take 1 tablet by mouth daily 2/2/22 3/4/22 Yes Arie Mouse, APRN - CNP   Nutritional Supplements (BOOST PO) Take 1 each by mouth daily   Yes Historical Provider, MD   acetaminophen (TYLENOL) 500 MG tablet Take 1,000 mg by mouth every 6 hours as needed for Pain    Historical Provider, MD       REVIEW OF SYSTEMS  The following systems were reviewed and revealed the following in addition to any already discussed in the HPI:    Review of Systems   Constitutional: Negative for fatigue and fever.    HENT: Positive for hearing loss and trouble swallowing. Negative for congestion, ear pain, postnasal drip, rhinorrhea and sneezing. Eyes: Negative for pain and visual disturbance. Respiratory: Negative for cough and shortness of breath. Cardiovascular: Negative for chest pain. Gastrointestinal: Negative for nausea and vomiting. Endocrine: Negative. Genitourinary: Negative. Musculoskeletal: Negative for neck pain and neck stiffness. Skin: Negative for rash. Neurological: Negative for dizziness and headaches. PHYSICAL EXAM  /61 (Site: Left Upper Arm, Position: Sitting, Cuff Size: Medium Adult)   Pulse 69   Temp 97.2 °F (36.2 °C) (Infrared)   Ht 5' 4\" (1.626 m)   Wt 135 lb 9.3 oz (61.5 kg)   BMI 23.27 kg/m²     GENERAL: No Acute Distress, Alert and Oriented, no hoarseness  EYES: EOMI, Anti-icteric  NOSE: No epistaxis, nasal mucosa within normal limits, no purulent drainage  EARS: Normal external canal appearance, EAC patent bilaterally, right TM with a 20% anterior-inferior perforation, left TM intact with no evidence of effusions  FACE: 1/6 House-Brackmann Scale, symmetric, sensation equal bilaterally  ORAL CAVITY: No masses or lesions palpated, uvula is midline, moist mucous membranes, post radiation changes to the soft palate no abnormalities noted  NECK: Normal range of motion, no thyromegaly, trachea is midline, no lymphadenopathy, no neck masses, no crepitus  CHEST: Normal respiratory effort, no retractions, breathing comfortably  SKIN: No rashes, normal appearing skin, no evidence of skin lesions/tumors    RADIOLOGY  Summary of findings:  PET scan images reviewed, no radiology report in PACS. She appears to have persistence of disease in the left tongue base and in the neck. These images were also shown to the daughter and patient at the time of the exam    PROCEDURE  Flexible Laryngoscopy    Pre op: History of tongue base cancer.    Post op: Same  Procedure : Flexible Laryngoscopy  Surgeon: Darrin Leventhal, DO  Anesthesia: Afrin with 4% lidocaine  Indication: Laryngeal mirror examination was not tolerated due to gag reflex  Description:  After verbal consent was obtained, the scope was passed along the floor of the right naris to the level of the larynx. There was no evidence of concerning masses or lesions of the base of tongue, vallecula, epiglottis, aryepiglottic folds, arytenoids, false vocal folds, true vocal folds, or pyriform sinuses. True vocal folds exhibited symmetric motion bilaterally without evidence of paralysis or paresis. The scope was removed. The patient tolerated the procedure without difficulty. There were no complications. Pertinent findings: There is an area of the left tongue base which appears more prominent      ASSESSMENT/PLAN  Moises Tamayo is a very pleasant 80 y.o. female with     1. 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 She had recent PET scan which showed probable persistence of disease. Flexible laryngoscopy did show an asymmetry of the left tongue base. Rec laryngoscopy with biopsy was discussed with the patient versus referral to [facility] for potential direct laryngoscopy and biopsy and definitive treatment at the same time. Decided for referral to [facility]  - External Referral To ENT  - NE LARYNGOSCOPY FLEXIBLE DIAGNOSTIC    2. Bilateral hearing loss, unspecified hearing loss type  We can perform a hearing test whenever she is interested for her hearing loss    3. Perforation of right tympanic membrane  Her eardrum perforation is unchanged from prior exam.  I advised dry ear precautions. She will follow-up with [facility] and Dr. Daron Ruffin for further treatment of her tongue base cancer. Medical Decision Making:   The following items were considered in medical decision making:  Independent review of images  Review / order clinical lab tests  Review / order radiology tests  Decision to obtain old records

## 2022-02-24 ENCOUNTER — TELEPHONE (OUTPATIENT)
Dept: FAMILY MEDICINE CLINIC | Age: 72
End: 2022-02-24

## 2022-02-24 DIAGNOSIS — E03.2 HYPOTHYROIDISM DUE TO MEDICATION: ICD-10-CM

## 2022-02-24 DIAGNOSIS — R79.89 ELEVATED TSH: Primary | ICD-10-CM

## 2022-02-24 NOTE — TELEPHONE ENCOUNTER
Patient daughter calling, would like a call back from Linda. Wants to know if there would be any way to get patient put into a rehab facility? States that her cancer is back and things are just too stressful for patient spouse to take care of her on top of his own medical concerns. Please advise.

## 2022-03-04 DIAGNOSIS — R79.89 ELEVATED TSH: ICD-10-CM

## 2022-03-04 NOTE — TELEPHONE ENCOUNTER
----- Message from Roseline Morales sent at 3/4/2022 12:33 PM EST -----  Subject: Refill Request    QUESTIONS  Name of Medication? levothyroxine (SYNTHROID) 100 MCG tablet  Patient-reported dosage and instructions? Take 1 tablet by mouth daily  How many days do you have left? 4  Preferred Pharmacy? [address]  Pharmacy phone number (if available)? [phone]  ---------------------------------------------------------------------------  --------------  Emily STROUD  What is the best way for the office to contact you? OK to leave message on   voicemail  Preferred Call Back Phone Number?  [phone]

## 2022-03-04 NOTE — TELEPHONE ENCOUNTER
Trina Delgado is requesting refill(s)   Last OV 1/4/22 (pertaining to medication)  LR 3/4/22 (per medication requested)  Next office visit scheduled or attempted No   If no, reason:

## 2022-03-05 NOTE — TELEPHONE ENCOUNTER
We increased her Thyroid last time. I really need labs to determine where we are and a urine test please.

## 2022-03-07 RX ORDER — LEVOTHYROXINE SODIUM 0.1 MG/1
100 TABLET ORAL DAILY
Qty: 30 TABLET | Refills: 0 | Status: SHIPPED | OUTPATIENT
Start: 2022-03-07 | End: 2022-04-05 | Stop reason: SDUPTHER

## 2022-03-24 ENCOUNTER — APPOINTMENT (OUTPATIENT)
Dept: CT IMAGING | Age: 72
End: 2022-03-24
Payer: MEDICARE

## 2022-03-24 ENCOUNTER — APPOINTMENT (OUTPATIENT)
Dept: GENERAL RADIOLOGY | Age: 72
End: 2022-03-24
Payer: MEDICARE

## 2022-03-24 ENCOUNTER — HOSPITAL ENCOUNTER (EMERGENCY)
Age: 72
Discharge: LEFT BEFORE DISCHARGE | End: 2022-03-24
Attending: EMERGENCY MEDICINE
Payer: MEDICARE

## 2022-03-24 VITALS
OXYGEN SATURATION: 100 % | HEART RATE: 76 BPM | RESPIRATION RATE: 22 BRPM | TEMPERATURE: 97 F | DIASTOLIC BLOOD PRESSURE: 65 MMHG | SYSTOLIC BLOOD PRESSURE: 154 MMHG

## 2022-03-24 DIAGNOSIS — N17.9 AKI (ACUTE KIDNEY INJURY) (HCC): ICD-10-CM

## 2022-03-24 DIAGNOSIS — R63.0 ANOREXIA: ICD-10-CM

## 2022-03-24 DIAGNOSIS — Z53.29 LEFT AGAINST MEDICAL ADVICE: ICD-10-CM

## 2022-03-24 DIAGNOSIS — R62.7 FAILURE TO THRIVE IN ADULT: Primary | ICD-10-CM

## 2022-03-24 DIAGNOSIS — E86.0 DEHYDRATION: ICD-10-CM

## 2022-03-24 LAB
A/G RATIO: 1.4 (ref 1.1–2.2)
ALBUMIN SERPL-MCNC: 4 G/DL (ref 3.4–5)
ALP BLD-CCNC: 85 U/L (ref 40–129)
ALT SERPL-CCNC: 8 U/L (ref 10–40)
ANION GAP SERPL CALCULATED.3IONS-SCNC: 13 MMOL/L (ref 3–16)
AST SERPL-CCNC: 15 U/L (ref 15–37)
BASOPHILS ABSOLUTE: 0.1 K/UL (ref 0–0.2)
BASOPHILS RELATIVE PERCENT: 0.9 %
BILIRUB SERPL-MCNC: <0.2 MG/DL (ref 0–1)
BILIRUBIN URINE: NEGATIVE
BLOOD, URINE: NEGATIVE
BUN BLDV-MCNC: 61 MG/DL (ref 7–20)
CALCIUM SERPL-MCNC: 10 MG/DL (ref 8.3–10.6)
CHLORIDE BLD-SCNC: 100 MMOL/L (ref 99–110)
CLARITY: CLEAR
CO2: 21 MMOL/L (ref 21–32)
COLOR: YELLOW
CREAT SERPL-MCNC: 2.2 MG/DL (ref 0.6–1.2)
EOSINOPHILS ABSOLUTE: 0.3 K/UL (ref 0–0.6)
EOSINOPHILS RELATIVE PERCENT: 3.5 %
EPITHELIAL CELLS, UA: ABNORMAL /HPF (ref 0–5)
GFR AFRICAN AMERICAN: 27
GFR NON-AFRICAN AMERICAN: 22
GLUCOSE BLD-MCNC: 127 MG/DL (ref 70–99)
GLUCOSE URINE: NEGATIVE MG/DL
HCT VFR BLD CALC: 28.4 % (ref 36–48)
HEMOGLOBIN: 9.8 G/DL (ref 12–16)
KETONES, URINE: NEGATIVE MG/DL
LACTIC ACID, SEPSIS: 1.2 MMOL/L (ref 0.4–1.9)
LEUKOCYTE ESTERASE, URINE: ABNORMAL
LIPASE: 15 U/L (ref 13–60)
LYMPHOCYTES ABSOLUTE: 0.5 K/UL (ref 1–5.1)
LYMPHOCYTES RELATIVE PERCENT: 6.5 %
MCH RBC QN AUTO: 33 PG (ref 26–34)
MCHC RBC AUTO-ENTMCNC: 34.5 G/DL (ref 31–36)
MCV RBC AUTO: 95.6 FL (ref 80–100)
MICROSCOPIC EXAMINATION: YES
MONOCYTES ABSOLUTE: 0.8 K/UL (ref 0–1.3)
MONOCYTES RELATIVE PERCENT: 11.1 %
NEUTROPHILS ABSOLUTE: 5.8 K/UL (ref 1.7–7.7)
NEUTROPHILS RELATIVE PERCENT: 78 %
NITRITE, URINE: NEGATIVE
PDW BLD-RTO: 12.4 % (ref 12.4–15.4)
PH UA: 5.5 (ref 5–8)
PLATELET # BLD: 234 K/UL (ref 135–450)
PMV BLD AUTO: 8.8 FL (ref 5–10.5)
POTASSIUM REFLEX MAGNESIUM: 4.3 MMOL/L (ref 3.5–5.1)
PROTEIN UA: NEGATIVE MG/DL
RBC # BLD: 2.97 M/UL (ref 4–5.2)
RBC UA: ABNORMAL /HPF (ref 0–4)
SODIUM BLD-SCNC: 134 MMOL/L (ref 136–145)
SPECIFIC GRAVITY UA: 1.01 (ref 1–1.03)
TOTAL PROTEIN: 6.8 G/DL (ref 6.4–8.2)
URINE REFLEX TO CULTURE: ABNORMAL
URINE TYPE: ABNORMAL
UROBILINOGEN, URINE: 0.2 E.U./DL
WBC # BLD: 7.4 K/UL (ref 4–11)
WBC UA: ABNORMAL /HPF (ref 0–5)

## 2022-03-24 PROCEDURE — 74176 CT ABD & PELVIS W/O CONTRAST: CPT

## 2022-03-24 PROCEDURE — 83605 ASSAY OF LACTIC ACID: CPT

## 2022-03-24 PROCEDURE — 81001 URINALYSIS AUTO W/SCOPE: CPT

## 2022-03-24 PROCEDURE — 87040 BLOOD CULTURE FOR BACTERIA: CPT

## 2022-03-24 PROCEDURE — 80053 COMPREHEN METABOLIC PANEL: CPT

## 2022-03-24 PROCEDURE — 83690 ASSAY OF LIPASE: CPT

## 2022-03-24 PROCEDURE — 85025 COMPLETE CBC W/AUTO DIFF WBC: CPT

## 2022-03-24 PROCEDURE — 36415 COLL VENOUS BLD VENIPUNCTURE: CPT

## 2022-03-24 PROCEDURE — 2580000003 HC RX 258: Performed by: PHYSICIAN ASSISTANT

## 2022-03-24 PROCEDURE — 6360000004 HC RX CONTRAST MEDICATION: Performed by: PHYSICIAN ASSISTANT

## 2022-03-24 PROCEDURE — 99283 EMERGENCY DEPT VISIT LOW MDM: CPT

## 2022-03-24 PROCEDURE — 71045 X-RAY EXAM CHEST 1 VIEW: CPT

## 2022-03-24 RX ORDER — 0.9 % SODIUM CHLORIDE 0.9 %
1000 INTRAVENOUS SOLUTION INTRAVENOUS ONCE
Status: COMPLETED | OUTPATIENT
Start: 2022-03-24 | End: 2022-03-24

## 2022-03-24 RX ADMIN — IOHEXOL 50 ML: 240 INJECTION, SOLUTION INTRATHECAL; INTRAVASCULAR; INTRAVENOUS; ORAL at 19:44

## 2022-03-24 RX ADMIN — SODIUM CHLORIDE 1000 ML: 9 INJECTION, SOLUTION INTRAVENOUS at 16:04

## 2022-03-24 NOTE — ED PROVIDER NOTES
[address] ED  EMERGENCY DEPARTMENT ENCOUNTER        Pt Name: Benjamin Mckeon  MRN: [id number]  [place] 1950  Date of evaluation: 3/24/2022  Provider: Hussein Boss PA-C  PCP: LEVAR Dunaway CNP    Shared Visit or Autonomous Visit:  I have seen and evaluated this patient with my supervising physician Dr Praful Fowler   Patient presents with    Failure To Thrive     pt reports g tube hurting, worried about infection, g-tube looks intact in triage, Nikolas Eng reports it flushes fine; daughter reports pt will not eat, noncompliant, pt undergoing cx tx       HISTORY OF PRESENT ILLNESS   (Location/Symptom, Timing/Onset, Context/Setting, Quality, Duration, Modifying Factors, Severity)  Note limiting factors. Benjamin Mckeon is a 80 y.o. female brought in by family for evaluation patient has not been eating for several months, she has a G-tube but she will not let her  use it she finally let him use it 2 days ago for a boost.  Daughter states she is very weak. She has some discharge G-tube site states it just needs cleaned states no abdominal pain only has some discomfort certain ways that she moves. States she wants the G-tube out but her doctor has told her it needs to stay in because she will not eat. She has history of mouth cancer she had chemo and radiation, last was in August.  Chronic numbness tingling of her hands and feet. Daughter states she has also been confused. No falls or head injury. Patient states she just does not feel well and does not want to eat. Recent PET scan in February found to more areas of concern followed up with ENT was supposed to have a CT scan with contrast at [facility] today but patient refused to go there so her family brought her here. The history is provided by the patient and a relative (here with daughter). Fatigue  Onset quality:  Gradual  Duration: months.   Timing:  Constant  Progression: Worsening  Context comment:  Not eating  Associated symptoms: no abdominal pain, no chest pain, no cough, no dysuria, no fever, no headaches, no loss of consciousness, no near-syncope, no shortness of breath, no stroke symptoms, no syncope and no vomiting        Nursing Notes were reviewed    REVIEW OF SYSTEMS    (2-9 systems for level 4, 10 or more for level 5)     Review of Systems   Constitutional: Positive for activity change, appetite change and fatigue. Negative for fever. Respiratory: Negative for cough and shortness of breath. Cardiovascular: Negative for chest pain, syncope and near-syncope. Gastrointestinal: Negative for abdominal pain and vomiting. Genitourinary: Negative for dysuria. Neurological: Negative for loss of consciousness, syncope and headaches. All other systems reviewed and are negative. Positives and Pertinent negatives as per HPI.        PAST MEDICAL HISTORY     Past Medical History:   Diagnosis Date    Anxiety     Cancer ([facility] [address] 05/05/2021    throat    Cerebral artery occlusion with cerebral infarction ([facility] [address]     30 years ago had mini stroke with no residual    Crohn disease ([facility] [address]     CVA (cerebral infarction) 2006    hx unclear    Hyperlipidemia     Hypertension          SURGICAL HISTORY     Past Surgical History:   Procedure Laterality Date    APPENDECTOMY      CHOLECYSTECTOMY      COLONOSCOPY  2011    COLONOSCOPY N/A 9/2/2021    COLONOSCOPY WITH BIOPSY RECTAL ULCER performed by Mika Pagan MD at [address] N/A 5/5/2021    DIRECT MICROLARYNGOSCOPY WITH BIOPSIES performed by Radha Mae DO at [address]    infection after btl;one ovary in   [address] [place] [address] [address]  05/26/2021    Procedure: EGD ESOPHAGOGASTRODUODENOSCOPY PEG TUBE INSERTION    UPPER GASTROINTESTINAL ENDOSCOPY  5/26/2021    EGD ESOPHAGOGASTRODUODENOSCOPY PEG TUBE INSERTION performed by Darell Barksdale MD at [address]       Discharge Medication List as of 3/24/2022 10:38 PM      CONTINUE these medications which have NOT CHANGED    Details   levothyroxine (SYNTHROID) 100 MCG tablet Take 1 tablet by mouth daily, Disp-30 tablet, R-0Normal      Nutritional Supplements (BOOST PO) Take 1 each by mouth dailyHistorical Med      acetaminophen (TYLENOL) 500 MG tablet Take 1,000 mg by mouth every 6 hours as needed for PainHistorical Med               ALLERGIES     Penicillins    FAMILYHISTORY       Family History   Problem Relation Age of Onset    Heart Disease Mother           SOCIAL HISTORY       Social History     Socioeconomic History    Marital status:      Spouse name: Toni Sofia Number of children: Not on file    Years of education: Not on file    Highest education level: Not on file   Occupational History    Not on file   Tobacco Use    Smoking status: Former Smoker     Packs/day: 1.00     Years: 30.00     Pack years: 30.00     Types: Cigarettes    Smokeless tobacco: Never Used    Tobacco comment: placed in AVS   Vaping Use    Vaping Use: Never used   Substance and Sexual Activity    Alcohol use: Not Currently     Alcohol/week: 4.0 standard drinks     Types: 4 Cans of beer per week     Comment: daily; 4 beers per day    Drug use: No    Sexual activity: Not Currently   Other Topics Concern    Not on file   Social History Narrative    10/2011 lives with spouse and dogs;not working re [employer]     Social Determinants of Health     Financial Resource Strain:     Difficulty of Paying Living Expenses: Not on file   Food Insecurity:     Worried About Running Out of Food in the Last Year: Not on file    Rosalina of Food in the Last Year: Not on file   Transportation Needs:     Lack of Transportation (Medical): Not on file    Lack of Transportation (Non-Medical):  Not on file   Physical Activity:     Days of Exercise per Week: Not on file    Minutes of Exercise per Session: Not on file   Stress:     Feeling of Stress : Not on file   Social Connections:     Frequency of Communication with Friends and Family: Not on file    Frequency of Social Gatherings with Friends and Family: Not on file    Attends Church Services: Not on file    Active Member of [address] [place] [address] or Organizations: Not on file    Attends Club or Organization Meetings: Not on file    Marital Status: Not on file   Intimate Partner Violence:     Fear of Current or Ex-Partner: Not on file    Emotionally Abused: Not on file    Physically Abused: Not on file    Sexually Abused: Not on file   Housing Stability:     Unable to Pay for Housing in the Last Year: Not on file    Number of [place] in the Last Year: Not on file    Unstable Housing in the Last Year: Not on file       SCREENINGS    Williams Coma Scale  Eye Opening: Spontaneous  Best Verbal Response: Oriented  Best Motor Response: Obeys commands  Williams Coma Scale Score: 15        PHYSICAL EXAM    (up to 7 for level 4, 8 or more for level 5)     ED Triage Vitals   BP Temp Temp Source Pulse Resp SpO2 Height Weight   03/24/22 1422 03/24/22 1417 03/24/22 1417 03/24/22 1420 03/24/22 1420 03/24/22 1420 -- --   97/63 97 °F (36.1 °C) Tympanic 97 14 100 %         Physical Exam  Vitals and nursing note reviewed. Constitutional:       Appearance: She is well-developed. She is not toxic-appearing. Comments: Chronically ill-appearing   HENT:      Head: Normocephalic and atraumatic. Mouth/Throat:      Mouth: Mucous membranes are dry. Pharynx: Oropharynx is clear. No pharyngeal swelling or posterior oropharyngeal erythema. Comments: White plaque at tongue   Eyes:      Conjunctiva/sclera: Conjunctivae normal.      Pupils: Pupils are equal, round, and reactive to light. Neck:      Vascular: No JVD. Cardiovascular:      Rate and Rhythm: Normal rate and regular rhythm. Heart sounds: Normal heart sounds.    Pulmonary:      Effort: Pulmonary effort is normal. No respiratory distress. Breath sounds: Normal breath sounds. No stridor. No wheezing, rhonchi or rales. Abdominal:      General: Bowel sounds are normal. There is no distension. Palpations: Abdomen is soft. Abdomen is not rigid. There is no mass. Tenderness: There is no abdominal tenderness. There is no guarding or rebound. Comments: G-tube in place with small amount of discharge at site. Musculoskeletal:         General: Normal range of motion. Cervical back: Normal range of motion and neck supple. Skin:     General: Skin is warm and dry. Findings: No rash. Neurological:      Mental Status: She is alert. Mental status is at baseline. GCS: GCS eye subscore is 4. GCS verbal subscore is 5. GCS motor subscore is 6. Cranial Nerves: No cranial nerve deficit. Sensory: Sensation is intact. No sensory deficit. Motor: Motor function is intact. No abnormal muscle tone. Coordination: Coordination normal.      Comments: Patient is sitting up in a chair. Awake and alert. Oriented to person and place. States the month is May and the year is 2021.    Psychiatric:         Behavior: Behavior normal.         DIAGNOSTIC RESULTS   LABS:    Labs Reviewed   CBC WITH AUTO DIFFERENTIAL - Abnormal; Notable for the following components:       Result Value    RBC 2.97 (*)     Hemoglobin 9.8 (*)     Hematocrit 28.4 (*)     Lymphocytes Absolute 0.5 (*)     All other components within normal limits   COMPREHENSIVE METABOLIC PANEL W/ REFLEX TO MG FOR LOW K - Abnormal; Notable for the following components:    Sodium 134 (*)     Glucose 127 (*)     BUN 61 (*)     CREATININE 2.2 (*)     GFR Non- 22 (*)     GFR  27 (*)     ALT 8 (*)     All other components within normal limits   URINALYSIS WITH REFLEX TO CULTURE - Abnormal; Notable for the following components:    Leukocyte Esterase, Urine TRACE (*)     All other components within normal limits   MICROSCOPIC URINALYSIS - Abnormal; Notable for the following components:    Epithelial Cells, UA 11-20 (*)     All other components within normal limits   CULTURE, BLOOD 1   CULTURE, BLOOD 2   LACTATE, SEPSIS   LIPASE     Results for orders placed or performed during the hospital encounter of 03/24/22   CBC with Auto Differential   Result Value Ref Range    WBC 7.4 4.0 - 11.0 K/uL    RBC 2.97 (L) 4.00 - 5.20 M/uL    Hemoglobin 9.8 (L) 12.0 - 16.0 g/dL    Hematocrit 28.4 (L) 36.0 - 48.0 %    MCV 95.6 80.0 - 100.0 fL    MCH 33.0 26.0 - 34.0 pg    MCHC 34.5 31.0 - 36.0 g/dL    RDW 12.4 12.4 - 15.4 %    Platelets 414 064 - 973 K/uL    MPV 8.8 5.0 - 10.5 fL    Neutrophils % 78.0 %    Lymphocytes % 6.5 %    Monocytes % 11.1 %    Eosinophils % 3.5 %    Basophils % 0.9 %    Neutrophils Absolute 5.8 1.7 - 7.7 K/uL    Lymphocytes Absolute 0.5 (L) 1.0 - 5.1 K/uL    Monocytes Absolute 0.8 0.0 - 1.3 K/uL    Eosinophils Absolute 0.3 0.0 - 0.6 K/uL    Basophils Absolute 0.1 0.0 - 0.2 K/uL   Comprehensive Metabolic Panel w/ Reflex to MG   Result Value Ref Range    Sodium 134 (L) 136 - 145 mmol/L    Potassium reflex Magnesium 4.3 3.5 - 5.1 mmol/L    Chloride 100 99 - 110 mmol/L    CO2 21 21 - 32 mmol/L    Anion Gap 13 3 - 16    Glucose 127 (H) 70 - 99 mg/dL    BUN 61 (H) 7 - 20 mg/dL    CREATININE 2.2 (H) 0.6 - 1.2 mg/dL    GFR Non-African American 22 (A) >60    GFR  27 (A) >60    Calcium 10.0 8.3 - 10.6 mg/dL    Total Protein 6.8 6.4 - 8.2 g/dL    Albumin 4.0 3.4 - 5.0 g/dL    Albumin/Globulin Ratio 1.4 1.1 - 2.2    Total Bilirubin <0.2 0.0 - 1.0 mg/dL    Alkaline Phosphatase 85 40 - 129 U/L    ALT 8 (L) 10 - 40 U/L    AST 15 15 - 37 U/L   Lactate, Sepsis   Result Value Ref Range    Lactic Acid, Sepsis 1.2 0.4 - 1.9 mmol/L   Urinalysis with Reflex to Culture    Specimen: Urine, clean catch   Result Value Ref Range    Color, UA Yellow Straw/Yellow    Clarity, UA Clear Clear    Glucose, Ur Negative Negative mg/dL    Bilirubin Urine Negative Negative    Ketones, Urine Negative Negative mg/dL    Specific Gravity, UA 1.015 1.005 - 1.030    Blood, Urine Negative Negative    pH, UA 5.5 5.0 - 8.0    Protein, UA Negative Negative mg/dL    Urobilinogen, Urine 0.2 <2.0 E.U./dL    Nitrite, Urine Negative Negative    Leukocyte Esterase, Urine TRACE (A) Negative    Microscopic Examination YES     Urine Type NotGiven     Urine Reflex to Culture Not Indicated    Lipase   Result Value Ref Range    Lipase 15.0 13.0 - 60.0 U/L   Microscopic Urinalysis   Result Value Ref Range    WBC, UA 0-2 0 - 5 /HPF    RBC, UA 3-4 0 - 4 /HPF    Epithelial Cells, UA 11-20 (A) 0 - 5 /HPF       All other labs were within normal range or not returned as of this dictation. EKG: All EKG's are interpreted by the Emergency Department Physician in the absence of a cardiologist.  Please see their note for interpretation of EKG. RADIOLOGY:   Non-plain film images such as CT, Ultrasound and MRI are read by the radiologist. Plain radiographic images are visualized andpreliminarily interpreted by the  ED Provider with the below findings:        Interpretation perthe Radiologist below, if available at the time of this note:    CT ABDOMEN PELVIS WO CONTRAST Additional Contrast? Oral (via G-tube)   Final Result   1. Gastrostomy tube is in normal position. No inflammatory changes are   appreciated along the gastrostomy tube tract. There is no extraluminal   contrast.  Normal gastric emptying is demonstrated. 2. Gastroesophageal reflux is noted. 3. No acute abdominopelvic abnormality is identified. XR CHEST PORTABLE   Final Result   No acute cardiopulmonary disease.            CT ABDOMEN PELVIS WO CONTRAST Additional Contrast? Oral (via G-tube)    Result Date: 3/24/2022  EXAMINATION: CT OF THE ABDOMEN AND PELVIS WITHOUT CONTRAST 3/24/2022 8:59 pm TECHNIQUE: CT of the abdomen and pelvis was performed without the administration of intravenous contrast. Multiplanar reformatted images are provided for review. Dose modulation, iterative reconstruction, and/or weight based adjustment of the mA/kV was utilized to reduce the radiation dose to as low as reasonably achievable. COMPARISON: 08/30/2021, 03/12/2021 HISTORY: ORDERING SYSTEM PROVIDED HISTORY: abdominal pain, drainage from gtube TECHNOLOGIST PROVIDED HISTORY: Reason for exam:->abdominal pain, drainage from gtube Additional Contrast?->Oral Decision Support Exception - unselect if not a suspected or confirmed emergency medical condition->Emergency Medical Condition (MA) Reason for Exam: pt c/o abdominal pain and pain around her g tube FINDINGS: Lower Chest: There is a stable 4 mm nodule in the right lower lobe; no follow-up recommended. There is minimal atelectasis or scarring in the left lung base. The visualized heart is unremarkable. Organs: The liver is homogeneous and normal in attenuation. Cholecystectomy clips are present. The pancreas, spleen and adrenal glands are unremarkable. A 2.9 cm left renal cyst is stable. There are no suspicious renal lesions. No obstructing calculus or hydronephrosis. GI/Bowel: Gastrostomy tube is in normal position. The gastrostomy tube tract is unremarkable. Oral contrast has been administered. There is mild reflux into the esophagus of the oral contrast.  The oral contrast has proceeded to the mid level of the small bowel. No abnormal small bowel loops are identified. There is mild prominence of stool in the colon and rectum. No focal mural thickening of the colon or pericolonic edema is identified. Pelvis: Urinary bladder is unremarkable. Uterus is absent. Peritoneum/Retroperitoneum: No extraluminal oral contrast is identified in the peritoneal cavity. There is no ascites or adenopathy. Moderate aortoiliac calcification, without aneurysm. Bones/Soft Tissues: Degenerative changes in the lumbar spine. No acute osseous abnormality.   Abdominal wall is unremarkable. 1. Gastrostomy tube is in normal position. No inflammatory changes are appreciated along the gastrostomy tube tract. There is no extraluminal contrast.  Normal gastric emptying is demonstrated. 2. Gastroesophageal reflux is noted. 3. No acute abdominopelvic abnormality is identified. XR CHEST PORTABLE    Result Date: 3/24/2022  EXAMINATION: ONE XRAY VIEW OF THE CHEST 3/24/2022 4:34 pm COMPARISON: 08/29/2021 HISTORY: ORDERING SYSTEM PROVIDED HISTORY: FTT TECHNOLOGIST PROVIDED HISTORY: Reason for exam:->FTT Reason for Exam: Failure To Thrive (pt reports g tube hurting, worried about infection, g-tube looks intact in triage, Henrey Romberg reports it flushes fine; daughter reports pt will not eat, noncompliant, pt undergoing cx tx) FINDINGS: The cardiac silhouette, mediastinal and hilar contours are normal.  Old granulomatous disease is noted. Otherwise, the lungs are clear. There are no pleural effusions. No acute osseous abnormalities are identified. No acute cardiopulmonary disease. PROCEDURES   Unless otherwise noted below, none     Procedures    CRITICAL CARE TIME   N/A    CONSULTS:  IP CONSULT TO HOSPITALIST      EMERGENCY DEPARTMENT COURSE and DIFFERENTIAL DIAGNOSIS/MDM:   Vitals:    Vitals:    03/24/22 1900 03/24/22 2000 03/24/22 2200 03/24/22 2230   BP: (!) 144/79 (!) 126/54 (!) 151/57 (!) 154/65   Pulse: 82 79 78 76   Resp: 22 21 17 22   Temp:       TempSrc:       SpO2: 99% 100%         Patient was given thefollowing medications:  Medications   0.9 % sodium chloride bolus (0 mLs IntraVENous Stopped 3/24/22 1716)   iohexol (OMNIPAQUE 240) injection 50 mL (50 mLs Oral Given 3/24/22 1944)         ED course  Patient brought in by daughter for evaluation concerned that she is not eating states she has not been eating for the past 2 months.   She has a feeding tube but she does not let them use it she finally let her  use it 2 days ago for a boost.  Does not believe she has been showering and when she looked at her G-tube site today saw discharge did not know if she it was infected or just needed cleaned. Discussed with patient and daughter with her not eating have concern for her going home and would recommend admission. They were both initially in agreement. Work-up was obtained. On labs white count 7.4. Hemoglobin 9.8. Potassium normal.  Glucose 127. BUN elevated 61. Creat 2.2. she has chronic kidney disease but this is worse than usual.  She was given IV fluids for hydration. Patient came walking out of her room down the troy stated she was going to leave. Stated she was tired of being here and she did not want to wait on the CT scan. I spoke with her and her daughter. Recommend she stay to complete the evaluation and we are recommending admission. After speaking with her she was redirected back to her room and she agreed to stay and she also agreed to be admitted to the hospital for failure to thrive and not eating. CT scan abdomen pelvis obtained showing G-tube in normal position. No inflammatory changes. No extraluminal contrast.  No acute abdominal pelvic abnormality identified. 10:29 PM EDT  Patient reevaluated. Stable. Again discussed plan for admission discussed my concern with her going home with her worsening weakness and she will not eat and will not allow them to use the feeding tube, she is dehydrated, has worsening weakness, discussed my concern she will continue to worsen if she goes back home. However patient is refusing admission states she wants to go home. She does not want to be admitted to hospital or to go into a care facility. Her daughter is at bedside. Her daughter called patient's  and they all talked,  stated to let her come home if that is what she is wishing, patient, her daughter and her  are all in agreement they are going to take her home.   I advised her to contact her doctor tomorrow for follow-up appointment and to return for any worsening symptoms. They understand and agree. FINAL IMPRESSION      1. Failure to thrive in adult    2. Anorexia    3. Dehydration    4. JESSICA (acute kidney injury) ([address]    5.  Left against medical advice          DISPOSITION/PLAN   DISPOSITION AMA    PATIENT REFERREDTO:  Nata Downing, APRN - CNP  [address]  [facility] [address]  [phone]    Schedule an appointment as soon as possible for a visit       Dona Brooks MD  [address]  [phone]    Call in 1 day      [facility] ED  [address] [facility]  [phone]    If symptoms worsen      DISCHARGE MEDICATIONS:  Discharge Medication List as of 3/24/2022 10:38 PM          DISCONTINUED MEDICATIONS:  Discharge Medication List as of 3/24/2022 10:38 PM                 (Please note that portions ofthis note were completed with a voice recognition program.  Efforts were made to edit the dictations but occasionally words are mis-transcribed.)    Heidi Martínez PA-C (electronically signed)            Qi Garrido PA-C  03/25/22 9523

## 2022-03-24 NOTE — ED NOTES
Failure to thrive, patient daughter reported numbness and tingling of the hands and feet after chemo. Patient's daughter reported feeding tube is not in great condition. Reported the  is allowed to feed through the feeding tube once every other day periodically. Daughter reported the area is \"nasty\" and smells.      Lele Quezada RN  03/24/22 1297

## 2022-03-25 ENCOUNTER — TELEPHONE (OUTPATIENT)
Dept: FAMILY MEDICINE CLINIC | Age: 72
End: 2022-03-25

## 2022-03-25 ASSESSMENT — ENCOUNTER SYMPTOMS
COUGH: 0
ABDOMINAL PAIN: 0
VOMITING: 0
SHORTNESS OF BREATH: 0

## 2022-03-25 NOTE — TELEPHONE ENCOUNTER
Patient was seen in hospital yesterday. Daughter states a lot of tests were ran and was told patient had a UTI, but nothing was done there or called into pharmacy. Wants to know if you can take a look at those results and call something in for her if needed? Please advise.

## 2022-03-28 LAB
BLOOD CULTURE, ROUTINE: NORMAL
CULTURE, BLOOD 2: NORMAL

## 2022-03-29 NOTE — ED PROVIDER NOTES
I independently examined and evaluated Red Soliz. In brief, patient is a 72yoF with G tube who has not been allowing family to use it coming in for FTT. Focused exam revealed thin female with g tube in place. Patient was initially agreeable with admission to the hospital for failure to thrive. Labs obtained and hemoglobin was 9.8, no significant change compared to baseline. No leukocytosis present. Creatinine is 2.2, no significant change compared to baseline. Lactate is normal at 1.2. CT shows G-tube in normal position. There is no inflammatory changes appreciated along the G-tube tract. However, at this time, patient would like to leave. After discussion with family, they ultimately left AGAINST MEDICAL ADVICE. All diagnostic, treatment, and disposition decisions were made by myself in conjunction with the advanced practice provider. Comment: Please note this report has been produced using speech recognition software and may contain errors related to that system including errors in grammar, punctuation, and spelling, as well as words and phrases that may be inappropriate. If there are any questions or concerns please feel free to contact the dictating provider for clarification. For all further details of the patient's emergency department visit, please see the advanced practice provider's documentation.           Sheryle Jasper, MD  03/30/22 9180

## 2022-04-05 ENCOUNTER — SCHEDULED TELEPHONE ENCOUNTER (OUTPATIENT)
Dept: FAMILY MEDICINE CLINIC | Age: 72
End: 2022-04-05
Payer: MEDICARE

## 2022-04-05 DIAGNOSIS — C10.9 SQUAMOUS CELL CARCINOMA OF OROPHARYNX (HCC): ICD-10-CM

## 2022-04-05 DIAGNOSIS — E44.0 MODERATE MALNUTRITION (HCC): ICD-10-CM

## 2022-04-05 DIAGNOSIS — N18.4 CKD (CHRONIC KIDNEY DISEASE) STAGE 4, GFR 15-29 ML/MIN (HCC): ICD-10-CM

## 2022-04-05 DIAGNOSIS — E03.2 HYPOTHYROIDISM DUE TO MEDICATION: Primary | ICD-10-CM

## 2022-04-05 PROCEDURE — 99447 NTRPROF PH1/NTRNET/EHR 11-20: CPT | Performed by: NURSE PRACTITIONER

## 2022-04-05 RX ORDER — LEVOTHYROXINE SODIUM 0.1 MG/1
100 TABLET ORAL DAILY
Qty: 30 TABLET | Refills: 0 | Status: SHIPPED | OUTPATIENT
Start: 2022-04-05 | End: 2022-05-19 | Stop reason: SDUPTHER

## 2022-04-05 ASSESSMENT — ENCOUNTER SYMPTOMS
SHORTNESS OF BREATH: 0
TROUBLE SWALLOWING: 1
NAUSEA: 0
BLOOD IN STOOL: 0
DIARRHEA: 0
CONSTIPATION: 0
COUGH: 0
VOMITING: 0
CHEST TIGHTNESS: 0

## 2022-04-05 NOTE — PROGRESS NOTES
2022    TELEHEALTH EVALUATION -- Audio/Visual (During ARWOH-57 public health emergency)    HPI:    Reese Babinski (:  1950) has requested an audio/video evaluation for the following concern(s):    Hypothyroid: Continues to have daily fatigue but is taking medication daily on an empty stomach.  has been helping her to remember to take her medication. Last TSH 33 22 despite multiple attempts to ask patient to come to clinic for updated labs. Pharyngeal Cancer: Recently referred to  for additional treatment. CKD: Continues to drink 2 L of water daily orally. Continues to decline Nephrology referral.    Failure to Thrive: Recent hospitalization for failure to thrive which has been a recurring issue for her. Using Boost 2-3x daily with very little food intake. Patient has lost at least 15 lbs over the past year and does not seem too concerned about her poor PO intake. Does not feel she needs to gain weight and is not that interested in eating food. When I last saw her she was drinking her Boost but now her  has been using her G-tube for nutrition. When patient presented to  she did discuss her nutrition with a dietician but there was no definitive plan for what to do or recommendations for new tube feeding. Review of Systems   Constitutional: Positive for activity change, appetite change and fatigue. HENT: Positive for trouble swallowing (Chronic). Respiratory: Negative for cough, chest tightness and shortness of breath. Cardiovascular: Negative. Gastrointestinal: Negative for blood in stool, constipation, diarrhea, nausea and vomiting. Genitourinary: Negative. Musculoskeletal: Negative. Skin: Negative. Neurological: Negative for dizziness, tremors, light-headedness and headaches. Parasthesias     Psychiatric/Behavioral: Negative for decreased concentration, dysphoric mood, self-injury, sleep disturbance and suicidal ideas.  The patient is not nervous/anxious. Prior to Visit Medications    Medication Sig Taking? Authorizing Provider   levothyroxine (SYNTHROID) 100 MCG tablet Take 1 tablet by mouth daily Yes LEVAR Ang - CNP   Nutritional Supplements (BOOST PO) Take 1 each by mouth daily Yes Historical Provider, MD   acetaminophen (TYLENOL) 500 MG tablet Take 1,000 mg by mouth every 6 hours as needed for Pain Yes Historical Provider, MD       Social History     Tobacco Use    Smoking status: Former Smoker     Packs/day: 1.00     Years: 30.00     Pack years: 30.00     Types: Cigarettes    Smokeless tobacco: Never Used    Tobacco comment: placed in AVS   Vaping Use    Vaping Use: Never used   Substance Use Topics    Alcohol use: Not Currently     Alcohol/week: 4.0 standard drinks     Types: 4 Cans of beer per week     Comment: daily; 4 beers per day    Drug use: No        Allergies   Allergen Reactions    Penicillins Hives and Rash     5/2013 40 yrs ago had a nonpruritic rash on hands;no hives and no other sx's;       PHYSICAL EXAMINATION:  [ INSTRUCTIONS:  \"[x]\" Indicates a positive item  \"[]\" Indicates a negative item      Vital Signs: (As obtained by patient/caregiver or practitioner observation)    Blood pressure-  Heart rate-    Respiratory rate-    Temperature-  Pulse oximetry-     UNABLE TO PERFORM AS AUDIO/TELEPHONE VISIT ONLY       ASSESSMENT/PLAN:  Conrado Astudillo was seen today for follow-up from hospital and hypothyroidism. Diagnoses and all orders for this visit:    Hypothyroidism due to medication  Reinforced to patient that I absolutely need labs before I will refill her Synthroid again given her elevated TSH of 33 2/2022. Will also draw Thyroid antibodies due to concern possible Hashmiotos/ Graves given Oropharyngeal cancer. Patient and  verbalized understanding. DOES NOT NEED APPT FOR LABS at my clinic.    -     levothyroxine (SYNTHROID) 100 MCG tablet;  Take 1 tablet by mouth daily  -     T3; Future  - T4, Free; Future  -     Thyroglobulin; Future  -     Thyroid Peroxidase Antibody; Future  -     TSH with Reflex; Future    CKD (chronic kidney disease) stage 4, GFR 15-29 ml/min (HCC)  Continue to monitor  Cr baseline ~2. Continues to decline Nephrology referral.   Avoid nephrotoxic medication, renally dose all meds  Will reach out to [facility] to discuss most appropriate tube feeds (Boost vs. Jevity) and amount needed daily. Message left on her VM today. Squamous cell carcinoma of oropharynx (HCC)  Continue care with Dr. Vasthi Osei. Appreciate assistance. Moderate malnutrition ([address]  See above note regarding tube feed. Return in about 4 weeks (around 5/3/2022) for thyroid. Berneice Brittle is a 80 y.o. female being evaluated by a Virtual Visit (video visit) encounter to address concerns as mentioned above. A caregiver was present when appropriate. Due to this being a TeleHealth encounter (During XWISS-04 public health emergency), evaluation of the following organ systems was limited: Vitals/Constitutional/EENT/Resp/CV/GI//MS/Neuro/Skin/Heme-Lymph-Imm. The patient (or guardian if applicable) is aware that this is a billable service, which includes applicable co-pays. This Virtual Visit was conducted with patient's (and/or legal guardian's) consent. The visit was conducted pursuant to the emergency declaration under the [address] [place] [address] authority and the [employer] General Act. Patient identification was verified, and a caregiver was present when appropriate. The patient was located in a state where the provider was licensed to provide care. Total time spent on this encounter: 15    Services were provided through a video synchronous discussion virtually to substitute for in-person clinic visit.  Patient and provider were located at their individual homes.    --Leida Bustos, APRN - CNP on 4/5/2022 at 5:13 PM    An electronic signature was used to authenticate this note. Patient should call the office immediately with new or ongoing signs or symptoms or worsening, or proceed to the emergency room. All entries in chief complaint and history of present illness are reviewed and validated by me. No changes in past medical history, past surgical history, social history, or family history were noted during the patient encounter unless specifically listed above. All updates of past medical history, past surgical history, social history, or family history were reviewed personally by me during the office visit. All problems listed in the assessment are stable unless noted otherwise. Medication profile reviewed personally by me during the office visit. Medication side effects and possible impairments from medications were discussed as applicable. Every effort has been made to assure accurate transcription by this voice recognition software. However, mistakes in transcription may still occur    You are being started on a new medication. All medications have the potential for adverse effects. All medications effect each person differently. Please read and review provided information related to medication. If the medication that you have been prescribed has the potential to cause sedation, do not drive or operate car, truck, or heavy machinery until you know how the medication will effect you. If you experience any adverse effects from the medication, please call the office or report to the emergency department.

## 2022-04-06 ENCOUNTER — TELEPHONE (OUTPATIENT)
Dept: FAMILY MEDICINE CLINIC | Age: 72
End: 2022-04-06

## 2022-05-19 ENCOUNTER — OFFICE VISIT (OUTPATIENT)
Dept: FAMILY MEDICINE CLINIC | Age: 72
End: 2022-05-19
Payer: MEDICARE

## 2022-05-19 VITALS
DIASTOLIC BLOOD PRESSURE: 58 MMHG | SYSTOLIC BLOOD PRESSURE: 108 MMHG | WEIGHT: 132 LBS | TEMPERATURE: 96.9 F | HEART RATE: 79 BPM | BODY MASS INDEX: 22.66 KG/M2 | OXYGEN SATURATION: 97 %

## 2022-05-19 DIAGNOSIS — R53.82 CHRONIC FATIGUE: ICD-10-CM

## 2022-05-19 DIAGNOSIS — G62.0 CHEMOTHERAPY-INDUCED PERIPHERAL NEUROPATHY (HCC): ICD-10-CM

## 2022-05-19 DIAGNOSIS — Z93.1 GASTROSTOMY STATUS (HCC): ICD-10-CM

## 2022-05-19 DIAGNOSIS — T45.1X5A CHEMOTHERAPY-INDUCED PERIPHERAL NEUROPATHY (HCC): ICD-10-CM

## 2022-05-19 DIAGNOSIS — E55.9 VITAMIN D DEFICIENCY: ICD-10-CM

## 2022-05-19 DIAGNOSIS — N18.4 CKD (CHRONIC KIDNEY DISEASE) STAGE 4, GFR 15-29 ML/MIN (HCC): ICD-10-CM

## 2022-05-19 DIAGNOSIS — E44.0 MODERATE MALNUTRITION (HCC): Chronic | ICD-10-CM

## 2022-05-19 DIAGNOSIS — E03.2 HYPOTHYROIDISM DUE TO MEDICATION: Primary | ICD-10-CM

## 2022-05-19 DIAGNOSIS — K50.00 CROHN'S DISEASE OF SMALL INTESTINE WITHOUT COMPLICATION (HCC): ICD-10-CM

## 2022-05-19 DIAGNOSIS — F17.210 CIGARETTE NICOTINE DEPENDENCE WITHOUT COMPLICATION: ICD-10-CM

## 2022-05-19 DIAGNOSIS — F41.9 CHRONIC ANXIETY: ICD-10-CM

## 2022-05-19 DIAGNOSIS — R13.12 OROPHARYNGEAL DYSPHAGIA: ICD-10-CM

## 2022-05-19 DIAGNOSIS — C10.9 SQUAMOUS CELL CARCINOMA OF OROPHARYNX (HCC): ICD-10-CM

## 2022-05-19 PROCEDURE — 36415 COLL VENOUS BLD VENIPUNCTURE: CPT | Performed by: NURSE PRACTITIONER

## 2022-05-19 PROCEDURE — 1090F PRES/ABSN URINE INCON ASSESS: CPT | Performed by: NURSE PRACTITIONER

## 2022-05-19 PROCEDURE — G8420 CALC BMI NORM PARAMETERS: HCPCS | Performed by: NURSE PRACTITIONER

## 2022-05-19 PROCEDURE — 1036F TOBACCO NON-USER: CPT | Performed by: NURSE PRACTITIONER

## 2022-05-19 PROCEDURE — G8427 DOCREV CUR MEDS BY ELIG CLIN: HCPCS | Performed by: NURSE PRACTITIONER

## 2022-05-19 PROCEDURE — 3017F COLORECTAL CA SCREEN DOC REV: CPT | Performed by: NURSE PRACTITIONER

## 2022-05-19 PROCEDURE — 99214 OFFICE O/P EST MOD 30 MIN: CPT | Performed by: NURSE PRACTITIONER

## 2022-05-19 PROCEDURE — G8400 PT W/DXA NO RESULTS DOC: HCPCS | Performed by: NURSE PRACTITIONER

## 2022-05-19 PROCEDURE — 1124F ACP DISCUSS-NO DSCNMKR DOCD: CPT | Performed by: NURSE PRACTITIONER

## 2022-05-19 RX ORDER — GABAPENTIN 100 MG/1
100 CAPSULE ORAL 2 TIMES DAILY
Qty: 60 CAPSULE | Refills: 1 | Status: SHIPPED | OUTPATIENT
Start: 2022-05-19 | End: 2022-05-19

## 2022-05-19 RX ORDER — LEVOTHYROXINE SODIUM 0.1 MG/1
100 TABLET ORAL DAILY
Qty: 30 TABLET | Refills: 6 | Status: SHIPPED | OUTPATIENT
Start: 2022-05-19 | End: 2022-05-19

## 2022-05-19 RX ORDER — GABAPENTIN 100 MG/1
100 CAPSULE ORAL 2 TIMES DAILY
Qty: 180 CAPSULE | Refills: 1 | Status: SHIPPED | OUTPATIENT
Start: 2022-05-19 | End: 2022-07-12 | Stop reason: SDUPTHER

## 2022-05-19 RX ORDER — LEVOTHYROXINE SODIUM 0.1 MG/1
100 TABLET ORAL DAILY
Qty: 90 TABLET | Refills: 1 | Status: SHIPPED | OUTPATIENT
Start: 2022-05-19 | End: 2022-10-26

## 2022-05-19 ASSESSMENT — PATIENT HEALTH QUESTIONNAIRE - PHQ9
2. FEELING DOWN, DEPRESSED OR HOPELESS: 1
8. MOVING OR SPEAKING SO SLOWLY THAT OTHER PEOPLE COULD HAVE NOTICED. OR THE OPPOSITE, BEING SO FIGETY OR RESTLESS THAT YOU HAVE BEEN MOVING AROUND A LOT MORE THAN USUAL: 0
9. THOUGHTS THAT YOU WOULD BE BETTER OFF DEAD, OR OF HURTING YOURSELF: 0
SUM OF ALL RESPONSES TO PHQ QUESTIONS 1-9: 10
3. TROUBLE FALLING OR STAYING ASLEEP: 3
SUM OF ALL RESPONSES TO PHQ QUESTIONS 1-9: 10
7. TROUBLE CONCENTRATING ON THINGS, SUCH AS READING THE NEWSPAPER OR WATCHING TELEVISION: 0
4. FEELING TIRED OR HAVING LITTLE ENERGY: 3
SUM OF ALL RESPONSES TO PHQ QUESTIONS 1-9: 10
SUM OF ALL RESPONSES TO PHQ QUESTIONS 1-9: 10
6. FEELING BAD ABOUT YOURSELF - OR THAT YOU ARE A FAILURE OR HAVE LET YOURSELF OR YOUR FAMILY DOWN: 0
5. POOR APPETITE OR OVEREATING: 3
10. IF YOU CHECKED OFF ANY PROBLEMS, HOW DIFFICULT HAVE THESE PROBLEMS MADE IT FOR YOU TO DO YOUR WORK, TAKE CARE OF THINGS AT HOME, OR GET ALONG WITH OTHER PEOPLE: 1

## 2022-05-19 NOTE — PROGRESS NOTES
5/19/2022    Chief Complaint   Patient presents with    Hypothyroidism     ran out of med 2 weeks ago        Jonel Parson is a 80 y.o. female, presents today for:      ASSESSMENT/PLAN:    1. Hypothyroidism due to medication  Updated labs today. Clinically looks better today than in the past.  Will draw autoimmune antibodies to determine cause for elevated TSH. Again reinforced how to take medication  - TSH with Reflex  - Thyroid Peroxidase Antibody  - Thyroglobulin  - T4, Free  - T3  - levothyroxine (SYNTHROID) 100 MCG tablet; Take 1 tablet by mouth daily  Dispense: 90 tablet; Refill: 1    2. Squamous cell carcinoma of oropharynx ([address]  Continue care with ENT at [facility]. Encouraged to call to schedule CT Head/ Neck W WO Contrast as previously ordered    3. Chemotherapy-induced peripheral neuropathy (HCC)  Improving, likely due to Gabapentin and improved thyroid function  Updated labs ordered today. - CBC with Auto Differential  - Comprehensive Metabolic Panel  - gabapentin (NEURONTIN) 100 MG capsule; Take 1 capsule by mouth 2 times daily for 90 days. Intended supply: 90 days  Dispense: 180 capsule; Refill: 1    4. Chronic fatigue  Updated labs today  - Vitamin D 25 Hydroxy  - Vitamin B12 & Folate    5. CKD (chronic kidney disease) stage 4, GFR 15-29 ml/min (HCC)  Continues to decline Nephrology referral.  Updated labs ordered today. Encouraged hydration  Avoid nephrotoxic medication, renally dose all medications    6. Crohn's disease of small intestine without complication ([address]  Stable today. Asymptomatic. Continue to monitor    7. Chronic anxiety  Continues to be severe. Limited medications options available given CKD and hx hyponatremia. Also concern regarding pt intermittent poor follow up. Monitor for now. 8. Moderate malnutrition ([address]  Again encouraged oral nutrition. Encouraged more intake to help kidney functions and malnutrition. Pt continues to not see this as an issue    9. Oropharyngeal dysphagia  Continue to monitor. Gtube in place    10. Gastrostomy status (HCC)  Stable, continue to monitor    11. Vitamin D deficiency  Updated labs ordered today  - Vitamin D 25 Hydroxy    12. Cigarette nicotine dependence without complication  Encouraged cessation. Return in about 3 months (around 8/19/2022). Presenting today for chronic disease follow up. Here today with    Hypothyroid: Continues to have daily fatigue but is overall feeling better than last OV. Taking medication daily on an empty stomach.  has been helping her to remember to take her medication. Last TSH 33 2/1/22 despite multiple attempts to ask patient to come to clinic for updated labs. Pharyngeal Cancer: Recently referred to  for additional treatment. CKD: Continues to drink 2 L of water daily orally. Continues to decline Nephrology referral.    Failure to Thrive: Drinking 2 boost and 3/4 gallon of milk daily, no other intake. Did not make appt with CT neck w/ wo contrast for ENT for pharyngeal cancers. Continues to feel tired. Not drinking alcolohol. Now smoking <1 ppd due to stress. Continues to ask for G tube removal.      Lab Results   Component Value Date     (L) 05/19/2022    K 5.2 (H) 05/19/2022    CL 93 (L) 05/19/2022    CO2 21 05/19/2022    BUN 77 (H) 05/19/2022    CREATININE 2.1 (H) 05/19/2022    GLUCOSE 95 05/19/2022    CALCIUM 11.0 (H) 05/19/2022    PROT 7.5 05/19/2022    LABALBU 4.1 05/19/2022    BILITOT <0.2 05/19/2022    ALKPHOS 104 05/19/2022    AST 16 05/19/2022    ALT 9 (L) 05/19/2022    LABGLOM 23 (A) 05/19/2022    GFRAA 28 (A) 05/19/2022    AGRATIO 1.2 05/19/2022    GLOB 3.2 10/07/2021         Review of Systems   Constitutional: Positive for activity change, appetite change and fatigue. HENT: Positive for trouble swallowing (Chronic). Respiratory: Negative for cough, chest tightness and shortness of breath. Cardiovascular: Negative.     Gastrointestinal: Negative for blood in stool, constipation, diarrhea, nausea and vomiting. Genitourinary: Negative. Musculoskeletal: Negative. Skin: Negative. Neurological: Negative for dizziness, tremors, light-headedness and headaches. Parasthesias     Psychiatric/Behavioral: Negative for decreased concentration, dysphoric mood, self-injury, sleep disturbance and suicidal ideas. The patient is not nervous/anxious. Current Outpatient Medications on File Prior to Visit   Medication Sig Dispense Refill    Nutritional Supplements (BOOST PO) Take 1 each by mouth daily      acetaminophen (TYLENOL) 500 MG tablet Take 1,000 mg by mouth every 6 hours as needed for Pain       No current facility-administered medications on file prior to visit.      Allergies   Allergen Reactions    Penicillins Hives and Rash     5/2013 40 yrs ago had a nonpruritic rash on hands;no hives and no other sx's;     Past Medical History:   Diagnosis Date    Anxiety     Cancer ([facility] 05/05/2021    throat    Cerebral artery occlusion with cerebral infarction ([facility]     30 years ago had mini stroke with no residual    Crohn disease ([facility]     CVA (cerebral infarction) 2006    hx unclear    Hyperlipidemia     Hypertension      Past Surgical History:   Procedure Laterality Date    APPENDECTOMY      CHOLECYSTECTOMY      COLONOSCOPY  2011    COLONOSCOPY N/A 9/2/2021    COLONOSCOPY WITH BIOPSY RECTAL ULCER performed by Ada Patton MD at [place] [address] N/A 5/5/2021    DIRECT MICROLARYNGOSCOPY WITH BIOPSIES performed by Samantha Marino DO at [address]    infection after btl;one ovary in   [address] [place] [address] Drive  05/26/2021    Procedure: EGD ESOPHAGOGASTRODUODENOSCOPY PEG TUBE INSERTION    UPPER GASTROINTESTINAL ENDOSCOPY  5/26/2021    EGD ESOPHAGOGASTRODUODENOSCOPY PEG TUBE INSERTION performed by Brisa Rosado MD at [address] [facility] Social History     Tobacco Use    Smoking status: Former Smoker     Packs/day: 1.00     Years: 30.00     Pack years: 30.00     Types: Cigarettes    Smokeless tobacco: Never Used    Tobacco comment: placed in AVS   Substance Use Topics    Alcohol use: Not Currently     Alcohol/week: 4.0 standard drinks     Types: 4 Cans of beer per week     Comment: daily; 4 beers per day     Family History   Problem Relation Age of Onset    Heart Disease Mother        Vitals:    05/19/22 1047   BP: (!) 108/58   Pulse: 79   Temp: 96.9 °F (36.1 °C)   TempSrc: Infrared   SpO2: 97%   Weight: 132 lb (59.9 kg)     Estimated body mass index is 22.66 kg/m² as calculated from the following:    Height as of 2/21/22: 5' 4\" (1.626 m). Weight as of this encounter: 132 lb (59.9 kg). Physical Exam  Vitals and nursing note reviewed. Constitutional:       Appearance: Normal appearance. HENT:      Head: Normocephalic. Eyes:      Extraocular Movements: Extraocular movements intact. Conjunctiva/sclera: Conjunctivae normal.      Pupils: Pupils are equal, round, and reactive to light. Neck:      Vascular: No carotid bruit. Cardiovascular:      Rate and Rhythm: Normal rate and regular rhythm. Pulses: Normal pulses. Carotid pulses are 2+ on the right side and 2+ on the left side. Dorsalis pedis pulses are 2+ on the right side and 2+ on the left side. Posterior tibial pulses are 2+ on the right side and 2+ on the left side. Heart sounds: Normal heart sounds. No murmur heard. No gallop. Pulmonary:      Effort: Pulmonary effort is normal.      Breath sounds: Normal breath sounds. Abdominal:      General: Abdomen is flat. Palpations: Abdomen is soft. Musculoskeletal:         General: Normal range of motion. Cervical back: Normal range of motion. Right lower leg: No edema. Left lower leg: No edema. Right foot: Normal range of motion.  No deformity, bunion, Charcot foot, foot drop or prominent metatarsal heads. Left foot: Normal range of motion. No deformity, bunion, Charcot foot, foot drop or prominent metatarsal heads. Feet:      Right foot:      Protective Sensation: 10 sites tested. 10 sites sensed. Skin integrity: Skin integrity normal.      Toenail Condition: Right toenails are normal.      Left foot:      Protective Sensation: 10 sites tested. 10 sites sensed. Skin integrity: Skin integrity normal.      Toenail Condition: Left toenails are normal.   Lymphadenopathy:      Cervical: No cervical adenopathy. Skin:     General: Skin is warm and dry. Capillary Refill: Capillary refill takes less than 2 seconds. Neurological:      General: No focal deficit present. Mental Status: She is alert and oriented to person, place, and time. Psychiatric:         Mood and Affect: Mood normal.         Behavior: Behavior normal.      Comments: Less slurring of words today  Not wearing pajamas like past several visits. Patient's questions answered and concerns addressed. Patient agrees to plan of care.         Electronically signed by LEVAR Fitzgerald CNP on 6/10/2022 at 11:54 AM

## 2022-05-20 LAB
A/G RATIO: 1.2 (ref 1.1–2.2)
ALBUMIN SERPL-MCNC: 4.1 G/DL (ref 3.4–5)
ALP BLD-CCNC: 104 U/L (ref 40–129)
ALT SERPL-CCNC: 9 U/L (ref 10–40)
ANION GAP SERPL CALCULATED.3IONS-SCNC: 14 MMOL/L (ref 3–16)
AST SERPL-CCNC: 16 U/L (ref 15–37)
BANDED NEUTROPHILS RELATIVE PERCENT: 2 % (ref 0–7)
BASOPHILS ABSOLUTE: 0 K/UL (ref 0–0.2)
BASOPHILS RELATIVE PERCENT: 0 %
BILIRUB SERPL-MCNC: <0.2 MG/DL (ref 0–1)
BUN BLDV-MCNC: 77 MG/DL (ref 7–20)
CALCIUM SERPL-MCNC: 11 MG/DL (ref 8.3–10.6)
CHLORIDE BLD-SCNC: 93 MMOL/L (ref 99–110)
CO2: 21 MMOL/L (ref 21–32)
CREAT SERPL-MCNC: 2.1 MG/DL (ref 0.6–1.2)
EOSINOPHILS ABSOLUTE: 0.6 K/UL (ref 0–0.6)
EOSINOPHILS RELATIVE PERCENT: 10 %
FOLATE: 7.73 NG/ML (ref 4.78–24.2)
GFR AFRICAN AMERICAN: 28
GFR NON-AFRICAN AMERICAN: 23
GLUCOSE BLD-MCNC: 95 MG/DL (ref 70–99)
HCT VFR BLD CALC: 27.2 % (ref 36–48)
HEMOGLOBIN: 9.7 G/DL (ref 12–16)
LYMPHOCYTES ABSOLUTE: 0.6 K/UL (ref 1–5.1)
LYMPHOCYTES RELATIVE PERCENT: 10 %
MCH RBC QN AUTO: 36.7 PG (ref 26–34)
MCHC RBC AUTO-ENTMCNC: 35.7 G/DL (ref 31–36)
MCV RBC AUTO: 102.7 FL (ref 80–100)
MONOCYTES ABSOLUTE: 0.7 K/UL (ref 0–1.3)
MONOCYTES RELATIVE PERCENT: 12 %
NEUTROPHILS ABSOLUTE: 4.2 K/UL (ref 1.7–7.7)
NEUTROPHILS RELATIVE PERCENT: 66 %
PDW BLD-RTO: 13.6 % (ref 12.4–15.4)
PLATELET # BLD: 298 K/UL (ref 135–450)
PMV BLD AUTO: 8.8 FL (ref 5–10.5)
POTASSIUM SERPL-SCNC: 5.2 MMOL/L (ref 3.5–5.1)
RBC # BLD: 2.64 M/UL (ref 4–5.2)
RBC # BLD: NORMAL 10*6/UL
SODIUM BLD-SCNC: 128 MMOL/L (ref 136–145)
T3 TOTAL: 0.56 NG/ML (ref 0.8–2)
T4 FREE: 0.9 NG/DL (ref 0.9–1.8)
THYROID PEROXIDASE (TPO) ABS: >600 IU/ML
TOTAL PROTEIN: 7.5 G/DL (ref 6.4–8.2)
TSH REFLEX: 4.58 UIU/ML (ref 0.27–4.2)
VITAMIN B-12: 457 PG/ML (ref 211–911)
VITAMIN D 25-HYDROXY: 40.7 NG/ML
WBC # BLD: 6.2 K/UL (ref 4–11)

## 2022-05-20 NOTE — RESULT ENCOUNTER NOTE
Continue to be mildly anemic- likely from kidney disease. Kidney function continues to be low but similar to prior. Sodium level is low- try to drink slightly less water to see if this will help- maybe one less glass a day. Normal Vitamin D level. Normal B12/ Folate level. Thyroid level is MUCH BETTER. Continue current dosing of medication. Still waiting on autoimmune labs from thyroid but I think its fine to go ahead and restart thyroid medications for now.

## 2022-05-27 NOTE — RESULT ENCOUNTER NOTE
Rest of thyroid labs---- autoimmune markers are positive. Meaning the radiation affected her thyroid and is the cause for her thyroid function to be so decreased. This commonly occurs. Normally we send people to the Endocrinologist for this. It is encouraging though that her thyroid function has been improving. Please call if you have additional questions and/ or concerns. Please keep your next appt. Thank you.

## 2022-06-03 LAB — THYROGLOBULIN BY LC-MS/MS, SERUM/PLASMA: 1.9 NG/ML (ref 1.3–31.8)

## 2022-06-10 ASSESSMENT — ENCOUNTER SYMPTOMS
DIARRHEA: 0
VOMITING: 0
COUGH: 0
TROUBLE SWALLOWING: 1
SHORTNESS OF BREATH: 0
CONSTIPATION: 0
BLOOD IN STOOL: 0
NAUSEA: 0
CHEST TIGHTNESS: 0

## 2022-06-30 ENCOUNTER — TELEPHONE (OUTPATIENT)
Dept: FAMILY MEDICINE CLINIC | Age: 72
End: 2022-06-30

## 2022-06-30 NOTE — TELEPHONE ENCOUNTER
Patient's spouse calling stating they are wanting this feeding tube taken out. Asking if they can come here and do it or if she needs to go the hospital. I asked why they think she needs it out he said \"Its a pain in our ass and she doesn't use it nor want it so we are done with it\". He said she is holding her weight now and doesn't see why we continue to let it stay in. I informed that PCP wouldn't be able to do this but he insisted I asked.

## 2022-07-01 NOTE — TELEPHONE ENCOUNTER
[facility] Progress Note    Date: 2022    Name: Hyacinth Carnes    MRN: [id number]         : 1950    Peripheral Lab Draw      Ms. Marie to [facility], ambulatory at 1330 accompanied by self. Pt was assessed and education was provided. Ms. Vadim Choudhury vitals were reviewed and patient was observed for 5 minutes prior to treatment. Visit Vitals  Temp 97 °F (36.1 °C)     Recent Results (from the past 12 hour(s))   CBC WITH 3 PART DIFF    Collection Time: 22  1:40 PM   Result Value Ref Range    WBC 8.4 4.5 - 13.0 K/uL    RBC 3.52 (L) 4.10 - 5.10 M/uL    HGB 11.3 (L) 12.0 - 16.0 g/dL    HCT 33.9 (L) 36 - 48 %    MCV 96.3 78 - 102 FL    MCH 32.1 25.0 - 35.0 PG    MCHC 33.3 31 - 37 g/dL    RDW 13.0 11.5 - 14.5 %    PLATELET 513 066 - 376 K/uL    NEUTROPHILS 70 40 - 70 %    Mixed cells 3 0.1 - 17 %    LYMPHOCYTES 27 14 - 44 %    ABS. NEUTROPHILS 5.9 1.8 - 9.5 K/UL    ABS. MIXED CELLS 0.2 0.0 - 2.3 K/uL    ABS. LYMPHOCYTES 2.3 1.1 - 5.9 K/UL    DF AUTOMATED         Blood obtained peripherally from left arm x 1 attempt with butterfly needle and sent to lab per written orders. No bleeding or hematoma noted at site. Gauze and coban applied. Ms. Roshni Dent tolerated the phlebotomy, and had no complaints. Patient armband removed and shredded. Ms. Roshni Dent was discharged from [place] [address] in stable condition at 1340.      Piter Osman Phlebotomist PCT  2022  2:09 PM Patient spouse notified.

## 2022-07-12 ENCOUNTER — OFFICE VISIT (OUTPATIENT)
Dept: FAMILY MEDICINE CLINIC | Age: 72
End: 2022-07-12
Payer: MEDICARE

## 2022-07-12 VITALS
BODY MASS INDEX: 24.03 KG/M2 | SYSTOLIC BLOOD PRESSURE: 148 MMHG | OXYGEN SATURATION: 99 % | HEART RATE: 77 BPM | DIASTOLIC BLOOD PRESSURE: 72 MMHG | WEIGHT: 140 LBS

## 2022-07-12 DIAGNOSIS — C10.9 SQUAMOUS CELL CARCINOMA OF OROPHARYNX (HCC): ICD-10-CM

## 2022-07-12 DIAGNOSIS — G62.0 CHEMOTHERAPY-INDUCED PERIPHERAL NEUROPATHY (HCC): Primary | ICD-10-CM

## 2022-07-12 DIAGNOSIS — E44.0 MODERATE MALNUTRITION (HCC): ICD-10-CM

## 2022-07-12 DIAGNOSIS — E03.2 HYPOTHYROIDISM DUE TO MEDICATION: ICD-10-CM

## 2022-07-12 DIAGNOSIS — K50.10 CROHN'S DISEASE OF LARGE INTESTINE WITHOUT COMPLICATION (HCC): ICD-10-CM

## 2022-07-12 DIAGNOSIS — R53.82 CHRONIC FATIGUE: ICD-10-CM

## 2022-07-12 DIAGNOSIS — N18.4 CKD (CHRONIC KIDNEY DISEASE) STAGE 4, GFR 15-29 ML/MIN (HCC): ICD-10-CM

## 2022-07-12 DIAGNOSIS — T45.1X5A CHEMOTHERAPY-INDUCED PERIPHERAL NEUROPATHY (HCC): Primary | ICD-10-CM

## 2022-07-12 PROCEDURE — G8427 DOCREV CUR MEDS BY ELIG CLIN: HCPCS | Performed by: NURSE PRACTITIONER

## 2022-07-12 PROCEDURE — 1036F TOBACCO NON-USER: CPT | Performed by: NURSE PRACTITIONER

## 2022-07-12 PROCEDURE — 1090F PRES/ABSN URINE INCON ASSESS: CPT | Performed by: NURSE PRACTITIONER

## 2022-07-12 PROCEDURE — 3017F COLORECTAL CA SCREEN DOC REV: CPT | Performed by: NURSE PRACTITIONER

## 2022-07-12 PROCEDURE — G8420 CALC BMI NORM PARAMETERS: HCPCS | Performed by: NURSE PRACTITIONER

## 2022-07-12 PROCEDURE — 1124F ACP DISCUSS-NO DSCNMKR DOCD: CPT | Performed by: NURSE PRACTITIONER

## 2022-07-12 PROCEDURE — 99214 OFFICE O/P EST MOD 30 MIN: CPT | Performed by: NURSE PRACTITIONER

## 2022-07-12 PROCEDURE — G8400 PT W/DXA NO RESULTS DOC: HCPCS | Performed by: NURSE PRACTITIONER

## 2022-07-12 RX ORDER — GABAPENTIN 100 MG/1
200 CAPSULE ORAL 3 TIMES DAILY
Qty: 540 CAPSULE | Refills: 0 | Status: SHIPPED | OUTPATIENT
Start: 2022-07-12 | End: 2022-10-17

## 2022-07-12 NOTE — PROGRESS NOTES
7/12/2022    Chief Complaint   Patient presents with    Pain     in both hands and legs has not cleared up/ taking gabapentin 3 times a time instead of two    Thyroid Problem    Chronic Kidney Disease    Fatigue       McKinney Jurist is a 71 y.o. female, presents today for:      ASSESSMENT/PLAN:    1. Chemotherapy-induced peripheral neuropathy (HCC)  Increase Gabapentin per pt request, now at maximum dose for pt due to CKD  Discuss EMG if no improvement but likely chemo related  Continue to keep TSH in normal  range and encouraged to stay away from ETOH as this can exacerbation.    - gabapentin (NEURONTIN) 100 MG capsule; Take 2 capsules by mouth 3 times daily for 90 days. Intended supply: 90 days  Dispense: 540 capsule; Refill: 0    2. Hypothyroidism due to medication  Improving today, TSH due next OV  Continue Synthroid    3. CKD (chronic kidney disease) stage 4, GFR 15-29 ml/min ([facility])  Renally dose all medication  Avoid nephrotoxic medications  Continues to decline Nephrology referral  Encouraged water intake  Encouraged ETOH cessation to prevent electrolyte imbalances    4. Squamous cell carcinoma of oropharynx (HCC)  Encouraged to obtain CT scan ordered by  ENT    5. Chronic fatigue  Multifactoral given above issues  Encouraged self care  Encouraged 30-45 minutes daily physical exercise as tolearted  Encouraged portion control, mixture of protein, carbohydrates, fruits/ veggies. Encouraged Sleep Hygiene    6. Moderate malnutrition ([address]  Improving today, given weight gain. Planning for G-tube removal with Dr. Flores Ends at the end of the month    7. Crohn's disease of large intestine without complication ([address]  Asymptomatic today, continue to monitor. Return in about 4 weeks (around 8/9/2022) for keep august.       Presenting today for chronic disease follow up.  Here today with   Bilateral Hand Neuropathy: Continues to have intermittent bilateral hand numbness/ tingling and difficulty gripping objects despite improvement in thyroid function and cessation of ETOH. No chronic neck/ shoulder pain. Has not obtained EMG. Requesting to increase Gabapentin to see if helps symptoms. Hypothyroid: Continues to have daily fatigue but is overall feeling better than last OV. Taking medication daily on an empty stomach.  has been helping her to remember to take her medication. Last TSH 33 2/1/22 despite multiple attempts to ask patient to come to clinic for updated labs. Pharyngeal Cancer: Recently referred to  for additional treatment. CKD: Continues to drink 2 L of water daily orally. Continues to decline Nephrology referral.    Failure to Thrive: Improving, plan for G-tube removal at the end of the month. Drinking 2 boost and 3/4 gallon of milk daily, no other intake. Did not make appt with CT neck w/ wo contrast for ENT for pharyngeal cancers. Continues to feel tired. Not drinking alcolohol. Now smoking <1 ppd due to stress. Lab Results   Component Value Date     (L) 05/19/2022    K 5.2 (H) 05/19/2022    CL 93 (L) 05/19/2022    CO2 21 05/19/2022    BUN 77 (H) 05/19/2022    CREATININE 2.1 (H) 05/19/2022    GLUCOSE 95 05/19/2022    CALCIUM 11.0 (H) 05/19/2022    PROT 7.5 05/19/2022    LABALBU 4.1 05/19/2022    BILITOT <0.2 05/19/2022    ALKPHOS 104 05/19/2022    AST 16 05/19/2022    ALT 9 (L) 05/19/2022    LABGLOM 23 (A) 05/19/2022    GFRAA 28 (A) 05/19/2022    AGRATIO 1.2 05/19/2022    GLOB 3.2 10/07/2021         Review of Systems   Constitutional:  Positive for appetite change and fatigue. Negative for activity change. HENT:  Positive for trouble swallowing (Chronic). Respiratory:  Negative for cough, chest tightness and shortness of breath. Cardiovascular: Negative. Gastrointestinal:  Negative for blood in stool, constipation, diarrhea, nausea and vomiting. Genitourinary: Negative. Musculoskeletal: Negative. Skin: Negative.     Neurological: Negative for dizziness, tremors, light-headedness and headaches. Parasthesias     Psychiatric/Behavioral:  Negative for decreased concentration, dysphoric mood, self-injury, sleep disturbance and suicidal ideas. The patient is not nervous/anxious. Current Outpatient Medications on File Prior to Visit   Medication Sig Dispense Refill    levothyroxine (SYNTHROID) 100 MCG tablet Take 1 tablet by mouth daily 90 tablet 1    Nutritional Supplements (BOOST PO) Take 1 each by mouth daily      acetaminophen (TYLENOL) 500 MG tablet Take 1,000 mg by mouth every 6 hours as needed for Pain       No current facility-administered medications on file prior to visit.      Allergies   Allergen Reactions    Penicillins Hives and Rash     5/2013 40 yrs ago had a nonpruritic rash on hands;no hives and no other sx's;     Past Medical History:   Diagnosis Date    Anxiety     Cancer ([facility] 05/05/2021    throat    Cerebral artery occlusion with cerebral infarction ([facility]     30 years ago had mini stroke with no residual    Crohn disease ([facility]     CVA (cerebral infarction) 2006    hx unclear    Gastrostomy status ([facility] 9/1/2021    Hyperlipidemia     Hypertension      Past Surgical History:   Procedure Laterality Date    APPENDECTOMY      CHOLECYSTECTOMY      COLONOSCOPY  2011    COLONOSCOPY N/A 9/2/2021    COLONOSCOPY WITH BIOPSY RECTAL ULCER performed by Iesha Robles MD at [facility] ([address] [place] [address])      LARYNGOSCOPY N/A 5/5/2021    DIRECT MICROLARYNGOSCOPY WITH BIOPSIES performed by Sofia Arroyo DO at [place] [address] (CERVIX NOT REMOVED)  1973    infection after btl;one ovary in    [address]  05/26/2021    Procedure: EGD ESOPHAGOGASTRODUODENOSCOPY PEG TUBE INSERTION    UPPER GASTROINTESTINAL ENDOSCOPY  5/26/2021    EGD ESOPHAGOGASTRODUODENOSCOPY PEG TUBE INSERTION performed by Jono Espino MD at [address] [place] [address] Tobacco Use    Smoking status: Former     Packs/day: 1.00     Years: 30.00     Pack years: 30.00     Types: Cigarettes    Smokeless tobacco: Never    Tobacco comments:     placed in AVS   Substance Use Topics    Alcohol use: Not Currently     Alcohol/week: 4.0 standard drinks     Types: 4 Cans of beer per week     Comment: daily; 4 beers per day     Family History   Problem Relation Age of Onset    Heart Disease Mother        Vitals:    07/12/22 1640   BP: (!) 148/72   Site: Right Upper Arm   Position: Sitting   Cuff Size: Medium Adult   Pulse: 77   SpO2: 99%   Weight: 140 lb (63.5 kg)     Estimated body mass index is 24.03 kg/m² as calculated from the following:    Height as of 2/21/22: 5' 4\" (1.626 m). Weight as of this encounter: 140 lb (63.5 kg). Physical Exam  Vitals and nursing note reviewed. Constitutional:       Appearance: Normal appearance. HENT:      Head: Normocephalic. Eyes:      Extraocular Movements: Extraocular movements intact. Conjunctiva/sclera: Conjunctivae normal.      Pupils: Pupils are equal, round, and reactive to light. Neck:      Vascular: No carotid bruit. Cardiovascular:      Rate and Rhythm: Normal rate and regular rhythm. Pulses: Normal pulses. Carotid pulses are 2+ on the right side and 2+ on the left side. Dorsalis pedis pulses are 2+ on the right side and 2+ on the left side. Posterior tibial pulses are 2+ on the right side and 2+ on the left side. Heart sounds: Normal heart sounds. No murmur heard. No gallop. Pulmonary:      Effort: Pulmonary effort is normal.      Breath sounds: Normal breath sounds. Abdominal:      General: Abdomen is flat. Palpations: Abdomen is soft. Musculoskeletal:         General: Normal range of motion.       Right shoulder: Normal.      Left shoulder: Normal.      Right elbow: Normal.      Left elbow: Normal.      Right wrist: Normal.      Left wrist: Normal.      Right hand: Normal. Left hand: Normal.      Cervical back: Normal and normal range of motion. Right lower leg: No edema. Left lower leg: No edema. Right foot: Normal range of motion. No deformity, bunion, Charcot foot, foot drop or prominent metatarsal heads. Left foot: Normal range of motion. No deformity, bunion, Charcot foot, foot drop or prominent metatarsal heads. Feet:      Right foot:      Protective Sensation: 10 sites tested. 10 sites sensed. Skin integrity: Skin integrity normal.      Toenail Condition: Right toenails are normal.      Left foot:      Protective Sensation: 10 sites tested. 10 sites sensed. Skin integrity: Skin integrity normal.      Toenail Condition: Left toenails are normal.   Lymphadenopathy:      Cervical: No cervical adenopathy. Skin:     General: Skin is warm and dry. Capillary Refill: Capillary refill takes less than 2 seconds. Neurological:      General: No focal deficit present. Mental Status: She is alert and oriented to person, place, and time. Psychiatric:         Mood and Affect: Mood normal.         Behavior: Behavior normal.      Comments: Less slurring of words today  Not wearing pajamas like past several visits. Patient's questions answered and concerns addressed. Patient agrees to plan of care.         Electronically signed by LEVAR Garcia CNP on 8/2/2022 at 10:13 AM

## 2022-08-02 PROBLEM — E44.0 MODERATE MALNUTRITION (HCC): Chronic | Status: RESOLVED | Noted: 2021-08-30 | Resolved: 2022-08-02

## 2022-08-02 PROBLEM — E83.39 HYPOPHOSPHATEMIA: Status: RESOLVED | Noted: 2021-09-02 | Resolved: 2022-08-02

## 2022-08-02 PROBLEM — Z93.1 GASTROSTOMY STATUS (HCC): Status: RESOLVED | Noted: 2021-09-01 | Resolved: 2022-08-02

## 2022-08-02 PROBLEM — B96.89 UTI DUE TO KLEBSIELLA SPECIES: Status: RESOLVED | Noted: 2021-09-02 | Resolved: 2022-08-02

## 2022-08-02 PROBLEM — N39.0 UTI DUE TO KLEBSIELLA SPECIES: Status: RESOLVED | Noted: 2021-09-02 | Resolved: 2022-08-02

## 2022-08-02 PROBLEM — N17.9 ACUTE KIDNEY INJURY (HCC): Status: RESOLVED | Noted: 2021-07-16 | Resolved: 2022-08-02

## 2022-08-02 PROBLEM — K59.00 CONSTIPATION: Status: RESOLVED | Noted: 2021-09-02 | Resolved: 2022-08-02

## 2022-08-02 PROBLEM — R22.43 LOCALIZED SWELLING OF BOTH LOWER LEGS: Status: RESOLVED | Noted: 2020-08-10 | Resolved: 2022-08-02

## 2022-08-02 PROBLEM — E87.6 HYPOKALEMIA: Status: RESOLVED | Noted: 2021-09-02 | Resolved: 2022-08-02

## 2022-08-02 ASSESSMENT — ENCOUNTER SYMPTOMS
VOMITING: 0
SHORTNESS OF BREATH: 0
NAUSEA: 0
BLOOD IN STOOL: 0
TROUBLE SWALLOWING: 1
DIARRHEA: 0
CHEST TIGHTNESS: 0
COUGH: 0
CONSTIPATION: 0

## 2022-08-16 ENCOUNTER — OFFICE VISIT (OUTPATIENT)
Dept: FAMILY MEDICINE CLINIC | Age: 72
End: 2022-08-16
Payer: MEDICARE

## 2022-08-16 VITALS
BODY MASS INDEX: 24.17 KG/M2 | HEART RATE: 73 BPM | SYSTOLIC BLOOD PRESSURE: 110 MMHG | OXYGEN SATURATION: 94 % | DIASTOLIC BLOOD PRESSURE: 50 MMHG | WEIGHT: 140.8 LBS

## 2022-08-16 DIAGNOSIS — T45.1X5A CHEMOTHERAPY-INDUCED PERIPHERAL NEUROPATHY (HCC): ICD-10-CM

## 2022-08-16 DIAGNOSIS — E87.20 METABOLIC ACIDOSIS: ICD-10-CM

## 2022-08-16 DIAGNOSIS — E03.2 HYPOTHYROIDISM DUE TO MEDICATION: ICD-10-CM

## 2022-08-16 DIAGNOSIS — L30.9 DERMATITIS: Primary | ICD-10-CM

## 2022-08-16 DIAGNOSIS — G62.0 CHEMOTHERAPY-INDUCED PERIPHERAL NEUROPATHY (HCC): ICD-10-CM

## 2022-08-16 DIAGNOSIS — R07.9 CHEST PAIN, UNSPECIFIED TYPE: ICD-10-CM

## 2022-08-16 DIAGNOSIS — N18.4 CKD (CHRONIC KIDNEY DISEASE) STAGE 4, GFR 15-29 ML/MIN (HCC): ICD-10-CM

## 2022-08-16 PROCEDURE — 36415 COLL VENOUS BLD VENIPUNCTURE: CPT | Performed by: NURSE PRACTITIONER

## 2022-08-16 PROCEDURE — 1124F ACP DISCUSS-NO DSCNMKR DOCD: CPT | Performed by: NURSE PRACTITIONER

## 2022-08-16 PROCEDURE — 99214 OFFICE O/P EST MOD 30 MIN: CPT | Performed by: NURSE PRACTITIONER

## 2022-08-16 PROCEDURE — 93000 ELECTROCARDIOGRAM COMPLETE: CPT | Performed by: NURSE PRACTITIONER

## 2022-08-16 RX ORDER — METHYLPREDNISOLONE 4 MG/1
TABLET ORAL
Qty: 1 KIT | Refills: 0 | Status: SHIPPED | OUTPATIENT
Start: 2022-08-16 | End: 2022-08-22

## 2022-08-16 RX ORDER — METHYLPREDNISOLONE 4 MG/1
TABLET ORAL
Qty: 1 KIT | Refills: 0 | Status: SHIPPED | OUTPATIENT
Start: 2022-08-16 | End: 2022-08-16

## 2022-08-16 ASSESSMENT — PATIENT HEALTH QUESTIONNAIRE - PHQ9
3. TROUBLE FALLING OR STAYING ASLEEP: 3
4. FEELING TIRED OR HAVING LITTLE ENERGY: 3
SUM OF ALL RESPONSES TO PHQ QUESTIONS 1-9: 18
7. TROUBLE CONCENTRATING ON THINGS, SUCH AS READING THE NEWSPAPER OR WATCHING TELEVISION: 3
6. FEELING BAD ABOUT YOURSELF - OR THAT YOU ARE A FAILURE OR HAVE LET YOURSELF OR YOUR FAMILY DOWN: 3
9. THOUGHTS THAT YOU WOULD BE BETTER OFF DEAD, OR OF HURTING YOURSELF: 0
SUM OF ALL RESPONSES TO PHQ QUESTIONS 1-9: 18
SUM OF ALL RESPONSES TO PHQ QUESTIONS 1-9: 18
5. POOR APPETITE OR OVEREATING: 0
8. MOVING OR SPEAKING SO SLOWLY THAT OTHER PEOPLE COULD HAVE NOTICED. OR THE OPPOSITE, BEING SO FIGETY OR RESTLESS THAT YOU HAVE BEEN MOVING AROUND A LOT MORE THAN USUAL: 3
SUM OF ALL RESPONSES TO PHQ QUESTIONS 1-9: 18
10. IF YOU CHECKED OFF ANY PROBLEMS, HOW DIFFICULT HAVE THESE PROBLEMS MADE IT FOR YOU TO DO YOUR WORK, TAKE CARE OF THINGS AT HOME, OR GET ALONG WITH OTHER PEOPLE: 2
2. FEELING DOWN, DEPRESSED OR HOPELESS: 3

## 2022-08-16 ASSESSMENT — ENCOUNTER SYMPTOMS
SHORTNESS OF BREATH: 0
BLOOD IN STOOL: 0
COUGH: 0
TROUBLE SWALLOWING: 1
CONSTIPATION: 0
DIARRHEA: 0
CHEST TIGHTNESS: 0
NAUSEA: 0
VOMITING: 0

## 2022-08-16 NOTE — PROGRESS NOTES
Blood drawn per order. Needle size: 23 g butterfly  Site: L Antecubital.  First attempt successful No    Second attempt no, right arm   Third attempt top of right hand     Pressure applied until bleeding stopped. Cotton ball and band aid  applied. Patient informed to call office or return if bleeding reoccurs and unable to stop.     Tubes drawn: 1 purple     1 red

## 2022-08-16 NOTE — PROGRESS NOTES
8/16/2022    Chief Complaint   Patient presents with    Rash     On arms     Difficulty Walking     Pt said her legs go numb    Hypothyroidism       Kit Granegr is a 71 y.o. female, presents today for:      ASSESSMENT/PLAN:  1. 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 Discussed with pt Nephrology referral, again declined. Would prefer to try Medrol Dose Marybel. Discussed with pt it likely would not improve symptoms. - methylPREDNISolone (MEDROL, MARYBEL,) 4 MG tablet; Take by mouth. Dispense: 1 kit; Refill: 0    2. Chest pain, unspecified type  Unclear etiology but patient declining Cardiology w/u or referral today  NSR, similar to prior ECG. Thyroid panel ordered today  Encouraged to call if symptoms worsen  - EKG 12 Lead    3. CKD (chronic kidney disease) stage 4, GFR 15-29 ml/min (HCC)  Renally dose all medication  Avoid nephrotoxic medications  Continues to decline Nephrology referral  Encouraged water intake  Encouraged ETOH cessation to prevent electrolyte imbalances  - Comprehensive Metabolic Panel  - Uric Acid  - Vitamin D 25 Hydroxy  - TSH with Reflex  - Phosphorus    4. Metabolic acidosis  ADDENDUM: CO2 and Uric acid levels elevated on labs. Start Sodium Bicarbonate and Allopurinol.    - sodium bicarbonate 650 MG tablet; Take 1 tablet by mouth 2 times daily  Dispense: 60 tablet; Refill: 11  - allopurinol (ZYLOPRIM) 100 MG tablet; Take 1 tablet by mouth daily  Dispense: 30 tablet; Refill: 3    5. Chemotherapy-induced peripheral neuropathy (HCC)  Continue Gabapentin per pt request, now at maximum dose for pt due to CKD  Discuss EMG if no improvement but likely chemo related  Continue to keep TSH in normal  range and encouraged to stay away from ETOH as this can exacerbation. 6. Hypothyroidism due to medication  Improving. Updated labs today  Continue Levothyroxine.    - TSH with Reflex    Return in about 3 months (around 11/16/2022). Worsening diffiuclty feeling anterior legs. LABALBU 4.5 08/16/2022    BILITOT <0.2 08/16/2022    ALKPHOS 140 (H) 08/16/2022    AST 16 08/16/2022    ALT 57 (H) 08/16/2022    LABGLOM 17 (A) 08/16/2022    GFRAA 20 (A) 08/16/2022    AGRATIO 1.7 08/16/2022    GLOB 3.2 10/07/2021         Review of Systems   Constitutional:  Positive for appetite change and fatigue. Negative for activity change. HENT:  Positive for trouble swallowing (Chronic). Respiratory:  Negative for cough, chest tightness and shortness of breath. Cardiovascular: Negative. Gastrointestinal:  Negative for blood in stool, constipation, diarrhea, nausea and vomiting. Genitourinary: Negative. Musculoskeletal: Negative. Skin: Negative. Neurological:  Negative for dizziness, tremors, light-headedness and headaches. Parasthesias     Psychiatric/Behavioral:  Negative for decreased concentration, dysphoric mood, self-injury, sleep disturbance and suicidal ideas. The patient is not nervous/anxious. Current Outpatient Medications on File Prior to Visit   Medication Sig Dispense Refill    gabapentin (NEURONTIN) 100 MG capsule Take 2 capsules by mouth 3 times daily for 90 days. Intended supply: 90 days 540 capsule 0    levothyroxine (SYNTHROID) 100 MCG tablet Take 1 tablet by mouth daily 90 tablet 1    Nutritional Supplements (BOOST PO) Take 1 each by mouth daily      acetaminophen (TYLENOL) 500 MG tablet Take 1,000 mg by mouth every 6 hours as needed for Pain       No current facility-administered medications on file prior to visit.      Allergies   Allergen Reactions    Penicillins Hives and Rash     5/2013 40 yrs ago had a nonpruritic rash on hands;no hives and no other sx's;     Past Medical History:   Diagnosis Date    Anxiety     Cancer ([address] 05/05/2021    throat    Cerebral artery occlusion with cerebral infarction ([address]     30 years ago had mini stroke with no residual    Crohn disease ([address]     CVA (cerebral infarction) 2006    hx unclear    Gastrostomy status ([address] 9/1/2021    Hyperlipidemia     Hypertension      Past Surgical History:   Procedure Laterality Date    APPENDECTOMY      CHOLECYSTECTOMY      COLONOSCOPY  2011    COLONOSCOPY N/A 9/2/2021    COLONOSCOPY WITH BIOPSY RECTAL ULCER performed by Mari Nina MD at [facility] ([address] [place] [address])      LARYNGOSCOPY N/A 5/5/2021    DIRECT MICROLARYNGOSCOPY WITH BIOPSIES performed by Blake Sanchez DO at [facility] (CERVIX NOT REMOVED)  1973    infection after btl;one ovary in    UPPER GASTROINTESTINAL ENDOSCOPY  05/26/2021    Procedure: EGD ESOPHAGOGASTRODUODENOSCOPY PEG TUBE INSERTION    UPPER GASTROINTESTINAL ENDOSCOPY  5/26/2021    EGD ESOPHAGOGASTRODUODENOSCOPY PEG TUBE INSERTION performed by Mike Cormier MD at [place] [address]. History     Tobacco Use    Smoking status: Former     Packs/day: 1.00     Years: 30.00     Pack years: 30.00     Types: Cigarettes    Smokeless tobacco: Never    Tobacco comments:     placed in AVS   Substance Use Topics    Alcohol use: Not Currently     Alcohol/week: 4.0 standard drinks     Types: 4 Cans of beer per week     Comment: daily; 4 beers per day     Family History   Problem Relation Age of Onset    Heart Disease Mother        Vitals:    08/16/22 1554 08/16/22 1620   BP:  (!) 110/50   Pulse: 73    SpO2: 94%    Weight: 140 lb 12.8 oz (63.9 kg)      Estimated body mass index is 24.17 kg/m² as calculated from the following:    Height as of 2/21/22: 5' 4\" (1.626 m). Weight as of this encounter: 140 lb 12.8 oz (63.9 kg). Physical Exam  Vitals and nursing note reviewed. Constitutional:       Appearance: Normal appearance. HENT:      Head: Normocephalic. Eyes:      Extraocular Movements: Extraocular movements intact. Conjunctiva/sclera: Conjunctivae normal.      Pupils: Pupils are equal, round, and reactive to light. Neck:      Vascular: No carotid bruit.    Cardiovascular:      Rate and Rhythm: Normal rate and regular rhythm. Pulses: Normal pulses. Carotid pulses are 2+ on the right side and 2+ on the left side. Dorsalis pedis pulses are 2+ on the right side and 2+ on the left side. Posterior tibial pulses are 2+ on the right side and 2+ on the left side. Heart sounds: Normal heart sounds. No murmur heard. No gallop. Pulmonary:      Effort: Pulmonary effort is normal.      Breath sounds: Normal breath sounds. Abdominal:      General: Abdomen is flat. Palpations: Abdomen is soft. Musculoskeletal:         General: Normal range of motion. Right shoulder: Normal.      Left shoulder: Normal.      Right elbow: Normal.      Left elbow: Normal.      Right wrist: Normal.      Left wrist: Normal.      Right hand: Normal.      Left hand: Normal.      Cervical back: Normal and normal range of motion. Right lower leg: No edema. Left lower leg: No edema. Right foot: Normal range of motion. No deformity, bunion, Charcot foot, foot drop or prominent metatarsal heads. Left foot: Normal range of motion. No deformity, bunion, Charcot foot, foot drop or prominent metatarsal heads. Feet:      Right foot:      Protective Sensation: 10 sites tested. 10 sites sensed. Skin integrity: Skin integrity normal.      Toenail Condition: Right toenails are normal.      Left foot:      Protective Sensation: 10 sites tested. 10 sites sensed. Skin integrity: Skin integrity normal.      Toenail Condition: Left toenails are normal.   Lymphadenopathy:      Cervical: No cervical adenopathy. Skin:     General: Skin is warm and dry. Capillary Refill: Capillary refill takes less than 2 seconds. Comments: Skin colored comedones present on right and left upper arms, secondary excoriation on chest, itching throughout visit. Neurological:      General: No focal deficit present.       Mental Status: She is alert and oriented to person, place, and time.   Psychiatric:         Mood and Affect: Mood normal.         Behavior: Behavior normal.      Comments: Less slurring of words today  Not wearing pajamas like past several visits. Patient's questions answered and concerns addressed. Patient agrees to plan of care.         Electronically signed by LEVAR Li CNP on 9/6/2022 at 4:23 PM

## 2022-08-17 LAB
A/G RATIO: 1.7 (ref 1.1–2.2)
ALBUMIN SERPL-MCNC: 4.5 G/DL (ref 3.4–5)
ALP BLD-CCNC: 140 U/L (ref 40–129)
ALT SERPL-CCNC: 57 U/L (ref 10–40)
ANION GAP SERPL CALCULATED.3IONS-SCNC: 17 MMOL/L (ref 3–16)
AST SERPL-CCNC: 16 U/L (ref 15–37)
BILIRUB SERPL-MCNC: <0.2 MG/DL (ref 0–1)
BUN BLDV-MCNC: 56 MG/DL (ref 7–20)
CALCIUM SERPL-MCNC: 9.9 MG/DL (ref 8.3–10.6)
CHLORIDE BLD-SCNC: 96 MMOL/L (ref 99–110)
CO2: 16 MMOL/L (ref 21–32)
CREAT SERPL-MCNC: 2.8 MG/DL (ref 0.6–1.2)
GFR AFRICAN AMERICAN: 20
GFR NON-AFRICAN AMERICAN: 17
GLUCOSE BLD-MCNC: 93 MG/DL (ref 70–99)
PHOSPHORUS: 5.3 MG/DL (ref 2.5–4.9)
POTASSIUM SERPL-SCNC: 4.9 MMOL/L (ref 3.5–5.1)
SODIUM BLD-SCNC: 129 MMOL/L (ref 136–145)
TOTAL PROTEIN: 7.2 G/DL (ref 6.4–8.2)
TSH REFLEX: 2.26 UIU/ML (ref 0.27–4.2)
URIC ACID, SERUM: 6.3 MG/DL (ref 2.6–6)
VITAMIN D 25-HYDROXY: 49.3 NG/ML

## 2022-08-18 RX ORDER — ALLOPURINOL 100 MG/1
100 TABLET ORAL DAILY
Qty: 30 TABLET | Refills: 3 | Status: SHIPPED | OUTPATIENT
Start: 2022-08-18

## 2022-08-18 RX ORDER — SODIUM BICARBONATE 650 MG/1
650 TABLET ORAL 2 TIMES DAILY
Qty: 60 TABLET | Refills: 11 | Status: SHIPPED | OUTPATIENT
Start: 2022-08-18 | End: 2022-10-26

## 2022-10-14 DIAGNOSIS — T45.1X5A CHEMOTHERAPY-INDUCED PERIPHERAL NEUROPATHY (HCC): ICD-10-CM

## 2022-10-14 DIAGNOSIS — G62.0 CHEMOTHERAPY-INDUCED PERIPHERAL NEUROPATHY (HCC): ICD-10-CM

## 2022-10-17 RX ORDER — GABAPENTIN 100 MG/1
CAPSULE ORAL
Qty: 540 CAPSULE | Refills: 0 | Status: SHIPPED | OUTPATIENT
Start: 2022-10-17 | End: 2023-01-15

## 2022-10-26 ENCOUNTER — TELEPHONE (OUTPATIENT)
Dept: FAMILY MEDICINE CLINIC | Age: 72
End: 2022-10-26

## 2022-10-26 ENCOUNTER — APPOINTMENT (OUTPATIENT)
Dept: CT IMAGING | Age: 72
DRG: 640 | End: 2022-10-26
Payer: MEDICARE

## 2022-10-26 ENCOUNTER — HOSPITAL ENCOUNTER (INPATIENT)
Age: 72
LOS: 2 days | Discharge: HOME | DRG: 640 | End: 2022-10-28
Attending: EMERGENCY MEDICINE | Admitting: INTERNAL MEDICINE
Payer: MEDICARE

## 2022-10-26 DIAGNOSIS — J18.9 PNEUMONIA OF RIGHT LOWER LOBE DUE TO INFECTIOUS ORGANISM: ICD-10-CM

## 2022-10-26 DIAGNOSIS — C10.9 OROPHARYNGEAL CANCER (HCC): ICD-10-CM

## 2022-10-26 DIAGNOSIS — K52.9 COLITIS: ICD-10-CM

## 2022-10-26 DIAGNOSIS — E87.1 HYPONATREMIA: ICD-10-CM

## 2022-10-26 DIAGNOSIS — T45.1X5A CHEMOTHERAPY-INDUCED PERIPHERAL NEUROPATHY (HCC): ICD-10-CM

## 2022-10-26 DIAGNOSIS — G62.0 CHEMOTHERAPY-INDUCED PERIPHERAL NEUROPATHY (HCC): ICD-10-CM

## 2022-10-26 DIAGNOSIS — R62.7 FAILURE TO THRIVE IN ADULT: Primary | ICD-10-CM

## 2022-10-26 DIAGNOSIS — Z51.5 HOSPICE CARE: ICD-10-CM

## 2022-10-26 PROBLEM — E87.20 METABOLIC ACIDOSIS: Status: ACTIVE | Noted: 2022-10-26

## 2022-10-26 PROBLEM — N18.4 STAGE 4 CHRONIC KIDNEY DISEASE (HCC): Status: ACTIVE | Noted: 2022-10-26

## 2022-10-26 PROBLEM — U07.1 COVID: Status: ACTIVE | Noted: 2022-10-26

## 2022-10-26 PROBLEM — R11.2 NAUSEA AND VOMITING: Status: ACTIVE | Noted: 2022-10-26

## 2022-10-26 LAB
ALBUMIN SERPL-MCNC: 3.4 G/DL (ref 3.4–5)
ALP BLD-CCNC: 57 U/L (ref 40–129)
ALT SERPL-CCNC: 15 U/L (ref 10–40)
ANION GAP SERPL CALCULATED.3IONS-SCNC: 13 MMOL/L (ref 3–16)
AST SERPL-CCNC: 22 U/L (ref 15–37)
BILIRUB SERPL-MCNC: 0.3 MG/DL (ref 0–1)
BILIRUBIN DIRECT: <0.2 MG/DL (ref 0–0.3)
BILIRUBIN, INDIRECT: NORMAL MG/DL (ref 0–1)
BUN BLDV-MCNC: 32 MG/DL (ref 7–20)
CALCIUM IONIZED: 1 MMOL/L (ref 1.12–1.32)
CALCIUM SERPL-MCNC: 9 MG/DL (ref 8.3–10.6)
CHLORIDE BLD-SCNC: 97 MMOL/L (ref 99–110)
CO2: 16 MMOL/L (ref 21–32)
CREAT SERPL-MCNC: 2.5 MG/DL (ref 0.6–1.2)
EKG ATRIAL RATE: 85 BPM
EKG DIAGNOSIS: NORMAL
EKG P AXIS: 59 DEGREES
EKG P-R INTERVAL: 140 MS
EKG Q-T INTERVAL: 368 MS
EKG QRS DURATION: 92 MS
EKG QTC CALCULATION (BAZETT): 437 MS
EKG R AXIS: 3 DEGREES
EKG T AXIS: 25 DEGREES
EKG VENTRICULAR RATE: 85 BPM
GFR SERPL CREATININE-BSD FRML MDRD: 20 ML/MIN/{1.73_M2}
GLUCOSE BLD-MCNC: 88 MG/DL (ref 70–99)
GLUCOSE BLD-MCNC: 99 MG/DL (ref 70–99)
INR BLD: 1.17 (ref 0.87–1.14)
LACTIC ACID: 0.7 MMOL/L (ref 0.4–2)
LIPASE: 19 U/L (ref 13–60)
MAGNESIUM: 2.2 MG/DL (ref 1.8–2.4)
PERFORMED ON: NORMAL
PH VENOUS: 7.33 (ref 7.35–7.45)
PHOSPHORUS: 2.8 MG/DL (ref 2.5–4.9)
POTASSIUM SERPL-SCNC: 4.2 MMOL/L (ref 3.5–5.1)
PREALBUMIN: 19.8 MG/DL (ref 20–40)
PRO-BNP: 338 PG/ML (ref 0–124)
PROTHROMBIN TIME: 14.7 SEC (ref 11.7–14.5)
SARS-COV-2, NAAT: DETECTED
SODIUM BLD-SCNC: 126 MMOL/L (ref 136–145)
TOTAL CK: 36 U/L (ref 26–192)
TOTAL PROTEIN: 6.7 G/DL (ref 6.4–8.2)
TROPONIN: <0.01 NG/ML
TSH SERPL DL<=0.05 MIU/L-ACNC: 0.46 UIU/ML (ref 0.27–4.2)

## 2022-10-26 PROCEDURE — 99223 1ST HOSP IP/OBS HIGH 75: CPT

## 2022-10-26 PROCEDURE — 83605 ASSAY OF LACTIC ACID: CPT

## 2022-10-26 PROCEDURE — 93005 ELECTROCARDIOGRAM TRACING: CPT | Performed by: PHYSICIAN ASSISTANT

## 2022-10-26 PROCEDURE — 85610 PROTHROMBIN TIME: CPT

## 2022-10-26 PROCEDURE — 87040 BLOOD CULTURE FOR BACTERIA: CPT

## 2022-10-26 PROCEDURE — 80076 HEPATIC FUNCTION PANEL: CPT

## 2022-10-26 PROCEDURE — 83540 ASSAY OF IRON: CPT

## 2022-10-26 PROCEDURE — 80048 BASIC METABOLIC PNL TOTAL CA: CPT

## 2022-10-26 PROCEDURE — 84484 ASSAY OF TROPONIN QUANT: CPT

## 2022-10-26 PROCEDURE — 82550 ASSAY OF CK (CPK): CPT

## 2022-10-26 PROCEDURE — 83550 IRON BINDING TEST: CPT

## 2022-10-26 PROCEDURE — 85025 COMPLETE CBC W/AUTO DIFF WBC: CPT

## 2022-10-26 PROCEDURE — 93010 ELECTROCARDIOGRAM REPORT: CPT | Performed by: INTERNAL MEDICINE

## 2022-10-26 PROCEDURE — 83880 ASSAY OF NATRIURETIC PEPTIDE: CPT

## 2022-10-26 PROCEDURE — 85045 AUTOMATED RETICULOCYTE COUNT: CPT

## 2022-10-26 PROCEDURE — 83690 ASSAY OF LIPASE: CPT

## 2022-10-26 PROCEDURE — 84134 ASSAY OF PREALBUMIN: CPT

## 2022-10-26 PROCEDURE — 82330 ASSAY OF CALCIUM: CPT

## 2022-10-26 PROCEDURE — 2500000003 HC RX 250 WO HCPCS

## 2022-10-26 PROCEDURE — 96361 HYDRATE IV INFUSION ADD-ON: CPT

## 2022-10-26 PROCEDURE — 84100 ASSAY OF PHOSPHORUS: CPT

## 2022-10-26 PROCEDURE — 36415 COLL VENOUS BLD VENIPUNCTURE: CPT

## 2022-10-26 PROCEDURE — 82728 ASSAY OF FERRITIN: CPT

## 2022-10-26 PROCEDURE — 2580000003 HC RX 258

## 2022-10-26 PROCEDURE — 96365 THER/PROPH/DIAG IV INF INIT: CPT

## 2022-10-26 PROCEDURE — 2500000003 HC RX 250 WO HCPCS: Performed by: PHYSICIAN ASSISTANT

## 2022-10-26 PROCEDURE — 84443 ASSAY THYROID STIM HORMONE: CPT

## 2022-10-26 PROCEDURE — 70450 CT HEAD/BRAIN W/O DYE: CPT

## 2022-10-26 PROCEDURE — 71250 CT THORAX DX C-: CPT

## 2022-10-26 PROCEDURE — 83735 ASSAY OF MAGNESIUM: CPT

## 2022-10-26 PROCEDURE — C9113 INJ PANTOPRAZOLE SODIUM, VIA: HCPCS

## 2022-10-26 PROCEDURE — 99285 EMERGENCY DEPT VISIT HI MDM: CPT

## 2022-10-26 PROCEDURE — 6370000000 HC RX 637 (ALT 250 FOR IP)

## 2022-10-26 PROCEDURE — 6360000002 HC RX W HCPCS

## 2022-10-26 PROCEDURE — 2580000003 HC RX 258: Performed by: PHYSICIAN ASSISTANT

## 2022-10-26 PROCEDURE — 87635 SARS-COV-2 COVID-19 AMP PRB: CPT

## 2022-10-26 PROCEDURE — 1200000000 HC SEMI PRIVATE

## 2022-10-26 RX ORDER — SODIUM BICARBONATE 650 MG/1
650 TABLET ORAL 2 TIMES DAILY
Status: ON HOLD | COMMUNITY
End: 2022-10-28 | Stop reason: HOSPADM

## 2022-10-26 RX ORDER — ONDANSETRON 4 MG/1
4 TABLET, ORALLY DISINTEGRATING ORAL EVERY 8 HOURS PRN
Status: DISCONTINUED | OUTPATIENT
Start: 2022-10-26 | End: 2022-10-28 | Stop reason: HOSPADM

## 2022-10-26 RX ORDER — SODIUM CHLORIDE 0.9 % (FLUSH) 0.9 %
5-40 SYRINGE (ML) INJECTION EVERY 12 HOURS SCHEDULED
Status: DISCONTINUED | OUTPATIENT
Start: 2022-10-26 | End: 2022-10-28 | Stop reason: HOSPADM

## 2022-10-26 RX ORDER — SODIUM CHLORIDE 0.9 % (FLUSH) 0.9 %
10 SYRINGE (ML) INJECTION PRN
Status: DISCONTINUED | OUTPATIENT
Start: 2022-10-26 | End: 2022-10-28 | Stop reason: HOSPADM

## 2022-10-26 RX ORDER — ENOXAPARIN SODIUM 100 MG/ML
30 INJECTION SUBCUTANEOUS DAILY
Status: DISCONTINUED | OUTPATIENT
Start: 2022-10-26 | End: 2022-10-28 | Stop reason: HOSPADM

## 2022-10-26 RX ORDER — SODIUM BICARBONATE 650 MG/1
650 TABLET ORAL 2 TIMES DAILY
Status: DISCONTINUED | OUTPATIENT
Start: 2022-10-26 | End: 2022-10-26

## 2022-10-26 RX ORDER — LEVOFLOXACIN 5 MG/ML
500 INJECTION, SOLUTION INTRAVENOUS ONCE
Status: COMPLETED | OUTPATIENT
Start: 2022-10-26 | End: 2022-10-26

## 2022-10-26 RX ORDER — LEVOTHYROXINE SODIUM 0.1 MG/1
100 TABLET ORAL DAILY
Status: DISCONTINUED | OUTPATIENT
Start: 2022-10-27 | End: 2022-10-28 | Stop reason: HOSPADM

## 2022-10-26 RX ORDER — ONDANSETRON 2 MG/ML
4 INJECTION INTRAMUSCULAR; INTRAVENOUS EVERY 6 HOURS PRN
Status: DISCONTINUED | OUTPATIENT
Start: 2022-10-26 | End: 2022-10-28 | Stop reason: HOSPADM

## 2022-10-26 RX ORDER — PANTOPRAZOLE SODIUM 40 MG/10ML
40 INJECTION, POWDER, LYOPHILIZED, FOR SOLUTION INTRAVENOUS DAILY
Status: DISCONTINUED | OUTPATIENT
Start: 2022-10-26 | End: 2022-10-28 | Stop reason: HOSPADM

## 2022-10-26 RX ORDER — SODIUM CHLORIDE 9 MG/ML
INJECTION, SOLUTION INTRAVENOUS PRN
Status: DISCONTINUED | OUTPATIENT
Start: 2022-10-26 | End: 2022-10-28 | Stop reason: HOSPADM

## 2022-10-26 RX ORDER — METRONIDAZOLE 500 MG/100ML
500 INJECTION, SOLUTION INTRAVENOUS ONCE
Status: COMPLETED | OUTPATIENT
Start: 2022-10-26 | End: 2022-10-26

## 2022-10-26 RX ORDER — 0.9 % SODIUM CHLORIDE 0.9 %
500 INTRAVENOUS SOLUTION INTRAVENOUS ONCE
Status: COMPLETED | OUTPATIENT
Start: 2022-10-26 | End: 2022-10-26

## 2022-10-26 RX ORDER — METRONIDAZOLE 500 MG/100ML
500 INJECTION, SOLUTION INTRAVENOUS EVERY 8 HOURS
Status: DISCONTINUED | OUTPATIENT
Start: 2022-10-26 | End: 2022-10-28 | Stop reason: HOSPADM

## 2022-10-26 RX ORDER — LEVOFLOXACIN 5 MG/ML
250 INJECTION, SOLUTION INTRAVENOUS ONCE
Status: COMPLETED | OUTPATIENT
Start: 2022-10-26 | End: 2022-10-26

## 2022-10-26 RX ORDER — LEVOFLOXACIN 5 MG/ML
500 INJECTION, SOLUTION INTRAVENOUS
Status: DISCONTINUED | OUTPATIENT
Start: 2022-10-28 | End: 2022-10-28 | Stop reason: HOSPADM

## 2022-10-26 RX ORDER — ACETAMINOPHEN 325 MG/1
650 TABLET ORAL EVERY 6 HOURS PRN
Status: DISCONTINUED | OUTPATIENT
Start: 2022-10-26 | End: 2022-10-28 | Stop reason: HOSPADM

## 2022-10-26 RX ORDER — POLYETHYLENE GLYCOL 3350 17 G/17G
17 POWDER, FOR SOLUTION ORAL DAILY PRN
Status: DISCONTINUED | OUTPATIENT
Start: 2022-10-26 | End: 2022-10-28 | Stop reason: HOSPADM

## 2022-10-26 RX ORDER — GABAPENTIN 100 MG/1
100 CAPSULE ORAL 3 TIMES DAILY
Status: DISCONTINUED | OUTPATIENT
Start: 2022-10-26 | End: 2022-10-28 | Stop reason: HOSPADM

## 2022-10-26 RX ORDER — ACETAMINOPHEN 650 MG/1
650 SUPPOSITORY RECTAL EVERY 6 HOURS PRN
Status: DISCONTINUED | OUTPATIENT
Start: 2022-10-26 | End: 2022-10-28 | Stop reason: HOSPADM

## 2022-10-26 RX ADMIN — METRONIDAZOLE 500 MG: 500 INJECTION, SOLUTION INTRAVENOUS at 15:17

## 2022-10-26 RX ADMIN — LEVOFLOXACIN 500 MG: 500 INJECTION, SOLUTION INTRAVENOUS at 16:49

## 2022-10-26 RX ADMIN — SODIUM BICARBONATE: 84 INJECTION, SOLUTION INTRAVENOUS at 18:22

## 2022-10-26 RX ADMIN — ONDANSETRON HYDROCHLORIDE 4 MG: 2 INJECTION, SOLUTION INTRAMUSCULAR; INTRAVENOUS at 23:17

## 2022-10-26 RX ADMIN — SODIUM CHLORIDE 500 ML: 9 INJECTION, SOLUTION INTRAVENOUS at 13:01

## 2022-10-26 RX ADMIN — GABAPENTIN 100 MG: 100 CAPSULE ORAL at 23:07

## 2022-10-26 RX ADMIN — Medication 10 ML: at 23:06

## 2022-10-26 RX ADMIN — ENOXAPARIN SODIUM 30 MG: 100 INJECTION SUBCUTANEOUS at 18:58

## 2022-10-26 RX ADMIN — METRONIDAZOLE 500 MG: 500 INJECTION, SOLUTION INTRAVENOUS at 23:05

## 2022-10-26 RX ADMIN — LEVOFLOXACIN 250 MG: 5 INJECTION, SOLUTION INTRAVENOUS at 18:30

## 2022-10-26 NOTE — PROGRESS NOTES
Patient admitted to room 225 from ER. Side rails up x2. Patient does have an order for telemetry. Bed is locked and in lowest position. Call light placed in patient reach. Patient explained the routine of the hospital, including but not limited to lab work, vital signs, hourly rounding, etc. Care plans and education updated, CHG wipes completed at time of admission. /64   Pulse 87   Temp 97.7 °F (36.5 °C) (Oral)   Resp 18   Wt 130 lb (59 kg)   SpO2 98%   BMI 22.31 kg/m²     Most recent set of vitals as shown. CHG bath completed. 4 Eyes Skin Assessment     The patient is being assess for   Admission    I agree that 2 RN's have performed a thorough Head to Toe Skin Assessment on the patient. ALL assessment sites listed below have been assessed. Areas assessed for pressure by both nurses:   [x]   Head, Face, and Ears   [x]   Shoulders, Back, and Chest, Abdomen  [x]   Arms, Elbows, and Hands   [x]   Coccyx, Sacrum, and Ischium  [x]   Legs, Feet, and Heels        Redness noted to coccyx. Skin blanches. Barrier cream applied and brief changed. Pt does have a old incision to stomach from past G-tube. **SHARE this note so that the co-signing nurse is able to place an eSignature**    Co-signer eSignature: Electronically signed by Saima Lincoln RN on 10/26/22 at 6:55 PM EDT    Does the Patient have Skin Breakdown related to pressure? No     (Insert Photo here)         Cristobal Prevention initiated:  Yes   Wound Care Orders initiated:  No      [facility] nurse consulted for Pressure Injury (Stage 3,4, Unstageable, DTI, NWPT, Complex wounds)and New or Established Ostomies:  NA      Primary Nurse eSignature: Electronically signed by Gladis Dyer RN on 10/26/22 at 6:36 PM EDT      Bedside Mobility Assessment Tool (BMAT):     Assessment Level 1- Sit and Shake    1. From a semi-reclined position, ask patient to sit up and rotate to a seated position at the side of the bed. Can use the bedrail. 2. Ask patient to reach out and grab your hand and shake making sure patient reaches across his/her midline. Fail- Patient is unable to perform tasks, patient is MOBILITY LEVEL 1. Assessment Level 2- Stretch and Point   1. With patient in seated position at the side of the bed, have patient place both feet on the floor (or stool) with knees no higher than hips. 2. Ask patient to stretch one leg and straighten the knee, then bend the ankle/flex and point the toes. If appropriate, repeat with the other leg. Fail- Patient is unable to complete task. Patient is MOBILITY LEVEL 2. Assessment Level 3- Stand   1. Ask patient to elevate off the bed or chair (seated to standing) using an assistive device (cane, bedrail). 2. Patient should be able to raise buttocks off be and hold for a count of five. May repeat once. Fail- Patient unable to demonstrate standing stability. Patient is MOBILITY LEVEL 3. Assessment Level 4- Walk   1. Ask patient to march in place at bedside. 2. Then ask patient to advance step and return each foot. Some medical conditions may render a patient from stepping backwards, use your best clinical judgement. Fail- Patient not able to complete tasks OR requires use of assistive device. Patient is MOBILITY LEVEL 3. Mobility Level- 3    Patient is not able to demonstrate the ability to move from a reclining position to an upright position within the recliner due to weakness.

## 2022-10-26 NOTE — CONSULTS
Pharmacy to dose levaquin for CAP vs Aspiration PNA received from [facility].      Pt's CrCl = 18   Per renal dosing policy if normal dose is to be 750mg daily, should dose 750mg x1 followed by 500mg q48h    Pt received 500mg x1 in the ED, ordered an additional 250mg x1 dose to equal 750mg total dose followed by 500mg q48h starting 10/28

## 2022-10-26 NOTE — CARE COORDINATION
Case Management Assessment  Initial Evaluation      Patient Name: Miracle Flores  YOB: 1950  Diagnosis: No admission diagnoses are documented for this encounter. Date / Time: 10/26/2022 10:16 AM    Admission status/Date:10/26/2022   Chart Reviewed: Yes      Patient Interviewed: Yes   Family Interviewed:  Yes - daughter Lisa Scanlon at bedside with pt's permission       Hospitalization in the last 30 days:  No    Health Care Decision Maker :   Primary Decision Maker: Damien Billy Spouse - [phone]    (CM - must 1st enter selection under Navigator - emergency contact- [place] Relationship and pick relationship)   Who do you trust or have selected to make healthcare decisions for you    Met with: pt and daughter   Micha Serra conducted  (bedside/phone):bedside    Current PCP: LEVAR Quinonez - [place]  Precert required for SNF : Y          3 night stay required -  N    ADLS  Support Systems/Care Needs:    Transportation: family    Meal Preparation: family    Housing  Living Arrangements: pt lives at home with her   Steps: 2 to the porch then 1 to enter the house  Intent for return to present living arrangements: Yes  Identified Issues: pt wants to return home and pt's family wants her to go to rehab     [address] [place] [address] with [address] : No Agency:(Services)     Passport/Waiver : No  :                      Phone Number:    Passport/Waiver Services: n/a          Durable Medical Equiptment   DME Provider: n/a  Equipment:   Walker___Cane___RTS___ BSC___Shower Chair___Hospital Bed___W/C____Other__Rolator (doesn't use)______  02 at ____Liter(s)---wears(frequency)_______ [facility] ___ CPAP___ BiPap___   N/A____    Home O2 Use :  No      Community Service Affiliation  Dialysis:  No    Agency:  Location:  Dialysis Schedule:  Phone:   Fax:     Other Community Services: n/a    DISCHARGE PLAN: Explained Case Management role/services. Chart review completed. Met with pt and daughter at bedside with pt's permission. Pt's daughter completed most of the assessment per pt request. Pt requires assistance from her family with her ADL's. Pt's daughter stated that pt hasn't felt like eating since she completed her radiation and chemo in 2021 and she normally just drinks milk but hasn't felt like drinking milk either. Pt's daughter stated she had a feeding tube that was recently removed a few months ago. She states pt normally stays in bed. Pt's daughter stated family is interested in rehab and pt stated she wants to return home. Pt's daughter stated they would discuss further as pt was agreeable to rehab earlier in the day. Explained referral process, PT/OT will be needed when appropriate and pt would need to be agreeable. A home care and SNF list was given to pt's daughter per pt request.     CM will follow. Please notify CM if needs or concerns arise.      Mirian Howard MSW, MARIA D-S

## 2022-10-26 NOTE — ACP (ADVANCE CARE PLANNING)
Advance Care Planning     General Advance Care Planning (ACP) Conversation    Date of Conversation: 10/26/2022  Conducted with:pt and daughter Grabiel Terry at bedside    Healthcare Decision Maker:    Primary Decision Maker: Kinza Enmanuel - Spouse - [phone]  Click here to complete Healthcare Decision Makers including selection of the Healthcare Decision Maker Relationship (ie \"Primary\"). Today we documented Decision Maker(s) consistent with Legal Next of Kin hierarchy. Grabiel Terry stated pt doesn't have a POA and pt's  is her next of kin    Content/Action Overview:  Per pending note from David Johnson PA-C on this date, he addressed code status (see his note).      Length of Voluntary ACP Conversation in minutes:  <16 minutes (Non-Billable)    Amber OSORIO, NARENDRAS

## 2022-10-26 NOTE — ED PROVIDER NOTES
ED Attending Attestation Note    This patient was seen by the advanced practice provider. I personally saw the patient and performed a substantive portion of the visit including all aspects of the medical decision making. Briefly, 71 y.o. female presents with concern for failure to thrive. Patient lives at home with her . Patient has a significant history of throat and tongue cancer. She is status post chemo and radiation. She has not really been eating much. Focused exam:   Gen: 71 y.o. female, NAD. She is chronically ill-appearing. GCS is 15. Abdomen soft without any focal tenderness. No guarding or rebound or abdominal distention noted    The Ekg interpreted by me shows  normal sinus rhythm with a rate of 85  Axis is   Normal  QTc is  normal  Intervals and Durations are unremarkable. ST Segments: nonspecific changes  No significant change from prior EKG dated 8/16/22    Imaging:  CT Head W/O Contrast    Result Date: 10/26/2022  EXAMINATION: CT OF THE HEAD WITHOUT CONTRAST  10/26/2022 8:48 am TECHNIQUE: CT of the head was performed without the administration of intravenous contrast. Automated exposure control, iterative reconstruction, and/or weight based adjustment of the mA/kV was utilized to reduce the radiation dose to as low as reasonably achievable. COMPARISON: None. HISTORY: ORDERING SYSTEM PROVIDED HISTORY: History of cancer evaluate metastatic TECHNOLOGIST PROVIDED HISTORY: Reason for exam:->History of cancer evaluate metastatic Has a \"code stroke\" or \"stroke alert\" been called? ->No Decision Support Exception - unselect if not a suspected or confirmed emergency medical condition->Emergency Medical Condition (MA) Reason for Exam: Other (Pt has hx of cx, here today for generalized weakness/FTH, hasn't eaten x3-4 days ago, takes water and milk, pt cannot walk to bathroom, family reports they are here for placement in rehab facility ) Additional signs and symptoms: History of cancer evaluate metastatic FINDINGS: BRAIN/VENTRICLES: There is no acute intracranial hemorrhage, mass effect or midline shift. No abnormal extra-axial fluid collection. The gray-white differentiation is maintained without evidence of an acute infarct. There is no evidence of hydrocephalus. There are multifocal areas of white matter hypoattenuation. ORBITS: The visualized portion of the orbits demonstrate no acute abnormality. SINUSES: There is mild mucosal thickening in the right maxillary sinus. There is a right mastoid effusion. The right mastoid air cells are hypoplastic. SOFT TISSUES/SKULL:  No acute abnormality of the visualized skull or soft tissues. 1. No acute intracranial findings. 2. Areas of white matter hypoattenuation which likely reflect chronic microvascular ischemic changes. 3. Right mastoid effusion. 4. Mild mucosal thickening in the right maxillary sinus. CT CHEST ABDOMEN PELVIS WO CONTRAST Additional Contrast? None    Result Date: 10/26/2022  EXAMINATION: CT OF THE CHEST, ABDOMEN, AND PELVIS WITHOUT CONTRAST 10/26/2022 11:49 am TECHNIQUE: CT of the chest, abdomen and pelvis was performed without the administration of intravenous contrast. Multiplanar reformatted images are provided for review. Automated exposure control, iterative reconstruction, and/or weight based adjustment of the mA/kV was utilized to reduce the radiation dose to as low as reasonably achievable. COMPARISON: 03/24/2022 and 02/09/2022 HISTORY: ORDERING SYSTEM PROVIDED HISTORY: History throat and oropharyngeal cancer. Evaluate metastatic disease TECHNOLOGIST PROVIDED HISTORY: Reason for exam:->History throat and oropharyngeal cancer.   Evaluate metastatic disease Additional Contrast?->None Decision Support Exception - unselect if not a suspected or confirmed emergency medical condition->Emergency Medical Condition (MA) Reason for Exam: Other (Pt has hx of cx, here today for generalized weakness/FTH, hasn't eaten x3-4 days ago, takes water and milk, pt cannot walk to bathroom, family reports they are here for placement in rehab facility ) Additional signs and symptoms: History throat and oropharyngeal cancer. Evaluate metastatic disease FINDINGS: Chest: Mediastinum: Motion artifact degrades image quality. Coronary artery calcifications are a marker of atherosclerosis. Moderate atherosclerosis involves the thoracic aorta and major branch vessels. There are no enlarged thoracic lymph nodes. Calcified granulomatous disease is noted. Lungs/pleura: Mucous plugging is present within the subsegmental right lower lobe bronchi with associated bronchial wall thickening. There is no pneumothorax or pleural effusion. There is biapical scarring. Patchy nodular airspace disease is present within the right lower lobe due to pneumonia, possibly from aspiration. Soft Tissues/Bones: Degenerative changes involve the thoracic spine. Abdomen/Pelvis: Organs: The unenhanced liver, spleen, pancreas, adrenal glands and kidneys are unremarkable. Status post cholecystectomy. GI/Bowel: A small hiatal hernia is noted. There is no bowel obstruction. Status post appendectomy. Although the colon is not distended, there is moderate circumferential wall thickening involving the distal sigmoid colon with mild pericolonic inflammation. Pelvis: Status post hysterectomy. Peritoneum/Retroperitoneum: There is no free fluid or adenopathy. Moderate to severe atherosclerosis involves the abdominal aorta and major branch vessels. Bones/Soft Tissues: Degenerative changes involve the lumbar spine. 1. Right lower lobe pneumonia. Recommend chest radiograph in 8 weeks to confirm resolution. 2. Acute colitis involving the distal sigmoid colon. MDM:   Patient presenting with concern for failure to thrive and noted to have evidence of pneumonia and colitis.   Will initiate antibiotics, fluids and plan for admission to the hospitalist for overall management        For further details of the patient's emergency department visit, please see the advanced practice provider's documentation. Denia Davis MD     This report has been produced using speech recognition software and may contain errors related to that system including errors in grammar, punctuation, and spelling, as well as words and phrases that may be inappropriate. If there are any questions or concerns please feel free to contact the dictating provider for clarification.        Denia Davis MD  10/26/22 1413

## 2022-10-26 NOTE — ED PROVIDER NOTES
[address] ED  EMERGENCY DEPARTMENT ENCOUNTER        Pt Name: Ivette Green  MRN: [id number]  Armstrongfbaljeet 1950  Date of evaluation: 10/26/2022  Provider: Vaishnavi Jones PA-C  PCP: LEVAR Betts CNP  Note Started: 11:39 AM EDT        I have seen and evaluated this patient with my supervising physician Perla Jean MD.    [facility]       Chief Complaint   Patient presents with    Other     Pt has hx of cx, here today for generalized weakness/FTH, hasn't eaten x3-4 days ago, takes water and milk, pt cannot walk to bathroom, family reports they are here for placement in rehab facility        HISTORY OF PRESENT ILLNESS   (Location, Timing/Onset, Context/Setting, Quality, Duration, Modifying Factors, Severity, Associated Signs and Symptoms)  Note limiting factors. Chief Complaint: Failure to thrive    Ivette Green is a 71 y.o. female who presents with her daughter. This patient was seen in 3/22 with complaint failure to thrive. Apparent G-tube placed and improvement with G-tube removal a few months ago. She has had steady decline since that removal.  Patient does live at home with  who is having increasing difficulty managing. As I walk into the patient is sound asleep and appears somewhat ashen in color. She does awaken. The daughter provides majority of history. It is indicated the patient had chronic sore throat was seen by ENT Dr. Belia Viveros 4/21 diagnosis throat and tongue cancer. Followed by [facility]. She had 10 chemo treatments and 3 radiation treatments. Last treatments occurring 9/21. I do not find last surveillance however the patient did have abdominal pelvic CT scan 3/22 showing no metastatic disease at that time. The patient per the daughter decreased food intake, decreased fluid intake. Unable to ambulate, increased weakness and concern failure to thrive. The daughter is not POA.   I do find in the patient's chart 9/21 DNR [facility]    ENT Dr. Artie Koo, GI Dr. Anali Ramon, hematology/oncology is [facility]    Nursing Notes were all reviewed and agreed with or any disagreements were addressed in the HPI. REVIEW OF SYSTEMS    (2-9 systems for level 4, 10 or more for level 5)     Review of Systems    Positives and Pertinent negatives as per HPI. Except as noted above in the ROS, all other systems were reviewed and negative.        PAST MEDICAL HISTORY     Past Medical History:   Diagnosis Date    Anxiety     Cancer ([facility] 05/05/2021    throat    Cerebral artery occlusion with cerebral infarction ([facility]     30 years ago had mini stroke with no residual    Crohn disease ([facility]     CVA (cerebral infarction) 2006    hx unclear    Gastrostomy status ([facility] 9/1/2021    Hyperlipidemia     Hypertension          SURGICAL HISTORY     Past Surgical History:   Procedure Laterality Date    APPENDECTOMY      CHOLECYSTECTOMY      COLONOSCOPY  2011    COLONOSCOPY N/A 9/2/2021    COLONOSCOPY WITH BIOPSY RECTAL ULCER performed by Jeannette Ernandez MD at [facility] ([address] [place] [address])      LARYNGOSCOPY N/A 5/5/2021    DIRECT MICROLARYNGOSCOPY WITH BIOPSIES performed by Artie Koo DO at [facility] (CERVIX NOT REMOVED)  1973    infection after btl;one ovary in    [address]  05/26/2021    Procedure: EGD ESOPHAGOGASTRODUODENOSCOPY PEG TUBE INSERTION    UPPER GASTROINTESTINAL ENDOSCOPY  5/26/2021    EGD ESOPHAGOGASTRODUODENOSCOPY PEG TUBE INSERTION performed by Jose Manuel Smith MD at [address]       Previous Medications    ALLOPURINOL (ZYLOPRIM) 100 MG TABLET    Take 1 tablet by mouth daily    GABAPENTIN (NEURONTIN) 100 MG CAPSULE    TAKE 2 CAPSULES BY MOUTH THREE TIMES DAILY    LEVOTHYROXINE (SYNTHROID) 100 MCG TABLET    Take 1 tablet by mouth daily    SODIUM BICARBONATE 650 MG TABLET    Take 650 mg by mouth 2 times daily         ALLERGIES Penicillins    FAMILYHISTORY       Family History   Problem Relation Age of Onset    Heart Disease Mother           SOCIAL HISTORY       Social History     Tobacco Use    Smoking status: Every Day     Packs/day: 1.00     Years: 30.00     Pack years: 30.00     Types: Cigarettes    Smokeless tobacco: Never    Tobacco comments:     placed in AVS   Vaping Use    Vaping Use: Never used   Substance Use Topics    Alcohol use: Not Currently     Alcohol/week: 4.0 standard drinks     Types: 4 Cans of beer per week     Comment: daily; 4 beers per day    Drug use: No       SCREENINGS    Primitivo Coma Scale  Eye Opening: Spontaneous  Best Verbal Response: Oriented  Best Motor Response: Obeys commands  Primitivo Coma Scale Score: 15        PHYSICAL EXAM    (up to 7 for level 4, 8 or more for level 5)     ED Triage Vitals [10/26/22 1025]   BP Temp Temp Source Heart Rate Resp SpO2 Height Weight   120/65 97 °F (36.1 °C) Oral 93 14 94 % -- --       Physical Exam  Vitals and nursing note reviewed. Constitutional:       Appearance: She is well-developed and normal weight. She is ill-appearing. Comments: Pale and ashen in color. HENT:      Head: Normocephalic and atraumatic. Right Ear: External ear normal.      Left Ear: External ear normal.   Eyes:      General: No scleral icterus. Right eye: No discharge. Left eye: No discharge. Conjunctiva/sclera: Conjunctivae normal.   Cardiovascular:      Rate and Rhythm: Regular rhythm. Tachycardia present. Heart sounds: Normal heart sounds. Pulmonary:      Effort: Pulmonary effort is normal.      Breath sounds: Normal breath sounds. Abdominal:      General: Abdomen is flat. Bowel sounds are normal.      Palpations: Abdomen is soft. Tenderness: There is no abdominal tenderness. Musculoskeletal:         General: Normal range of motion. Cervical back: Normal range of motion and neck supple. Right lower leg: No edema.       Left lower leg: No edema.   Skin:     General: Skin is warm and dry. Neurological:      General: No focal deficit present. Mental Status: She is alert and oriented to person, place, and time. Mental status is at baseline. Psychiatric:         Mood and Affect: Mood normal.         Behavior: Behavior normal.         Thought Content: Thought content normal.         Judgment: Judgment normal.       DIAGNOSTIC RESULTS   LABS:    Labs Reviewed   PROTIME-INR - Abnormal; Notable for the following components:       Result Value    Protime 14.7 (*)     INR 1.17 (*)     All other components within normal limits   BASIC METABOLIC PANEL - Abnormal; Notable for the following components:    Sodium 126 (*)     Chloride 97 (*)     CO2 16 (*)     BUN 32 (*)     Creatinine 2.5 (*)     Est, Glom Filt Rate 20 (*)     All other components within normal limits   BRAIN NATRIURETIC PEPTIDE - Abnormal; Notable for the following components:    Pro- (*)     All other components within normal limits   CBC WITH AUTO DIFFERENTIAL - Abnormal; Notable for the following components:    RBC 2.67 (*)     Hemoglobin 8.7 (*)     Hematocrit 25.1 (*)     RDW 16.0 (*)     Lymphocytes Absolute 0.3 (*)     All other components within normal limits   CULTURE, BLOOD 2   CULTURE, BLOOD 1   TROPONIN   TSH   MAGNESIUM   CK   LIPASE   LACTIC ACID   HEPATIC FUNCTION PANEL   PHOSPHORUS   URINALYSIS WITH REFLEX TO CULTURE   CALCIUM, IONIZED   PREALBUMIN       When ordered only abnormal lab results are displayed. All other labs were within normal range or not returned as of this dictation. EKG: When ordered, EKG's are interpreted by the Emergency Department Physician in the absence of a cardiologist.  Please see their note for interpretation of EKG.     RADIOLOGY:   Non-plain film images such as CT, Ultrasound and MRI are read by the radiologist. Plain radiographic images are visualized and preliminarily interpreted by the ED Provider with the below findings:        Interpretation per the Radiologist below, if available at the time of this note:    CT CHEST ABDOMEN PELVIS WO CONTRAST Additional Contrast? None   Final Result   1. Right lower lobe pneumonia. Recommend chest radiograph in 8 weeks to   confirm resolution. 2. Acute colitis involving the distal sigmoid colon. CT Head W/O Contrast   Final Result   1. No acute intracranial findings. 2. Areas of white matter hypoattenuation which likely reflect chronic   microvascular ischemic changes. 3. Right mastoid effusion. 4. Mild mucosal thickening in the right maxillary sinus. No results found. PROCEDURES   Unless otherwise noted below, none     Procedures    CRITICAL CARE TIME   Critical Care  There was a high probability of life-threatening clinical deterioration in the patient's condition requiring my urgent intervention. Total critical care time with the patient was 35 minutes excluding separately reportable procedures. Critical care required due to patients finding of COVID virus infection with secondary pneumonia possibly aspiration pneumonia, failure to thrive, sigmoid colitis, hyponatremia. CONSULTS:  IP CONSULT TO HOSPITALIST      EMERGENCY DEPARTMENT COURSE and DIFFERENTIAL DIAGNOSIS/MDM:   Vitals:    Vitals:    10/26/22 1025   BP: 120/65   Pulse: 93   Resp: 14   Temp: 97 °F (36.1 °C)   TempSrc: Oral   SpO2: 94%       Patient was given the following medications:  Medications   metronidazole (FLAGYL) 500 mg in 0.9% NaCl 100 mL IVPB premix (500 mg IntraVENous New Bag 10/26/22 1517)   levoFLOXacin (LEVAQUIN) 500 MG/100ML infusion 500 mg (has no administration in time range)   0.9 % sodium chloride bolus (0 mLs IntraVENous Stopped 10/26/22 1515)         Is this patient to be included in the SEP-1 Core Measure due to severe sepsis or septic shock? No   Exclusion criteria - the patient is NOT to be included for SEP-1 Core Measure due to:   Infection is not TABLET    Take 1 tablet by mouth 2 times daily              (Please note that portions of this note were completed with a voice recognition program.  Efforts were made to edit the dictations but occasionally words are mis-transcribed. )    Luciano Rodriguez PA-C (electronically signed)            Luciano Rodriguez PA-C  10/26/22 5550

## 2022-10-26 NOTE — TELEPHONE ENCOUNTER
Pt daughter called in this morning and wanted lisa to know that she is taking her mother to the ER. She went to visit her mom and she was so weak she could not stand or walk she stopped drinking the milk drink .

## 2022-10-26 NOTE — PROGRESS NOTES
Received call from Dr. Diana Santa regarding Bun/Creat. Continue sodium bicarb and neph will see patient tomorrow.

## 2022-10-26 NOTE — H&P
Hospital Medicine History & Physical      PCP: Caitlin Frances, APRN - CNP    Date of Admission: 10/26/2022    Date of Service: Pt seen/examined on 10/26/2022      Chief Complaint:    Chief Complaint   Patient presents with    Other     Pt has hx of cx, here today for generalized weakness/FTH, hasn't eaten x3-4 days ago, takes water and milk, pt cannot walk to bathroom, family reports they are here for placement in rehab facility          History Of Present Illness: The patient is a 71 y.o. female with hx of throat cancer, chemo-induced neuropathy, CKD IV, Hypothyroidism who presented to [facility] ED with complaint of \"sick\". Patient states she has felt sick for the past couple of weeks. Endorses nausea, vomiting and non-productive cough. Otherwise she denies other issues. She is alert and oriented to her name and the place but tells me it's 1979. Despite this she does appear somewhat but overall is a poor historian. Patient's daughter is at bedside and majority of history is gathered from her. Patient has a history of throat cancer which has been treated by [facility] and she follows with Dr. Akila Gallegos with ENT. At one point she had a Gtube placed for nutrition and reportedly appeared to be doing well with this, however, the patient wanted it to be removed and since its removal several months ago the patient has drastically declined. She apparently has not eaten solid foods for approximately 6 months. Daughter states that most of her nutrition was via milk and she would go through several gallons per week. She states that the patient has said \"nothing tastes good\" and \"it hurts to swallow\" although the patient does not endorse this to me. Daughter also reports that she has apparently had issues with nutrition previously as well.  Daughter reports that the patient lives with her  who is having increasing difficulty taking care of her at home as she has essentially become fully dependent on him to the extent that he has tablet by mouth daily 8/18/22  Yes LEVAR Romano CNP   levothyroxine (SYNTHROID) 100 MCG tablet Take 1 tablet by mouth daily 5/19/22 10/26/22 Yes LEVAR Romano CNP   sodium bicarbonate 650 MG tablet Take 650 mg by mouth 2 times daily    Historical Provider, MD       Allergies:  Penicillins    Social History:  The patient currently lives at home    TOBACCO:   reports that she has been smoking cigarettes. She has a 30.00 pack-year smoking history. She has never used smokeless tobacco.  ETOH:   reports that she does not currently use alcohol after a past usage of about 4.0 standard drinks per week. Family History:   Positive as follows:        Problem Relation Age of Onset    Heart Disease Mother        REVIEW OF SYSTEMS:       Constitutional: Negative for fever +poor oral intake  HENT: Negative for sore throat   Eyes: Negative for redness   Respiratory: Negative  for dyspnea, +cough   Cardiovascular: Negative for chest pain   Gastrointestinal: Negative diarrhea, +nausea, +vomiting  Genitourinary: Negative for hematuria   Musculoskeletal: Negative for arthralgias   Skin: Negative for rash   Neurological: Negative for syncope   Hematological: Negative for adenopathy   Psychiatric/Behavorial: Negative for anxiety    PHYSICAL EXAM:    BP (!) 122/46   Pulse 83   Temp 98.1 °F (36.7 °C) (Oral)   Resp 20   Wt 130 lb (59 kg)   SpO2 98%   BMI 22.31 kg/m²     Gen: No distress. Alert. Eyes: PERRL. No sclera icterus. No conjunctival injection. ENT: No discharge. Pharynx clear. Neck: No JVD. No Carotid Bruit. Trachea midline. Resp: No accessory muscle use. +right sided crackles. No wheezes. No rhonchi. CV: Regular rate. Regular rhythm. No murmur. No rub. No edema. Capillary Refill: Brisk,< 3 seconds   Peripheral Pulses: +2 palpable, equal bilaterally   GI: TTP overlying and surrounding hole from previous Gtube no s/s of infection of this. Non-distended. No masses. No organomegaly.  Normal bowel sounds. No hernia. Skin: Warm and dry. No nodule on exposed extremities. No rash on exposed extremities. M/S: No cyanosis. No joint deformity. No clubbing. Neuro: Awake. Grossly nonfocal    Psych: Oriented to person and place. No anxiety or agitation. CBC:   Recent Labs     10/26/22  1228   WBC 8.8   HGB 8.7*   HCT 25.1*   MCV 94.1        BMP:   Recent Labs     10/26/22  1228   *   K 4.2   CL 97*   CO2 16*   PHOS 2.8   BUN 32*   CREATININE 2.5*     LIVER PROFILE:   Recent Labs     10/26/22  1228   AST 22   ALT 15   LIPASE 19.0   BILIDIR <0.2   BILITOT 0.3   ALKPHOS 57     PT/INR:   Recent Labs     10/26/22  1228   PROTIME 14.7*   INR 1.17*       CARDIAC ENZYMES  Recent Labs     10/26/22  1228   CKTOTAL 36   TROPONINI <0.01       CULTURES  Covid NAAT positive  Blood cx x2 pending    EKG:  I have reviewed the EKG with the following interpretation:   Normal sinus rhythm  Nonspecific T wave abnormality  Abnormal ECG  When compared with ECG of 29-AUG-2021 22:34,  Nonspecific T wave abnormality now evident in Inferior leads  Nonspecific T wave abnormality now evident in Anterolateral leads  Confirmed by CHICO BOB, [address] ([id number]) on 10/26/2022 5:41:24 PM    RADIOLOGY  CT CHEST ABDOMEN PELVIS WO CONTRAST Additional Contrast? None   Final Result   1. Right lower lobe pneumonia. Recommend chest radiograph in 8 weeks to   confirm resolution. 2. Acute colitis involving the distal sigmoid colon. CT Head W/O Contrast   Final Result   1. No acute intracranial findings. 2. Areas of white matter hypoattenuation which likely reflect chronic   microvascular ischemic changes. 3. Right mastoid effusion. 4. Mild mucosal thickening in the right maxillary sinus. ASSESSMENT/PLAN:  #RLL Pneumonia   -?  Aspiration  -CT above  -she is on room air  -IV PPI  -Abx to include Levaquin D#1; renal dosing and Flagyl D#1 for now    #Acute sigmoid colitis   -seen on CT  -patient does not complain of diarrhea  -abx ordered as above for pna  -GI consulted     #CKDIV  #Metabolic acidosis  #Hyponatremia  -Cr appears at baseline   -Bicarb 16  -PCP recently has placed patient on allopurinol for hyperuricemia and sodium bicarb for metabolic acidosis. Unclear if patient is taking sodium bicarb. -IVF with NS and 50 mEq of sodium bicarb at 100 mL/hr for now  -Nephrology consulted    #Nausea and vomiting  -antiemetics   -IVF    #COVID  -positive on NAAT   -not hypoxic  -no indication for inpt tx  -droplet +  -?cough related to this    #Failure to thrive  #Poor oral intake  -?dysphagia vs behavioral   -patient denies painful swallowing, per daughter however patient has endorsed \"nothing tasting good\" and \"hurts to swallow\" at times  -daughter also reports that she has had issues with not eating in the past  -SLP eval  -GI consulted to consider replacement of Gtube  -May need to consider palliative care consult   -PTOT; will likely require SNF  -Daughter reports that patient's  has been carrying her around the house and to the car and he cannot take care of her anymore    #Hx of throat cancer  -follows with ENT, Dr. Frank Ring  -completed chemo/rad with [facility]  -Later, ENT referred to  and patient has seen only once. They ordered CT neck with IV contrast but patient did not receive  -Gtube placed at one point by Dr. Juvenal Clement. Patient started to gain weight and Gtube was removed per patient request a few months ago  -CT of chest and brain today thankfully does not appear to display CA mets    #Chemo-induced neuropathy  -home gabapentin dose is 200 mg TID  -patient's daughter states she takes more than prescribed  -given her poor renal function I have ordered half her home dose for now    #Anemia, appears chronic  -?  ACD vs iron def  -check iron studies, ferritin and peripheral smear    #Hypothyroidism  -TSH wnl  -continue synthroid    DVT Prophylaxis: Lovenox  Diet: Diet NPO Exceptions are: Simba and Neha, Sips of [employer] with Meds, Sips of Clear Liquids  Code Status: DNR-CCA    Jewels Solano PA-C  10/26/2022 6:05 PM

## 2022-10-26 NOTE — PROGRESS NOTES
Consult has been called to Dr. Se Jo on 10/26/22.  Spoke with Laura Mosquera. 6:23 PM    Josiephine Seeds  10/26/2022

## 2022-10-27 ENCOUNTER — TELEPHONE (OUTPATIENT)
Dept: FAMILY MEDICINE CLINIC | Age: 72
End: 2022-10-27

## 2022-10-27 LAB
ALBUMIN SERPL-MCNC: 3 G/DL (ref 3.4–5)
ALBUMIN SERPL-MCNC: 3.1 G/DL (ref 3.4–5)
ANION GAP SERPL CALCULATED.3IONS-SCNC: 13 MMOL/L (ref 3–16)
ANION GAP SERPL CALCULATED.3IONS-SCNC: 13 MMOL/L (ref 3–16)
BASOPHILS ABSOLUTE: 0 K/UL (ref 0–0.2)
BASOPHILS RELATIVE PERCENT: 0.2 %
BUN BLDV-MCNC: 28 MG/DL (ref 7–20)
BUN BLDV-MCNC: 30 MG/DL (ref 7–20)
CALCIUM SERPL-MCNC: 7.2 MG/DL (ref 8.3–10.6)
CALCIUM SERPL-MCNC: 8.1 MG/DL (ref 8.3–10.6)
CHLORIDE BLD-SCNC: 101 MMOL/L (ref 99–110)
CHLORIDE BLD-SCNC: 102 MMOL/L (ref 99–110)
CO2: 14 MMOL/L (ref 21–32)
CO2: 16 MMOL/L (ref 21–32)
CREAT SERPL-MCNC: 2.1 MG/DL (ref 0.6–1.2)
CREAT SERPL-MCNC: 2.2 MG/DL (ref 0.6–1.2)
EOSINOPHILS ABSOLUTE: 0.1 K/UL (ref 0–0.6)
EOSINOPHILS RELATIVE PERCENT: 1.1 %
FERRITIN: 1314 NG/ML (ref 15–150)
GFR SERPL CREATININE-BSD FRML MDRD: 23 ML/MIN/{1.73_M2}
GFR SERPL CREATININE-BSD FRML MDRD: 25 ML/MIN/{1.73_M2}
GLUCOSE BLD-MCNC: 101 MG/DL (ref 70–99)
GLUCOSE BLD-MCNC: 107 MG/DL (ref 70–99)
GLUCOSE BLD-MCNC: 88 MG/DL (ref 70–99)
GLUCOSE BLD-MCNC: 96 MG/DL (ref 70–99)
HCT VFR BLD CALC: 22.1 % (ref 36–48)
HCT VFR BLD CALC: 22.5 % (ref 36–48)
HEMOGLOBIN: 7.7 G/DL (ref 12–16)
IMMATURE RETIC FRACT: 0.31 (ref 0.21–0.37)
IRON SATURATION: 8 % (ref 15–50)
IRON: 17 UG/DL (ref 37–145)
LYMPHOCYTES ABSOLUTE: 0.3 K/UL (ref 1–5.1)
LYMPHOCYTES RELATIVE PERCENT: 5.2 %
MCH RBC QN AUTO: 31.8 PG (ref 26–34)
MCHC RBC AUTO-ENTMCNC: 34.1 G/DL (ref 31–36)
MCV RBC AUTO: 93.3 FL (ref 80–100)
MONOCYTES ABSOLUTE: 0.7 K/UL (ref 0–1.3)
MONOCYTES RELATIVE PERCENT: 11.4 %
NEUTROPHILS ABSOLUTE: 5.3 K/UL (ref 1.7–7.7)
NEUTROPHILS RELATIVE PERCENT: 82.1 %
PDW BLD-RTO: 15.3 % (ref 12.4–15.4)
PERFORMED ON: ABNORMAL
PERFORMED ON: ABNORMAL
PHOSPHORUS: 2.2 MG/DL (ref 2.5–4.9)
PHOSPHORUS: 2.8 MG/DL (ref 2.5–4.9)
PLATELET # BLD: 188 K/UL (ref 135–450)
PMV BLD AUTO: 7.7 FL (ref 5–10.5)
POTASSIUM SERPL-SCNC: 3.4 MMOL/L (ref 3.5–5.1)
POTASSIUM SERPL-SCNC: 3.8 MMOL/L (ref 3.5–5.1)
RBC # BLD: 2.41 M/UL (ref 4–5.2)
RETICULOCYTE ABSOLUTE COUNT: 0.03 M/UL (ref 0.02–0.1)
RETICULOCYTE COUNT PCT: 1.36 % (ref 0.5–2.18)
SODIUM BLD-SCNC: 129 MMOL/L (ref 136–145)
SODIUM BLD-SCNC: 130 MMOL/L (ref 136–145)
TOTAL IRON BINDING CAPACITY: 221 UG/DL (ref 260–445)
WBC # BLD: 6.5 K/UL (ref 4–11)

## 2022-10-27 PROCEDURE — 85025 COMPLETE CBC W/AUTO DIFF WBC: CPT

## 2022-10-27 PROCEDURE — 97161 PT EVAL LOW COMPLEX 20 MIN: CPT

## 2022-10-27 PROCEDURE — 99233 SBSQ HOSP IP/OBS HIGH 50: CPT | Performed by: NURSE PRACTITIONER

## 2022-10-27 PROCEDURE — 97165 OT EVAL LOW COMPLEX 30 MIN: CPT

## 2022-10-27 PROCEDURE — 6370000000 HC RX 637 (ALT 250 FOR IP)

## 2022-10-27 PROCEDURE — 6370000000 HC RX 637 (ALT 250 FOR IP): Performed by: INTERNAL MEDICINE

## 2022-10-27 PROCEDURE — 92610 EVALUATE SWALLOWING FUNCTION: CPT

## 2022-10-27 PROCEDURE — 2580000003 HC RX 258

## 2022-10-27 PROCEDURE — 2500000003 HC RX 250 WO HCPCS: Performed by: INTERNAL MEDICINE

## 2022-10-27 PROCEDURE — 97530 THERAPEUTIC ACTIVITIES: CPT

## 2022-10-27 PROCEDURE — 1200000000 HC SEMI PRIVATE

## 2022-10-27 PROCEDURE — 2580000003 HC RX 258: Performed by: INTERNAL MEDICINE

## 2022-10-27 PROCEDURE — 2500000003 HC RX 250 WO HCPCS

## 2022-10-27 PROCEDURE — 80069 RENAL FUNCTION PANEL: CPT

## 2022-10-27 PROCEDURE — C9113 INJ PANTOPRAZOLE SODIUM, VIA: HCPCS

## 2022-10-27 PROCEDURE — 92526 ORAL FUNCTION THERAPY: CPT

## 2022-10-27 PROCEDURE — 6370000000 HC RX 637 (ALT 250 FOR IP): Performed by: PHYSICIAN ASSISTANT

## 2022-10-27 PROCEDURE — 36415 COLL VENOUS BLD VENIPUNCTURE: CPT

## 2022-10-27 PROCEDURE — 6360000002 HC RX W HCPCS

## 2022-10-27 RX ORDER — SACCHAROMYCES BOULARDII 250 MG
250 CAPSULE ORAL 2 TIMES DAILY
Status: DISCONTINUED | OUTPATIENT
Start: 2022-10-27 | End: 2022-10-28 | Stop reason: HOSPADM

## 2022-10-27 RX ADMIN — METRONIDAZOLE 500 MG: 500 INJECTION, SOLUTION INTRAVENOUS at 15:59

## 2022-10-27 RX ADMIN — GABAPENTIN 100 MG: 100 CAPSULE ORAL at 20:34

## 2022-10-27 RX ADMIN — GABAPENTIN 100 MG: 100 CAPSULE ORAL at 11:42

## 2022-10-27 RX ADMIN — SODIUM BICARBONATE: 84 INJECTION, SOLUTION INTRAVENOUS at 13:09

## 2022-10-27 RX ADMIN — SODIUM BICARBONATE: 84 INJECTION, SOLUTION INTRAVENOUS at 17:09

## 2022-10-27 RX ADMIN — LEVOTHYROXINE SODIUM 100 MCG: 100 TABLET ORAL at 06:24

## 2022-10-27 RX ADMIN — RDII 250 MG CAPSULE 250 MG: at 20:34

## 2022-10-27 RX ADMIN — Medication 10 ML: at 20:36

## 2022-10-27 RX ADMIN — POTASSIUM PHOSPHATE, MONOBASIC AND POTASSIUM PHOSPHATE, DIBASIC 20 MMOL: 224; 236 INJECTION, SOLUTION, CONCENTRATE INTRAVENOUS at 12:00

## 2022-10-27 RX ADMIN — ENOXAPARIN SODIUM 30 MG: 100 INJECTION SUBCUTANEOUS at 11:42

## 2022-10-27 RX ADMIN — METRONIDAZOLE 500 MG: 500 INJECTION, SOLUTION INTRAVENOUS at 21:52

## 2022-10-27 RX ADMIN — PANTOPRAZOLE SODIUM 40 MG: 40 INJECTION, POWDER, FOR SOLUTION INTRAVENOUS at 11:55

## 2022-10-27 RX ADMIN — RDII 250 MG CAPSULE 250 MG: at 11:42

## 2022-10-27 RX ADMIN — ACETAMINOPHEN 650 MG: 325 TABLET ORAL at 20:34

## 2022-10-27 RX ADMIN — METRONIDAZOLE 500 MG: 500 INJECTION, SOLUTION INTRAVENOUS at 06:22

## 2022-10-27 RX ADMIN — SODIUM CHLORIDE: 9 INJECTION, SOLUTION INTRAVENOUS at 11:52

## 2022-10-27 ASSESSMENT — PAIN SCALES - GENERAL
PAINLEVEL_OUTOF10: 4
PAINLEVEL_OUTOF10: 2

## 2022-10-27 ASSESSMENT — PAIN DESCRIPTION - ONSET: ONSET: ON-GOING

## 2022-10-27 ASSESSMENT — PAIN DESCRIPTION - LOCATION
LOCATION: HEAD
LOCATION: HEAD

## 2022-10-27 ASSESSMENT — PAIN - FUNCTIONAL ASSESSMENT: PAIN_FUNCTIONAL_ASSESSMENT: PREVENTS OR INTERFERES SOME ACTIVE ACTIVITIES AND ADLS

## 2022-10-27 ASSESSMENT — PAIN DESCRIPTION - DESCRIPTORS: DESCRIPTORS: ACHING

## 2022-10-27 ASSESSMENT — PAIN DESCRIPTION - PAIN TYPE: TYPE: ACUTE PAIN

## 2022-10-27 NOTE — PLAN OF CARE
SLP completed evaluation. Please refer to notes in EMR.       Carmelo Hernandez MA [address] [facility]  Speech Language Pathologist

## 2022-10-27 NOTE — PROGRESS NOTES
Progress Note    Admit Date:  10/26/2022    Admitted with failure to thrive, COVID +, onroom air, RLL PNA, possible ASA, sigmoid colitis, CKD. Pt has a history of throat cancer and G-tube. G-tube removed and patient has not been eating, only drinking milk. Nephrology and GI following    Subjective:  Ms. Sarita Delvalle is resting in bed +cough. On room air. Speech eval completed and NPO at this time. Pt is refusing G-tube to be placed. She stated she just hasn't been eating because she isn't hungry. Been drinking a lot of milk at home only. Daughter at bedside and stated pts  is unable to csre for her at home. Pt is alert to self and situation only. Long discussion with daughter regarding plan of care.  is NOK to make medical decisions as patient is not alert and oriented. Pt stated she doesn't want to go to rehab she wants to go home. Palliative care consulted. Objective:   Patient Vitals for the past 4 hrs:   BP Temp Temp src Pulse SpO2   10/27/22 1130 118/67 97.6 °F (36.4 °C) Oral 81 97 %        No intake or output data in the 24 hours ending 10/27/22 1358    Physical Exam:    Gen: No distress. Alert. Pale, appears chronically ill  Eyes: PERRL. No sclera icterus. No conjunctival injection. ENT: No discharge. Pharynx clear. Neck: No JVD. Trachea midline. Resp: No accessory muscle use. +right sided crackles. No wheezes. No rhonchi. CV: Regular rate. Regular rhythm. No murmur. No rub. No edema. Capillary Refill: Brisk,< 3 seconds   Peripheral Pulses: +2 palpable, equal bilaterally   GI: TTP overlying and surrounding hole from previous Gtube no s/s of infection of this. Non-distended. No masses. No organomegaly. Normal bowel sounds. Skin: Warm and dry. No nodule on exposed extremities. No rash on exposed extremities. M/S: No cyanosis. No joint deformity. No clubbing. Neuro: Awake. Grossly nonfocal    Psych: Oriented to person and place. No anxiety or agitation. Aspiration  -CT above  -she is on room air  -IV PPI  -Abx to include Levaquin D#1; renal dosing and Flagyl D#1 for now  - monitor     Speech recommendations   NPO - pleasure feeds of thin liquid via cup only ; Critical Meds PO as tolerated  Risk management: stay upright for at least 30 mins after intake, oral care 2-3x/day to reduce adverse affects in the event of aspiration, small sips, increase physical mobility as able, slow rate of intake, general GERD precautions, and general aspiration precautions          #Acute sigmoid colitis   -seen on CT  -patient does not complain of diarrhea  -abx ordered as above for pna  -GI consulted      #CKDIV  #Metabolic acidosis  #Hyponatremia  -Cr appears at baseline   -Bicarb 16 on admission   -PCP recently has placed patient on allopurinol for hyperuricemia and sodium bicarb for metabolic acidosis. Unclear if patient is taking sodium bicarb. -IVF with NS and 50 mEq of sodium bicarb at 100 mL/hr for now  -Nephrology consulted     #Nausea and vomiting  -antiemetics   -IVF  - denies at this time     #COVID  -positive on NAAT   -not hypoxic  -no indication for inpt tx  -droplet +  -?cough related to this     #Failure to thrive  #Poor oral intake  -?dysphagia vs behavioral   -patient denies painful swallowing, per daughter however patient has endorsed \"nothing tasting good\" and \"hurts to swallow\" at times  -daughter also reports that she has had issues with not eating in the past  -SLP eval  -GI consulted to consider replacement of Gtube  -May need to consider palliative care consult   -PTOT; will likely require SNF  -Daughter reports that patient's  has been carrying her around the house and to the car and he cannot take care of her anymore  - pt is not alert and oriented to make decisions at this time, daughter stated she will go home ans speak with  regarding code status, she was [facility]- will change to full code until daughter discusses with , Jaqueline Cushing.   Will have palliative care discuss with . Discussed with ELIF Crook and she will call and address palliative care     #Hx of throat cancer  -follows with ENT, Dr. Malou Kuhn  -completed chemo/rad with [facility]  -Later, ENT referred to  and patient has seen only once. They ordered CT neck with IV contrast but patient did not receive  -Gtube placed at one point by Dr. Frances Ramey. Patient started to gain weight and Gtube was removed per patient request a few months ago  -CT of chest and brain today thankfully does not appear to display CA mets     #Chemo-induced neuropathy  -home gabapentin dose is 200 mg TID  -patient's daughter states she takes more than prescribed  -given her poor renal function I have ordered half her home dose for now     #Anemia, appears chronic- anemia of chronic disease   -? ACD vs iron def  - hemoglobin 8.7--7.7  - monitor   -check iron studies, ferritin and peripheral smear     #Hypothyroidism  -TSH wnl  -continue synthroid    Time spent today with patient >30- minutes, including plan of care, patient education, and care coordination. Palliative care consulted. Will discuss with  and daughter today and decide the plan of care.      DVT Prophylaxis: Lovenox   Diet: Diet NPO Exceptions are: Ice Chips, Sips of Water with Meds, Sips of Clear Liquids, Popsicles  Code Status: Prior         LEVAR Damian - CNP

## 2022-10-27 NOTE — PROGRESS NOTES
Inpatient Physical Therapy Evaluation and Treatment    Unit: [facility]  Date:  10/27/2022  Patient Name:    Renay Rico  Admitting diagnosis:  Colitis [K52.9]  Hyponatremia [E87.1]  Oropharyngeal cancer ([address] [C10.9]  Failure to thrive in adult [R62.7]  Pneumonia of right lower lobe due to infectious organism [J18.9]  Admit Date:  10/26/2022  Precautions/Restrictions/WB Status/ Lines/ Wounds/ Oxygen: Fall risk, Bed/chair alarm, Lines -IV, Telemetry, and Isolation Precautions: Droplet Plus - COVID    Treatment Time:  9:34 - 10:00  Treatment Number:  1   Timed Code Treatment Minutes: 16 minutes  Total Treatment Minutes:  26  minutes    Patient Goals for Therapy: \" go home \"          Discharge Recommendations: SNF  DME needs for discharge: defer to facility       Therapy recommendation for EMS Transport: requires transport by cot due to difficulty with transfers    Therapy recommendations for staff:   Assist of 2 with use of MARA STEDY and gait belt for all transfers to/from [facility]  to/from [facility]    History of Present Illness:   Per Ihsan Jean:  The patient is a 71 y.o. female with hx of throat cancer, chemo-induced neuropathy, CKD IV, Hypothyroidism who presented to [facility] ED with complaint of \"sick\". Patient states she has felt sick for the past couple of weeks. Endorses nausea, vomiting and non-productive cough. Otherwise she denies other issues. She is alert and oriented to her name and the place but tells me it's 1979. Despite this she does appear somewhat but overall is a poor historian. Patient's daughter is at bedside and majority of history is gathered from her. Patient has a history of throat cancer which has been treated by [facility] and she follows with Dr. Johnny Sharma with ENT. At one point she had a Gtube placed for nutrition and reportedly appeared to be doing well with this, however, the patient wanted it to be removed and since its removal several months ago the patient has drastically declined.  She apparently has not eaten solid foods for approximately 6 months. Daughter states that most of her nutrition was via milk and she would go through several gallons per week. She states that the patient has said \"nothing tastes good\" and \"it hurts to swallow\" although the patient does not endorse this to me. Daughter also reports that she has apparently had issues with nutrition previously as well. Daughter reports that the patient lives with her  who is having increasing difficulty taking care of her at home as she has essentially become fully dependent on him to the extent that he has apparently been carrying her around the house and to and from the car. Over the last week the patient has apparently only been drinking water occasionally. Vitals are stable. Labs reveal hyponatremia, metabolic acidosis, stable CKDIV, chronic anemia. Some time after I saw her, her COVID was positive. She was stable on room air when I saw her. Chest CT displayed right lower lobe pneumonia and acute colitis involving the distal sigmoid colon. CT head without acute findings. She is admitted for further care.      Home Health S4 Level Recommendation:  NA  AM-PAC Mobility Score       AM-PAC Inpatient Mobility without Stair Climbing Raw Score : 9    Social/Functional History from OT Taya 8/31/21  Social/Functional History  Lives With: Spouse  Type of Home: House  Home Layout: One level  Home Access: Stairs to enter with rails  Entrance Stairs - Number of Steps: 2 WENDY  Entrance Stairs - Rails: Both  Bathroom Shower/Tub: Tub/Shower unit (walk in shower in 's bathroom)  Bathroom Toilet: Standard  Bathroom Equipment: Grab bars in shower  Bathroom Accessibility: Accessible  Home Equipment: Diana Lawton Help From: Family  ADL Assistance: Needs assistance ( \"helps with everything\" since the ca dx and chemo)  Homemaking Assistance: Needs assistance  Homemaking Responsibilities: No  Ambulation Assistance: Independent (normally no AD, but has cane if needed; reports she stays in bed most of the time in the last month since chemo)  Transfer Assistance: Independent  Active : No  Patient's  Info: has not driven in the last 4 months  Leisure & Hobbies: pt has 7 dogs  Additional Comments: Pt's  reports one fall 3 weeks ago without injury. Pt does not use AD at home normally. Preadmission Environment  per patient 10/27/22  Pt. Lives with spouse and 24/7 Assist Available -has 7 dogs  Home environment:    mobile home/trailer  Steps to enter first floor:    4-5 steps to enter and no handrail            Steps to second floor: N/A  Bathroom:       Tub/Shower unit, Walk-in Shower, Grab bars, and Shower Chair   Equipment owned:      [employer] [facility] [employer] [facility]      Preadmission Status / PLOF:  History of falls             No  Pt. Able to drive          No- drives  Pt Fully independent with ADL's         No-spouse assists with bathing and dressing as needed  Pt. Required assistance from family for:  Cleaning, Cooking, and Laundry     Pt. Fully independent for transfers and gait and walked with: Cane-sometimes has  hold onto her \"when I'm afraid I'll fall\"          Pain   No  Location:   Rating: NA /10  Pain Medicine Status: Denies need    Cognition    A&O x4 - stated \"4876\" for year but easily reoriented  Able to follow 1 step commands  Refused OOB this date but appears to understand that therapy assessment of OOB safety important if hoping to d/c home from hospital.  States she will get OOB \"tomorrow. \"    Subjective  Patient lying supine in bed with no family present. Pt agreeable to this PT eval & tx. Upper Extremity ROM/Strength  Please see OT evaluation. Lower Extremity ROM / Strength   AROM WFL: Yes  ROM limitations:     BLE strength impaired, but not formally assessed with MMT.     Lower Extremity Sensation    [facility]    Lower Extremity Proprioception:   NT    Coordination and Tone  NT    Balance  Sitting:  Fair +; CGA  Comments: EOB    Standing: Not tested; Not Tested  Comments:     Bed Mobility   Supine to Sit:    Mod A initially, improved to CGA on second trial  Sit to Supine:   CGA  Rolling:   Not Tested  Scooting in sitting: Min A   Scooting in supine:  Not Tested    Transfer Training  -Pt refused   Sit to stand:   Not Tested  Stand to sit:   Not Tested  Bed to Chair:   Not Tested with use of N/A    Gait gait deferred due to pt refusing; pt ambulated 0 ft. Stair Training deferred, pt unsafe/ not appropriate to complete stairs at this time    Activity Tolerance   Pt completed therapy session with No adverse symptoms noted w/activity    Supine:  SpO2: 98% on RA  HR: 86  BP: 131/63     EOB:  SpO2: 99% on RA  HR: 87  BP: 108/61    Positioning Needs   Pt in bed, alarm set, positioned in proper neutral alignment and pressure relief provided. Call light provided and all needs within reach    Exercises Initiated  Chloe deferred secondary to pt declined  NA    Other  None. Patient/Family Education   Pt educated on role of inpatient PT, POC, importance of continued activity, DC recommendations, safety awareness, transfer techniques, pacing activity, and calling for assist with mobility. Assessment  Pt seen for Physical Therapy evaluation in acute care setting. Pt demonstrated decreased Activity tolerance, Balance, Safety, and Strength as well as decreased independence with Ambulation, Bed Mobility , and Transfers. Recommending SNF upon discharge as patient functioning well below baseline, demonstrates good rehab potential and unable to return home due to burden of care beyond caregiver ability, home environment not conducive to patient recovery, and limited safety awareness. Goals : To be met in 3 visits:  1). Independent with LE Ex x 10 reps    To be met in 6 visits:  1). Supine to/from sit: Supervision  2). Sit to/from stand: Min A   3). Bed to chair: Min A   4).   Gait: Ambulate 50 ft.  with  Min A  and use of LRAD (least restrictive assistive device)  5). Tolerate B LE exercises 3 sets of 10-15 reps    Rehabilitation Potential: Fair  Strengths for achieving goals include:   PLOF and Family Support   Barriers to achieving goals include:    Weakness    Plan    To be seen 3-5 x / week  while in acute care setting for therapeutic exercises, bed mobility, transfers, progressive gait training, balance training, and family/patient education. Signature: Jean-Paul Monteiro PT, DPT, OMT-C  #[id number]      If patient discharges from this facility prior to next visit, this note will serve as the Discharge Summary.

## 2022-10-27 NOTE — CONSULTS
Gastroenterology Consult Note    Patient:   Estela Russ   :    1950   Facility:   [facility]  Referring/PCP: Leatha Membreno APRN - CNP  Date:     10/27/2022  Consultant:   Rhett Maya PA-C      Chief Complaint   Patient presents with    Other     Pt has hx of cx, here today for generalized weakness/FTH, hasn't eaten x3-4 days ago, takes water and milk, pt cannot walk to bathroom, family reports they are here for placement in rehab facility         History of Present illness     81-year-old female with a history of throat cancer s/p chemoradiation, hypothyroidism, CKD, and chemo-induced neuropathy presented to the ED after feeling sick. Patient is a fair historian, so history obtained upon chart review, discussion with nurse, and patient interview. She admits to decreased appetite for 2 weeks. She says she does not feel like eating. She passes a BM daily on average, and her last BM was yesterday. She denies nausea, vomiting, dysphagia, heartburn, abdominal pain, cramping, bloating, change in bowel habits, rectal bleeding, melena, and weight loss. She takes ibuprofen 2 times per week for pain relief. Her last EGD with PEG tube placement was . She had her PEG tube removed in . Her last colonoscopy in  showed collagenous colitis. GI was consulted for evaluation of failure to thrive. CT chest, abdomen, and pelvis shows right lower lobe pneumonia and acute colitis involving the distal sigmoid colon.       Past Medical History:   Diagnosis Date    Anxiety     Cancer ([facility] [address] 2021    throat    Cerebral artery occlusion with cerebral infarction ([facility] [address]     30 years ago had mini stroke with no residual    Crohn disease ([address]     CVA (cerebral infarction)     hx unclear    Gastrostomy status ([facility] [address] 2021    Hyperlipidemia     Hypertension      Past Surgical History:   Procedure Laterality Date    APPENDECTOMY      CHOLECYSTECTOMY      COLONOSCOPY   COLONOSCOPY N/A 9/2/2021    COLONOSCOPY WITH BIOPSY RECTAL ULCER performed by Eb Cohen MD at [facility] ([address] [facility])      LARYNGOSCOPY N/A 5/5/2021    DIRECT MICROLARYNGOSCOPY WITH BIOPSIES performed by Frank Real DO at [place] [id number] (CERVIX NOT REMOVED)  1973    infection after btl;one ovary in    UPPER GASTROINTESTINAL ENDOSCOPY  05/26/2021    Procedure: EGD ESOPHAGOGASTRODUODENOSCOPY PEG TUBE INSERTION    UPPER GASTROINTESTINAL ENDOSCOPY  5/26/2021    EGD ESOPHAGOGASTRODUODENOSCOPY PEG TUBE INSERTION performed by Dillon Tyler MD at [address]:   Social History     Tobacco Use    Smoking status: Every Day     Packs/day: 1.00     Years: 30.00     Pack years: 30.00     Types: Cigarettes    Smokeless tobacco: Never    Tobacco comments:     placed in AVS   Substance Use Topics    Alcohol use: Not Currently     Alcohol/week: 4.0 standard drinks     Types: 4 Cans of beer per week     Comment: daily; 4 beers per day     Family:   Family History   Problem Relation Age of Onset    Heart Disease Mother      No current facility-administered medications on file prior to encounter. Current Outpatient Medications on File Prior to Encounter   Medication Sig Dispense Refill    gabapentin (NEURONTIN) 100 MG capsule TAKE 2 CAPSULES BY MOUTH THREE TIMES DAILY 540 capsule 0    allopurinol (ZYLOPRIM) 100 MG tablet Take 1 tablet by mouth daily 30 tablet 3    levothyroxine (SYNTHROID) 100 MCG tablet Take 1 tablet by mouth daily 90 tablet 1    sodium bicarbonate 650 MG tablet Take 650 mg by mouth 2 times daily        Infusions:    sodium chloride 5 mL/hr at 10/27/22 1152    IV infusion builder 100 mL/hr at 10/26/22 1822     PRN Medications: sodium chloride flush, sodium chloride, ondansetron **OR** ondansetron, polyethylene glycol, acetaminophen **OR** acetaminophen  Allergies:    Allergies   Allergen Reactions    Penicillins Hives and Rash     5/2013 40 yrs ago had a nonpruritic rash on hands;no hives and no other sx's;       ROS:  Constitutional: negative for chills, fevers and sweats  Eyes: negative for cataracts, icterus and redness  Ears, nose, mouth, throat, and face: negative for epistaxis, hearing loss and sore throat  Respiratory: negative for cough, hemoptysis and sputum  Cardiovascular: negative for chest pain, dyspnea and lower extremity edema  Gastrointestinal: as per HPI  Genitourinary:negative for dysuria, frequency and hematuria  Neurological: negative for coordination problems, dizziness and gait problems  Behavioral/Psych: negative for anxiety, depression and mood swings      Physical Exam   /67   Pulse 81   Temp 97.6 °F (36.4 °C) (Oral)   Resp 18   Ht 5' 1\" (1.549 m)   Wt 139 lb (63 kg)   SpO2 97%   BMI 26.26 kg/m²       General appearance: alert, appears stated age, cooperative, and no distress  Head: Normocephalic, without obvious abnormality, atraumatic  Eyes: conjunctivae/corneas clear. PERRL, EOM's intact. Fundi benign.   Neck: supple, symmetrical, trachea midline  Lungs: clear to auscultation bilaterally  Heart: regular rate and rhythm, S1, S2 normal, no murmur, click, rub or gallop  Abdomen: soft, non-tender; bowel sounds normal; no masses,  no organomegaly  Extremities: extremities normal, atraumatic, no cyanosis or edema    Lab and Imaging Review   Labs:  CBC:   Recent Labs     10/26/22  1228 10/27/22  0528   WBC 8.8 6.5   HGB 8.7* 7.7*   HCT 22.1*  25.1* 22.5*   MCV 94.1 93.3    188     BMP:   Recent Labs     10/26/22  1228 10/27/22  0528   * 130*   K 4.2 3.4*   CL 97* 101   CO2 16* 16*   PHOS 2.8 2.2*   BUN 32* 30*   CREATININE 2.5* 2.2*     LIVER PROFILE:   Recent Labs     10/26/22  1228   AST 22   ALT 15   LIPASE 19.0   PROT 6.7   BILIDIR <0.2   BILITOT 0.3   ALKPHOS 57     PT/INR:   Recent Labs     10/26/22  1228   INR 1.17*       IMAGING:  CT CHEST ABDOMEN PELVIS WO CONTRAST Additional Contrast? None   Final Result   1. Right lower lobe pneumonia. Recommend chest radiograph in 8 weeks to   confirm resolution. 2. Acute colitis involving the distal sigmoid colon. CT Head W/O Contrast   Final Result   1. No acute intracranial findings. 2. Areas of white matter hypoattenuation which likely reflect chronic   microvascular ischemic changes. 3. Right mastoid effusion. 4. Mild mucosal thickening in the right maxillary sinus. Attending Supervising [de-identified] Attestation Statement  The patient is a 71 y.o. female. I have performed a history and physical examination of the patient. I discussed the case with my physician assistant Cathleen Jerry PA-C    I reviewed the patient's Past Medical History, Past Surgical History, Medications, and Allergies. Physical Exam:  Vitals:    10/26/22 2300 10/27/22 0102 10/27/22 1130 10/27/22 1700   BP: 131/61 119/62 118/67 126/63   Pulse: 85 85 81 79   Resp: 16 18 18 20   Temp: 97.1 °F (36.2 °C) 97.1 °F (36.2 °C) 97.6 °F (36.4 °C) 97.6 °F (36.4 °C)   TempSrc: Oral Oral Oral Oral   SpO2: 99% 98% 97% 98%   Weight:  139 lb (63 kg)     Height:           Physical Examination: General appearance - chronically ill appearing  Mental status - alert, oriented to person, place, and time  Eyes - sclera anicteric  Neck - supple, no significant adenopathy  Chest - not examined  Heart - normal rate and regular rhythm  Abdomen - no rebound tenderness noted  Extremities - no pedal edema noted      Assessment:     67-year-old female with a history of throat cancer s/p chemoradiation, hypothyroidism, CKD, and chemo-induced neuropathy admitted with failure to thrive and acute sigmoid colitis, likely infections vs inflammatory.       Plan:   Continue supportive care  Monitor and document output  Observe for signs of bleeding  Continue broad spectrum antibiotics  Bowel regimen with probiotics daily  Continue Pantoprazole 40 mg qAM   Check stool tests  Advance diet as tolerated per SLP recommendations  Encourage PT/OT  Will follow  Recommend colonoscopy in 4-6 weeks upon d/c      Avis Interiano PA-C  12:02 PM 10/27/2022                      67-year-old female with history of hypertension, anxiety, Crohn's disease, hyperlipidemia, CVA, squamous cell carcinoma of the tongue status post PEG tube placement in May 2021 followed by chemoradiation therapy, underwent PEG tube removal in July 2022 now admitted with Colitis, Covid 19 and failure to thrive    Continue supportive care. Continue pantoprazole daily. Check stool test. Continue antibiotics. Continue pantoprazole daily. Speech therapy consultation.   Can consider PEG tube placement if patient is agreeable    Anjum Falcon MD          [phone]  [phone]

## 2022-10-27 NOTE — TELEPHONE ENCOUNTER
Patient daughter calling because patient is currently admitted in hospital. They are wanting to move her to a nursing facility at discharge and patient is refusing. Champ Joses states that patient is no longer ambulating on her own and  is having to carry her everywhere around the house. Is asking if there is any way for  to get POA over patient and start making her medical decisions? Or any other advice you may have to help this situation? Please advise.

## 2022-10-27 NOTE — PROGRESS NOTES
[phone] Occupational Therapy  Evaluation and Treatment    Unit: 2 [place]  Date:  10/27/2022  Patient Name:    Chika Peace  Admitting diagnosis:  Colitis [K52.9]  Hyponatremia [E87.1]  Oropharyngeal cancer ([facility] [address] [C10.9]  Failure to thrive in adult [R62.7]  Pneumonia of right lower lobe due to infectious organism [J18.9]  Admit Date:  10/26/2022  Precautions/Restrictions/WB Status/ Lines/ Wounds/ Oxygen: fall risk, IV, bed/chair alarm, telemetry, continuous pulse ox, and Droplet Plus precautions (+ COVID 19)    Treatment Time:  [id number]  Treatment Number: 1     Billable Treatment Time: 18 minutes   Total Treatment Time:   28   minutes    Patient Goals for Therapy:  \" go home \"      Discharge Recommendations: SNF  DME needs for discharge: defer to facility       Therapy recommendations for staff:   Assist of 2 with use of MARA STEDY for all transfers to/from BSC/chair    History of Present Illness: per Ermias RAY H&P 10/26/22:  \"The patient is a 71 y.o. female with hx of throat cancer, chemo-induced neuropathy, CKD IV, Hypothyroidism who presented to [facility] ED with complaint of \"sick\". Patient states she has felt sick for the past couple of weeks. Endorses nausea, vomiting and non-productive cough. Otherwise she denies other issues. She is alert and oriented to her name and the place but tells me it's 1979. Despite this she does appear somewhat but overall is a poor historian. Patient's daughter is at bedside and majority of history is gathered from her. Patient has a history of throat cancer which has been treated by [facility] and she follows with Dr. Frank Ring with ENT. At one point she had a Gtube placed for nutrition and reportedly appeared to be doing well with this, however, the patient wanted it to be removed and since its removal several months ago the patient has drastically declined. She apparently has not eaten solid foods for approximately 6 months.  Daughter states that most of her nutrition was via milk and she would go through several gallons per week. She states that the patient has said \"nothing tastes good\" and \"it hurts to swallow\" although the patient does not endorse this to me. Daughter also reports that she has apparently had issues with nutrition previously as well. Daughter reports that the patient lives with her  who is having increasing difficulty taking care of her at home as she has essentially become fully dependent on him to the extent that he has apparently been carrying her around the house and to and from the car. Over the last week the patient has apparently only been drinking water occasionally. Vitals are stable. Labs reveal hyponatremia, metabolic acidosis, stable CKDIV, chronic anemia. Some time after I saw her, her COVID was positive. She was stable on room air when I saw her. Chest CT displayed right lower lobe pneumonia and acute colitis involving the distal sigmoid colon. CT head without acute findings. She is admitted for further care. \"    Home Health S4 Level Recommendation:  Level 2 Social if going home with 24 hour physical assist despite SNF recommendation  AM-PAC Score: AM-PAC Inpatient Daily Activity Raw Score: 15    Social/Functional History from OT Taya 8/31/21  Social/Functional History  Lives With: Spouse  Type of Home: House  Home Layout: One level  Home Access: Stairs to enter with rails  Entrance Stairs - Number of Steps: 2 WENDY  Entrance Stairs - Rails: Both  Bathroom Shower/Tub: Tub/Shower unit (walk in shower in 's bathroom)  Bathroom Toilet: Standard  Bathroom Equipment: Grab bars in shower  Bathroom Accessibility: Accessible  Home Equipment: [employer] Help From: Family  ADL Assistance: Needs assistance ( \"helps with everything\" since the ca dx and chemo)  Homemaking Assistance: Needs assistance  Homemaking Responsibilities: No  Ambulation Assistance: Independent (normally no AD, but has cane if needed; reports she stays in bed most of the time in the last month since chemo)  Transfer Assistance: Independent  Active : No  Patient's  Info: has not driven in the last 4 months  Leisure & Hobbies: pt has 7 dogs  Additional Comments: Pt's  reports one fall 3 weeks ago without injury. Pt does not use AD at home normally. Preadmission Environment  per patient 10/27/22  Pt. Lives with spouse and 24/7 Assist Available -has 7 dogs  Home environment:  mobile home/trailer  Steps to enter first floor:    4-5 steps to enter and no handrail    Steps to second floor: N/A  Bathroom:  Tub/Shower unit, Walk-in Shower, Grab bars, and Shower Chair   Equipment owned:  small base [facility]     Preadmission Status / PLOF:  History of falls   No  Pt. Able to drive   No- drives  Pt Fully independent with ADL's  No-spouse assists with bathing and dressing as needed  Pt. Required assistance from family for:  Cleaning, Cooking, and Laundry     Pt. Fully independent for transfers and gait and walked with: Cane-sometimes has  hold onto her \"when I'm afraid I'll fall\"    Pain  No  Rating:NA  Location:  Pain Medicine Status: No request made      Cognition    A&O x4 - stated \"0794\" for year but easily reoriented  Able to follow 1 step commands  Refused OOB this date but appears to understand that therapy assessment of OOB safety important if hoping to d/c home from hospital.  States she will get OOB \"tomorrow. \"    Subjective  Patient lying supine in bed with no family present   Pt agreeable to this OT eval & tx. Initially agreeable to get up to chair but then flatly declined later in session.     Upper Extremity ROM:    WFL    Upper Extremity Strength:    Strength Assessment (measured on a 0-5 scale):  4/5 overall bilaterally, lacks UE endurance    Upper Extremity Sensation    Impaired-reports hands \"asleep\" initially, improved with AROM    Upper Extremity Proprioception:  WFL    Coordination and Tone  [facility]    Balance  Functional Sitting Balance:  WFL at EOB after initially requiring MOD A (reported dizziness at EOB after initial supine to sit)  Functional Standing Balance:NT    Bed mobility:    Supine to sit:   Mod A initially, improved to CGA on second trial  Sit to supine:   CGA  Rolling:    Not Tested  Scooting in sitting:  Min A  Scooting to head of bed:   Total A  with [place] bed    Bridging:   Not Tested    Transfers:    Sit to stand:  Not Tested  Stand to sit:  Not Tested  Bed to chair:   Not Tested  Standard toilet: Not Tested  Bed to [facility]:  Not Tested    Refused OOB this date despite max encouragement. Dressing:      UE:   Not Tested  LE:    Not Tested    Bathing:    UE:  Not Tested  LE:  Not Tested    Eating:   Independent beverage management (water)-reports she wants nothing other than water, RN aware    Toileting:  Not Tested    Grooming: Not Tested-declined    Activity Tolerance   Pt completed therapy session with Dizziness noted with initial supine to sit, repeated this activity and checked vitals (below); no dizziness reported on second trial of supine to sit. Supine:  SpO2: 98% on RA  HR: 86  BP: 131/63    EOB:  SpO2: 99% on RA  HR: 87  BP: 108/61    Positioning Needs: In bed, call light and needs in reach. Alarm Set    Exercise / Activities Initiated:   N/A-encouraged AROM of BUEs in bed    Patient/Family Education:   Role of OT  Recommendations for DC  Importance of OOB and nutrition    Assessment of Deficits: Pt seen for Occupational therapy evaluation in acute care setting. Pt demonstrated decreased Activity tolerance, ADLs, IADLs, Bed mobility, Safety Awareness, Strength, and Transfers. Pt functioning below baseline and will likely benefit from skilled occupational therapy services to maximize safety and independence. Goal(s) : To be met in 3 Visits:  1). Bed to toilet/BSC: Min A    To be met in 5 Visits:  1). Supine to/from Sit:  SBA  2). Upper Body Bathing:   SBA  3). Lower Body Bathing:   Min A  4). Upper Body Dressing:  SBA  5). Lower Body Dressing:  Min A  6). Pt to demonstrate UE exs x 15 reps with minimal cues    Rehabilitation Potential:  Good for goals listed above. Strengths for achieving goals include: Family Support  Barriers to achieving goals include:  Complexity of condition and Weakness     Plan: To be seen 3-5 x/wk while in acute care setting for therapeutic exercises, bed mobility, transfers, dressing, bathing, family/patient education, ADL/IADL retraining, energy conservation training.      Dasha Hinson, OTR/L [id number]            If patient discharges from this facility prior to next visit, this note will serve as the Discharge Summary

## 2022-10-27 NOTE — FLOWSHEET NOTE
Shift assessment complete; see flowsheets. PM medications administered; see MAR. Pt AOx4, but has transient periods of confusion. Respirations even and unlabored. Pt denies any further needs or pain at this time. Call light in reach, bed locked in lowest position. Bed alarm in place for patient safety.

## 2022-10-27 NOTE — PLAN OF CARE
Problem: Chronic Conditions and Co-morbidities  Goal: Patient's chronic conditions and co-morbidity symptoms are monitored and maintained or improved  Outcome: Progressing     Problem: Skin/Tissue Integrity  Goal: Absence of new skin breakdown  Description: 1. Monitor for areas of redness and/or skin breakdown  2. Assess vascular access sites hourly  3. Every 4-6 hours minimum:  Change oxygen saturation probe site  4. Every 4-6 hours:  If on nasal continuous positive airway pressure, respiratory therapy assess nares and determine need for appliance change or resting period.   10/27/2022 1421 by Drew Tucker RN  Outcome: Progressing  10/27/2022 0249 by Grey Cancino RN  Outcome: Progressing     Problem: Nutrition Deficit:  Goal: Optimize nutritional status  Outcome: Progressing

## 2022-10-27 NOTE — FLOWSHEET NOTE
10/27/22 1130   Vital Signs   Temp 97.6 °F (36.4 °C)   Temp Source Oral   Heart Rate 81   Heart Rate Source Monitor;Popliteal   Resp 18   /67   BP Location Right upper arm   BP Method Automatic   MAP (Calculated) 84   Patient Position Semi fowlers   Level of Consciousness 0   MEWS Score 1   Pain Assessment   Pain Assessment 0-10   Pain Level 2   Pain Location Head   Oxygen Therapy   SpO2 97 %   O2 Device None (Room air)   Shift assessment complete - See flow sheet. Medications given - See [facility]   Patient with no complaints of pain at this time. Patient denies any further needs. Bed alarm is set for patient safety A/O Call light in reach  Bedside Mobility Assessment Tool (BMAT):     Assessment Level 1- Sit and Shake    1. From a semi-reclined position, ask patient to sit up and rotate to a seated position at the side of the bed. Can use the bedrail. 2. Ask patient to reach out and grab your hand and shake making sure patient reaches across his/her midline. Pass- Patient is able to come to a seated position, maintain core strength. Maintains seated balance while reaching across midline. Move on to Assessment Level 2. Assessment Level 2- Stretch and Point   1. With patient in seated position at the side of the bed, have patient place both feet on the floor (or stool) with knees no higher than hips. 2. Ask patient to stretch one leg and straighten the knee, then bend the ankle/flex and point the toes. If appropriate, repeat with the other leg. Pass- Patient is able to demonstrate appropriate quad strength on intended weight bearing limb(s). Move onto Assessment Level 3. Assessment Level 3- Stand   1. Ask patient to elevate off the bed or chair (seated to standing) using an assistive device (cane, bedrail). 2. Patient should be able to raise buttocks off be and hold for a count of five. May repeat once. Fail- Patient unable to demonstrate standing stability.  Patient is MOBILITY LEVEL 3.     Assessment Level 4- Walk   1. Ask patient to march in place at bedside. 2. Then ask patient to advance step and return each foot. Some medical conditions may render a patient from stepping backwards, use your best clinical judgement. Fail- Patient not able to complete tasks OR requires use of assistive device. Patient is MOBILITY LEVEL 3.        Mobility Level- 3

## 2022-10-27 NOTE — PROGRESS NOTES
Speech Language Pathology  Clinical Bedside Swallow Assessment  Facility/Department: [facility]    Instrumentation: Not clinically indicated at this time. Will monitor for need during scheduled follow-up sessions. Pt will likely benefit, but not agreeable at this time.    Diet recommendation: NPO - pleasure feeds of thin liquid via cup only ; Critical Meds PO as tolerated  Risk management: stay upright for at least 30 mins after intake, oral care 2-3x/day to reduce adverse affects in the event of aspiration, small sips, increase physical mobility as able, slow rate of intake, general GERD precautions, and general aspiration precautions    Flores Low  : 1950 (76 y.o.)   MRN: [id number]  ROOM: [address] [place] [address]  ADMISSION DATE: 10/26/2022  PATIENT DIAGNOSIS(ES): Colitis [K52.9]  Hyponatremia [E87.1]  Oropharyngeal cancer (HCC) [C10.9]  Failure to thrive in adult [R62.7]  Pneumonia of right lower lobe due to infectious organism [J18.9]  Chief Complaint   Patient presents with    Other     Pt has hx of cx, here today for generalized weakness/FTH, hasn't eaten x3-4 days ago, takes water and milk, pt cannot walk to bathroom, family reports they are here for placement in rehab facility      Patient Active Problem List    Diagnosis Date Noted    Failure to thrive in adult 10/26/2022    Pneumonia of right lower lobe due to infectious organism 10/26/2022    Colitis     Metabolic acidosis     COVID 10/26/2022    Nausea and vomiting 10/26/2022    Stage 4 chronic kidney disease ([address] 10/26/2022    Oropharyngeal cancer ([address] 10/26/2022    CKD (chronic kidney disease) stage 4, GFR 15-29 ml/min ([address] 2022    Hypothyroidism due to medication 2022    Squamous cell carcinoma of oropharynx (HCC)     Hyponatremia     Dysphagia     Mixed incontinence urge and stress 08/10/2020    ASCVD (arteriosclerotic cardiovascular disease) 08/10/2020    Cigarette nicotine dependence without complication 38/24/4274    Essential hypertension 10/12/2016    Hyperlipidemia 10/12/2016    Aortic insufficiency 10/15/2011    Crohn's disease without complication ([facility] 40/01/5510    Chronic anxiety 01/28/2010     Past Medical History:   Diagnosis Date    Anxiety     Cancer ([facility] 05/05/2021    throat    Cerebral artery occlusion with cerebral infarction ([facility]     30 years ago had mini stroke with no residual    Crohn disease ([facility]     CVA (cerebral infarction) 2006    hx unclear    Gastrostomy status ([facility] 9/1/2021    Hyperlipidemia     Hypertension      Past Surgical History:   Procedure Laterality Date    APPENDECTOMY      CHOLECYSTECTOMY      COLONOSCOPY  2011    COLONOSCOPY N/A 9/2/2021    COLONOSCOPY WITH BIOPSY RECTAL ULCER performed by Adrian Leon MD at [facility] ([address] [place] [address])      LARYNGOSCOPY N/A 5/5/2021    DIRECT MICROLARYNGOSCOPY WITH BIOPSIES performed by Velma Gauthier DO at [place] [address] (CERVIX NOT REMOVED)  1973    infection after btl;one ovary in    [address]  05/26/2021    Procedure: EGD ESOPHAGOGASTRODUODENOSCOPY PEG TUBE INSERTION    UPPER GASTROINTESTINAL ENDOSCOPY  5/26/2021    EGD ESOPHAGOGASTRODUODENOSCOPY PEG TUBE INSERTION performed by Beata Crisostomo MD at [facility]   Allergen Reactions    Penicillins Hives and Rash     5/2013 40 yrs ago had a nonpruritic rash on hands;no hives and no other sx's;       DATE ONSET: 10/26/2022    Date of Evaluation: 10/27/2022   Evaluating Therapist: GRIS Castillo    Chart Reviewed: : [x] Yes [] No    Current Diet: Diet NPO Exceptions are: Ice Chips, Sips of Water with Meds, Sips of Clear Liquids, Popsicles    Recent Chest Radiography: [] Chest XR   [x] CT of Chest  Date: 10/26/2022  Impression   1. Right lower lobe pneumonia. Recommend chest radiograph in 8 weeks to   confirm resolution.    2. Acute colitis involving the distal sigmoid colon. Pain: The patient does not complain of pain    Reason for Referral  Phong Rangel was referred for a bedside swallow evaluation to assess the efficiency of their swallow function, identify signs and symptoms of aspiration and make recommendations regarding safe dietary consistencies, effective compensatory strategies, and safe eating environment. Assessment    Medical record review/interview: Per MD H&P: \"The patient is a 71 y.o. female with hx of throat cancer, chemo-induced neuropathy, CKD IV, Hypothyroidism who presented to [facility] ED with complaint of \"sick\". Patient states she has felt sick for the past couple of weeks. Endorses nausea, vomiting and non-productive cough. Otherwise she denies other issues. She is alert and oriented to her name and the place but tells me it's 1979. Despite this she does appear somewhat but overall is a poor historian. Patient's daughter is at bedside and majority of history is gathered from her. Patient has a history of throat cancer which has been treated by [facility] and she follows with Dr. Michelle Serna with ENT. At one point she had a Gtube placed for nutrition and reportedly appeared to be doing well with this, however, the patient wanted it to be removed and since its removal several months ago the patient has drastically declined. She apparently has not eaten solid foods for approximately 6 months. Daughter states that most of her nutrition was via milk and she would go through several gallons per week. She states that the patient has said \"nothing tastes good\" and \"it hurts to swallow\" although the patient does not endorse this to me. Daughter also reports that she has apparently had issues with nutrition previously as well.  Daughter reports that the patient lives with her  who is having increasing difficulty taking care of her at home as she has essentially become fully dependent on him to the extent that he has apparently been carrying her around the house and to and from the car. Over the last week the patient has apparently only been drinking water occasionally. Vitals are stable. Labs reveal hyponatremia, metabolic acidosis, stable CKDIV, chronic anemia. Some time after I saw her, her COVID was positive. She was stable on room air when I saw her. Chest CT displayed right lower lobe pneumonia and acute colitis involving the distal sigmoid colon. CT head without acute findings. She is admitted for further care. \"      Patient Complaints:  Odynophagia: [] Yes [x] No  Globus Sensation: [] Yes [x] No  SOB with PO intake: [] Yes [x] No  Increased WOB with PO intake: [] Yes [x] No  Reflux Sx's: [] Yes [x] No  Weight loss: [x] Yes [] No  Coughing/Choking with PO intake: [] Yes [x] No  Reduced Appetite: [x] Yes [] No  Trouble with Mastication:  [] Yes [x] No  Avoidance of Certain Foods:  [x] Yes [] No  Premature Satiety:  [] Yes [x] No  Recent PNA:  [x] Yes [] No  Recurring PNA:  [] Yes [x] No  Changes in vocal quality: [] Yes [x] No    Baseline Method of Oral Meds:  [x] Whole with liquid    [] Cut with liquid    [] Whole with puree    [] Cut in puree    [] Crushed in puree    [] Crushed in liquid    [] Via TF    Additional Reported Symptoms/Complaints:   Pt admitted 2/2 failure to thrive. Pt with previous admission in March 2022 - G-Tube placed. G-Tube has since been removed, but pt with steady decline since. Per chart review, pt is being followed by ENT - began with left sided throat pain, chronic sore throat. In April 2021, pt diagnosed with throat and tongue ca (base of tonuge), s/p chemo and radiation. Per chart review, pt has not eat solids for ~6 months and has had a decrease with intake of liquids. Pt with questionable aspiration PNA - RLL PNA per chest CT.      Predisposing dysphagia risk factors: Current smoker, Poor motivation, Environmental barriers, throat / BOT ca   Clinical signs of possible chronic dysphagia: weight loss, reduced PO intake, and hx of malnutrition  Precipitating dysphagia risk factors: reduced physical mobility and AMS    Vitals/labs:     Vitals:    10/26/22 1704 10/26/22 1823 10/26/22 2300 10/27/22 0102   BP:  120/64 131/61 119/62   Pulse:  87 85 85   Resp:  18 16 18   Temp:  97.7 °F (36.5 °C) 97.1 °F (36.2 °C) 97.1 °F (36.2 °C)   TempSrc:  Oral Oral Oral   SpO2:  98% 99% 98%   Weight: 130 lb (59 kg) 134 lb (60.8 kg)  139 lb (63 kg)   Height:  5' 1\" (1.549 m)          CBC:   Recent Labs     10/27/22  0528   WBC 6.5   HGB 7.7*         BMP:  Recent Labs     10/27/22  0528   *   K 3.4*      CO2 16*   BUN 30*   CREATININE 2.2*   GLUCOSE 88          Cranial nerve exam:   CN V (trigeminal): ophthalmic, maxillary, and mandibular facial sensation- WNL  CN VII (facial): WNL  CN IX/X (glossopharyngeal/vagus): MPT: DNT; pitch range: WNL; vocal quality: hoarse; cough: Strong-perceptually  CN XII (hypoglossal): WNL    Laryngeal function exam:   Secretions: WFL  Vocal quality: See CN exam above  MPT: See CN exam above  S/Z ratio: DNT  Pitch range: See CN exam above  Cough: See CN exam above    Oral Care Status:    [] Oral Care [facility]  [] Poor oral care status  [x] Edentulous - pt reports having upper and lower dentures, but not with her at hospital  [] Upper Dentures  [] Lower Dentures  [] Missing/Broken Teeth  [] Evidence of dental cavities/carries    PO trials:   Ice: Suspect timely swallow and adequate laryngeal elevation upon palpation of anterior neck. No overt s/s of aspiration. IDDSI 0 (thin):   - 5cc bolus (tsp): No anterior bolus loss. Adequate A-P transit. Suspect timely swallow and adequate laryngeal elevation upon palpation of anterior neck. No overt s/s of aspiration.   - Cup: No anterior bolus loss. Adequate A-P transit. Suspect timely swallow and adequate laryngeal elevation upon palpation of anterior neck.  No overt s/s of aspiration.   - Straw: Pt declined    IDDSI 4 (puree): Pt declined    IDDSI 5 (minced and moist): Pt declined    IDDSI 6 (soft and bite sized): Pt declined    IDDSI 7 (regular): Pt declined    3 oz water: PASS    Impressions:    Pt alert and cooperative, although did require encouragement to participate in eval. Pt oriented to person and place. Pt not oriented to month or year. Clinical signs of oropharyngeal dysphagia likely acute-on-chronic related to reduced physical mobility, AMS, hx of head and neck ca. Swallow prognosis is fair. Pt with overall limited PO intake during eval - agreeable to water only. Declined all solid trials. Instrumental swallow study is not indicated at this time - pt is not agreeable to complete, but may benefit in the future if agreeable. There are concerns for aspiration PNA per chest CT, but no overt s/s of aspiration at bedside. Evaluation grossly limited - pt agreeable to thin liquids only. Thus, pt is not safe for an oral diet at this time. Recommend pleasure feeds of thin liquids via cup - no straws. Despite limited PO trials, pt did tolerate cup sips of thin via cup with no overt s/s of aspiration. Critical meds in puree. SLP to continue to follow for diet tolerance and ongoing PO trials to determine safest and least restrictive diet. Instrumentation: Not clinically indicated at this time. Will monitor for need during scheduled follow-up sessions. Pt will likely benefit, but not agreeable at this time.    Diet recommendation: NPO - pleasure feeds of thin liquid via cup only ; Critical Meds PO as tolerated  Risk management: stay upright for at least 30 mins after intake, oral care 2-3x/day to reduce adverse affects in the event of aspiration, small sips, increase physical mobility as able, slow rate of intake, general GERD precautions, and general aspiration precautions    Prognosis: Fair    Recommended Intervention:  [x] Dysphagia tx  [] Videostroboscopy                      [x] NPO   [] MBS       [] Speech/Cog Eval   [x] Therapeutic PO Trials     [] Ice Chips   [] Other:  [] FEES                                                 Dysphagia Therapeutic Intervention:  []  Bolus control Exercises  []  Oral Motor Exercises  []  Mejias Water Protocol  []  Thermal Stimulation  []  Oral Care    []  Vital Stim/NMES  []  Laryngeal Exercises  [x]  Patient/Family Education  []  Pharyngeal Exercises  [x]  Therapeutic PO trials with SLP  []  Diet tolerance monitoring  []  Other:     Referrals:  [x] ENT    [] PT  [] Pulmonology [] GI  [] Neurology  [x] RD  [] OT   []     Goals:  Short Term Goals:  Timeframe for Short Term Goals: (5 days; 11/02/2022)  Goal 1: The patient will tolerate recommended diet with no clinical s/s of aspiration 5/5  Goal 2: The patient/caregiver will demonstrate understanding of compensatory swallow strategies, for improved swallow safety  Goal 3: The patient will tolerate repeat BSE when able for ongoing assessment  Goal 4: The patient will tolerate instrumental assessment when able     Long Term Goals:   Timeframe for Long Term Goals: (7 days; 11/03/2022)  Goal 1: The patient will tolerate least restrictive diet with no clinical s/s of aspiration or worsening respiratory/pulmonary status    Treatment:  Skilled instruction completed with patient re: evidenced based practice regarding recommendations and POC, importance of oral care to reduce adverse affects in the event of aspiration, and instruction of recommended compensatory strategies developed based upon clinical exam. Pt able to recall/demonstrate compensatory strategies with mod cues.       Pt Education: SLP educated the patient re: Role of SLP, rationale for completion of assessment, results of assessment, recommendations, and POC  Pt Education Response: verbalized understanding and would benefit from ongoing education    Duration/Frequency of Tx: 3-5x/wk    Individuals Consulted:   [x]  Patient     []  NP         [x]  RN   []  RD                   []  MD      []  Family Member []  PA    []  Other:      Safety Devices / Report:  [x]  All fall risk precautions in place []  Safety handoff completed with RN  []  Bed alarm in place  []  Left in bed     []  Chair alarm in place  []  Left in chair   [x]  Call light in reach   []  Other:             Total Treatment Time / Charges       Time in Time out Total Time / units   Swallow Eval/Tx Time  0831 0902 31 min / 2 units (DE/DT)     Signature:  Philip Lara MA [id number] [facility]  Speech Language Pathologist

## 2022-10-27 NOTE — CONSULTS
[facility]  Nephrology Consult Note           Reason for Consult: JESSICA on CKD  Requesting Physician:  Dr. Peyton Andrade    Chief Complaint:    Chief Complaint   Patient presents with    Other     Pt has hx of cx, here today for generalized weakness/FTH, hasn't eaten x3-4 days ago, takes water and milk, pt cannot walk to bathroom, family reports they are here for placement in rehab facility      History of Present Illness on 10/27/2022:    71 y.o. yo female with PMH of squamous cell carcinoma treated with cisplatin and XRT, hypertension, Crohn's disease, anxiety, CVA, hyperlipidemia, hypothyroidism, history of recurrent JESSICA who is admitted for generalized weakness and decreased p.o. intake for last 3 to 4 days prior to admission. According to patient's daughter, She apparently has not eaten solid foods for approximately 6 months. Daughter states that most of her nutrition was via milk and she would go through several gallons per week.    She was noted to have JESSICA, hyponatremia and metabolic acidosis and nephrology has been consulted  Patient was also found to be positive for COVID  She has been having diarrhea for the last few days    Past Medical History:        Diagnosis Date    Anxiety     Cancer ([address] 05/05/2021    throat    Cerebral artery occlusion with cerebral infarction ([address]     30 years ago had mini stroke with no residual    Crohn disease ([address]     CVA (cerebral infarction) 2006    hx unclear    Gastrostomy status ([address] 9/1/2021    Hyperlipidemia     Hypertension        Past Surgical History:        Procedure Laterality Date    APPENDECTOMY      CHOLECYSTECTOMY      COLONOSCOPY  2011    COLONOSCOPY N/A 9/2/2021    COLONOSCOPY WITH BIOPSY RECTAL ULCER performed by Roxi Bustos MD at [facility] ([facility])      LARYNGOSCOPY N/A 5/5/2021    DIRECT MICROLARYNGOSCOPY WITH BIOPSIES performed by Loni Pickard DO at [place] [address] (CERVIX NOT REMOVED)  [facility] infection after btl;one ovary in    UPPER GASTROINTESTINAL ENDOSCOPY  05/26/2021    Procedure: EGD ESOPHAGOGASTRODUODENOSCOPY PEG TUBE INSERTION    UPPER GASTROINTESTINAL ENDOSCOPY  5/26/2021    EGD ESOPHAGOGASTRODUODENOSCOPY PEG TUBE INSERTION performed by Dillon Tyler MD at [address] Medications:    No current facility-administered medications on file prior to encounter.      Current Outpatient Medications on File Prior to Encounter   Medication Sig Dispense Refill    gabapentin (NEURONTIN) 100 MG capsule TAKE 2 CAPSULES BY MOUTH THREE TIMES DAILY 540 capsule 0    allopurinol (ZYLOPRIM) 100 MG tablet Take 1 tablet by mouth daily 30 tablet 3    levothyroxine (SYNTHROID) 100 MCG tablet Take 1 tablet by mouth daily 90 tablet 1    sodium bicarbonate 650 MG tablet Take 650 mg by mouth 2 times daily         Allergies:  Penicillins    Social History:    Social History     Socioeconomic History    Marital status:      Spouse name: Sylvester Hernández    Number of children: Not on file    Years of education: Not on file    Highest education level: Not on file   Occupational History    Not on file   Tobacco Use    Smoking status: Every Day     Packs/day: 1.00     Years: 30.00     Pack years: 30.00     Types: Cigarettes    Smokeless tobacco: Never    Tobacco comments:     placed in AVS   Vaping Use    Vaping Use: Never used   Substance and Sexual Activity    Alcohol use: Not Currently     Alcohol/week: 4.0 standard drinks     Types: 4 Cans of beer per week     Comment: daily; 4 beers per day    Drug use: No    Sexual activity: Not Currently   Other Topics Concern    Not on file   Social History Narrative    10/2011 lives with spouse and dogs;not working re [employer]     Social Determinants of Health     Financial Resource Strain: Not on file   Food Insecurity: Not on file   Transportation Needs: Not on file   Physical Activity: Not on file   Stress: Not on file   Social Connections: Not on file Intimate Partner Violence: Not on file   Housing Stability: Not on file       Family History:   Family History   Problem Relation Age of Onset    Heart Disease Mother        Review of Systems:   Pertinent positives stated above in HPI. All other 10 systems were reviewed and were negative. Physical exam:   Constitutional:  VITALS:  /67   Pulse 81   Temp 97.6 °F (36.4 °C) (Oral)   Resp 18   Ht 5' 1\" (1.549 m)   Wt 139 lb (63 kg)   SpO2 97%   BMI 26.26 kg/m²   Gen: alert, awake  Neck: No JVD  Skin: Unremarkable  Cardiovascular:  S1, S2 without m/r/g   Respiratory: CTA B without w/r/r; respiratory effort normal  Abdomen:  soft, nt, nd,   Extremities: no lower extremity edema  Neuro/Psy: AAoriented times 3 ; moves all 4 ext    Data/  Recent Labs     10/26/22  1228 10/27/22  0528   WBC 8.8 6.5   HGB 8.7* 7.7*   HCT 22.1*  25.1* 22.5*   MCV 94.1 93.3    188     Recent Labs     10/26/22  1228 10/27/22  0528   * 130*   K 4.2 3.4*   CL 97* 101   CO2 16* 16*   GLUCOSE 99 88   PHOS 2.8 2.2*   MG 2.20  --    BUN 32* 30*   CREATININE 2.5* 2.2*   LABGLOM 20* 23*   CT scan of the chest abdomen pelvis without contrast  Impression   1. Right lower lobe pneumonia. Recommend chest radiograph in 8 weeks to   confirm resolution. 2. Acute colitis involving the distal sigmoid colon.      Assessment  -JESSICA on CKD III   Patient has had recurrent JESSICA's, creatinine on chart review seems to be around 1.9-2.1 at baseline  -Hyponatremia, hypovolemic and decreased solute intake  -Metabolic acidosis in the setting of JESSICA along with diarrhea  -COVID positive  -Right lower lobe pneumonia  -Diarrhea  -Anemia  -History of squamous cell carcinoma of throat status post radiation therapy along with treatment with cisplatin    Plan  -Replace potassium phosphate  -Continue concentrated IV fluid with bicarb as ordered  -Serial renal panel  -daily wts and strict i/o  -renal dose medications   -avoid nephrotoxins      Thank you for the consultation. Please do not hesitate to call with questions. Oscar Bagley MD  Office: [phone]  Fax:    [phone] [address] [facility]

## 2022-10-27 NOTE — CARE COORDINATION
CM notes Palliative Care consult. Covid  pos as of 10/26/2022    Fany Garrison NP spoke with [facility] (daughter) regarding pt and some of the Palliative Care. Lynnette Wills states pt is not alert and oriented and that pt stated date was 2002. Virginia Johann states that she informed [facility] of information and [facility] was going to speak with Anastasiya Greenberg (spouse)  and then they were going to call CM regarding what was discussed with Fany Garrison NP. Addendum 1700: Linn Leary called CM via phone and spoke with CM via phone. CM discussed pt. Anastasiya Greenberg wants pt to remain a Full Code currently. They would like [facility], because they may want an IPU at some point. CM advised [facility] and [facility] that both of them can come in tomorrow (10/28) to speak with the Hosp[facility] of [address] [place] [address] regarding pt. CM informed Oanh Zambrano RN (clinical) of this and she approved pt to have 2 visitors tomorrow (10/28). Robin Rn (charge) aware. CM called and spoke with Ronaldo Arellano with [facility] and made referral to her regarding pt. CM provided Leonelvinicius Greenberg and Viviana's phone number to her. She will send referral to [facility] (admissions) and they will contact [facility] and/or [facility] to set up meeting to discuss pt and assess pt, as pt is not alert and oriented. Jonas Brush RN is aware.

## 2022-10-28 VITALS
TEMPERATURE: 97.6 F | HEART RATE: 83 BPM | WEIGHT: 140.5 LBS | HEIGHT: 61 IN | OXYGEN SATURATION: 98 % | RESPIRATION RATE: 16 BRPM | SYSTOLIC BLOOD PRESSURE: 124 MMHG | BODY MASS INDEX: 26.53 KG/M2 | DIASTOLIC BLOOD PRESSURE: 64 MMHG

## 2022-10-28 LAB
ALBUMIN SERPL-MCNC: 3 G/DL (ref 3.4–5)
ANION GAP SERPL CALCULATED.3IONS-SCNC: 13 MMOL/L (ref 3–16)
BASOPHILS ABSOLUTE: 0 K/UL (ref 0–0.2)
BASOPHILS RELATIVE PERCENT: 0.2 %
BLOOD SMEAR REVIEW: NORMAL
BUN BLDV-MCNC: 21 MG/DL (ref 7–20)
CALCIUM SERPL-MCNC: 6.9 MG/DL (ref 8.3–10.6)
CHLORIDE BLD-SCNC: 102 MMOL/L (ref 99–110)
CO2: 18 MMOL/L (ref 21–32)
CREAT SERPL-MCNC: 1.9 MG/DL (ref 0.6–1.2)
EOSINOPHILS ABSOLUTE: 0 K/UL (ref 0–0.6)
EOSINOPHILS RELATIVE PERCENT: 0.5 %
GFR SERPL CREATININE-BSD FRML MDRD: 28 ML/MIN/{1.73_M2}
GLUCOSE BLD-MCNC: 85 MG/DL (ref 70–99)
HCT VFR BLD CALC: 25.1 % (ref 36–48)
HEMATOLOGY PATH CONSULT: NORMAL
HEMATOLOGY PATH CONSULT: YES
HEMOGLOBIN: 8.7 G/DL (ref 12–16)
LYMPHOCYTES ABSOLUTE: 0.3 K/UL (ref 1–5.1)
LYMPHOCYTES RELATIVE PERCENT: 3.9 %
MCH RBC QN AUTO: 32.4 PG (ref 26–34)
MCHC RBC AUTO-ENTMCNC: 34.4 G/DL (ref 31–36)
MCV RBC AUTO: 94.1 FL (ref 80–100)
MONOCYTES ABSOLUTE: 0.8 K/UL (ref 0–1.3)
MONOCYTES RELATIVE PERCENT: 9.3 %
NEUTROPHILS ABSOLUTE: 7.6 K/UL (ref 1.7–7.7)
NEUTROPHILS RELATIVE PERCENT: 86.1 %
PDW BLD-RTO: 16 % (ref 12.4–15.4)
PHOSPHORUS: 2 MG/DL (ref 2.5–4.9)
PLATELET # BLD: 224 K/UL (ref 135–450)
PMV BLD AUTO: 7.1 FL (ref 5–10.5)
POTASSIUM SERPL-SCNC: 3.3 MMOL/L (ref 3.5–5.1)
RBC # BLD: 2.67 M/UL (ref 4–5.2)
SODIUM BLD-SCNC: 133 MMOL/L (ref 136–145)
WBC # BLD: 8.8 K/UL (ref 4–11)

## 2022-10-28 PROCEDURE — 80069 RENAL FUNCTION PANEL: CPT

## 2022-10-28 PROCEDURE — 2500000003 HC RX 250 WO HCPCS: Performed by: INTERNAL MEDICINE

## 2022-10-28 PROCEDURE — 97535 SELF CARE MNGMENT TRAINING: CPT

## 2022-10-28 PROCEDURE — 2500000003 HC RX 250 WO HCPCS

## 2022-10-28 PROCEDURE — 36415 COLL VENOUS BLD VENIPUNCTURE: CPT

## 2022-10-28 PROCEDURE — 99239 HOSP IP/OBS DSCHRG MGMT >30: CPT | Performed by: INTERNAL MEDICINE

## 2022-10-28 PROCEDURE — 6370000000 HC RX 637 (ALT 250 FOR IP)

## 2022-10-28 PROCEDURE — 6360000002 HC RX W HCPCS

## 2022-10-28 PROCEDURE — C9113 INJ PANTOPRAZOLE SODIUM, VIA: HCPCS

## 2022-10-28 PROCEDURE — 2580000003 HC RX 258

## 2022-10-28 PROCEDURE — 6370000000 HC RX 637 (ALT 250 FOR IP): Performed by: PHYSICIAN ASSISTANT

## 2022-10-28 PROCEDURE — 97530 THERAPEUTIC ACTIVITIES: CPT

## 2022-10-28 PROCEDURE — 2580000003 HC RX 258: Performed by: INTERNAL MEDICINE

## 2022-10-28 RX ORDER — SODIUM BICARBONATE 650 MG/1
1300 TABLET ORAL 2 TIMES DAILY
Qty: 120 TABLET | Refills: 0 | Status: SHIPPED | OUTPATIENT
Start: 2022-10-28

## 2022-10-28 RX ORDER — MORPHINE SULFATE 100 MG/5ML
10 SOLUTION ORAL EVERY 4 HOURS PRN
Qty: 30 ML | Refills: 0 | Status: SHIPPED | OUTPATIENT
Start: 2022-10-28 | End: 2022-11-02

## 2022-10-28 RX ORDER — LORAZEPAM 0.5 MG/1
0.5 TABLET ORAL EVERY 8 HOURS PRN
Qty: 15 TABLET | Refills: 0 | Status: SHIPPED | OUTPATIENT
Start: 2022-10-28 | End: 2022-11-02

## 2022-10-28 RX ORDER — PANTOPRAZOLE SODIUM 40 MG/1
40 TABLET, DELAYED RELEASE ORAL
Qty: 30 TABLET | Refills: 0 | Status: SHIPPED | OUTPATIENT
Start: 2022-10-28

## 2022-10-28 RX ORDER — SACCHAROMYCES BOULARDII 250 MG
250 CAPSULE ORAL 2 TIMES DAILY
Qty: 14 CAPSULE | Refills: 0 | COMMUNITY
Start: 2022-10-28 | End: 2022-11-04

## 2022-10-28 RX ORDER — LEVOFLOXACIN 250 MG/1
250 TABLET ORAL DAILY
Qty: 4 TABLET | Refills: 0 | Status: SHIPPED | OUTPATIENT
Start: 2022-10-28 | End: 2022-11-01

## 2022-10-28 RX ORDER — SODIUM BICARBONATE 650 MG/1
1300 TABLET ORAL 2 TIMES DAILY
Status: DISCONTINUED | OUTPATIENT
Start: 2022-10-28 | End: 2022-10-28 | Stop reason: HOSPADM

## 2022-10-28 RX ADMIN — GABAPENTIN 100 MG: 100 CAPSULE ORAL at 11:37

## 2022-10-28 RX ADMIN — LEVOTHYROXINE SODIUM 100 MCG: 100 TABLET ORAL at 05:38

## 2022-10-28 RX ADMIN — POTASSIUM PHOSPHATE, MONOBASIC AND POTASSIUM PHOSPHATE, DIBASIC 30 MMOL: 224; 236 INJECTION, SOLUTION, CONCENTRATE INTRAVENOUS at 15:19

## 2022-10-28 RX ADMIN — SODIUM CHLORIDE: 9 INJECTION, SOLUTION INTRAVENOUS at 15:24

## 2022-10-28 RX ADMIN — RDII 250 MG CAPSULE 250 MG: at 11:37

## 2022-10-28 RX ADMIN — METRONIDAZOLE 500 MG: 500 INJECTION, SOLUTION INTRAVENOUS at 05:40

## 2022-10-28 RX ADMIN — ENOXAPARIN SODIUM 30 MG: 100 INJECTION SUBCUTANEOUS at 11:37

## 2022-10-28 RX ADMIN — PANTOPRAZOLE SODIUM 40 MG: 40 INJECTION, POWDER, FOR SOLUTION INTRAVENOUS at 11:37

## 2022-10-28 RX ADMIN — METRONIDAZOLE 500 MG: 500 INJECTION, SOLUTION INTRAVENOUS at 15:26

## 2022-10-28 NOTE — PLAN OF CARE
Problem: Discharge Planning  Goal: Discharge to home or other facility with appropriate resources  10/27/2022 2308 by Jenna Gomez RN  Outcome: Progressing  Flowsheets (Taken 10/26/2022 1932 by Nayely Calderon RN)  Discharge to home or other facility with appropriate resources:   Identify barriers to discharge with patient and caregiver   Arrange for needed discharge resources and transportation as appropriate   Identify discharge learning needs (meds, wound care, etc)     Problem: Safety - Adult  Goal: Free from fall injury  10/27/2022 2308 by Jenna Gomez RN  Outcome: Progressing  Flowsheets (Taken 10/27/2022 2308)  Free From Fall Injury:   Instruct family/caregiver on patient safety   Based on caregiver fall risk screen, instruct family/caregiver to ask for assistance with transferring infant if caregiver noted to have fall risk factors     Problem: Skin/Tissue Integrity  Goal: Absence of new skin breakdown  Description: 1. Monitor for areas of redness and/or skin breakdown  2. Assess vascular access sites hourly  3. Every 4-6 hours minimum:  Change oxygen saturation probe site  4. Every 4-6 hours:  If on nasal continuous positive airway pressure, respiratory therapy assess nares and determine need for appliance change or resting period.   10/27/2022 2308 by Jenna Gomez RN  Outcome: Progressing     Problem: Chronic Conditions and Co-morbidities  Goal: Patient's chronic conditions and co-morbidity symptoms are monitored and maintained or improved  10/27/2022 2308 by Jenna Gomez RN  Outcome: Progressing  Flowsheets (Taken 10/27/2022 2308)  Care Plan - Patient's Chronic Conditions and Co-Morbidity Symptoms are Monitored and Maintained or Improved:   Monitor and assess patient's chronic conditions and comorbid symptoms for stability, deterioration, or improvement   Collaborate with multidisciplinary team to address chronic and comorbid conditions and prevent exacerbation or deterioration

## 2022-10-28 NOTE — CARE COORDINATION
DANIEL spoke with Beatriz with [facility] and she states that pt's spouse signed consents and that DME will be delivered (bedside table, BSC) at 1400pm to home today. Pt has a [employer] bed at home already, as Raimundo Silva has confirmed this. She states she spoke with spouse and spouse wants to take pt home today. Beatriz with [facility] states she can accept pt at home today. DANIEL spoke with Cr with PT top make sure that pt cold get in and out of the car. Cr with PT worked with pt and family saw this and, per Cr with PT, pt can get in and out of the car and transfer to home with spouse in car. Pt is not currently on any comfort meds here a the hospital per [facility]. DANIEL informed Dr. Cristiano Louis that per Beatriz with [facility], that if a pt needs comfort meds to print scripts and pt can be sent home with them . Beatrzi with [facility] states that a hospice RN will see pt tomorrow at home and spouse is aware of this. Aria Mckeon DISCHARGE ORDER  Date/Time 10/28/2022 12:25 PM  Completed by: Germania Barba RN, Case Management    Patient Name: Lord Salguero      : 1950  Admitting Diagnosis: Colitis [K52.9]  Hyponatremia [E87.1]  Oropharyngeal cancer ([facility] [address] [C10.9]  Failure to thrive in adult [R62.7]  Pneumonia of right lower lobe due to infectious organism [J18.9]      Admit order Date and Status:10/26/2022  (verify MD's last order for status of admission)      Noted discharge order. If applicable PT/OT recommendation at Discharge:  Pt will be returning home with hospice. DME recommendation by PT/OT:Hospice DME being delivered today  Confirmed discharge plan  (pt's spouse): Yes  with whom______________Ted_  If pt confirmed DC plan does family need to be contacted by DANIEL No if yes who__n/a____  Discharge Plan: Reviewed chart. Role of discharge planner explained and patient's spouse, Kaela Cesar, verbalized understanding. Discharge order is noted. Pt is being d/c'd to home today.  Spouse will take pt to home today. Pt's O2 sats are 98% on RA. No further discharge needs needed or noted. Addendum [id number]: CM spoke with spouse informed him again that pt is ready for discharge and he stats he is on his way. Paola Manley RN is aware. Date of Last IMM Given: 10/26/2022    Reviewed chart. Role of discharge planner explained and patient verbalized understanding. Discharge order is noted. Has Home O2 in place on admit:  No  Informed of need to bring portable home O2 tank on day of discharge for nursing to connect prior to leaving:   No  Verbalized agreement/Understanding:   No    Discharge timeout done with Paola Manley RN. All discharge needs and concerns addressed.

## 2022-10-28 NOTE — PROGRESS NOTES
Inpatient Physical Therapy Daily Treatment Note    Unit: 2 [address]  Date:  10/28/2022  Patient Name:    Jaziel Boone  Admitting diagnosis:  Colitis [K52.9]  Hyponatremia [E87.1]  Oropharyngeal cancer ([address] [C10.9]  Failure to thrive in adult [R62.7]  Pneumonia of right lower lobe due to infectious organism [J18.9]  Admit Date:  10/26/2022  Precautions/Restrictions:  Fall risk, Bed/chair alarm, Lines -IV, Telemetry, and Isolation Precautions: Droplet Plus - COVID      Discharge Recommendations:  Pt will be returning home with hospice. DME needs for discharge:  Hospice DME being delivered today       Therapy recommendation for EMS Transport: can transport by wheelchair    Therapy recommendations for staff:   Assist of 1 with use of rolling walker (RW) and gait belt for all transfers and ambulation to/from [facility]  to/from chair    History of Present Illness:   Per Prema White:  The patient is a 71 y.o. female with hx of throat cancer, chemo-induced neuropathy, CKD IV, Hypothyroidism who presented to [facility] ED with complaint of \"sick\". Patient states she has felt sick for the past couple of weeks. Endorses nausea, vomiting and non-productive cough. Otherwise she denies other issues. She is alert and oriented to her name and the place but tells me it's 1979. Despite this she does appear somewhat but overall is a poor historian. Patient's daughter is at bedside and majority of history is gathered from her. Patient has a history of throat cancer which has been treated by [facility] and she follows with Dr. Cheryl Grande with ENT. At one point she had a Gtube placed for nutrition and reportedly appeared to be doing well with this, however, the patient wanted it to be removed and since its removal several months ago the patient has drastically declined. She apparently has not eaten solid foods for approximately 6 months. Daughter states that most of her nutrition was via milk and she would go through several gallons per week.  She states that the patient has said \"nothing tastes good\" and \"it hurts to swallow\" although the patient does not endorse this to me. Daughter also reports that she has apparently had issues with nutrition previously as well. Daughter reports that the patient lives with her  who is having increasing difficulty taking care of her at home as she has essentially become fully dependent on him to the extent that he has apparently been carrying her around the house and to and from the car. Over the last week the patient has apparently only been drinking water occasionally. Vitals are stable. Labs reveal hyponatremia, metabolic acidosis, stable CKDIV, chronic anemia. Some time after I saw her, her COVID was positive. She was stable on room air when I saw her. Chest CT displayed right lower lobe pneumonia and acute colitis involving the distal sigmoid colon. CT head without acute findings. She is admitted for further care. Home Health S4 Level Recommendation: NA  AM-PAC Mobility Score      AM-PAC Inpatient Mobility without Stair Climbing Raw Score : 14    Treatment Time:  11:40 - 12:10  Treatment number: 2  Timed Code Treatment Minutes: 30 minutes  Total Treatment Minutes:  30  minutes    Cognition    A&O orientation not directly assessed. Able to follow 2 step commands    Subjective  Patient lying supine in bed with family at bedside (spouse and dtr)  Pt agreeable to this PT tx.   Pt's spouse present for family training.      Pain   No  Location:   Rating:    NA/10  Pain Medicine Status: Denies need     Bed Mobility   Supine to Sit:    CGA  Sit to Supine:   Not Tested  Rolling:   CGA  Scooting:   CGA    Transfer Training     Sit to stand:   CGA  Stand to sit:   CGA  Bed to Chair:   CGA with use of gait belt and rolling walker (RW)    Gait Training gait completed as indicated below  Distance:      5 ft  Deviations (firm surface/linoleum):  decreased khalif, narrow NANDINI, forward flexed posture, step through pattern, and decreased step length bilaterally  Assistive Device Used:    gait belt and rolling walker (RW)  Level of Assist:    CGA  Comment:     Stair Training deferred, pt unsafe/not appropriate to complete stairs at this time    Therapeutic Exercise Chloe deferred secondary to treatment focus on functional mobility  NA    Balance  Sitting:  Fair +; CGA  Comments: EOB    Standing: Fair ; CGA  Comments: with RW    Patient Education      Role of PT, POC, Discharge recommendations, safety awareness, transfer techniques, pacing activity, and calling for assist with mobility. Positioning Needs       Pt reclined in chair, alarm set, positioned in proper neutral alignment and pressure relief provided. Call light provided and all needs within reach    Activity Tolerance   Pt completed therapy session with No adverse symptoms noted w/activity. VSS. Other    Assessment :  Patient with fair tolerance, agreeable to OOB transfer but fatigued quickly. Pt's family present for family training and receptive to PT education on safe transfers. Family reported the feel comfortable taking pt home and transferring her bed to/from [facility] and chair. Pt plans to return home with hospice today. Recommending Home 24 hr assist upon discharge as patient functioning below baseline level    Goals (all goals ongoing unless otherwise indicated)  To be met in 3 visits:  1). Independent with LE Ex x 10 reps     To be met in 6 visits:  1). Supine to/from sit: Supervision -Not Met  2). Sit to/from stand: Min A -Met  3). Bed to chair: Min A -Met  4). Gait: Ambulate 50 ft.  with  Min A  and use of LRAD (least restrictive assistive device) -Not Met  5). Tolerate B LE exercises 3 sets of 10-15 reps -Not Met    Plan   Continue with plan of care. Signature: Betina Coats PT, DPT, OMT-C  #[id number]]      If patient discharges from this facility prior to next visit, this note will serve as the Discharge Summary.

## 2022-10-28 NOTE — DISCHARGE INSTR - COC
Continuity of Care Form    Patient Name: Wileen Krabbe   :  1950  MRN:  [id number]    Admit date:  10/26/2022  Discharge date:  ***    Code Status Order: Full Code   Advance Directives:     Admitting Physician:  Forest Michaud MD  PCP: Graciela Seip, APRN - CNP    Discharging Nurse: [facility] Unit/Room#: 0225/0225-02  Discharging Unit Phone Number: ***    Emergency Contact:   Extended Emergency Contact Information  Primary Emergency Contact: So Cullen  Address: [place] [address] [place] [address]  [address] Phone: [phone]  Relation: Spouse  Secondary Emergency Contact: [address] Phone: [phone]  Relation: Child   needed? No    Past Surgical History:  Past Surgical History:   Procedure Laterality Date    APPENDECTOMY      CHOLECYSTECTOMY      COLONOSCOPY      COLONOSCOPY N/A 2021    COLONOSCOPY WITH BIOPSY RECTAL ULCER performed by Kiera Thornton MD at [facility] ([facility])      LARYNGOSCOPY N/A 2021    DIRECT MICROLARYNGOSCOPY WITH BIOPSIES performed by Marcie Mcnulty DO at [place] [address] (CERVIX NOT REMOVED)  1973    infection after btl;one ovary in    UPPER GASTROINTESTINAL ENDOSCOPY  2021    Procedure: EGD ESOPHAGOGASTRODUODENOSCOPY PEG TUBE INSERTION    UPPER GASTROINTESTINAL ENDOSCOPY  2021    EGD ESOPHAGOGASTRODUODENOSCOPY PEG TUBE INSERTION performed by Roque Fair MD at [address] [facility]       Immunization History: There is no immunization history on file for this patient.     Active Problems:  Patient Active Problem List   Diagnosis Code    Chronic anxiety F41.9    Crohn's disease without complication ([facility] [address] D53.14    Aortic insufficiency I35.1    Essential hypertension I10    Hyperlipidemia E78.5    Cigarette nicotine dependence without complication S56.449    Mixed incontinence urge and stress N39.46    ASCVD (arteriosclerotic cardiovascular disease) I25.10    Squamous cell carcinoma of oropharynx (HCC) C10.9    Hyponatremia E87.1    Dysphagia R13.10    CKD (chronic kidney disease) stage 4, GFR 15-29 ml/min (HCC) N18.4    Hypothyroidism due to medication E03.2    Failure to thrive in adult R62.7    Pneumonia of right lower lobe due to infectious organism J18.9    Colitis J22.7    Metabolic acidosis Y91.71    COVID U07.1    Nausea and vomiting R11.2    Stage 4 chronic kidney disease (HCC) N18.4    Oropharyngeal cancer (HCC) C10.9       Isolation/Infection:   Isolation            Droplet Plus  Droplet Plus  C Diff Contact          Patient Infection Status       Infection Onset Added Last Indicated Last Indicated By Review Planned Expiration Resolved Resolved By    C-diff Rule Out 10/27/22 10/27/22 10/27/22 Clostridium Difficile Toxin/Antigen (Ordered) 11/03/22 11/06/22      COVID-19 10/26/22 10/26/22 10/26/22 COVID-19, Rapid 11/05/22 11/09/22      Resolved    C-diff Rule Out 08/30/21 08/30/21 08/31/21 GI Bacterial Pathogens By PCR (Ordered)   08/30/21 Eugenia Cohn RN    COVID-19 (Rule Out) 08/29/21 08/29/21 08/29/21 COVID-19 & Influenza Combo (Ordered)   08/29/21 Rule-Out Test Resulted    COVID-19 (Rule Out) 05/21/21 05/21/21 05/21/21 COVID-19 (Ordered)   05/21/21 Rule-Out Test Resulted            Nurse Assessment:  Last Vital Signs: /64   Pulse 83   Temp 97.6 °F (36.4 °C) (Oral)   Resp 16   Ht 5' 1\" (1.549 m)   Wt 140 lb 8 oz (63.7 kg)   SpO2 98%   BMI 26.55 kg/m²     Last documented pain score (0-10 scale): Pain Level: 4  Last Weight:   Wt Readings from Last 1 Encounters:   10/28/22 140 lb 8 oz (63.7 kg)     Mental Status:  {IP PT MENTAL STATUS:20030}    IV Access:  {[facility] IV ACCESS:307531066}    Nursing Mobility/ADLs:  Walking   {CHP REG BOBLK:836772087}  Transfer  {CHP DME PGVW:893506412}  Bathing  {CHP DME EMIX:577392529}  Dressing  {CHP DME STHP:983284622}  Toileting  {CHP DME UEO}  Feeding  {CHP DME UCAT:465042933}  Med Admin  {CHP DME MAE:224886447}  Med Delivery   { JAVY MED Delivery:462147617}    Wound Care Documentation and Therapy:        Elimination:  Continence: Bowel: {YES / WL:99050}  Bladder: {YES / AX:89529}  Urinary Catheter: {Urinary Catheter:579889792}   Colostomy/Ileostomy/Ileal Conduit: {YES / ID:25852}       Date of Last BM: ***  No intake or output data in the 24 hours ending 10/28/22 1726  No intake/output data recorded.     Safety Concerns:     [employer] Safety Concerns:907617964}    Impairments/Disabilities:      [employer] Impairments/Disabilities:582719464}    Nutrition Therapy:  Current Nutrition Therapy:   [employer] Diet List:984823555}    Routes of Feeding: {CHP DME Other Feedings:464516377}  Liquids: {Slp liquid thickness:07772}  Daily Fluid Restriction: {CHP DME Yes amt example:816421421}  Last Modified Barium Swallow with Video (Video Swallowing Test): {Done Not Done SLBP:900196099}    Treatments at the Time of Hospital Discharge:   Respiratory Treatments: ***  Oxygen Therapy:  {Therapy; copd oxygen:08182}  Ventilator:    { CC Vent FMCN:375717463}    Rehab Therapies: {THERAPEUTIC INTERVENTION:2553986582}  Weight Bearing Status/Restrictions: [employer]  Weight Bearin}  Other Medical Equipment (for information only, NOT a DME order):  {EQUIPMENT:847537517}  Other Treatments: ***    Patient's personal belongings (please select all that are sent with patient):  {CHP DME Belongings:343623819}    RN SIGNATURE:  {Esignature:321649644}    CASE MANAGEMENT/SOCIAL WORK SECTION    Inpatient Status Date: ***    Readmission Risk Assessment Score:  Readmission Risk              Risk of Unplanned Readmission:  24           Discharging to Facility/ Agency   Name:   Address:  Phone:  Fax:    Dialysis Facility (if applicable)   Name:  Address:  Dialysis Schedule:  Phone:  Fax:    / signature: {Esignature:844001929}    PHYSICIAN SECTION    Prognosis: {Prognosis:9401603169}    Condition at Discharge: Britt Granger Patient Condition:294610337}    Rehab Potential (if transferring to Rehab): {Prognosis:2604042950}    Recommended Labs or Other Treatments After Discharge: ***    Physician Certification: I certify the above information and transfer of Gianluca Dupont  is necessary for the continuing treatment of the diagnosis listed and that she requires {Admit to Appropriate Level of Care:12472} for {GREATER/LESS:438405095} 30 days.      Update Admission H&P: {CHP DME Changes in WOTVZ:215660985}    PHYSICIAN SIGNATURE:  {Esignature:475641918}

## 2022-10-28 NOTE — PROGRESS NOTES
[facility]  Nephrology follow-up note           Reason for Consult: JESSICA on CKD  Requesting Physician:  Dr. Lisa Elam history  Resting  Family met with hospice and have opted to go home with hospice    ROS: No fever  PSFH: No visitor    Scheduled Meds:   potassium phosphate IVPB  30 mmol IntraVENous Once    sodium bicarbonate  1,300 mg Oral BID    calcium gluconate IVPB  2,000 mg IntraVENous Once    potassium bicarb-citric acid  20 mEq Oral Once    saccharomyces boulardii  250 mg Oral BID    levothyroxine  100 mcg Oral Daily    gabapentin  100 mg Oral TID    sodium chloride flush  5-40 mL IntraVENous 2 times per day    enoxaparin  30 mg SubCUTAneous Daily    pantoprazole  40 mg IntraVENous Daily    metroNIDAZOLE  500 mg IntraVENous Q8H    levofloxacin  500 mg IntraVENous Q48H     Continuous Infusions:   sodium chloride 5 mL/hr at 10/27/22 1152     PRN Meds:.sodium chloride flush, sodium chloride, ondansetron **OR** ondansetron, polyethylene glycol, acetaminophen **OR** acetaminophen      History of Present Illness on 10/27/2022:    71 y.o. yo female with PMH of squamous cell carcinoma treated with cisplatin and XRT, hypertension, Crohn's disease, anxiety, CVA, hyperlipidemia, hypothyroidism, history of recurrent JESSICA who is admitted for generalized weakness and decreased p.o. intake for last 3 to 4 days prior to admission. According to patient's daughter, She apparently has not eaten solid foods for approximately 6 months. Daughter states that most of her nutrition was via milk and she would go through several gallons per week.    She was noted to have JESSICA, hyponatremia and metabolic acidosis and nephrology has been consulted  Patient was also found to be positive for COVID  She has been having diarrhea for the last few days      Physical exam:   Constitutional:  VITALS:  /64   Pulse 83   Temp 97.6 °F (36.4 °C) (Oral)   Resp 16   Ht 5' 1\" (1.549 m)   Wt 140 lb 8 oz (63.7 kg)   SpO2 98% BMI 26.55 kg/m²     Deferred due to COVID    Data/  Recent Labs     10/26/22  1228 10/27/22  0528   WBC 8.8 6.5   HGB 8.7* 7.7*   HCT 25.1*  22.1* 22.5*   MCV 94.1 93.3    188       Recent Labs     10/26/22  1228 10/27/22  0528 10/27/22  1355 10/28/22  1046   * 130* 129* 133*   K 4.2 3.4* 3.8 3.3*   CL 97* 101 102 102   CO2 16* 16* 14* 18*   GLUCOSE 99 88 96 85   PHOS 2.8 2.2* 2.8 2.0*   MG 2.20  --   --   --    BUN 32* 30* 28* 21*   CREATININE 2.5* 2.2* 2.1* 1.9*   LABGLOM 20* 23* 25* 28*     CT scan of the chest abdomen pelvis without contrast  Impression   1. Right lower lobe pneumonia. Recommend chest radiograph in 8 weeks to   confirm resolution. 2. Acute colitis involving the distal sigmoid colon. Assessment  -JESSICA on CKD III   Patient has had recurrent JESSICA's, creatinine on chart review seems to be around 1.9-2.1 at baseline   Creatinine back to recent baseline  -Hyponatremia, hypovolemic and decreased solute intake  -Metabolic acidosis in the setting of JESSICA along with diarrhea  -COVID positive  -Right lower lobe pneumonia  -Diarrhea  -Anemia  -History of squamous cell carcinoma of throat status post radiation therapy along with treatment with cisplatin    Plan  -Replace potassium phosphate, calcium  -Discontinue IV fluid    Noted plans for hospice    Thank you for the consultation. Please do not hesitate to call with questions. Kristen Martinez MD  Office: [phone]  Fax:    [phone]  [facility]

## 2022-10-28 NOTE — PROGRESS NOTES
Occupational Therapy and Physical Therapy  Patient meeting with hospice this morning. Will hold OT/PT treatment at this time. Will follow up later this date if appropriate.          MARIYA WilkersonR/CHAR #[id number]

## 2022-10-28 NOTE — PROGRESS NOTES
Missed call from [facility]. Daughter says that she will contact them tomorrow about products that were not delivered.

## 2022-10-28 NOTE — PROGRESS NOTES
Pt daughter states products were never delivered to home. I called [facility] and left message .

## 2022-10-28 NOTE — PROGRESS NOTES
PROGRESS NOTE  S:72 yrs Patient  admitted on 10/26/2022 with Colitis [K52.9]  Hyponatremia [E87.1]  Oropharyngeal cancer ([address] [C10.9]  Failure to thrive in adult [R62.7]  Pneumonia of right lower lobe due to infectious organism [J18.9] . Today she feels well. Denies any complaints. Family met with hospice and decided to pursue comfort care.  She continues to refuse PEG tube      Exam:   Vitals:    10/28/22 0232   BP: 124/64   Pulse: 83   Resp: 16   Temp: 97.6 °F (36.4 °C)   SpO2: 98%      General appearance: appears older than stated age and no distress  HEENT: Sclera clear, anicteric  Neck: supple, symmetrical, trachea midline  Lungs:  not examined  Heart: regular rate and rhythm  Abdomen: soft, non-tender; bowel sounds normal; no masses,  no organomegaly  Extremities: extremities normal, atraumatic, no cyanosis or edema     Medications: Reviewed    Labs:  CBC:   Recent Labs     10/26/22  1228 10/27/22  0528   WBC 8.8 6.5   HGB 8.7* 7.7*   HCT 25.1*  22.1* 22.5*   MCV 94.1 93.3    188     BMP:   Recent Labs     10/27/22  0528 10/27/22  1355 10/28/22  1046   * 129* 133*   K 3.4* 3.8 3.3*    102 102   CO2 16* 14* 18*   PHOS 2.2* 2.8 2.0*   BUN 30* 28* 21*   CREATININE 2.2* 2.1* 1.9*     LIVER PROFILE:   Recent Labs     10/26/22  1228   AST 22   ALT 15   LIPASE 19.0   PROT 6.7   BILIDIR <0.2   BILITOT 0.3   ALKPHOS 57     PT/INR:   Recent Labs     10/26/22  1228   INR 1.17*     Impression:71year-old female with history of hypertension, anxiety, Crohn's disease, hyperlipidemia, CVA, squamous cell carcinoma of the tongue status post PEG tube placement in May 2021 followed by chemoradiation therapy, underwent PEG tube removal in July 2022 now admitted with Colitis, Covid 19 and failure to thrive    Recommendation:  Continue supportive care  Encourage nutrition with supplements  PPI daily  Family decided to pursue hospice  Will sign off  Call with questions      Lilia Horne MD, MD  4:34 PM 10/28/2022

## 2022-10-28 NOTE — PROGRESS NOTES
Occupational Therapy Daily Treatment Note    Unit: 2 [place]  Date:  10/28/2022  Patient Name:    Grady Campbell  Admitting diagnosis:  Colitis [K52.9]  Hyponatremia [E87.1]  Oropharyngeal cancer ([address] [C10.9]  Failure to thrive in adult [R62.7]  Pneumonia of right lower lobe due to infectious organism [J18.9]  Admit Date:  10/26/2022  Precautions/Restrictions:   fall risk, IV, bed/chair alarm, telemetry, continuous pulse ox, and Droplet Plus precautions (+ COVID 19)      Discharge Recommendations: SNF  DME needs for discharge: defer to facility       Therapy recommendations for staff:   Assist of 1 (minimal assist) with use of rolling walker (RW) for all transfers to/from BSC/chair    AM-PAC Score: AM-PAC Inpatient Daily Activity Raw Score: 15  Home Health S4 Level: Level 1- Standard       Treatment Time:  11:40 - 12:10  Treatment number:  2  Total Treatment Time:   30 minutes    History of Present Illness: per Dea RAY H&P 10/26/22:  \"The patient is a 71 y.o. female with hx of throat cancer, chemo-induced neuropathy, CKD IV, Hypothyroidism who presented to [facility] ED with complaint of \"sick\". Patient states she has felt sick for the past couple of weeks. Endorses nausea, vomiting and non-productive cough. Otherwise she denies other issues. She is alert and oriented to her name and the place but tells me it's 1979. Despite this she does appear somewhat but overall is a poor historian. Patient's daughter is at bedside and majority of history is gathered from her. Patient has a history of throat cancer which has been treated by [facility] and she follows with Dr. Shaji Park with ENT. At one point she had a Gtube placed for nutrition and reportedly appeared to be doing well with this, however, the patient wanted it to be removed and since its removal several months ago the patient has drastically declined. She apparently has not eaten solid foods for approximately 6 months.  Daughter states that most of her nutrition was via milk and she would go through several gallons per week. She states that the patient has said \"nothing tastes good\" and \"it hurts to swallow\" although the patient does not endorse this to me. Daughter also reports that she has apparently had issues with nutrition previously as well. Daughter reports that the patient lives with her  who is having increasing difficulty taking care of her at home as she has essentially become fully dependent on him to the extent that he has apparently been carrying her around the house and to and from the car. Over the last week the patient has apparently only been drinking water occasionally. Vitals are stable. Labs reveal hyponatremia, metabolic acidosis, stable CKDIV, chronic anemia. Some time after I saw her, her COVID was positive. She was stable on room air when I saw her. Chest CT displayed right lower lobe pneumonia and acute colitis involving the distal sigmoid colon. CT head without acute findings. She is admitted for further care. \"    Subjective: Patient supine in bed and agreeable to treatment with encouragement. Pain   No  Rating: NA  Location:  Pain Medicine Status: Denies need      Bed Mobility:   Supine to Sit:  CGA  Sit to Supine:  CGA  Rolling:           CGA  Scooting:        CGA    Transfer Training:   Sit to stand:   CGA  Stand to sit:  CGA  Bed to Chair:  CGA and with use of RW  Bed to [facility]:   Not Tested  Standard toilet:   Not Tested    Activity Tolerance   Pt completed therapy session with No adverse symptoms noted w/activity  SpO2: 95%  HR: 100  BP:     Balance  Sitting Balance :     SBA  Standing Balance   CGA    ADL Training:   Upper body dressing:  Not Tested  Upper body bathing:  Not Tested  Lower body dressing:  Max A  Lower body bathing:  Max A  Toileting:    Max A  Grooming/Hygiene:  Not Tested  Feeding :   Not Tested    Therapeutic Exercise:   N/A    Patient Education:   Role of OT  Recommendations for DC  Safe RW use/hand placement    Positioning Needs:   Up in chair, call light and needs in reach. Alarm Set    Family Present:  Yes    Assessment: Patient limited by decreased activity tolerance. However, improved participation in mobility. Patient would benefit from continued skilled OT services to improve independence and activity tolerance. GOALS  To be met in 3 Visits:  1). Bed to toilet/BSC: Min A     To be met in 5 Visits:  1). Supine to/from Sit:             SBA  2). Upper Body Bathing:         SBA  3). Lower Body Bathing:         Min A  4). Upper Body Dressing:       SBA  5). Lower Body Dressing:       Min A  6).  Pt to demonstrate UE exs x 15 reps with minimal cues    Plan: cont with [facility], OTR/L #[id number]      If patient discharges from this facility prior to next visit, this note will serve as the Discharge Summary

## 2022-10-28 NOTE — DISCHARGE SUMMARY
Name:  Seth Enciso  Room:  0225/0225-02  MRN:    [id number]    Discharge Summary      This discharge summary is in conjunction with a complete physical exam done on the day of discharge. Discharging Physician: Dr. [place]: 10/26/2022  Discharge:  10/28/2022    HPI taken from admission H&P:    The patient is a 71 y.o. female with hx of throat cancer, chemo-induced neuropathy, CKD IV, Hypothyroidism who presented to [facility] ED with complaint of \"sick\". Patient states she has felt sick for the past couple of weeks. Endorses nausea, vomiting and non-productive cough. Otherwise she denies other issues. She is alert and oriented to her name and the place but tells me it's 1979. Despite this she does appear somewhat but overall is a poor historian. Patient's daughter is at bedside and majority of history is gathered from her. Patient has a history of throat cancer which has been treated by [facility] and she follows with Dr. Mackenzie Serna with ENT. At one point she had a Gtube placed for nutrition and reportedly appeared to be doing well with this, however, the patient wanted it to be removed and since its removal several months ago the patient has drastically declined. She apparently has not eaten solid foods for approximately 6 months. Daughter states that most of her nutrition was via milk and she would go through several gallons per week. She states that the patient has said \"nothing tastes good\" and \"it hurts to swallow\" although the patient does not endorse this to me. Daughter also reports that she has apparently had issues with nutrition previously as well. Daughter reports that the patient lives with her  who is having increasing difficulty taking care of her at home as she has essentially become fully dependent on him to the extent that he has apparently been carrying her around the house and to and from the car. Over the last week the patient has apparently only been drinking water occasionally. Vitals are stable. Labs reveal hyponatremia, metabolic acidosis, stable CKDIV, chronic anemia. Some time after I saw her, her COVID was positive. She was stable on room air when I saw her. Chest CT displayed right lower lobe pneumonia and acute colitis involving the distal sigmoid colon. CT head without acute findings. She is admitted for further care. Diagnoses this Admission and Hospital Course:    #RLL Pneumonia   -? Aspiration  -CT as below  -she is on room air  -IV PPI  -Abx to include Levaquin; Flagyl D#2   - monitor      Speech recommendations   NPO - pleasure feeds of thin liquid via cup only ; Critical Meds PO as tolerated  Risk management: stay upright for at least 30 mins after intake, oral care 2-3x/day to reduce adverse affects in the event of aspiration, small sips, increase physical mobility as able, slow rate of intake, general GERD precautions, and general aspiration precautions           #Acute sigmoid colitis   -seen on CT  -patient does not complain of diarrhea  -abx ordered as above for pna  -GI consulted      #CKDIV  #Metabolic acidosis  #Hyponatremia  -Cr appears at baseline   -Bicarb 16 on admission   -PCP recently has placed patient on allopurinol for hyperuricemia and sodium bicarb for metabolic acidosis. Unclear if patient is taking sodium bicarb.   -IVF with NS and 50 mEq of sodium bicarb at 100 mL/hr for now  -Nephrology consulted     #Nausea and vomiting  -antiemetics   -IVF  - denies at this time     #COVID  -positive on NAAT   -not hypoxic  -no indication for inpt tx  -droplet +  -?cough related to this     #Failure to thrive  #Poor oral intake  -?dysphagia vs behavioral   -patient denies painful swallowing, per daughter however patient has endorsed \"nothing tasting good\" and \"hurts to swallow\" at times  -daughter also reports that she has had issues with not eating in the past  -SLP eval  -GI consulted to consider replacement of Gtube  -May need to consider palliative care consult   -PTOT; will likely require SNF  -Daughter reports that patient's  has been carrying her around the house and to the car and he cannot take care of her anymore  - pt is not alert and oriented to make decisions at this time, daughter stated she will go home and speak with pt's  regarding code status. --> Palliative care consulted     #Hx of throat cancer  -follows with ENT, Dr. Evens Landry  -completed chemo/rad with [facility]  -Later, ENT referred to  and patient has seen only once. They ordered CT neck with IV contrast but patient did not receive  -Gtube placed at one point by Dr. Devin Weller. Patient started to gain weight and Gtube was removed per patient request a few months ago  -CT of chest and brain today thankfully does not appear to display CA mets     #Chemo-induced neuropathy  -home gabapentin dose is 200 mg TID  -patient's daughter states she takes more than prescribed  -given her poor renal function I have ordered half her home dose for now     #Anemia, appears chronic- anemia of chronic disease   -? ACD vs iron def  - hemoglobin 8.7--7.7  - monitor   -check iron studies, ferritin and peripheral smear     #Hypothyroidism  -TSH wnl  -continue synthroid        Palliative care consulted. Family wants comfort care only . Code status changed to DNR . They have met with hospice; and wish to take pt home with hospice care    DC home with home hospice care        Procedures (Please Review Full Report for Details)  N/A    Consults    Nephrology   GI       Physical Exam at Discharge:    /64   Pulse 83   Temp 97.6 °F (36.4 °C) (Oral)   Resp 16   Ht 5' 1\" (1.549 m)   Wt 140 lb 8 oz (63.7 kg)   SpO2 98%   BMI 26.55 kg/m²   Gen: No distress. Alert. Pale, appears chronically ill  Eyes: PERRL. No sclera icterus. No conjunctival injection. ENT: No discharge. Pharynx clear. Neck: No JVD. Trachea midline. Resp: No accessory muscle use. +right sided crackles. No wheezes. No rhonchi. CV: Regular rate. Regular rhythm. No murmur. No rub. No edema. Capillary Refill: Brisk,< 3 seconds   Peripheral Pulses: +2 palpable, equal bilaterally   GI: TTP overlying and surrounding hole from previous Gtube no s/s of infection of this. Non-distended. No masses. No organomegaly. Normal bowel sounds. Skin: Warm and dry. No nodule on exposed extremities. No rash on exposed extremities. M/S: No cyanosis. No joint deformity. No clubbing. Neuro: Awake. Grossly nonfocal    Psych: Oriented to person and place. No anxiety or agitation. Lab Results   Component Value Date    WBC 6.5 10/27/2022    HGB 7.7 (L) 10/27/2022    HCT 22.5 (L) 10/27/2022    MCV 93.3 10/27/2022     10/27/2022     Lab Results   Component Value Date     (L) 10/28/2022    K 3.3 (L) 10/28/2022     10/28/2022    CO2 18 (L) 10/28/2022    BUN 21 (H) 10/28/2022    CREATININE 1.9 (H) 10/28/2022    GLUCOSE 85 10/28/2022    CALCIUM 6.9 (L) 10/28/2022    PROT 6.7 10/26/2022    LABALBU 3.0 (L) 10/28/2022    BILITOT 0.3 10/26/2022    ALKPHOS 57 10/26/2022    AST 22 10/26/2022    ALT 15 10/26/2022    LABGLOM 28 (A) 10/28/2022    GFRAA 20 (A) 08/16/2022    AGRATIO 1.7 08/16/2022    GLOB 3.2 10/07/2021         CULTURES  CULTURES  Blood Culture NGTD       SARS-CoV-2, NAAT DETECTED        RADIOLOGY  CT CHEST ABDOMEN PELVIS WO CONTRAST Additional Contrast? None   Final Result   1. Right lower lobe pneumonia. Recommend chest radiograph in 8 weeks to   confirm resolution. 2. Acute colitis involving the distal sigmoid colon. CT Head W/O Contrast   Final Result   1. No acute intracranial findings. 2. Areas of white matter hypoattenuation which likely reflect chronic   microvascular ischemic changes. 3. Right mastoid effusion. 4. Mild mucosal thickening in the right maxillary sinus.                Discharge Medications     Medication List        START taking these medications      levoFLOXacin 250 MG tablet  Commonly known as: Levaquin  Take 1 tablet by mouth daily for 4 days     LORazepam 0.5 MG tablet  Commonly known as: Ativan  Take 1 tablet by mouth every 8 hours as needed for Anxiety for up to 5 days. morphine sulfate 20 MG/ML concentrated oral solution  Take 0.5 mLs by mouth every 4 hours as needed for Pain for up to 5 days. pantoprazole 40 MG tablet  Commonly known as: PROTONIX  Take 1 tablet by mouth every morning (before breakfast)     saccharomyces boulardii 250 MG capsule  Commonly known as: FLORASTOR  Take 1 capsule by mouth 2 times daily for 7 days            CHANGE how you take these medications      sodium bicarbonate 650 MG tablet  Take 2 tablets by mouth 2 times daily  What changed: how much to take            CONTINUE taking these medications      allopurinol 100 MG tablet  Commonly known as: Zyloprim  Take 1 tablet by mouth daily     gabapentin 100 MG capsule  Commonly known as: NEURONTIN  TAKE 2 CAPSULES BY MOUTH THREE TIMES DAILY     levothyroxine 100 MCG tablet  Commonly known as: SYNTHROID  Take 1 tablet by mouth daily               Where to Get Your Medications        These medications were sent to [id number] [place] [address], [address] [facility] [address]      Phone: [phone]   levoFLOXacin 250 MG tablet  LORazepam 0.5 MG tablet  morphine sulfate 20 MG/ML concentrated oral solution  pantoprazole 40 MG tablet  sodium bicarbonate 650 MG tablet       You can get these medications from any pharmacy    You don't need a prescription for these medications  saccharomyces boulardii 250 MG capsule           Discharged in stable condition  to home with hospice care     Total time 35 minutes. > 50%  dominated by coordination of care. Follow Up:   Follow up with hospice care     Mathew Melgoza MD

## 2022-10-30 LAB
BLOOD CULTURE, ROUTINE: NORMAL
CULTURE, BLOOD 2: NORMAL

## 2022-11-02 PROBLEM — T45.1X5A CHEMOTHERAPY-INDUCED PERIPHERAL NEUROPATHY (HCC): Status: ACTIVE | Noted: 2022-11-02

## 2022-11-02 PROBLEM — Z51.5 HOSPICE CARE: Status: ACTIVE | Noted: 2022-11-02

## 2022-11-02 PROBLEM — G62.0 CHEMOTHERAPY-INDUCED PERIPHERAL NEUROPATHY (HCC): Status: ACTIVE | Noted: 2022-11-02

## 2023-05-09 ENCOUNTER — TELEPHONE (OUTPATIENT)
Dept: FAMILY MEDICINE CLINIC | Age: 73
End: 2023-05-09

## 2023-05-09 NOTE — TELEPHONE ENCOUNTER
Spoke with patient spouse, states that she is no longer on hospice or palliative care, states they have not been there in 5 months or more.

## 2023-05-09 NOTE — PROGRESS NOTES
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 Leventhal, MD   Transplant Surgery Conor Dc MD, Chad Clifton MD       Transplant Eligibility: Cirrhosis with MELD, ETOH    Committee Review Decision: Approved    Relative Contraindications: None    Absolute Contraindications: None    Committee Chair Dwight Green MD verbally attested to the committee's decision.    Committee Discussion Details:      Approved to list.  Cardiac evaluation has been thoroughly reviewed by hepatology, surgery, cardiology, and anesthesiology.     Patient has tolerated her infusion of 2 units of PRBC. Family son and daughter at her bed side throughout the entire infusion. Tylenol and Benadryl given prior to infusion. Discharge instructions given verbally and written. Verbalized understanding back. Patient ambulated out to car with assist of son. Left in stable condition.

## 2023-05-09 NOTE — TELEPHONE ENCOUNTER
Spouse Luis Hinojosa) called asking for pcp to send pain meds into pharmacy for pt. Spouse states pt has been bedridden for over a year, in  and out of hospital, and has cancer. When I asked spouse who was the pts oncologist, he said she doesn't have the cancer anymore. Spouse states pt has generalized all over body pains, and has been taking otc tylenol, and they are requesting something stronger. Spouse told me to just tell Young Mark and see what she says. Please advise.  Thank you

## 2023-05-09 NOTE — TELEPHONE ENCOUNTER
Tried to call spouse Lisset Pratt to let him know that Fina Graves needs to see her in the office if she is needing something for pain.  VM was full unable to leave [facility]  We could refer her to Ashwini Diego, Evelina Mark does that the # is [phone], or  Fay [phone]

## 2023-05-16 ENCOUNTER — TELEPHONE (OUTPATIENT)
Dept: FAMILY MEDICINE CLINIC | Age: 73
End: 2023-05-16

## 2023-05-16 NOTE — TELEPHONE ENCOUNTER
Annia notified, states that [facility] stopped services in December. States they are working on figuring this out and will call back with any further questions.

## 2023-05-16 NOTE — TELEPHONE ENCOUNTER
Beth Zelaya  would like to know what hospice was taking care of the pt  Not in chart for me to help her with

## 2023-05-16 NOTE — TELEPHONE ENCOUNTER
Annia with [facility].  calling to report patient passed away Sunday 5/14/23 at her home. States it was ruled a natural death and patient did have a DNR. Wanting to know who will be signing the death certificate? Please advise.

## 2023-05-16 NOTE — TELEPHONE ENCOUNTER
[facility] [address].   See Casi Fletcher,  note from 10/28/22

## 2024-04-25 NOTE — PROGRESS NOTES
Detail Level: Detailed Occupational Therapy   Occupational Therapy Initial Assessment  Date: 2021   Patient Name: Tari Villalta  MRN: [id number]     : 1950    Date of Service: 2021    Discharge Recommendations: Tari Villalta scored a 18/24 on the AM-PAC ADL Inpatient form. Current research shows that an AM-PAC score of 18 or greater is typically associated with a discharge to the patient's home setting. Based on the patient's AM-PAC score, and their current ADL deficits, it is recommended that the patient have 2-3 sessions per week of Occupational Therapy at d/c to increase the patient's independence. At this time, this patient demonstrates the endurance and safety to discharge home with home OT services and a follow up treatment frequency of 2-3x/wk. Please see assessment section for further patient specific details. Pt's  is primary caregiver and is interested in [facility] OT services. HOME HEALTH CARE: LEVEL 1 STANDARD    - Initial home health evaluation to occur within 24-48 hours, in patient home   - Therapy to evaluate with goal of regaining prior level of functioning   - Therapy to evaluate if patient has [address] needs for personal care      If patient discharges prior to next session this note will serve as a discharge summary. Please see below for the latest assessment towards goals. OT Equipment Recommendations  Equipment Needed: No    Assessment   Performance deficits / Impairments: Decreased functional mobility ; Decreased ADL status; Decreased cognition;Decreased high-level IADLs;Decreased safe awareness  Assessment: Pt is limited in the above areas impacting independence and safety in ADLs and functional mobility. Pt would benefit from skilled inpatient OT services to address these deficits.   Treatment Diagnosis: Decreased functional status secondary to hyponatremia  Prognosis: Good  Decision Making: Low Complexity  OT Education: OT Role;Plan of Care;Transfer were consulted for further evaluation and managementHer Creatinine was less than 1 in April 2021She was hospitalized at [facility] renal function. 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 Creatinine was 2 on discharge on 7/23/2021. She also had hyponatremia at that time, sodium ranged from mid 120s to low 130s and was 128 on discharge. Sodium at this time is 117 on admission and has improved to 121 at the time of consult. She is also being treated for a UTI    Subjective   General  Chart Reviewed: Yes  Patient assessed for rehabilitation services?: Yes  Additional Pertinent Hx: hx alcohol abuse, HTN, HLD  Family / Caregiver Present: No  Diagnosis: hyponatremia  Subjective  Subjective: Pt supine in bed upon arrival; agreeable to eval.  Patient Currently in Pain: Denies  Vital Signs  Patient Currently in Pain: Denies  Social/Functional History  Social/Functional History  Lives With: Spouse  Type of Home: House  Home Layout: One level  Home Access: Stairs to enter with rails  Entrance Stairs - Number of Steps: 2 WENDY  Entrance Stairs - Rails: Both  Bathroom Shower/Tub: Tub/Shower unit (walk in shower in 's bathroom)  Bathroom Toilet: Standard  Bathroom Equipment: Grab bars in shower  Bathroom Accessibility: [facility] Help From: Family  ADL Assistance: Needs assistance ( \"helps with everything\" since the ca dx and chemo)  Homemaking Assistance: Needs assistance  Homemaking Responsibilities: No  Ambulation Assistance: Independent (normally no AD, but has cane if needed; reports she stays in bed most of the time in the last month since chemo)  Transfer Assistance: Independent  Active : No  Patient's  Info: has not driven in the last 4 months  Leisure & Hobbies: pt has 7 dogs  Additional Comments: Pt's  reports one fall 3 weeks ago without injury.  Pt does not use AD at home normally. Objective   Vision: Impaired  Vision Exceptions: Wears glasses for reading  Hearing: Within functional limits    Orientation  Overall Orientation Status: Within Functional Limits  Observation/Palpation  Posture: Fair (forward head and rounded shoulders.)  Observation: IV, catheter  Balance  Sitting Balance: Stand by assistance (sat EOB ~5 minutes with multiple cues not to stand prior to therapist ready d/t impulsivity)  Standing Balance: Contact guard assistance  Standing Balance  Time: ~1 min  Activity: functional transfer  Comment: no device  Functional Mobility  Functional - Mobility Device: No device  Activity: Other (~5 ft transfer EOB>recliner)  Assist Level: Contact guard assistance  Functional Mobility Comments: encouraged pt to walk around room or to bathroom with pt reporting \"no, I just want to sit in that chair\"  ADL  UE Dressing: Stand by assistance (donned button-down, long-sleeved jacket/shirt)  Toileting:  (schroeder catheter)  Additional Comments: Pt declined additional ADLs. Tone RUE  RUE Tone: Normotonic  Tone LUE  LUE Tone: Normotonic  Coordination  Movements Are Fluid And Coordinated: Yes     Bed mobility  Supine to Sit: Stand by assistance  Sit to Supine: Stand by assistance  Scooting: Contact guard assistance  Comment: Pt trying to climb out of bed, not listening to VCs. CGA to keep from pulling out IV/catheter lines. Transfers  Stand Step Transfers: Contact guard assistance  Sit to stand: Contact guard assistance  Stand to sit: Contact guard assistance  Transfer Comments: CGA for safety due to impulsivity and weakness  Vision - Basic Assessment  Prior Vision: Wears glasses only for reading  Patient Visual Report: No visual complaint reported. Cognition  Overall Cognitive Status: Exceptions  Arousal/Alertness: Appropriate responses to stimuli  Following Commands: Inconsistently follows commands; Follows one step commands with repetition  Attention Span: Difficulty dividing attention; Difficulty attending to directions  Memory: Decreased recall of recent events  Safety Judgement: Decreased awareness of need for assistance  Problem Solving: Assistance required to generate solutions;Assistance required to correct errors made;Assistance required to identify errors made;Assistance required to implement solutions;Decreased awareness of errors  Insights: Decreased awareness of deficits  Initiation: Requires cues for some  Sequencing: Requires cues for some        Sensation  Overall Sensation Status: WFL        LUE AROM (degrees)  LUE AROM : WFL  Left Hand AROM (degrees)  Left Hand AROM: WFL  RUE AROM (degrees)  RUE AROM : WFL  Right Hand AROM (degrees)  Right Hand AROM: WFL  LUE Strength  Gross LUE Strength: WFL  RUE Strength  Gross RUE Strength: WFL                   Plan   Plan  Times per week: 3-5  Current Treatment Recommendations: Functional Mobility Training, Self-Care / ADL, Safety Education & Training, Home Management Training, Strengthening, Balance Training, Endurance Training    G-Code     OutComes Score                                                  AM-PAC Score        AM-[facility] Inpatient Daily Activity Raw Score: 18 (08/31/21 1434)  AM-PAC Inpatient ADL T-Scale Score : 38.66 (08/31/21 1434)  ADL Inpatient CMS 0-100% Score: 46.65 (08/31/21 1434)  ADL Inpatient CMS G-Code Modifier : CK (08/31/21 1434)    Goals  Short term goals  Time Frame for Short term goals: d/c  Short term goal 1: Pt will complete bed mobility mod I. Short term goal 2: Pt will complete functional mobility mod I. Short term goal 3: Pt will complete functional transfer mod I. Short term goal 4: Pt will complete LB ADLs mod I. Short term goal 5: Pt will complete UB ADLs mod I.   Patient Goals   Patient goals : return home       Therapy Time   Individual Concurrent Group Co-treatment   Time In 3989         Time Out 1306         Minutes 25         Timed Code Treatment Minutes: [address], OTR/L [id number] : Yes
